# Patient Record
Sex: FEMALE | Race: BLACK OR AFRICAN AMERICAN | Employment: OTHER | URBAN - METROPOLITAN AREA
[De-identification: names, ages, dates, MRNs, and addresses within clinical notes are randomized per-mention and may not be internally consistent; named-entity substitution may affect disease eponyms.]

---

## 2017-01-03 ENCOUNTER — OFFICE VISIT (OUTPATIENT)
Dept: INTERNAL MEDICINE CLINIC | Age: 72
End: 2017-01-03

## 2017-01-03 VITALS
SYSTOLIC BLOOD PRESSURE: 136 MMHG | HEIGHT: 64 IN | TEMPERATURE: 97.7 F | WEIGHT: 213 LBS | DIASTOLIC BLOOD PRESSURE: 81 MMHG | HEART RATE: 86 BPM | OXYGEN SATURATION: 96 % | BODY MASS INDEX: 36.37 KG/M2 | RESPIRATION RATE: 18 BRPM

## 2017-01-03 DIAGNOSIS — E03.4 HYPOTHYROIDISM DUE TO ACQUIRED ATROPHY OF THYROID: ICD-10-CM

## 2017-01-03 DIAGNOSIS — D64.9 ANEMIA, UNSPECIFIED: ICD-10-CM

## 2017-01-03 DIAGNOSIS — I10 ESSENTIAL HYPERTENSION: Primary | ICD-10-CM

## 2017-01-03 DIAGNOSIS — E11.9 TYPE 2 DIABETES MELLITUS WITHOUT COMPLICATION, UNSPECIFIED LONG TERM INSULIN USE STATUS: ICD-10-CM

## 2017-01-03 DIAGNOSIS — B18.2 CHRONIC HEPATITIS C WITHOUT HEPATIC COMA (HCC): ICD-10-CM

## 2017-01-03 NOTE — PROGRESS NOTES
1. Have you been to the ER, urgent care clinic since your last visit? Hospitalized since your last visit?no  2. Have you seen or consulted any other health care providers outside of the [address] [place] [address] since your last visit? Include any pap smears or colon screening.  no

## 2017-01-03 NOTE — PROGRESS NOTES
SUBJECTIVE:   Ms. Fani Moser is a 80 y.o. female who is here for the following complaint, and also for follow up of routine medical issues. She had been seeing Dr. Mini Ly, and Dr. Sukhi Ho before that. Chief Complaint   Patient presents with    Hypertension    Follow-up     pt here today for 4 month routine f.u    Other     pt refused f/u shot       DM: 128 yesterday. Low 100s generally. No tingling other than the left 2nd toe--better. Fay Petar Her numbness L foot has improved. LBP is stable. \"I was told I have arthritis in the spine\" by Dr. Yisroel Denver, orthopedist.  She has been through treatment with Dr. Robin Erickson, now seeing someone else in his office, for Hep C, obtained during a prior blood transfusion. Seeing Nephrologist, Dr. Rosalinda Gamez. At this time, she is otherwise doing well and has brought no other complaints to my attention today. For a list of the medical issues addressed today, see the assessment and plan below. PMH:   Past Medical History   Diagnosis Date    Arthritis      spine    Cancer ([address]      kidney (right)    Chronic kidney disease      right kidney removed    Diabetes ([address]      She is controlling with weight loss    Diabetes mellitus ([address] 10/8/2014    Hepatitis C      hx hep C from blood transfusion per pt; Treated by Dr. Colbert Dakin Hyperlipidemia     Hypertension     Personal history of renal cancer 3/2/2015    Thyroid disease 1999     Thyroidectomy       Past Surgical History   Procedure Laterality Date    Pr breast surgery procedure unlisted  1996     breast reduction    Hx urological  1996     kidney removed on right    Hx other surgical  1999     thyroidectomy    Hx other surgical       colonoscopy x 2 - ?polyps    Hx colonoscopy  3/5/12     Repeat in 5 years (Dr. Robin Erickson)    Hx tonsillectomy       2or 4years old    Hx hysterectomy  19    Hx gyn  1980s     tubal pregnancy    Hx oophorectomy  1995       All: She is allergic to codeine.    Current Outpatient Prescriptions   Medication Sig    metFORMIN (GLUCOPHAGE) 500 mg tablet Take 1 Tab by mouth two (2) times daily (with meals).  amLODIPine (NORVASC) 5 mg tablet Take 5 mg by mouth daily.  valsartan (DIOVAN) 160 mg tablet Take  by mouth daily.  hydrochlorothiazide (HYDRODIURIL) 25 mg tablet Take 1 Tab by mouth daily as needed.  atorvastatin (LIPITOR) 20 mg tablet Take 1 Tab by mouth daily.  multivitamin with iron-mineral 0.8 mg cmpk Take 1 Packet by mouth daily.  lancets 30 gauge misc 1 Lancet by SubCUTAneous route daily.  levothyroxine (SYNTHROID) 50 mcg tablet TAKE ONE TABLET BY MOUTH EVERY DAY BEFORE  BREAKFAST    glucose blood VI test strips (BLOOD GLUCOSE TEST) strip One touch ultra glucose strips. Check blood sugar 2-3 times per week    acetaminophen (TYLENOL EXTRA STRENGTH) 500 mg tablet Take 500-1,000 mg by mouth every six (6) hours as needed for Pain.  cloNIDine (CATAPRES) 0.1 mg tablet Take 0.1 mg by mouth two (2) times a day.  Blood-Glucose Meter (ONE TOUCH ULTRAMINI) monitoring kit Check blood sugar daily    amLODIPine-valsartan (EXFORGE) 5-160 mg per tablet Take 1 Tab by mouth daily.  OT ULTRA/FASTTRACK CONTROL soln 1 Drop by Other route every seven (7) days. No current facility-administered medications for this visit. FH: Her family history includes Diabetes in her daughter, mother, and sister; Heart Disease in her father; Hypertension in her daughter and son; Kidney Disease in her father and mother; No Known Problems in her son. SH: Retired Lpn, doing private duty nursing. She reports that she has quit smoking. She has never used smokeless tobacco. She reports that she does not drink alcohol or use illicit drugs. ROS: See above; Complete ROS otherwise negative.      OBJECTIVE:   Vitals:   Visit Vitals    /81 (BP 1 Location: Left arm, BP Patient Position: Sitting)    Pulse 86    Temp 97.7 °F (36.5 °C) (Oral)    Resp 18    Ht 5' 4\" (1.626 m)    Wt 213 lb (96.6 kg)    SpO2 96%    BMI 36.56 kg/m2      Gen: Pleasant 80 y.o.  female in NAD. HEENT: PERRLA. EOMI. OP moist and pink. Neck: Supple. No LAD. HEART: RRR, No M/G/R.    LUNGS: CTAB No W/R. ABDOMEN: S, NT, ND, BS+. EXTREMITIES: Warm. No C/C/E.  MUSCULOSKELETAL: Normal ROM, muscle strength 5/5 all groups. NEURO: Alert and oriented x 3. Cranial nerves grossly intact. No focal sensory or motor deficits noted. SKIN: Warm. Dry. Keloid at site of prior thyroid surgery. Lab Results   Component Value Date/Time    Sodium 142 08/04/2016 11:06 AM    Potassium 4.5 08/04/2016 11:06 AM    Chloride 102 08/04/2016 11:06 AM    CO2 24 08/04/2016 11:06 AM    Anion gap 11 03/03/2013 04:25 PM    Glucose 128 08/04/2016 11:06 AM    BUN 16 08/04/2016 11:06 AM    Creatinine 1.09 08/04/2016 11:06 AM    BUN/Creatinine ratio 15 08/04/2016 11:06 AM    GFR est AA 59 08/04/2016 11:06 AM    GFR est non-AA 52 08/04/2016 11:06 AM    Calcium 9.4 08/04/2016 11:06 AM    Bilirubin, total 0.2 08/04/2016 11:06 AM    ALT 15 08/04/2016 11:06 AM    AST 19 08/04/2016 11:06 AM    Alk.  phosphatase 100 08/04/2016 11:06 AM    Protein, total 6.2 08/04/2016 11:06 AM    Albumin 3.8 08/04/2016 11:06 AM    Globulin 4.0 03/03/2013 04:25 PM    A-G Ratio 1.6 08/04/2016 11:06 AM       Lab Results   Component Value Date/Time    Cholesterol, total 147 08/04/2016 11:06 AM    HDL Cholesterol 61 08/04/2016 11:06 AM    LDL, calculated 61 08/04/2016 11:06 AM    Triglyceride 125 08/04/2016 11:06 AM    CHOL/HDL Ratio 3.7 12/29/2009 07:16 AM        Lab Results   Component Value Date/Time    Hemoglobin A1c 7.6 08/04/2016 11:06 AM       Lab Results   Component Value Date/Time    WBC 8.6 08/04/2016 11:06 AM    HGB 12.6 08/04/2016 11:06 AM    HCT 39.3 08/04/2016 11:06 AM    PLATELET 519 50/75/3276 11:06 AM    MCV 83 08/04/2016 11:06 AM       Lab Results   Component Value Date/Time    TSH 1.230 08/04/2016 11:06 AM    TSH 1.760 12/03/2015 08:53 AM         ASSESSMENT/ PLAN:   Diabetes: at last check, controlled. Continue current measures. Hypertension: Fine today. Thyroid disease: Euthyroid. Watch TSH. Chronic kidney disease:  Reviewed labs. Watch labs. Arthritis: Chronic and stable. Hyperlipidemia: Doing worse. Discussed her high risk of cardiovascular complications. Rx for lipitor 20. Hepatitis C: F/U with GI. Obesity: Diet and exercise. Weight down a bit; keep up the good work. Neuropathy L toe: ?Sciatica vs other compressive neuropathy. Doing better lately. Follow-up Disposition:  Return in about 4 months (around 5/3/2017) for HTN. I have reviewed the patient's medications and risks/side effects/benefits were discussed. Diagnosis(-es) explained to patient and questions answered. Literature provided where appropriate.

## 2017-01-03 NOTE — MR AVS SNAPSHOT
Visit Information Date & Time Provider Department Dept. Phone Encounter #  
 1/3/2017  2:00 PM Ginny Hernandez, [address] [place] [address],[address] [phone] [id number] Follow-up Instructions Return in about 4 months (around 5/3/2017) for HTN. Follow-up and Disposition History Upcoming Health Maintenance Date Due DTaP/Tdap/Td series (1 - Tdap) 3/7/1967 ZOSTER VACCINE AGE 60> 3/7/2006 COLONOSCOPY 3/12/2017 HEMOGLOBIN A1C Q6M 2/4/2017 FOOT EXAM Q1 4/7/2017 MEDICARE YEARLY EXAM 5/10/2017 MICROALBUMIN Q1 8/4/2017 LIPID PANEL Q1 8/4/2017 EYE EXAM RETINAL OR DILATED Q1 8/30/2017 BREAST CANCER SCRN MAMMOGRAM 4/11/2018 GLAUCOMA SCREENING Q2Y 8/30/2018 Allergies as of 1/3/2017  Review Complete On: 1/3/2017 By: Ginny Hernandez MD  
  
 Severity Noted Reaction Type Reactions Codeine  03/23/2011    Hives Current Immunizations  Reviewed on 5/9/2016 Name Date Influenza Vaccine Split 2/1/2012 PPD 2/1/2012 Pneumococcal Conjugate (PCV-13) 2/11/2015 Pneumococcal Vaccine (Unspecified Type) 2/1/2012 Not reviewed this visit You Were Diagnosed With   
  
 Codes Comments Essential hypertension    -  Primary ICD-10-CM: I10 
ICD-9-CM: 401.9 Type 2 diabetes mellitus without complication, unspecified long term insulin use status (HCC)     ICD-10-CM: E11.9 ICD-9-CM: 250.00 Hypothyroidism due to acquired atrophy of thyroid     ICD-10-CM: E03.4 ICD-9-CM: 244.8, 246.8 Anemia, unspecified     ICD-10-CM: D64.9 ICD-9-CM: 985. 9 Chronic hepatitis C without hepatic coma (HCC)     ICD-10-CM: B18.2 ICD-9-CM: 070.54 Vitals BP Pulse Temp Resp Height(growth percentile) Weight(growth percentile) 136/81 (BP 1 Location: Left arm, BP Patient Position: Sitting) 86 97.7 °F (36.5 °C) (Oral) 18 5' 4\" (1.626 m) 213 lb (96.6 kg) SpO2 BMI OB Status Smoking Status 96% 36.56 kg/m2 Hysterectomy Former Smoker Vitals History BMI and BSA Data Body Mass Index Body Surface Area  
 36.56 kg/m 2 2.09 m 2 Preferred Pharmacy Pharmacy Name Phone [facility] [address] [address] Your Updated Medication List  
  
   
This list is accurate as of: 1/3/17  3:00 PM.  Always use your most recent med list. amLODIPine 5 mg tablet Commonly known as:  Mercedes Pace Take 5 mg by mouth daily. amLODIPine-valsartan 5-160 mg per tablet Commonly known as:  Evins Ripa Take 1 Tab by mouth daily. atorvastatin 20 mg tablet Commonly known as:  LIPITOR Take 1 Tab by mouth daily. Blood-Glucose Meter monitoring kit Commonly known as:  Dawson Elwood Check blood sugar daily  
  
 cloNIDine HCl 0.1 mg tablet Commonly known as:  CATAPRES Take 0.1 mg by mouth two (2) times a day. glucose blood VI test strips strip Commonly known as:  blood glucose test  
One touch ultra glucose strips. Check blood sugar 2-3 times per week  
  
 hydroCHLOROthiazide 25 mg tablet Commonly known as:  HYDRODIURIL Take 1 Tab by mouth daily as needed. lancets 30 gauge Misc 1 Lancet by SubCUTAneous route daily. levothyroxine 50 mcg tablet Commonly known as:  SYNTHROID  
TAKE ONE TABLET BY MOUTH EVERY DAY BEFORE  BREAKFAST  
  
 metFORMIN 500 mg tablet Commonly known as:  GLUCOPHAGE Take 1 Tab by mouth two (2) times daily (with meals). multivitamin with iron-mineral 0.8 mg Cmpk Take 1 Packet by mouth daily. OT Ultra/FastTrack Control Soln Generic drug:  Blood Glucose Control, Normal  
1 Drop by Other route every seven (7) days. TYLENOL EXTRA STRENGTH 500 mg tablet Generic drug:  acetaminophen Take 500-1,000 mg by mouth every six (6) hours as needed for Pain.  
  
 valsartan 160 mg tablet Commonly known as:  DIOVAN Take  by mouth daily. Prescriptions Sent to Pharmacy Refills glucose blood VI test strips (BLOOD GLUCOSE TEST) strip 6 Sig: One touch ultra glucose strips. Check blood sugar 2-3 times per week Class: Normal  
 Pharmacy: [address], [place] N[place]ak  #: [phone] Follow-up Instructions Return in about 4 months (around 5/3/2017) for HTN. Introducing [facility] & HEALTH SERVICES! Dear Soraida Pérez: Thank you for requesting a [employer] account. Our records indicate that you already have an active [employer] account. You can access your account anytime at [email or web address] [email or web address] Did you know that you can access your hospital and ER discharge instructions at any time in [employer]? You can also review all of your test results from your hospital stay or ER visit. Additional Information If you have questions, please visit the Frequently Asked Questions section of the [employer] website at [email or web address] Remember, [employer] is NOT to be used for urgent needs. For medical emergencies, dial 911. Now available from your iPhone and Android! Please provide this summary of care documentation to your next provider. Your primary care clinician is listed as [place]. If you have any questions after today's visit, please call [phone].

## 2017-02-27 RX ORDER — LEVOTHYROXINE SODIUM 50 UG/1
TABLET ORAL
Qty: 90 TAB | Refills: 0 | Status: SHIPPED | OUTPATIENT
Start: 2017-02-27 | End: 2017-07-16 | Stop reason: SDUPTHER

## 2017-04-12 ENCOUNTER — HOSPITAL ENCOUNTER (OUTPATIENT)
Dept: MAMMOGRAPHY | Age: 72
Discharge: HOME | End: 2017-04-12
Attending: INTERNAL MEDICINE
Payer: MEDICARE

## 2017-04-12 DIAGNOSIS — Z12.31 VISIT FOR SCREENING MAMMOGRAM: ICD-10-CM

## 2017-04-12 PROCEDURE — 77067 SCR MAMMO BI INCL CAD: CPT

## 2017-05-04 ENCOUNTER — OFFICE VISIT (OUTPATIENT)
Dept: INTERNAL MEDICINE CLINIC | Age: 72
End: 2017-05-04

## 2017-05-04 VITALS
HEIGHT: 64 IN | DIASTOLIC BLOOD PRESSURE: 84 MMHG | TEMPERATURE: 97.6 F | WEIGHT: 210.6 LBS | OXYGEN SATURATION: 98 % | HEART RATE: 88 BPM | SYSTOLIC BLOOD PRESSURE: 134 MMHG | BODY MASS INDEX: 35.96 KG/M2 | RESPIRATION RATE: 16 BRPM

## 2017-05-04 DIAGNOSIS — I10 ESSENTIAL HYPERTENSION: ICD-10-CM

## 2017-05-04 DIAGNOSIS — E03.4 HYPOTHYROIDISM DUE TO ACQUIRED ATROPHY OF THYROID: ICD-10-CM

## 2017-05-04 DIAGNOSIS — D64.9 ANEMIA, UNSPECIFIED: ICD-10-CM

## 2017-05-04 DIAGNOSIS — Z00.00 ROUTINE GENERAL MEDICAL EXAMINATION AT A HEALTH CARE FACILITY: Primary | ICD-10-CM

## 2017-05-04 DIAGNOSIS — Z13.39 SCREENING FOR ALCOHOLISM: ICD-10-CM

## 2017-05-04 DIAGNOSIS — E11.9 TYPE 2 DIABETES MELLITUS WITHOUT COMPLICATION, UNSPECIFIED LONG TERM INSULIN USE STATUS: ICD-10-CM

## 2017-05-04 NOTE — MR AVS SNAPSHOT
Visit Information Date & Time Provider Department Dept. Phone Encounter #  
 5/4/2017  9:00 AM Bella Jimenez, [address] [id number] Follow-up Instructions Return in about 6 months (around 11/4/2017) for HTN, etc.  
 Follow-up and Disposition History Upcoming Health Maintenance Date Due DTaP/Tdap/Td series (1 - Tdap) 3/7/1967 ZOSTER VACCINE AGE 60> 3/7/2006 HEMOGLOBIN A1C Q6M 2/4/2017 COLONOSCOPY 3/12/2017 INFLUENZA AGE 9 TO ADULT 8/1/2017 MICROALBUMIN Q1 8/4/2017 LIPID PANEL Q1 8/4/2017 EYE EXAM RETINAL OR DILATED Q1 8/30/2017 FOOT EXAM Q1 5/4/2018 MEDICARE YEARLY EXAM 5/5/2018 GLAUCOMA SCREENING Q2Y 8/30/2018 BREAST CANCER SCRN MAMMOGRAM 4/12/2019 Allergies as of 5/4/2017  Review Complete On: 5/4/2017 By: Bella Jimenez MD  
  
 Severity Noted Reaction Type Reactions Codeine  03/23/2011    Hives Current Immunizations  Reviewed on 5/9/2016 Name Date Influenza Vaccine Split 2/1/2012 PPD 2/1/2012 Pneumococcal Conjugate (PCV-13) 2/11/2015 Pneumococcal Vaccine (Unspecified Type) 2/1/2012 Not reviewed this visit You Were Diagnosed With   
  
 Codes Comments Routine general medical examination at a health care facility    -  Primary ICD-10-CM: Z00.00 ICD-9-CM: V70.0 Type 2 diabetes mellitus without complication, unspecified long term insulin use status     ICD-10-CM: E11.9 ICD-9-CM: 250.00 Essential hypertension     ICD-10-CM: I10 
ICD-9-CM: 401.9 Hypothyroidism due to acquired atrophy of thyroid     ICD-10-CM: E03.4 ICD-9-CM: 244.8, 246.8 Anemia, unspecified     ICD-10-CM: D64.9 ICD-9-CM: 285.9 Screening for alcoholism     ICD-10-CM: Z13.89 ICD-9-CM: V79.1 Vitals BP Pulse Temp Resp Height(growth percentile) Weight(growth percentile)  134/84 (BP 1 Location: Left arm, BP Patient Position: Sitting) 88 97.6 °F (36.4 °C) (Oral) 16 5' 4\" (1.626 m) 210 lb 9.6 oz (95.5 kg) LMP SpO2 BMI OB Status Smoking Status (LMP Unknown) 98% 36.15 kg/m2 Hysterectomy Former Smoker Vitals History BMI and BSA Data Body Mass Index Body Surface Area  
 36.15 kg/m 2 2.08 m 2 Preferred Pharmacy Pharmacy Name Phone [facility] [address] [address] Your Updated Medication List  
  
   
This list is accurate as of: 5/4/17  9:37 AM.  Always use your most recent med list. amLODIPine 5 mg tablet Commonly known as:  Axel Alli Take 5 mg by mouth daily. amLODIPine-valsartan 5-160 mg per tablet Commonly known as:  Toney Parkinson Take 1 Tab by mouth daily. atorvastatin 20 mg tablet Commonly known as:  LIPITOR Take 1 Tab by mouth daily. Blood-Glucose Meter monitoring kit Commonly known as:  SIMTEKl Check blood sugar daily  
  
 cloNIDine HCl 0.1 mg tablet Commonly known as:  CATAPRES Take 0.1 mg by mouth two (2) times a day. diph,Pertuss(Acell),Tet Vac-PF 2 Lf-(2.5-5-3-5 mcg)-5Lf/0.5 mL susp Commonly known as:  ADACEL  
0.5 mL by IntraMUSCular route once for 1 dose. glucose blood VI test strips strip Commonly known as:  blood glucose test  
One touch ultra glucose strips. Check blood sugar 2-3 times per week  
  
 hydroCHLOROthiazide 25 mg tablet Commonly known as:  HYDRODIURIL Take 1 Tab by mouth daily as needed. lancets 30 gauge Misc 1 Lancet by SubCUTAneous route daily. levothyroxine 50 mcg tablet Commonly known as:  SYNTHROID  
TAKE ONE TABLET BY MOUTH ONCE DAILY BEFORE BREAKFAST  
  
 metFORMIN 500 mg tablet Commonly known as:  GLUCOPHAGE Take 1 Tab by mouth two (2) times daily (with meals). multivitamin with iron-mineral 0.8 mg Cmpk Take 1 Packet by mouth daily. OT Ultra/FastTrack Control Soln Generic drug:  Blood Glucose Control, Normal  
 1 Drop by Other route every seven (7) days. TYLENOL EXTRA STRENGTH 500 mg tablet Generic drug:  acetaminophen Take 500-1,000 mg by mouth every six (6) hours as needed for Pain.  
  
 valsartan 160 mg tablet Commonly known as:  DIOVAN Take  by mouth daily. varicella zoster vacine live 19,400 unit/0.65 mL Susr injection Commonly known as:  ZOSTAVAX  
1 Vial by SubCUTAneous route once for 1 dose. Prescriptions Printed Refills  
 varicella zoster vacine live (ZOSTAVAX) 19,400 unit/0.65 mL susr injection 0 Si Vial by SubCUTAneous route once for 1 dose. Class: Print Route: SubCUTAneous diph,Pertuss,Acell,,Tet Vac-PF (ADACEL) 2 Lf-(2.5-5-3-5 mcg)-5Lf/0.5 mL susp 0 Si.5 mL by IntraMUSCular route once for 1 dose. Class: Print Route: IntraMUSCular We Performed the Following CBC WITH AUTOMATED DIFF [61903 CPT(R)] HEMOGLOBIN A1C WITH EAG [66704 CPT(R)]  DIABETES FOOT EXAM [7 Custom] LIPID PANEL [51641 CPT(R)] METABOLIC PANEL, COMPREHENSIVE [91447 CPT(R)] MICROALBUMIN, UR, RAND L340227 CPT(R)] T4, FREE D0960697 CPT(R)] TSH 3RD GENERATION [80505 CPT(R)] Follow-up Instructions Return in about 6 months (around 2017) for HTN, etc.  
  
  
Patient Instructions Medicare Part B Preventive Services Limitations Recommendation Scheduled Bone Mass Measurement 
(age 72 & older, biennial) Requires diagnosis related to osteoporosis or estrogen deficiency. Biennial benefit unless patient has history of long-term glucocorticoid tx or baseline is needed because initial test was by other method Cardiovascular Screening Blood Tests (every 5 years) Total cholesterol, HDL, Triglycerides Order as a panel if possible Colorectal Cancer Screening 
-Fecal occult blood test (annual) -Flexible sigmoidoscopy (5y) 
-Screening colonoscopy (10y) -Barium Enema Counseling to Prevent Tobacco Use (up to 8 sessions per year) - Counseling greater than 3 and up to 10 minutes - Counseling greater than 10 minutes Patients must be asymptomatic of tobacco-related conditions to receive as preventive service Diabetes Screening Tests (at least every 3 years, Medicare covers annually or at 6-month intervals for prediabetic patients) Fasting blood sugar (FBS) or glucose tolerance test (GTT) Patient must be diagnosed with one of the following: 
-Hypertension, Dyslipidemia, obesity, previous impaired FBS or GTT 
Or any two of the following: overweight, FH of diabetes, age ? 72, history of gestational diabetes, birth of baby weighing more than 9 pounds Diabetes Self-Management Training (DSMT) (no USPSTF recommendation) Requires referral by treating physician for patient with diabetes or renal disease. 10 hours of initial DSMT session of no less than 30 minutes each in a continuous 12-month period. 2 hours of follow-up DSMT in subsequent years. Glaucoma Screening (no USPSTF recommendation) Diabetes mellitus, family history, , age 48 or over,  American, age 72 or over Human Immunodeficiency Virus (HIV) Screening (annually for increased risk patients) HIV-1 and HIV-2 by EIA, RENAN, rapid antibody test, or oral mucosa transudate Patient must be at increased risk for HIV infection per USPSTF guidelines or pregnant. Tests covered annually for patients at increased risk. Pregnant patients may receive up to 3 test during pregnancy. Medical Nutrition Therapy (MNT) (for diabetes or renal disease not recommended schedule) Requires referral by treating physician for patient with diabetes or renal disease. Can be provided in same year as diabetes self-management training (DSMT), and CMS recommends medical nutrition therapy take place after DSMT. Up to 3 hours for initial year and 2 hours in subsequent years. Shingles Vaccination A shingles vaccine is also recommended once in a lifetime after age 61 Seasonal Influenza Vaccination (annually) Pneumococcal Vaccination (once after 65) Hepatitis B Vaccinations (if medium/high risk) Medium/high risk factors:  End-stage renal disease, Hemophiliacs who received Factor VIII or IX concentrates, Clients of institutions for the mentally retarded, Persons who live in the same house as a HepB virus carrier, Homosexual men, Illicit injectable drug abusers. Screening Mammography (biennial age 54-69) Annually (age 36 or over) Screening Pap Tests and Pelvic Examination (up to age 79 and after 79 if unknown history or abnormal study last 10 years) Every 24 months except high risk Ultrasound Screening for Abdominal Aortic Aneurysm (AAA) (once) Patient must be referred through IPPE and not have had a screening for abdominal aortic aneurysm before under Medicare. Limited to patients who meet one of the following criteria: 
- Men who are 73-68 years old and have smoked more than 100 cigarettes in their lifetime. 
-Anyone with a FH of AAA 
-Anyone recommended for screening by USPSTF Introducing [facility] SERVICES! Dear Katie Agarwal: Thank you for requesting a [employer] account. Our records indicate that you already have an active [employer] account. You can access your account anytime at [email or web address] Did you know that you can access your hospital and ER discharge instructions at any time in [employer]? You can also review all of your test results from your hospital stay or ER visit. Additional Information If you have questions, please visit the Frequently Asked Questions section of the [employer] website at [email or web address] Remember, [employer] is NOT to be used for urgent needs. For medical emergencies, dial 911. Now available from your iPhone and Android! Please provide this summary of care documentation to your next provider. Your primary care clinician is listed as [place]. If you have any questions after today's visit, please call [phone].

## 2017-05-04 NOTE — PROGRESS NOTES
1. Have you been to the ER, urgent care clinic since your last visit? Hospitalized since your last visit?no    2. Have you seen or consulted any other health care providers outside of the [address] [place] [address] since your last visit? Include any pap smears or colon screening.  no

## 2017-05-04 NOTE — PATIENT INSTRUCTIONS
Medicare Part B Preventive Services Limitations Recommendation Scheduled   Bone Mass Measurement  (age 72 & older, biennial) Requires diagnosis related to osteoporosis or estrogen deficiency. Biennial benefit unless patient has history of long-term glucocorticoid tx or baseline is needed because initial test was by other method     Cardiovascular Screening Blood Tests (every 5 years)  Total cholesterol, HDL, Triglycerides Order as a panel if possible     Colorectal Cancer Screening  -Fecal occult blood test (annual)  -Flexible sigmoidoscopy (5y)  -Screening colonoscopy (10y)  -Barium Enema      Counseling to Prevent Tobacco Use (up to 8 sessions per year)  - Counseling greater than 3 and up to 10 minutes  - Counseling greater than 10 minutes Patients must be asymptomatic of tobacco-related conditions to receive as preventive service     Diabetes Screening Tests (at least every 3 years, Medicare covers annually or at 6-month intervals for prediabetic patients)    Fasting blood sugar (FBS) or glucose tolerance test (GTT) Patient must be diagnosed with one of the following:  -Hypertension, Dyslipidemia, obesity, previous impaired FBS or GTT  Or any two of the following: overweight, FH of diabetes, age ? 72, history of gestational diabetes, birth of baby weighing more than 9 pounds     Diabetes Self-Management Training (DSMT) (no USPSTF recommendation) Requires referral by treating physician for patient with diabetes or renal disease. 10 hours of initial DSMT session of no less than 30 minutes each in a continuous 12-month period. 2 hours of follow-up DSMT in subsequent years.      Glaucoma Screening (no USPSTF recommendation) Diabetes mellitus, family history, , age 48 or over,  American, age 72 or over     Human Immunodeficiency Virus (HIV) Screening (annually for increased risk patients)  HIV-1 and HIV-2 by EIA, RENAN, rapid antibody test, or oral mucosa transudate Patient must be at increased risk for HIV infection per USPSTF guidelines or pregnant. Tests covered annually for patients at increased risk. Pregnant patients may receive up to 3 test during pregnancy. Medical Nutrition Therapy (MNT) (for diabetes or renal disease not recommended schedule) Requires referral by treating physician for patient with diabetes or renal disease. Can be provided in same year as diabetes self-management training (DSMT), and CMS recommends medical nutrition therapy take place after DSMT. Up to 3 hours for initial year and 2 hours in subsequent years. Shingles Vaccination A shingles vaccine is also recommended once in a lifetime after age 61     Seasonal Influenza Vaccination (annually)      Pneumococcal Vaccination (once after 72)      Hepatitis B Vaccinations (if medium/high risk) Medium/high risk factors:  End-stage renal disease,  Hemophiliacs who received Factor VIII or IX concentrates, Clients of institutions for the mentally retarded, Persons who live in the same house as a HepB virus carrier, Homosexual men, Illicit injectable drug abusers. Screening Mammography (biennial age 54-69) Annually (age 36 or over)     Screening Pap Tests and Pelvic Examination (up to age 79 and after 79 if unknown history or abnormal study last 10 years) Every 25 months except high risk     Ultrasound Screening for Abdominal Aortic Aneurysm (AAA) (once) Patient must be referred through [facility] and not have had a screening for abdominal aortic aneurysm before under Medicare.   Limited to patients who meet one of the following criteria:  - Men who are 73-68 years old and have smoked more than 100 cigarettes in their lifetime.  -Anyone with a FH of AAA  -Anyone recommended for screening by USPSTF

## 2017-05-04 NOTE — PROGRESS NOTES
SUBJECTIVE:   Ms. Tae Mendez is a 71 y.o. female who is here for the following complaint, and also for follow up of routine medical issues. She had been seeing Dr. Raffi Ramey, and Dr. Kiara Moon before that. Chief Complaint   Patient presents with    Hypertension    Follow-up     pt here today for 6 month f.u and Wellness        She got the flu in January. She is a little more tired lately. DM: 184 yesterday. Low 100s generally. Her numbness L foot has improved. LBP is stable. \"I was told I have arthritis in the spine\" by Dr. Hiwot Lawson, orthopedist.  She has been through treatment with Dr. Cecy Fairbanks, now seeing someone else in his office, for Hep C, obtained during a prior blood transfusion. Seeing Nephrologist, Dr. Dionne Chahal. At this time, she is otherwise doing well and has brought no other complaints to my attention today. For a list of the medical issues addressed today, see the assessment and plan below. PMH:   Past Medical History:   Diagnosis Date    Arthritis     spine    Cancer ([facility] [address]     kidney (right)    Chronic kidney disease     right kidney removed    Diabetes ([address]     She is controlling with weight loss    Diabetes mellitus ([address] 10/8/2014    Hepatitis C     hx hep C from blood transfusion per pt; Treated by Dr. Jalil Garcia Hyperlipidemia     Hypertension     Personal history of renal cancer 3/2/2015    Thyroid disease 1999    Thyroidectomy       Past Surgical History:   Procedure Laterality Date    BREAST SURGERY PROCEDURE UNLISTED  1996    breast reduction    HX BREAST REDUCTION Bilateral 1996    HX COLONOSCOPY  3/5/12    Repeat in 5 years (Dr. Cecy Fairbanks)    HX GYN  1980s    tubal pregnancy    HX HYSTERECTOMY  1995    HX [address] HX OTHER SURGICAL  1999    thyroidectomy    HX OTHER SURGICAL      colonoscopy x 2 - ?polyps    HX TONSILLECTOMY      2or 4years old   [address]    kidney removed on right       All: She is allergic to codeine. Current Outpatient Prescriptions   Medication Sig    levothyroxine (SYNTHROID) 50 mcg tablet TAKE ONE TABLET BY MOUTH ONCE DAILY BEFORE BREAKFAST    glucose blood VI test strips (BLOOD GLUCOSE TEST) strip One touch ultra glucose strips. Check blood sugar 2-3 times per week    metFORMIN (GLUCOPHAGE) 500 mg tablet Take 1 Tab by mouth two (2) times daily (with meals).  amLODIPine (NORVASC) 5 mg tablet Take 5 mg by mouth daily.  valsartan (DIOVAN) 160 mg tablet Take  by mouth daily.  hydrochlorothiazide (HYDRODIURIL) 25 mg tablet Take 1 Tab by mouth daily as needed.  atorvastatin (LIPITOR) 20 mg tablet Take 1 Tab by mouth daily.  multivitamin with iron-mineral 0.8 mg cmpk Take 1 Packet by mouth daily.  lancets 30 gauge misc 1 Lancet by SubCUTAneous route daily.  acetaminophen (TYLENOL EXTRA STRENGTH) 500 mg tablet Take 500-1,000 mg by mouth every six (6) hours as needed for Pain.  cloNIDine (CATAPRES) 0.1 mg tablet Take 0.1 mg by mouth two (2) times a day.  Blood-Glucose Meter (ONE TOUCH ULTRAMINI) monitoring kit Check blood sugar daily    amLODIPine-valsartan (EXFORGE) 5-160 mg per tablet Take 1 Tab by mouth daily.  OT ULTRA/FASTTRACK CONTROL soln 1 Drop by Other route every seven (7) days. No current facility-administered medications for this visit. FH: Her family history includes Diabetes in her daughter, mother, and sister; Heart Disease in her father; Hypertension in her daughter and son; Kidney Disease in her father and mother; No Known Problems in her son. SH: Retired Lpn, doing private duty nursing. She reports that she has quit smoking. She has never used smokeless tobacco. She reports that she does not drink alcohol or use illicit drugs. ROS: See above; Complete ROS otherwise negative.      OBJECTIVE:   Vitals:   Visit Vitals    /84 (BP 1 Location: Left arm, BP Patient Position: Sitting)    Pulse 88    Temp 97.6 °F (36.4 °C) (Oral)    Resp 16    Ht 5' 4\" (1.626 m)    Wt 210 lb 9.6 oz (95.5 kg)    LMP  (LMP Unknown)    SpO2 98%    BMI 36.15 kg/m2      Gen: Pleasant 71 y.o.  female in NAD. HEENT: PERRLA. EOMI. OP moist and pink. Neck: Supple. No LAD. HEART: RRR, No M/G/R.    LUNGS: CTAB No W/R. ABDOMEN: S, NT, ND, BS+. EXTREMITIES: Warm. No C/C/E.  MUSCULOSKELETAL: Normal ROM, muscle strength 5/5 all groups. NEURO: Alert and oriented x 3. Cranial nerves grossly intact. No focal sensory or motor deficits noted. SKIN: Warm. Dry. Keloid at site of prior thyroid surgery. Lab Results   Component Value Date/Time    Sodium 142 08/04/2016 11:06 AM    Potassium 4.5 08/04/2016 11:06 AM    Chloride 102 08/04/2016 11:06 AM    CO2 24 08/04/2016 11:06 AM    Anion gap 11 03/03/2013 04:25 PM    Glucose 128 08/04/2016 11:06 AM    BUN 16 08/04/2016 11:06 AM    Creatinine 1.09 08/04/2016 11:06 AM    BUN/Creatinine ratio 15 08/04/2016 11:06 AM    GFR est AA 59 08/04/2016 11:06 AM    GFR est non-AA 52 08/04/2016 11:06 AM    Calcium 9.4 08/04/2016 11:06 AM    Bilirubin, total 0.2 08/04/2016 11:06 AM    ALT (SGPT) 15 08/04/2016 11:06 AM    AST (SGOT) 19 08/04/2016 11:06 AM    Alk.  phosphatase 100 08/04/2016 11:06 AM    Protein, total 6.2 08/04/2016 11:06 AM    Albumin 3.8 08/04/2016 11:06 AM    Globulin 4.0 03/03/2013 04:25 PM    A-G Ratio 1.6 08/04/2016 11:06 AM       Lab Results   Component Value Date/Time    Cholesterol, total 147 08/04/2016 11:06 AM    HDL Cholesterol 61 08/04/2016 11:06 AM    LDL, calculated 61 08/04/2016 11:06 AM    Triglyceride 125 08/04/2016 11:06 AM    CHOL/HDL Ratio 3.7 12/29/2009 07:16 AM        Lab Results   Component Value Date/Time    Hemoglobin A1c 7.6 08/04/2016 11:06 AM       Lab Results   Component Value Date/Time    WBC 8.6 08/04/2016 11:06 AM    HGB 12.6 08/04/2016 11:06 AM    HCT 39.3 08/04/2016 11:06 AM    PLATELET 069 64/50/5759 11:06 AM    MCV 83 08/04/2016 11:06 AM       Lab Results   Component Value Date/Time    TSH 1.230 08/04/2016 11:06 AM         ASSESSMENT/ PLAN:   Diabetes: at last check, not quite controlled. Continue current measures. Hypertension: Fine today. Thyroid disease: Euthyroid. Watch TSH. Chronic kidney disease:  Reviewed labs. Watch labs. Arthritis: Chronic and stable. Hyperlipidemia: Doing worse. Discussed her high risk of cardiovascular complications. Rx for lipitor 20. Hepatitis C: F/U with GI. Obesity: Diet and exercise. Weight down a bit; keep up the good work. Neuropathy L toe: ?Sciatica vs other compressive neuropathy. Doing better lately. Follow-up Disposition:  Return in about 6 months (around 11/4/2017) for HTN, etc.    I have reviewed the patient's medications and risks/side effects/benefits were discussed. Diagnosis(-es) explained to patient and questions answered. Literature provided where appropriate. This is a Subsequent Medicare Annual Wellness Visit providing Personalized Prevention Plan Services (PPPS) (Performed 12 months after initial AWV and PPPS )    I have reviewed the patient's medical history in detail and updated the computerized patient record.      History     Past Medical History:   Diagnosis Date    Arthritis     spine    Cancer ([facility] [address]     kidney (right)    Chronic kidney disease     right kidney removed    Diabetes ([address]     She is controlling with weight loss    Diabetes mellitus ([facility] [address] 10/8/2014    Hepatitis C     hx hep C from blood transfusion per pt; Treated by Dr. Jennifer Roberts Hyperlipidemia     Hypertension     Personal history of renal cancer 3/2/2015    Thyroid disease 1999    Thyroidectomy      Past Surgical History:   Procedure Laterality Date    BREAST SURGERY PROCEDURE UNLISTED  1996    breast reduction    HX BREAST REDUCTION Bilateral 1996    HX COLONOSCOPY  3/5/12    Repeat in 5 years (Dr. Shepherd Daily)    HX GYN  1980s    tubal pregnancy    HX HYSTERECTOMY  1995    HX [address] HX OTHER SURGICAL  1999    thyroidectomy    HX OTHER SURGICAL      colonoscopy x 2 - ?polyps    HX TONSILLECTOMY      2or 4years old   [facility] [address]    kidney removed on right     Current Outpatient Prescriptions   Medication Sig Dispense Refill    levothyroxine (SYNTHROID) 50 mcg tablet TAKE ONE TABLET BY MOUTH ONCE DAILY BEFORE BREAKFAST 90 Tab 0    glucose blood VI test strips (BLOOD GLUCOSE TEST) strip One touch ultra glucose strips. Check blood sugar 2-3 times per week 50 Strip 6    metFORMIN (GLUCOPHAGE) 500 mg tablet Take 1 Tab by mouth two (2) times daily (with meals). 180 Tab 3    amLODIPine (NORVASC) 5 mg tablet Take 5 mg by mouth daily.  valsartan (DIOVAN) 160 mg tablet Take  by mouth daily.  hydrochlorothiazide (HYDRODIURIL) 25 mg tablet Take 1 Tab by mouth daily as needed. 30 Tab 5    atorvastatin (LIPITOR) 20 mg tablet Take 1 Tab by mouth daily. 90 Tab 3    multivitamin with iron-mineral 0.8 mg cmpk Take 1 Packet by mouth daily.  lancets 30 gauge misc 1 Lancet by SubCUTAneous route daily.  acetaminophen (TYLENOL EXTRA STRENGTH) 500 mg tablet Take 500-1,000 mg by mouth every six (6) hours as needed for Pain.  cloNIDine (CATAPRES) 0.1 mg tablet Take 0.1 mg by mouth two (2) times a day.  Blood-Glucose Meter (ONE TOUCH ULTRAMINI) monitoring kit Check blood sugar daily 1 Kit 0    amLODIPine-valsartan (EXFORGE) 5-160 mg per tablet Take 1 Tab by mouth daily. 90 Tab 3    OT ULTRA/FASTTRACK CONTROL soln 1 Drop by Other route every seven (7) days.        Allergies   Allergen Reactions    Codeine Hives     Family History   Problem Relation Age of Onset    Diabetes Mother     Kidney Disease Mother     Heart Disease Father     Kidney Disease Father      dialysis    Diabetes Sister     Diabetes Daughter     Hypertension Daughter     Hypertension Son     No Known Problems Son      Social History   Substance Use Topics    Smoking status: Former Smoker    Smokeless tobacco: Never Used      Comment: quit smoking 30-40 yrs ago    Alcohol use No     Patient Active Problem List   Diagnosis Code    Hypertension I10    Hepatitis C B19.20    Renal cancer ([facility] [address] C64.9    Hypothyroid E03.9    Anemia, unspecified D64.9    S/p nephrectomy, right Z90.5    Glucose intolerance (impaired glucose tolerance) R73.02    Diabetes mellitus (HCC) E11.9    History of hysterectomy including cervix Z90.710    History of bilateral salpingo-oophorectomy Z90.722    Personal history of renal cancer Z85.528    Bartholin cyst N75.0       Depression Risk Factor Screening:     PHQ over the last two weeks 5/4/2017   Little interest or pleasure in doing things Several days   Feeling down, depressed or hopeless Several days   Total Score PHQ 2 2     Alcohol Risk Factor Screening: On any occasion during the past 3 months, have you had more than 3 drinks containing alcohol? No    Do you average more than 7 drinks per week? No      Functional Ability and Level of Safety:     Hearing Loss   none    Activities of Daily Living   Self-care. Requires assistance with: no ADLs    Fall Risk     Fall Risk Assessment, last 12 mths 5/4/2017   Able to walk? Yes   Fall in past 12 months? No     Abuse Screen   Patient is not abused    Review of Systems   A comprehensive review of systems was negative except for that written in the HPI. Physical Examination     Evaluation of Cognitive Function:  Mood/affect:  happy  Appearance: age appropriate      Visit Vitals    /84 (BP 1 Location: Left arm, BP Patient Position: Sitting)    Pulse 88    Temp 97.6 °F (36.4 °C) (Oral)    Resp 16    Ht 5' 4\" (1.626 m)    Wt 210 lb 9.6 oz (95.5 kg)    LMP  (LMP Unknown)    SpO2 98%    BMI 36.15 kg/m2     Gen: Pleasant 71 y.o.  female in NAD.   HEENT: PERRLA. EOMI. OP moist and pink.  Neck: Supple.  No LAD.  HEART: RRR, No M/G/R.   LUNGS: CTAB No W/R.   ABDOMEN: S, NT, ND, BS+.   EXTREMITIES: Warm.  No C/C/E. MUSCULOSKELETAL: Normal ROM, muscle strength 5/5 all groups. NEURO: Alert and oriented x 3.  Cranial nerves grossly intact.  No focal sensory or motor deficits noted. SKIN: Warm. Dry. No rashes or other lesions noted. Patient Care Team:  Junior López MD as PCP - General (Internal Medicine)  Marisa Paz MD (Nephrology)  Yumiko Anaya as Consulting Provider (Optometry)  Carrie Mcneal. Yusuf Rodriguez NP (Gastroenterology)    Advice/Referrals/Counseling   Education and counseling provided:  Are appropriate based on today's review and evaluation      Assessment/Plan   current treatment plan is effective, no change in therapy.

## 2017-05-05 LAB
ALBUMIN SERPL-MCNC: 4.1 G/DL (ref 3.5–4.8)
ALBUMIN/GLOB SERPL: 1.6 {RATIO} (ref 1.2–2.2)
ALP SERPL-CCNC: 94 IU/L (ref 39–117)
ALT SERPL-CCNC: 16 IU/L (ref 0–32)
AST SERPL-CCNC: 17 IU/L (ref 0–40)
BASOPHILS # BLD AUTO: 0 X10E3/UL (ref 0–0.2)
BASOPHILS NFR BLD AUTO: 0 %
BILIRUB SERPL-MCNC: 0.3 MG/DL (ref 0–1.2)
BUN SERPL-MCNC: 21 MG/DL (ref 8–27)
BUN/CREAT SERPL: 16 (ref 12–28)
CALCIUM SERPL-MCNC: 9.5 MG/DL (ref 8.7–10.3)
CHLORIDE SERPL-SCNC: 105 MMOL/L (ref 96–106)
CHOLEST SERPL-MCNC: 194 MG/DL (ref 100–199)
CO2 SERPL-SCNC: 19 MMOL/L (ref 18–29)
CREAT SERPL-MCNC: 1.32 MG/DL (ref 0.57–1)
EOSINOPHIL # BLD AUTO: 0.1 X10E3/UL (ref 0–0.4)
EOSINOPHIL NFR BLD AUTO: 1 %
ERYTHROCYTE [DISTWIDTH] IN BLOOD BY AUTOMATED COUNT: 16.3 % (ref 12.3–15.4)
EST. AVERAGE GLUCOSE BLD GHB EST-MCNC: 174 MG/DL
GLOBULIN SER CALC-MCNC: 2.5 G/DL (ref 1.5–4.5)
GLUCOSE SERPL-MCNC: 155 MG/DL (ref 65–99)
HBA1C MFR BLD: 7.7 % (ref 4.8–5.6)
HCT VFR BLD AUTO: 39 % (ref 34–46.6)
HDLC SERPL-MCNC: 60 MG/DL
HGB BLD-MCNC: 12.7 G/DL (ref 11.1–15.9)
IMM GRANULOCYTES # BLD: 0 X10E3/UL (ref 0–0.1)
IMM GRANULOCYTES NFR BLD: 0 %
LDLC SERPL CALC-MCNC: 107 MG/DL (ref 0–99)
LYMPHOCYTES # BLD AUTO: 3.3 X10E3/UL (ref 0.7–3.1)
LYMPHOCYTES NFR BLD AUTO: 36 %
MCH RBC QN AUTO: 27.2 PG (ref 26.6–33)
MCHC RBC AUTO-ENTMCNC: 32.6 G/DL (ref 31.5–35.7)
MCV RBC AUTO: 84 FL (ref 79–97)
MICROALBUMIN UR-MCNC: 2213.7 UG/ML
MONOCYTES # BLD AUTO: 0.6 X10E3/UL (ref 0.1–0.9)
MONOCYTES NFR BLD AUTO: 7 %
NEUTROPHILS # BLD AUTO: 5.2 X10E3/UL (ref 1.4–7)
NEUTROPHILS NFR BLD AUTO: 56 %
PLATELET # BLD AUTO: 228 X10E3/UL (ref 150–379)
POTASSIUM SERPL-SCNC: 4.2 MMOL/L (ref 3.5–5.2)
PROT SERPL-MCNC: 6.6 G/DL (ref 6–8.5)
RBC # BLD AUTO: 4.67 X10E6/UL (ref 3.77–5.28)
SODIUM SERPL-SCNC: 144 MMOL/L (ref 134–144)
T4 FREE SERPL-MCNC: 1.25 NG/DL (ref 0.82–1.77)
TRIGL SERPL-MCNC: 136 MG/DL (ref 0–149)
TSH SERPL DL<=0.005 MIU/L-ACNC: 1.12 UIU/ML (ref 0.45–4.5)
VLDLC SERPL CALC-MCNC: 27 MG/DL (ref 5–40)
WBC # BLD AUTO: 9.2 X10E3/UL (ref 3.4–10.8)

## 2017-05-08 ENCOUNTER — TELEPHONE (OUTPATIENT)
Dept: INTERNAL MEDICINE CLINIC | Age: 72
End: 2017-05-08

## 2017-06-20 ENCOUNTER — ANESTHESIA (OUTPATIENT)
Dept: ENDOSCOPY | Age: 72
End: 2017-06-20
Payer: MEDICARE

## 2017-06-20 ENCOUNTER — HOSPITAL ENCOUNTER (OUTPATIENT)
Age: 72
Setting detail: OUTPATIENT SURGERY
Discharge: HOME | End: 2017-06-20
Attending: INTERNAL MEDICINE | Admitting: INTERNAL MEDICINE
Payer: MEDICARE

## 2017-06-20 ENCOUNTER — ANESTHESIA EVENT (OUTPATIENT)
Dept: ENDOSCOPY | Age: 72
End: 2017-06-20
Payer: MEDICARE

## 2017-06-20 VITALS
OXYGEN SATURATION: 99 % | WEIGHT: 199 LBS | HEART RATE: 77 BPM | TEMPERATURE: 98.1 F | SYSTOLIC BLOOD PRESSURE: 108 MMHG | BODY MASS INDEX: 33.97 KG/M2 | DIASTOLIC BLOOD PRESSURE: 69 MMHG | RESPIRATION RATE: 21 BRPM | HEIGHT: 64 IN

## 2017-06-20 PROCEDURE — 74011000250 HC RX REV CODE- 250

## 2017-06-20 PROCEDURE — 74011250636 HC RX REV CODE- 250/636

## 2017-06-20 PROCEDURE — 88305 TISSUE EXAM BY PATHOLOGIST: CPT | Performed by: INTERNAL MEDICINE

## 2017-06-20 PROCEDURE — 77030009426 HC FCPS BIOP ENDOSC BSC -B: Performed by: INTERNAL MEDICINE

## 2017-06-20 PROCEDURE — 77030027957 HC TBNG IRR ENDOGTR BUSS -B: Performed by: INTERNAL MEDICINE

## 2017-06-20 PROCEDURE — 74011250636 HC RX REV CODE- 250/636: Performed by: INTERNAL MEDICINE

## 2017-06-20 PROCEDURE — 76040000019: Performed by: INTERNAL MEDICINE

## 2017-06-20 PROCEDURE — 76060000031 HC ANESTHESIA FIRST 0.5 HR: Performed by: INTERNAL MEDICINE

## 2017-06-20 PROCEDURE — 74011250637 HC RX REV CODE- 250/637: Performed by: INTERNAL MEDICINE

## 2017-06-20 RX ORDER — DEXTROMETHORPHAN/PSEUDOEPHED 2.5-7.5/.8
1.2 DROPS ORAL
Status: DISCONTINUED | OUTPATIENT
Start: 2017-06-20 | End: 2017-06-20 | Stop reason: HOSPADM

## 2017-06-20 RX ORDER — MIDAZOLAM HYDROCHLORIDE 1 MG/ML
.25-1 INJECTION, SOLUTION INTRAMUSCULAR; INTRAVENOUS
Status: DISCONTINUED | OUTPATIENT
Start: 2017-06-20 | End: 2017-06-20 | Stop reason: HOSPADM

## 2017-06-20 RX ORDER — SODIUM CHLORIDE 9 MG/ML
INJECTION, SOLUTION INTRAVENOUS
Status: DISCONTINUED | OUTPATIENT
Start: 2017-06-20 | End: 2017-06-20 | Stop reason: HOSPADM

## 2017-06-20 RX ORDER — EPINEPHRINE 0.1 MG/ML
1 INJECTION INTRACARDIAC; INTRAVENOUS
Status: DISCONTINUED | OUTPATIENT
Start: 2017-06-20 | End: 2017-06-20 | Stop reason: HOSPADM

## 2017-06-20 RX ORDER — FLUMAZENIL 0.1 MG/ML
0.2 INJECTION INTRAVENOUS
Status: DISCONTINUED | OUTPATIENT
Start: 2017-06-20 | End: 2017-06-20 | Stop reason: HOSPADM

## 2017-06-20 RX ORDER — SODIUM CHLORIDE 0.9 % (FLUSH) 0.9 %
5-10 SYRINGE (ML) INJECTION AS NEEDED
Status: DISCONTINUED | OUTPATIENT
Start: 2017-06-20 | End: 2017-06-20 | Stop reason: HOSPADM

## 2017-06-20 RX ORDER — FENTANYL CITRATE 50 UG/ML
100 INJECTION, SOLUTION INTRAMUSCULAR; INTRAVENOUS
Status: DISCONTINUED | OUTPATIENT
Start: 2017-06-20 | End: 2017-06-20 | Stop reason: HOSPADM

## 2017-06-20 RX ORDER — ATROPINE SULFATE 0.1 MG/ML
0.5 INJECTION INTRAVENOUS
Status: DISCONTINUED | OUTPATIENT
Start: 2017-06-20 | End: 2017-06-20 | Stop reason: HOSPADM

## 2017-06-20 RX ORDER — SODIUM CHLORIDE 0.9 % (FLUSH) 0.9 %
5-10 SYRINGE (ML) INJECTION EVERY 8 HOURS
Status: DISCONTINUED | OUTPATIENT
Start: 2017-06-20 | End: 2017-06-20 | Stop reason: HOSPADM

## 2017-06-20 RX ORDER — PROPOFOL 10 MG/ML
INJECTION, EMULSION INTRAVENOUS AS NEEDED
Status: DISCONTINUED | OUTPATIENT
Start: 2017-06-20 | End: 2017-06-20 | Stop reason: HOSPADM

## 2017-06-20 RX ORDER — SODIUM CHLORIDE 9 MG/ML
100 INJECTION, SOLUTION INTRAVENOUS CONTINUOUS
Status: DISCONTINUED | OUTPATIENT
Start: 2017-06-20 | End: 2017-06-20 | Stop reason: HOSPADM

## 2017-06-20 RX ORDER — DIPHENHYDRAMINE HYDROCHLORIDE 50 MG/ML
50 INJECTION, SOLUTION INTRAMUSCULAR; INTRAVENOUS ONCE
Status: DISCONTINUED | OUTPATIENT
Start: 2017-06-20 | End: 2017-06-20 | Stop reason: HOSPADM

## 2017-06-20 RX ORDER — NALOXONE HYDROCHLORIDE 0.4 MG/ML
0.4 INJECTION, SOLUTION INTRAMUSCULAR; INTRAVENOUS; SUBCUTANEOUS
Status: DISCONTINUED | OUTPATIENT
Start: 2017-06-20 | End: 2017-06-20 | Stop reason: HOSPADM

## 2017-06-20 RX ORDER — LIDOCAINE HYDROCHLORIDE 20 MG/ML
INJECTION, SOLUTION EPIDURAL; INFILTRATION; INTRACAUDAL; PERINEURAL AS NEEDED
Status: DISCONTINUED | OUTPATIENT
Start: 2017-06-20 | End: 2017-06-20 | Stop reason: HOSPADM

## 2017-06-20 RX ADMIN — PROPOFOL 40 MG: 10 INJECTION, EMULSION INTRAVENOUS at 15:26

## 2017-06-20 RX ADMIN — PROPOFOL 120 MG: 10 INJECTION, EMULSION INTRAVENOUS at 15:20

## 2017-06-20 RX ADMIN — SODIUM CHLORIDE: 9 INJECTION, SOLUTION INTRAVENOUS at 14:59

## 2017-06-20 RX ADMIN — LIDOCAINE HYDROCHLORIDE 50 MG: 20 INJECTION, SOLUTION EPIDURAL; INFILTRATION; INTRACAUDAL; PERINEURAL at 15:20

## 2017-06-20 RX ADMIN — PROPOFOL 40 MG: 10 INJECTION, EMULSION INTRAVENOUS at 15:28

## 2017-06-20 RX ADMIN — PROPOFOL 40 MG: 10 INJECTION, EMULSION INTRAVENOUS at 15:34

## 2017-06-20 RX ADMIN — PROPOFOL 50 MG: 10 INJECTION, EMULSION INTRAVENOUS at 15:24

## 2017-06-20 RX ADMIN — PROPOFOL 50 MG: 10 INJECTION, EMULSION INTRAVENOUS at 15:22

## 2017-06-20 RX ADMIN — PROPOFOL 40 MG: 10 INJECTION, EMULSION INTRAVENOUS at 15:31

## 2017-06-20 NOTE — PERIOP NOTES
Patient has been evaluated by anesthesia pre-procedure. Patient alert and oriented. Vital signs will not be charted by the Endoscopy nurse. All vitals, airway, and loc are monitored by anesthesia staff throughout procedure.      .Endoscope was pre-cleaned at bedside immediately following procedure by [facility].

## 2017-06-20 NOTE — H&P
[address]  [address], [address] [facility]      History and Physical       NAME:  Sarah Rendon   :   1945   MRN:   [id number]             History of Present Illness:  Patient is a 71 y.o. who is seen for colon cancer screening. Her last colonoscopy was in  at which time polyps were not removed. She has a personal history of colon polyps prior to that. PMH:  Past Medical History:   Diagnosis Date    Arthritis     spine    Cancer ([address]     kidney (right)    Chronic kidney disease     right kidney removed    Diabetes ([address]     She is controlling with weight loss    Diabetes mellitus ([address] 10/8/2014    Hepatitis C     hx hep C from blood transfusion per pt; Treated by Dr. Prince Samayoa Hyperlipidemia     Hypertension     Personal history of renal cancer 3/2/2015    Thyroid disease     Thyroidectomy       PSH:  Past Surgical History:   Procedure Laterality Date    BREAST SURGERY PROCEDURE UNLISTED      breast reduction    HX BREAST REDUCTION Bilateral     HX COLONOSCOPY  3/5/12    Repeat in 5 years (Dr. Toby Joseph)    HX GYN      tubal pregnancy    HX HYSTERECTOMY      [address]X [address] HX OTHER SURGICAL      thyroidectomy    HX OTHER SURGICAL      colonoscopy x 2 - ?polyps    HX TONSILLECTOMY      2or 4years old   [address]    kidney removed on right       Allergies: Allergies   Allergen Reactions    Codeine Hives       Home Medications:  Prior to Admission Medications   Prescriptions Last Dose Informant Patient Reported? Taking? Blood-Glucose Meter (ONE TOUCH ULTRAMINI) monitoring kit 2017 at Unknown time  No Yes   Sig: Check blood sugar daily   OT ULTRA/FASTTRACK CONTROL soln Not Taking at Unknown time  Yes No   Si Drop by Other route every seven (7) days.    acetaminophen (TYLENOL EXTRA STRENGTH) 500 mg tablet Unknown at Unknown time  Yes No   Sig: Take 500-1,000 mg by mouth every six (6) hours as needed for Pain. amLODIPine (NORVASC) 5 mg tablet 2017 at Unknown time  Yes Yes   Sig: Take 5 mg by mouth daily. amLODIPine-valsartan (EXFORGE) 5-160 mg per tablet 2017 at Unknown time  No Yes   Sig: Take 1 Tab by mouth daily. atorvastatin (LIPITOR) 20 mg tablet 2017 at Unknown time  No Yes   Sig: Take 1 Tab by mouth daily. cloNIDine (CATAPRES) 0.1 mg tablet 2017 at Unknown time  Yes Yes   Sig: Take 0.1 mg by mouth two (2) times a day. glucose blood VI test strips (BLOOD GLUCOSE TEST) strip 2017 at Unknown time  No Yes   Sig: One touch ultra glucose strips. Check blood sugar 2-3 times per week   hydrochlorothiazide (HYDRODIURIL) 25 mg tablet 2017  No No   Sig: Take 1 Tab by mouth daily as needed. lancets 30 gauge misc 2017 at Unknown time  Yes Yes   Si Lancet by SubCUTAneous route daily. levothyroxine (SYNTHROID) 50 mcg tablet 2017 at Unknown time  No Yes   Sig: TAKE ONE TABLET BY MOUTH ONCE DAILY BEFORE BREAKFAST   metFORMIN (GLUCOPHAGE) 500 mg tablet 2017 at Unknown time  No Yes   Sig: Take 1 Tab by mouth two (2) times daily (with meals). multivitamin with iron-mineral 0.8 mg cmpk 2017 at Unknown time  Yes Yes   Sig: Take 1 Packet by mouth daily. valsartan (DIOVAN) 160 mg tablet Not Taking at Unknown time  Yes No   Sig: Take  by mouth daily.       Facility-Administered Medications: None       Hospital Medications:  Current Facility-Administered Medications   Medication Dose Route Frequency    0.9% sodium chloride infusion  100 mL/hr IntraVENous CONTINUOUS    sodium chloride (NS) flush 5-10 mL  5-10 mL IntraVENous Q8H    sodium chloride (NS) flush 5-10 mL  5-10 mL IntraVENous PRN    midazolam (VERSED) injection 0.25-10 mg  0.25-10 mg IntraVENous Multiple    fentaNYL citrate (PF) injection 100 mcg  100 mcg IntraVENous Multiple    naloxone (NARCAN) injection 0.4 mg  0.4 mg IntraVENous Multiple    flumazenil (ROMAZICON) 0.1 mg/mL injection 0.2 mg  0.2 mg IntraVENous Multiple    simethicone (MYLICON) 08EK/4.5IF oral drops 80 mg  1.2 mL Oral Multiple    diphenhydrAMINE (BENADRYL) injection 50 mg  50 mg IntraVENous ONCE    atropine injection 0.5 mg  0.5 mg IntraVENous ONCE PRN    EPINEPHrine (ADRENALIN) 0.1 mg/mL syringe 1 mg  1 mg Endoscopically ONCE PRN     Facility-Administered Medications Ordered in Other Encounters   Medication Dose Route Frequency    0.9% sodium chloride infusion   IntraVENous CONTINUOUS       Social History:  Social History   Substance Use Topics    Smoking status: Former Smoker    Smokeless tobacco: Never Used      Comment: quit smoking 30-40 yrs ago    Alcohol use No       Family History:  Family History   Problem Relation Age of Onset    Diabetes Mother     Kidney Disease Mother     Heart Disease Father     Kidney Disease Father      dialysis    Diabetes Sister     Diabetes Daughter     Hypertension Daughter     Hypertension Son     No Known Problems Son              Review of Systems:      Constitutional: negative fever, negative chills, negative weight loss  Eyes:   negative visual changes  ENT:   negative sore throat, tongue or lip swelling  Respiratory:  negative cough, negative dyspnea  Cards:  negative for chest pain, palpitations, lower extremity edema  GI:   See HPI  :  negative for frequency, dysuria  Integument:  negative for rash and pruritus  Heme:  negative for easy bruising and gum/nose bleeding  Musculoskel: negative for myalgias,  back pain and muscle weakness  Neuro: negative for headaches, dizziness, vertigo  Psych:  negative for feelings of anxiety, depression       Objective:   Patient Vitals for the past 8 hrs:   Height Weight   06/20/17 1458 5' 4\" (1.626 m) 90.3 kg (199 lb)             EXAM:     NEURO-a&o   HEENT-wnl   LUNGS-clear    COR-regular rate and rhythym     ABD-soft , no tenderness, no rebound, good bs     EXT-no edema     Data Review     No results for input(s): WBC, HGB, HCT, PLT, HGBEXT, HCTEXT, PLTEXT in the last 72 hours. No results for input(s): NA, K, CL, CO2, BUN, CREA, GLU, PHOS, CA in the last 72 hours. No results for input(s): SGOT, GPT, AP, TBIL, TP, ALB, GLOB, GGT, AML, LPSE in the last 72 hours. No lab exists for component: AMYP, HLPSE  No results for input(s): INR, PTP, APTT in the last 72 hours. No lab exists for component: INREXT       Assessment:   · Personal history of colon polyps     Patient Active Problem List   Diagnosis Code    Hypertension I10    Hepatitis C B19.20    Renal cancer ([facility] [address] C64.9    Hypothyroid E03.9    Anemia, unspecified D64.9    S/p nephrectomy, right Z90.5    Glucose intolerance (impaired glucose tolerance) R73.02    Diabetes mellitus (HCC) E11.9    History of hysterectomy including cervix Z90.710    History of bilateral salpingo-oophorectomy Z90.722    Personal history of renal cancer Z85.528    Bartholin cyst N75.0     Plan:   · Endoscopic procedure with MAC     Signed By: Park Llamas MD     6/20/2017  3:14 PM

## 2017-06-20 NOTE — ROUTINE PROCESS
Ty Marcos  1945  [id number]    Situation:  Verbal report received from: Vikki RN  Procedure: Procedure(s):  COLONOSCOPY  COLON BIOPSY    Background:    Preoperative diagnosis: HISTORY OF POLYPS  Postoperative diagnosis: diverticulosis, sigmoid mucosa abnornality, biopsied    :  Dr. Jayda Rae  Assistant(s): Endoscopy Technician-1: Dara Munoz IV  Endoscopy RN-1: Jerica Kamara RN    Specimens:   ID Type Source Tests Collected by Time Destination   1 : sigmoid colon bx Preservative   Main Rae MD 6/20/2017 1531 Pathology     H. Pylori  no    Assessment:  Intra-procedure medications     Anesthesia gave intra-procedure sedation and medications, see anesthesia flow sheet yes    Intravenous fluids: NS@ KVO     Vital signs stable     Abdominal assessment: round and soft     Recommendation:  Discharge patient per MD order.     Family or Friend   Permission to share finding with family or friend yes

## 2017-06-20 NOTE — DISCHARGE INSTRUCTIONS
[address]  [id number]  1945    Discomfort:  Redness at IV site- apply warm compress to area; if redness or soreness persist- contact your physician  There may be a slight amount of blood passed from the rectum  Gaseous discomfort- walking, belching will help relieve any discomfort  You may not operate a vehicle for 12 hours  You may not engage in an occupation involving machinery or appliances for rest of today  You may not drink alcoholic beverages for at least 12 hours  Avoid making any critical decisions for at least 24 hour  DIET:  You may resume your regular diet - however -  remember your colon is empty and a heavy meal will produce gas. Avoid these foods:  vegetables, fried / greasy foods, carbonated drinks     ACTIVITY:  You may  resume your normal daily activities it is recommended that you spend the remainder of the day resting -  avoid any strenuous activity. CALL M.D. ANY SIGN OF:   Increasing pain, nausea, vomiting  Abdominal distension (swelling)  New increased bleeding (oral or rectal)  Fever (chills)  Pain in chest area  Bloody discharge from nose or mouth  Shortness of breath      Follow-up Instructions:   Call Dr. Lowell Briceno for any questions or problems at [phone]          ENDOSCOPY FINDINGS:   Your colonoscopy showed diverticulosis. An area of redness in the colon was biopsied. We will contact you about the pathology results. Your next colonoscopy will be due in 5 years. Telephone # [phone]      Signed By: Veronica rBown.  Anthony Duarte MD     6/20/2017  3:39 PM       DISCHARGE SUMMARY from Nurse    The following personal items collected during your admission are returned to you:   Dental Appliance: Dental Appliances: None  Vision:    Hearing Aid:    Jewelry:    Clothing:    Other Valuables:    Valuables sent to safe:

## 2017-06-20 NOTE — PROCEDURES
Zara 65  174 [facility], [address] [place] [address]        Colonoscopy Operative Report    Jose Krause  [id number]  1945      Procedure Type:   Colonoscopy with biopsy     Indications:    Personal history of colon polyps (screening only)         Pre-operative Diagnosis: see indication above    Post-operative Diagnosis:  See findings below    :  Adan Burgess. Xena Mccain MD      Referring Provider: Sam Ryan MD      Sedation:  MAC anesthesia      Procedure Details:  After informed consent was obtained with all risks and benefits of procedure explained and preoperative exam completed, the patient was taken to the endoscopy suite and placed in the left lateral decubitus position. Upon sequential sedation as per above, a digital rectal exam was performed demonstrating internal hemorrhoids. The Olympus pediatric videocolonoscope  was inserted in the rectum and carefully advanced to the cecum, which was identified by the ileocecal valve and appendiceal orifice. The cecum was identified by the ileocecal valve and appendiceal orifice. The quality of preparation was good. The colonoscope was slowly withdrawn with careful evaluation between folds. Retroflexion in the rectum was completed . Findings:   Rectum: medium-sized internal hemorrhoids  Sigmoid: a 3 mm focus of erythema was seen and biopsied with cold forceps. Mild diverticulosis was seen. Descending Colon: normal  Transverse Colon: normal  Ascending Colon: normal  Cecum: normal  Terminal Ileum: not intubated      Specimen Removed: 1. Sigmoid colon biopsy    Complications: None. EBL:  None. Impression:    1. Mild sigmoid diverticulosis  2. Medium-sized internal hemorrhoids  3. Mucosal abnormality of sigmoid colon - biopsied. Recommendations:  1. Follow up surgical pathology  2. Repeat colonoscopy in 5 years  3. High fiber diet education    Signed By: Adan Burgess.  Xena Mccain MD     6/20/2017  3:41 PM

## 2017-06-20 NOTE — ANESTHESIA PREPROCEDURE EVALUATION
Anesthetic History   No history of anesthetic complications            Review of Systems / Medical History  Patient summary reviewed, nursing notes reviewed and pertinent labs reviewed    Pulmonary  Within defined limits                 Neuro/Psych   Within defined limits           Cardiovascular  Within defined limits  Hypertension                   GI/Hepatic/Renal  Within defined limits              Endo/Other  Within defined limits  Diabetes: type 2  Hypothyroidism  Obesity     Other Findings              Physical Exam    Airway  Mallampati: II  TM Distance: > 6 cm  Neck ROM: normal range of motion   Mouth opening: Normal     Cardiovascular  Regular rate and rhythm,  S1 and S2 normal,  no murmur, click, rub, or gallop             Dental    Dentition: Full upper dentures     Pulmonary  Breath sounds clear to auscultation               Abdominal  GI exam deferred       Other Findings            Anesthetic Plan    ASA: 3  Anesthesia type: MAC          Induction: Intravenous  Anesthetic plan and risks discussed with: Patient

## 2017-06-20 NOTE — IP AVS SNAPSHOT
[address] 
[phone] Patient: Lakeshia Never MRN: [id number] :1945 You are allergic to the following Allergen Reactions Codeine Hives Recent Documentation Height Weight BMI OB Status Smoking Status 1.626 m 90.3 kg 34.16 kg/m2 Hysterectomy Former Smoker Emergency Contacts Name Discharge Info Relation Home Work Mobile Rosa Quinonez  Child [2] [phone] About your hospitalization You were admitted on:  2017 You last received care in the:  [address] [place] [address] ENDOSCOPY You were discharged on:  2017 Unit phone number:  [phone] Why you were hospitalized Your primary diagnosis was:  Not on File Providers Seen During Your Hospitalizations Provider Role Specialty Primary office phone Main Sarabia MD Attending Provider Gastroenterology [phone] Your Primary Care Physician (PCP) Primary Care Physician Office Phone Office Fax [place]  [phone] Follow-up Information None Current Discharge Medication List  
  
CONTINUE these medications which have NOT CHANGED Dose & Instructions Dispensing Information Comments Morning Noon Evening Bedtime  
 amLODIPine 5 mg tablet Commonly known as:  Maria Fernanda Manuel Your last dose was: Your next dose is:    
   
   
 Dose:  5 mg Take 5 mg by mouth daily. Refills:  0  
     
   
   
   
  
 amLODIPine-valsartan 5-160 mg per tablet Commonly known as:  Giorgio Seat Your last dose was: Your next dose is:    
   
   
 Dose:  1 Tab Take 1 Tab by mouth daily. Quantity:  90 Tab Refills:  3  
     
   
   
   
  
 atorvastatin 20 mg tablet Commonly known as:  LIPITOR Your last dose was: Your next dose is:    
   
   
 Dose:  20 mg Take 1 Tab by mouth daily. Quantity:  90 Tab Refills:  3 Blood-Glucose Meter monitoring kit Commonly known as:  Edouard Trujillo Your last dose was: Your next dose is:    
   
   
 Check blood sugar daily Quantity:  1 Kit Refills:  0  
     
   
   
   
  
 cloNIDine HCl 0.1 mg tablet Commonly known as:  CATAPRES Your last dose was: Your next dose is:    
   
   
 Dose:  0.1 mg Take 0.1 mg by mouth two (2) times a day. Refills:  0  
     
   
   
   
  
 glucose blood VI test strips strip Commonly known as:  blood glucose test  
   
Your last dose was: Your next dose is: One touch ultra glucose strips. Check blood sugar 2-3 times per week Quantity:  50 Strip Refills:  6 Type 2 diabetes mellitus without complication [facility]-27-QR O05.4  
    
   
   
   
  
 hydroCHLOROthiazide 25 mg tablet Commonly known as:  HYDRODIURIL Your last dose was: Your next dose is:    
   
   
 Dose:  25 mg Take 1 Tab by mouth daily as needed. Quantity:  30 Tab Refills:  5  
     
   
   
   
  
 lancets 30 gauge Misc Your last dose was: Your next dose is:    
   
   
 Dose:  1 Lancet 1 Lancet by SubCUTAneous route daily. Refills:  0  
     
   
   
   
  
 levothyroxine 50 mcg tablet Commonly known as:  SYNTHROID Your last dose was: Your next dose is: TAKE ONE TABLET BY MOUTH ONCE DAILY BEFORE BREAKFAST Quantity:  90 Tab Refills:  0  
     
   
   
   
  
 metFORMIN 500 mg tablet Commonly known as:  GLUCOPHAGE Your last dose was: Your next dose is:    
   
   
 Dose:  500 mg Take 1 Tab by mouth two (2) times daily (with meals). Quantity:  180 Tab Refills:  3  
     
   
   
   
  
 multivitamin with iron-mineral 0.8 mg Cmpk Your last dose was: Your next dose is:    
   
   
 Dose:  1 Packet Take 1 Packet by mouth daily. Refills:  0 OT Ultra/FastTrack Control Soln Generic drug:  Blood Glucose Control, Normal  
   
Your last dose was: Your next dose is:    
   
   
 Dose:  1 Drop 1 Drop by Other route every seven (7) days. Refills:  0  
     
   
   
   
  
 TYLENOL EXTRA STRENGTH 500 mg tablet Generic drug:  acetaminophen Your last dose was: Your next dose is:    
   
   
 Dose:  500-1000 mg Take 500-1,000 mg by mouth every six (6) hours as needed for Pain. Refills:  0  
     
   
   
   
  
 valsartan 160 mg tablet Commonly known as:  DIOVAN Your last dose was: Your next dose is: Take  by mouth daily. Refills:  0 Discharge Instructions [address] 
611 [facility], [address] [place] [address] COLON DISCHARGE INSTRUCTIONS Gabbie Mora [id number] 
1945 Discomfort: 
Redness at IV site- apply warm compress to area; if redness or soreness persist- contact your physician There may be a slight amount of blood passed from the rectum Gaseous discomfort- walking, belching will help relieve any discomfort You may not operate a vehicle for 12 hours You may not engage in an occupation involving machinery or appliances for rest of today You may not drink alcoholic beverages for at least 12 hours Avoid making any critical decisions for at least 24 hour DIET: 
You may resume your regular diet  however -  remember your colon is empty and a heavy meal will produce gas. Avoid these foods:  vegetables, fried / greasy foods, carbonated drinks ACTIVITY: 
You may  resume your normal daily activities it is recommended that you spend the remainder of the day resting -  avoid any strenuous activity. CALL M.D. ANY SIGN OF: Increasing pain, nausea, vomiting Abdominal distension (swelling) New increased bleeding (oral or rectal) Fever (chills) Pain in chest area Bloody discharge from nose or mouth Shortness of breath Follow-up Instructions: 
 Call Dr. Emiliano Malin for any questions or problems at [phone] ENDOSCOPY FINDINGS: 
 Your colonoscopy showed diverticulosis. An area of redness in the colon was biopsied. We will contact you about the pathology results. Your next colonoscopy will be due in 5 years. Telephone # [phone] Signed By: Claudetta Conine. Camacho Varela MD   
 6/20/2017  3:39 PM 
  
 
DISCHARGE SUMMARY from Nurse The following personal items collected during your admission are returned to you:  
Dental Appliance: Dental Appliances: None Vision:   
Hearing Aid:   
Jewelry:   
Clothing:   
Other Valuables:   
Valuables sent to safe:   
 
 
 
Discharge Orders None Introducing [facility]! Dear Sylvia Guillermo: Thank you for requesting a STWA account. Our records indicate that you already have an active STWA account. You can access your account anytime at [email or web address] Did you know that you can access your hospital and ER discharge instructions at any time in STWA? You can also review all of your test results from your hospital stay or ER visit. Additional Information If you have questions, please visit the Frequently Asked Questions section of the STWA website at [email or web address] Remember, STWA is NOT to be used for urgent needs. For medical emergencies, dial 911. Now available from your iPhone and Android! General Information Please provide this summary of care documentation to your next provider. Patient Signature:  ____________________________________________________________ Date:  ____________________________________________________________  
  
Luciana Greco Provider Signature:  ____________________________________________________________ Date:  ____________________________________________________________

## 2017-06-20 NOTE — ANESTHESIA POSTPROCEDURE EVALUATION
Post-Anesthesia Evaluation and Assessment    Patient: Jose Krause MRN: [id number]  SSN: [id number]    YOB: 1945  Age: 71 y.o. Sex: female       Cardiovascular Function/Vital Signs  Visit Vitals    /72    Pulse (!) 59    Temp 36.7 °C (98.1 °F)    Resp 9    Ht 5' 4\" (1.626 m)    Wt 90.3 kg (199 lb)    SpO2 98%    BMI 34.16 kg/m2       Patient is status post MAC anesthesia for Procedure(s):  COLONOSCOPY  COLON BIOPSY. Nausea/Vomiting: None    Postoperative hydration reviewed and adequate. Pain:  Pain Scale 1: Visual (06/20/17 1545)  Pain Intensity 1: 0 (06/20/17 1545)   Managed    Neurological Status: At baseline    Mental Status and Level of Consciousness: Arousable    Pulmonary Status:   O2 Device: Room air (06/20/17 1545)   Adequate oxygenation and airway patent    Complications related to anesthesia: None    Post-anesthesia assessment completed.  No concerns    Signed By: Holland Vega MD     June 20, 2017

## 2017-06-23 RX ORDER — ATORVASTATIN CALCIUM 20 MG/1
TABLET, FILM COATED ORAL
Qty: 90 TAB | Refills: 0 | Status: SHIPPED | OUTPATIENT
Start: 2017-06-23 | End: 2017-10-11 | Stop reason: SDUPTHER

## 2017-07-17 RX ORDER — LEVOTHYROXINE SODIUM 50 UG/1
TABLET ORAL
Qty: 90 TAB | Refills: 0 | Status: SHIPPED | OUTPATIENT
Start: 2017-07-17 | End: 2017-10-28 | Stop reason: SDUPTHER

## 2017-10-11 RX ORDER — ATORVASTATIN CALCIUM 20 MG/1
TABLET, FILM COATED ORAL
Qty: 90 TAB | Refills: 0 | Status: SHIPPED | OUTPATIENT
Start: 2017-10-11 | End: 2017-11-16 | Stop reason: SDUPTHER

## 2017-10-28 RX ORDER — LEVOTHYROXINE SODIUM 50 UG/1
TABLET ORAL
Qty: 90 TAB | Refills: 0 | Status: SHIPPED | OUTPATIENT
Start: 2017-10-28 | End: 2017-11-16 | Stop reason: SDUPTHER

## 2017-11-03 ENCOUNTER — OFFICE VISIT (OUTPATIENT)
Dept: INTERNAL MEDICINE CLINIC | Age: 72
End: 2017-11-03

## 2017-11-03 VITALS
BODY MASS INDEX: 32.88 KG/M2 | RESPIRATION RATE: 16 BRPM | TEMPERATURE: 97.9 F | HEART RATE: 81 BPM | WEIGHT: 192.6 LBS | DIASTOLIC BLOOD PRESSURE: 87 MMHG | SYSTOLIC BLOOD PRESSURE: 139 MMHG | HEIGHT: 64 IN | OXYGEN SATURATION: 99 %

## 2017-11-03 DIAGNOSIS — D64.9 ANEMIA, UNSPECIFIED TYPE: ICD-10-CM

## 2017-11-03 DIAGNOSIS — E03.4 HYPOTHYROIDISM DUE TO ACQUIRED ATROPHY OF THYROID: ICD-10-CM

## 2017-11-03 DIAGNOSIS — E11.9 TYPE 2 DIABETES MELLITUS WITHOUT COMPLICATION, UNSPECIFIED LONG TERM INSULIN USE STATUS: Primary | ICD-10-CM

## 2017-11-03 DIAGNOSIS — I10 ESSENTIAL HYPERTENSION: ICD-10-CM

## 2017-11-03 NOTE — MR AVS SNAPSHOT
Visit Information Date & Time Provider Department Dept. Phone Encounter #  
 11/3/2017  9:15 AM Shakira Serrano, [address] [place] [address],[address] [phone] [id number] Follow-up Instructions Return in about 6 months (around 5/3/2018) for HTN. Upcoming Health Maintenance Date Due DTaP/Tdap/Td series (1 - Tdap) 3/7/1967 ZOSTER VACCINE AGE 60> 1/7/2006 HEMOGLOBIN A1C Q6M 11/4/2017 FOOT EXAM Q1 5/4/2018 MICROALBUMIN Q1 5/4/2018 LIPID PANEL Q1 5/4/2018 MEDICARE YEARLY EXAM 5/5/2018 EYE EXAM RETINAL OR DILATED Q1 7/19/2018 BREAST CANCER SCRN MAMMOGRAM 4/12/2019 GLAUCOMA SCREENING Q2Y 7/19/2019 COLONOSCOPY 6/20/2022 Allergies as of 11/3/2017  Review Complete On: 11/3/2017 By: Shakira Serrano MD  
  
 Severity Noted Reaction Type Reactions Codeine  03/23/2011    Hives Current Immunizations  Reviewed on 5/9/2016 Name Date Influenza Vaccine Split 2/1/2012 PPD 2/1/2012 Pneumococcal Conjugate (PCV-13) 2/11/2015 ZZZ-RETIRED (DO NOT USE) Pneumococcal Vaccine (Unspecified Type) 2/1/2012 Not reviewed this visit You Were Diagnosed With   
  
 Codes Comments Type 2 diabetes mellitus without complication, unspecified long term insulin use status (HCC)    -  Primary ICD-10-CM: E11.9 ICD-9-CM: 250.00 Essential hypertension     ICD-10-CM: I10 
ICD-9-CM: 401.9 Hypothyroidism due to acquired atrophy of thyroid     ICD-10-CM: E03.4 ICD-9-CM: 244.8, 246.8 Anemia, unspecified type     ICD-10-CM: D64.9 ICD-9-CM: 394. 9 Vitals BP Pulse Temp Resp Height(growth percentile) Weight(growth percentile) 139/87 (BP 1 Location: Left arm, BP Patient Position: Sitting) 81 97.9 °F (36.6 °C) (Oral) 16 5' 4\" (1.626 m) 192 lb 9.6 oz (87.4 kg) LMP SpO2 BMI OB Status Smoking Status (LMP Unknown) 99% 33.06 kg/m2 Hysterectomy Former Smoker Vitals History BMI and BSA Data Body Mass Index Body Surface Area 33.06 kg/m 2 1.99 m 2 Preferred Pharmacy Pharmacy Name Phone [facility] [address] [facility], [place] [address] Your Updated Medication List  
  
   
This list is accurate as of: 11/3/17  9:21 AM.  Always use your most recent med list. amLODIPine 5 mg tablet Commonly known as:  Oj Gamboa Take 5 mg by mouth daily. amLODIPine-valsartan 5-160 mg per tablet Commonly known as:  Rubio Clark Take 1 Tab by mouth daily. atorvastatin 20 mg tablet Commonly known as:  LIPITOR  
TAKE ONE TABLET BY MOUTH ONCE DAILY Blood-Glucose Meter monitoring kit Commonly known as:  Carolyn Ritchie Check blood sugar daily  
  
 cloNIDine HCl 0.1 mg tablet Commonly known as:  CATAPRES Take 0.1 mg by mouth two (2) times a day. glucose blood VI test strips strip Commonly known as:  blood glucose test  
One touch ultra glucose strips. Check blood sugar 2-3 times per week  
  
 hydroCHLOROthiazide 25 mg tablet Commonly known as:  HYDRODIURIL Take 1 Tab by mouth daily as needed. lancets 30 gauge Misc 1 Lancet by SubCUTAneous route daily. levothyroxine 50 mcg tablet Commonly known as:  SYNTHROID  
TAKE ONE TABLET BY MOUTH ONCE DAILY BEFORE BREAKFAST  
  
 metFORMIN 500 mg tablet Commonly known as:  GLUCOPHAGE Take 1 Tab by mouth two (2) times daily (with meals). multivitamin with iron-mineral 0.8 mg Cmpk Take 1 Packet by mouth daily. OT Ultra/FastTrack Control Soln Generic drug:  Blood Glucose Control, Normal  
1 Drop by Other route every seven (7) days. TYLENOL EXTRA STRENGTH 500 mg tablet Generic drug:  acetaminophen Take 500-1,000 mg by mouth every six (6) hours as needed for Pain.  
  
 valsartan 160 mg tablet Commonly known as:  DIOVAN Take  by mouth daily. Prescriptions Sent to Pharmacy Refills  
 glucose blood VI test strips (BLOOD GLUCOSE TEST) strip 6 Sig: One touch ultra glucose strips. Check blood sugar 2-3 times per week Class: Normal  
 Pharmacy: [facility] [address], [place] Ph #: [phone] We Performed the Following CBC WITH AUTOMATED DIFF [00406 CPT(R)] HEMOGLOBIN A1C WITH EAG [38823 CPT(R)] LIPID PANEL [72452 CPT(R)] METABOLIC PANEL, COMPREHENSIVE [22143 CPT(R)] MICROALBUMIN, UR, RAND [id number] CPT(R)] T4, FREE [id number] CPT(R)] TSH 3RD GENERATION [50412 CPT(R)] Follow-up Instructions Return in about 6 months (around 5/3/2018) for HTN. Introducing [facility]! Dear Zara Sands: Thank you for requesting a KiteBit account. Our records indicate that you already have an active KiteBit account. You can access your account anytime at [email or web address] Did you know that you can access your hospital and ER discharge instructions at any time in KiteBit? You can also review all of your test results from your hospital stay or ER visit. Additional Information If you have questions, please visit the Frequently Asked Questions section of the KiteBit website at [email or web address] Remember, KiteBit is NOT to be used for urgent needs. For medical emergencies, dial 911. Now available from your iPhone and Android! Please provide this summary of care documentation to your next provider. Your primary care clinician is listed as [place]. If you have any questions after today's visit, please call [phone].

## 2017-11-03 NOTE — PROGRESS NOTES
SUBJECTIVE:   Ms. Nando Trujillo is a 71 y.o. female who is here for the following complaint, and also for follow up of routine medical issues. She had been seeing Dr. Lily Pop, and Dr. Brennan Larios before that. Chief Complaint   Patient presents with    Hypertension     6 month f.u       She has been seeing nutritionist and losing weight:   Wt Readings from Last 3 Encounters:   11/03/17 192 lb 9.6 oz (87.4 kg)   06/20/17 199 lb (90.3 kg)   05/04/17 210 lb 9.6 oz (95.5 kg)     DM: . Her numbness L foot has improved. LBP is stable. \"I was told I have arthritis in the spine\" by Dr. Minda Guzman, orthopedist.  She has been through treatment with Dr. Isatu Rand, now seeing someone else in his office, for Hep C, obtained during a prior blood transfusion. Seeing Nephrologist, Dr. Amos Sotelo. At this time, she is otherwise doing well and has brought no other complaints to my attention today. For a list of the medical issues addressed today, see the assessment and plan below. PMH:   Past Medical History:   Diagnosis Date    Arthritis     spine    Cancer ([address]     kidney (right)    Chronic kidney disease     right kidney removed    Diabetes ([address]     She is controlling with weight loss    Diabetes mellitus ([address] 10/8/2014    Hepatitis C     hx hep C from blood transfusion per pt; Treated by Dr. Tara Mera Hyperlipidemia     Hypertension     Personal history of renal cancer 3/2/2015    Thyroid disease 1999    Thyroidectomy       Past Surgical History:   Procedure Laterality Date    BREAST SURGERY PROCEDURE UNLISTED  1996    breast reduction    COLONOSCOPY N/A 6/20/2017    COLONOSCOPY performed by Kasie Nieto.  Paola Gonzalez MD at [facility] END[facility]COPY    HX BREAST REDUCTION Bilateral 1996    HX COLONOSCOPY  3/5/12    Repeat in 5 years (Dr. Isatu Rand)    HX GYN  1980s    tubal pregnancy    HX HYSTERECTOMY  1995    HX [address] HX OTHER SURGICAL  1999    thyroidectomy    HX OTHER SURGICAL      colonoscopy x 2 - ?polyps    HX TONSILLECTOMY      2or 4years old   Michael Perry [address]    kidney removed on right       All: She is allergic to codeine. Current Outpatient Prescriptions   Medication Sig    levothyroxine (SYNTHROID) 50 mcg tablet TAKE ONE TABLET BY MOUTH ONCE DAILY BEFORE BREAKFAST    atorvastatin (LIPITOR) 20 mg tablet TAKE ONE TABLET BY MOUTH ONCE DAILY    glucose blood VI test strips (BLOOD GLUCOSE TEST) strip One touch ultra glucose strips. Check blood sugar 2-3 times per week    metFORMIN (GLUCOPHAGE) 500 mg tablet Take 1 Tab by mouth two (2) times daily (with meals).  amLODIPine (NORVASC) 5 mg tablet Take 5 mg by mouth daily.  valsartan (DIOVAN) 160 mg tablet Take  by mouth daily.  hydrochlorothiazide (HYDRODIURIL) 25 mg tablet Take 1 Tab by mouth daily as needed.  multivitamin with iron-mineral 0.8 mg cmpk Take 1 Packet by mouth daily.  lancets 30 gauge misc 1 Lancet by SubCUTAneous route daily.  acetaminophen (TYLENOL EXTRA STRENGTH) 500 mg tablet Take 500-1,000 mg by mouth every six (6) hours as needed for Pain.  cloNIDine (CATAPRES) 0.1 mg tablet Take 0.1 mg by mouth two (2) times a day.  Blood-Glucose Meter (ONE TOUCH ULTRAMINI) monitoring kit Check blood sugar daily    amLODIPine-valsartan (EXFORGE) 5-160 mg per tablet Take 1 Tab by mouth daily.  OT ULTRA/FASTTRACK CONTROL soln 1 Drop by Other route every seven (7) days. No current facility-administered medications for this visit. FH: Her family history includes Diabetes in her daughter, mother, and sister; Heart Disease in her father; Hypertension in her daughter and son; Kidney Disease in her father and mother; No Known Problems in her son. SH: Retired Lpn, doing private duty nursing. She reports that she has quit smoking. She has never used smokeless tobacco. She reports that she does not drink alcohol or use illicit drugs. ROS: See above; Complete ROS otherwise negative.      OBJECTIVE: Vitals:   Visit Vitals    /87 (BP 1 Location: Left arm, BP Patient Position: Sitting)    Pulse 81    Temp 97.9 °F (36.6 °C) (Oral)    Resp 16    Ht 5' 4\" (1.626 m)    Wt 192 lb 9.6 oz (87.4 kg)    LMP  (LMP Unknown)    SpO2 99%    BMI 33.06 kg/m2      Gen: Pleasant 71 y.o.  female in NAD. HEENT: PERRLA. EOMI. OP moist and pink. Neck: Supple. No LAD. HEART: RRR, No M/G/R.    LUNGS: CTAB No W/R. ABDOMEN: S, NT, ND, BS+. EXTREMITIES: Warm. No C/C/E.  MUSCULOSKELETAL: Normal ROM, muscle strength 5/5 all groups. NEURO: Alert and oriented x 3. Cranial nerves grossly intact. No focal sensory or motor deficits noted. SKIN: Warm. Dry. Keloid at site of prior thyroid surgery. Lab Results   Component Value Date/Time    Sodium 144 05/04/2017 09:51 AM    Potassium 4.2 05/04/2017 09:51 AM    Chloride 105 05/04/2017 09:51 AM    CO2 19 05/04/2017 09:51 AM    Anion gap 11 03/03/2013 04:25 PM    Glucose 155 05/04/2017 09:51 AM    BUN 21 05/04/2017 09:51 AM    Creatinine 1.32 05/04/2017 09:51 AM    BUN/Creatinine ratio 16 05/04/2017 09:51 AM    GFR est AA 47 05/04/2017 09:51 AM    GFR est non-AA 41 05/04/2017 09:51 AM    Calcium 9.5 05/04/2017 09:51 AM    Bilirubin, total 0.3 05/04/2017 09:51 AM    ALT (SGPT) 16 05/04/2017 09:51 AM    AST (SGOT) 17 05/04/2017 09:51 AM    Alk.  phosphatase 94 05/04/2017 09:51 AM    Protein, total 6.6 05/04/2017 09:51 AM    Albumin 4.1 05/04/2017 09:51 AM    Globulin 4.0 03/03/2013 04:25 PM    A-G Ratio 1.6 05/04/2017 09:51 AM       Lab Results   Component Value Date/Time    Cholesterol, total 194 05/04/2017 09:51 AM    HDL Cholesterol 60 05/04/2017 09:51 AM    LDL, calculated 107 05/04/2017 09:51 AM    Triglyceride 136 05/04/2017 09:51 AM    CHOL/HDL Ratio 3.7 12/29/2009 07:16 AM        Lab Results   Component Value Date/Time    Hemoglobin A1c 7.7 05/04/2017 09:51 AM       Lab Results   Component Value Date/Time    WBC 9.2 05/04/2017 09:51 AM    HGB 12.7 05/04/2017 09:51 AM    HCT 39.0 05/04/2017 09:51 AM    PLATELET 400 50/24/0950 09:51 AM    MCV 84 05/04/2017 09:51 AM       Lab Results   Component Value Date/Time    TSH 1.120 05/04/2017 09:51 AM         ASSESSMENT/ PLAN:   Diabetes: at last check, not quite controlled. Continue current measures. Hypertension: Fine today. Thyroid disease: Euthyroid. Watch TSH. Chronic kidney disease:  Reviewed labs. Watch labs. Arthritis: Chronic and stable. Hyperlipidemia: Doing worse. Discussed her high risk of cardiovascular complications. Lipitor 20. Hepatitis C: F/U with GI. Obesity: Diet and exercise. Weight down a bit; keep up the good work. Neuropathy L toe: ?Sciatica vs other compressive neuropathy. Doing better lately. Follow-up Disposition:  Return in about 6 months (around 5/3/2018) for HTN. I have reviewed the patient's medications and risks/side effects/benefits were discussed. Diagnosis(-es) explained to patient and questions answered. Literature provided where appropriate.

## 2017-11-03 NOTE — PROGRESS NOTES
1. Have you been to the ER, urgent care clinic since your last visit? Hospitalized since your last visit?no    2. Have you seen or consulted any other health care providers outside of the [address] [place] [address] since your last visit? Include any pap smears or colon screening.  no

## 2017-11-04 LAB
ALBUMIN SERPL-MCNC: 4.3 G/DL (ref 3.5–4.8)
ALBUMIN/GLOB SERPL: 1.6 {RATIO} (ref 1.2–2.2)
ALP SERPL-CCNC: 78 IU/L (ref 39–117)
ALT SERPL-CCNC: 24 IU/L (ref 0–32)
AST SERPL-CCNC: 19 IU/L (ref 0–40)
BASOPHILS # BLD AUTO: 0 X10E3/UL (ref 0–0.2)
BASOPHILS NFR BLD AUTO: 0 %
BILIRUB SERPL-MCNC: <0.2 MG/DL (ref 0–1.2)
BUN SERPL-MCNC: 40 MG/DL (ref 8–27)
BUN/CREAT SERPL: 28 (ref 12–28)
CALCIUM SERPL-MCNC: 9.7 MG/DL (ref 8.7–10.3)
CHLORIDE SERPL-SCNC: 106 MMOL/L (ref 96–106)
CHOLEST SERPL-MCNC: 162 MG/DL (ref 100–199)
CO2 SERPL-SCNC: 19 MMOL/L (ref 18–29)
CREAT SERPL-MCNC: 1.43 MG/DL (ref 0.57–1)
EOSINOPHIL # BLD AUTO: 0.1 X10E3/UL (ref 0–0.4)
EOSINOPHIL NFR BLD AUTO: 1 %
ERYTHROCYTE [DISTWIDTH] IN BLOOD BY AUTOMATED COUNT: 15.9 % (ref 12.3–15.4)
EST. AVERAGE GLUCOSE BLD GHB EST-MCNC: 134 MG/DL
GFR SERPLBLD CREATININE-BSD FMLA CKD-EPI: 37 ML/MIN/1.73
GFR SERPLBLD CREATININE-BSD FMLA CKD-EPI: 43 ML/MIN/1.73
GLOBULIN SER CALC-MCNC: 2.7 G/DL (ref 1.5–4.5)
GLUCOSE SERPL-MCNC: 103 MG/DL (ref 65–99)
HBA1C MFR BLD: 6.3 % (ref 4.8–5.6)
HCT VFR BLD AUTO: 35 % (ref 34–46.6)
HDLC SERPL-MCNC: 59 MG/DL
HGB BLD-MCNC: 11.4 G/DL (ref 11.1–15.9)
IMM GRANULOCYTES # BLD: 0 X10E3/UL (ref 0–0.1)
IMM GRANULOCYTES NFR BLD: 0 %
LDLC SERPL CALC-MCNC: 82 MG/DL (ref 0–99)
LYMPHOCYTES # BLD AUTO: 3.1 X10E3/UL (ref 0.7–3.1)
LYMPHOCYTES NFR BLD AUTO: 34 %
MCH RBC QN AUTO: 26.5 PG (ref 26.6–33)
MCHC RBC AUTO-ENTMCNC: 32.6 G/DL (ref 31.5–35.7)
MCV RBC AUTO: 81 FL (ref 79–97)
MICROALBUMIN UR-MCNC: 650.9 UG/ML
MONOCYTES # BLD AUTO: 0.7 X10E3/UL (ref 0.1–0.9)
MONOCYTES NFR BLD AUTO: 7 %
NEUTROPHILS # BLD AUTO: 5.2 X10E3/UL (ref 1.4–7)
NEUTROPHILS NFR BLD AUTO: 58 %
PLATELET # BLD AUTO: 221 X10E3/UL (ref 150–379)
POTASSIUM SERPL-SCNC: 4.2 MMOL/L (ref 3.5–5.2)
PROT SERPL-MCNC: 7 G/DL (ref 6–8.5)
RBC # BLD AUTO: 4.3 X10E6/UL (ref 3.77–5.28)
SODIUM SERPL-SCNC: 143 MMOL/L (ref 134–144)
T4 FREE SERPL-MCNC: 1.08 NG/DL (ref 0.82–1.77)
TRIGL SERPL-MCNC: 103 MG/DL (ref 0–149)
TSH SERPL DL<=0.005 MIU/L-ACNC: 1.26 UIU/ML (ref 0.45–4.5)
VLDLC SERPL CALC-MCNC: 21 MG/DL (ref 5–40)
WBC # BLD AUTO: 9.1 X10E3/UL (ref 3.4–10.8)

## 2017-11-13 ENCOUNTER — TELEPHONE (OUTPATIENT)
Dept: INTERNAL MEDICINE CLINIC | Age: 72
End: 2017-11-13

## 2017-11-16 RX ORDER — ATORVASTATIN CALCIUM 20 MG/1
20 TABLET, FILM COATED ORAL DAILY
Qty: 90 TAB | Refills: 3 | Status: SHIPPED | OUTPATIENT
Start: 2017-11-16 | End: 2018-12-18 | Stop reason: SDUPTHER

## 2017-11-16 RX ORDER — LEVOTHYROXINE SODIUM 50 UG/1
TABLET ORAL
Qty: 90 TAB | Refills: 3 | Status: SHIPPED | OUTPATIENT
Start: 2017-11-16 | End: 2018-12-18 | Stop reason: SDUPTHER

## 2017-11-16 RX ORDER — METFORMIN HYDROCHLORIDE 500 MG/1
500 TABLET ORAL 2 TIMES DAILY WITH MEALS
Qty: 180 TAB | Refills: 3 | Status: SHIPPED | OUTPATIENT
Start: 2017-11-16 | End: 2018-12-18 | Stop reason: SDUPTHER

## 2018-01-09 ENCOUNTER — TELEPHONE (OUTPATIENT)
Dept: INTERNAL MEDICINE CLINIC | Age: 73
End: 2018-01-09

## 2018-01-09 DIAGNOSIS — B18.2 CHRONIC HEPATITIS C WITHOUT HEPATIC COMA (HCC): Primary | ICD-10-CM

## 2018-01-09 NOTE — TELEPHONE ENCOUNTER
Pt asking for a referral to see a \"Gastroenterology\" she was cleared of Hep-C and in need of a follow up appt. Best Contact [phone].       Message received & copied from [facility]

## 2018-02-06 ENCOUNTER — OFFICE VISIT (OUTPATIENT)
Dept: HEMATOLOGY | Age: 73
End: 2018-02-06

## 2018-02-06 VITALS
OXYGEN SATURATION: 100 % | DIASTOLIC BLOOD PRESSURE: 80 MMHG | SYSTOLIC BLOOD PRESSURE: 138 MMHG | BODY MASS INDEX: 32.96 KG/M2 | TEMPERATURE: 96.5 F | HEART RATE: 94 BPM | WEIGHT: 192 LBS

## 2018-02-06 DIAGNOSIS — B19.20 HEPATITIS C VIRUS INFECTION WITHOUT HEPATIC COMA, UNSPECIFIED CHRONICITY: Primary | ICD-10-CM

## 2018-02-06 PROBLEM — E11.21 TYPE 2 DIABETES MELLITUS WITH NEPHROPATHY (HCC): Status: ACTIVE | Noted: 2018-02-06

## 2018-02-06 NOTE — PROGRESS NOTES
1. Have you been to the ER, urgent care clinic since your last visit? Hospitalized since your last visit? No    2. Have you seen or consulted any other health care providers outside of the [address] [place] [address] since your last visit? Include any pap smears or colon screening. No   Chief Complaint   Patient presents with    Follow-up     fibroscan Only      Visit Vitals    /80 (BP 1 Location: Left arm, BP Patient Position: Sitting)    Pulse 94    Temp 96.5 °F (35.8 °C) (Tympanic)    Wt 192 lb (87.1 kg)    SpO2 100%    BMI 32.96 kg/m2   \  PHQ over the last two weeks 2/6/2018   Little interest or pleasure in doing things Not at all   Feeling down, depressed or hopeless Not at all   Total Score PHQ 2 0     Fall Risk Assessment, last 12 mths 2/6/2018   Able to walk? Yes   Fall in past 12 months?  No     Learning Assessment 2/6/2018   PRIMARY LEARNER Patient   HIGHEST LEVEL OF EDUCATION - PRIMARY LEARNER  -   BARRIERS PRIMARY LEARNER -   CO-LEARNER CAREGIVER -   PRIMARY LANGUAGE ENGLISH   LEARNER PREFERENCE PRIMARY LISTENING   ANSWERED BY patient    RELATIONSHIP SELF

## 2018-02-06 NOTE — MR AVS SNAPSHOT
[address] [facility] [phone] [address] [place] [address] 
[phone] Patient: Romain Mills MRN: [id number] :1945 Visit Information Date & Time Provider Department Dept. Phone Encounter #  
 2018 10:30 AM Taylor Hess, [address] [place] [address] [id number] Upcoming Health Maintenance Date Due DTaP/Tdap/Td series (1 - Tdap) 3/7/1967 ZOSTER VACCINE AGE 60> 2006 HEMOGLOBIN A1C Q6M 5/3/2018 FOOT EXAM Q1 2018 MEDICARE YEARLY EXAM 2018 EYE EXAM RETINAL OR DILATED Q1 2018 MICROALBUMIN Q1 11/3/2018 LIPID PANEL Q1 11/3/2018 BREAST CANCER SCRN MAMMOGRAM 2019 GLAUCOMA SCREENING Q2Y 2019 COLONOSCOPY 2022 Allergies as of 2018  Review Complete On: 2018 By: Radha Kaplan Severity Noted Reaction Type Reactions Codeine  2011    Hives Current Immunizations  Reviewed on 2016 Name Date Influenza Vaccine Split 2012 PPD 2012 Pneumococcal Conjugate (PCV-13) 2015 ZZZ-RETIRED (DO NOT USE) Pneumococcal Vaccine (Unspecified Type) 2012 Not reviewed this visit Vitals BP Pulse Temp Weight(growth percentile) LMP SpO2  
 138/80 (BP 1 Location: Left arm, BP Patient Position: Sitting) 94 96.5 °F (35.8 °C) (Tympanic) 192 lb (87.1 kg) (LMP Unknown) 100% BMI OB Status Smoking Status 32.96 kg/m2 Hysterectomy Former Smoker BMI and BSA Data Body Mass Index Body Surface Area  
 32.96 kg/m 2 1.98 m 2 Preferred Pharmacy Pharmacy Name Phone [address] [facility], [address] [facility] [address] Your Updated Medication List  
  
   
This list is accurate as of: 18 10:47 AM.  Always use your most recent med list. amLODIPine 5 mg tablet Commonly known as:  Mimi Hutchinson Take 5 mg by mouth daily. amLODIPine-valsartan 5-160 mg per tablet Commonly known as:  Lorriane Deschutes Take 1 Tab by mouth daily. atorvastatin 20 mg tablet Commonly known as:  LIPITOR Take 1 Tab by mouth daily. Blood-Glucose Meter monitoring kit Commonly known as:  David Katz Check blood sugar daily  
  
 cloNIDine HCl 0.1 mg tablet Commonly known as:  CATAPRES Take 0.1 mg by mouth two (2) times a day. glucose blood VI test strips strip Commonly known as:  blood glucose test  
One touch ultra glucose strips. Check blood sugar 2-3 times per week  
  
 hydroCHLOROthiazide 25 mg tablet Commonly known as:  HYDRODIURIL Take 1 Tab by mouth daily as needed. lancets 30 gauge Misc 1 Lancet by SubCUTAneous route daily. levothyroxine 50 mcg tablet Commonly known as:  SYNTHROID  
TAKE ONE TABLET BY MOUTH ONCE DAILY BEFORE BREAKFAST  
  
 metFORMIN 500 mg tablet Commonly known as:  GLUCOPHAGE Take 1 Tab by mouth two (2) times daily (with meals). multivitamin with iron-mineral 0.8 mg Cmpk Take 1 Packet by mouth daily. OT Ultra/FastTrack Control Soln Generic drug:  Blood Glucose Control, Normal  
1 Drop by Other route every seven (7) days. TYLENOL EXTRA STRENGTH 500 mg tablet Generic drug:  acetaminophen Take 500-1,000 mg by mouth every six (6) hours as needed for Pain.  
  
 valsartan 160 mg tablet Commonly known as:  DIOVAN Take  by mouth daily. Introducing [facility]! Dear Stef Guillen: Thank you for requesting a P2i account. Our records indicate that you already have an active P2i account. You can access your account anytime at [email or web address] Did you know that you can access your hospital and ER discharge instructions at any time in P2i? You can also review all of your test results from your hospital stay or ER visit. Additional Information If you have questions, please visit the Frequently Asked Questions section of the Oval[employer] website at [email or web address] Remember, NuPathe is NOT to be used for urgent needs. For medical emergencies, dial 911. Now available from your iPhone and Android! Please provide this summary of care documentation to your next provider. Your primary care clinician is listed as [place]. If you have any questions after today's visit, please call [phone].

## 2018-02-06 NOTE — PROGRESS NOTES
70 Melissa Shabazz MD, [address], [facility], [place]       NANCY Alexis PA-C Seferino Batters, LINUS-BC   Nicole Egan, NANCY Robison [address]    at [address], [address] [address] Moon  22.    [phone]    FAX: [address]    at [address], [address] [facility], [address]    [phone]    FAX: [phone]         Patient Care Team:  Festus Hernanedz MD as PCP - General (Internal Medicine)  Marquez Robbins MD (Nephrology)  Palma Gomez as Consulting Provider (Optometry)  Lili Mistry MD (Gastroenterology)      Problem List  Date Reviewed: 11/3/2017          Codes Class Noted    Type 2 diabetes mellitus with nephropathy [facility]) ICD-10-CM: E11.21  ICD-9-CM: 250.40, 583.81  2/6/2018        History of hysterectomy including cervix ICD-10-CM: Z90.710  ICD-9-CM: V88.01  3/2/2015        History of bilateral salpingo-oophorectomy ICD-10-CM: Z90.722  ICD-9-CM: V45.77  3/2/2015        Personal history of renal cancer ICD-10-CM: Z85.528  ICD-9-CM: V10.52  3/2/2015        Bartholin cyst ICD-10-CM: N75.0  ICD-9-CM: 616.2  3/2/2015        Diabetes mellitus ([facility] [address] ICD-10-CM: E11.9  ICD-9-CM: 250.00  10/8/2014        S/p nephrectomy, right ICD-10-CM: Z90.5  ICD-9-CM: V45.73  12/30/2013    Overview Signed 12/30/2013 12:23 PM by Fatoumata Doherty     Renal cancer             Glucose intolerance (impaired glucose tolerance) ICD-10-CM: R73.02  ICD-9-CM: 790.22  12/30/2013        Anemia ICD-10-CM: D64.9  ICD-9-CM: 285.9  2/11/2013        Hypertension ICD-10-CM: I10  ICD-9-CM: 401.9  8/31/2011        Hepatitis C ICD-10-CM: B19.20  ICD-9-CM: 070.70  8/31/2011    Overview Signed 12/30/2013 12:24 PM by Fatoumata Doherty     Treat by Gelrund - stopped due to anemia from medications but also levels shows viral load undetectable despite incomplete treatment             Renal cancer [facility]) ICD-10-CM: C64.9  ICD-9-CM: 189.0  8/31/2011        Hypothyroid ICD-10-CM: E03.9  ICD-9-CM: 244.9  8/31/2011            The providers listed above have asked me to see Pancho Gonzales in consultation regarding chronic HCV and to perform assessment of fibrosis by means of in-office fibroscan analysis. This is a follow-up to a successful course of Luis for reassessment of fibrosis staging. The patient is a 71 y.o. Black female who was diagnosed with liver disease in late 36s. She is reportedly genotype 4 and had been intolerant to a prior course of 12 weeks of interferon and ribavirin in the past due to anemia. She has gone through a course of Licha Quivers as of ~3 years ago and has had a sustained viral response and normalization of liver enzymes. She presents for assessment of fibrotic pro/re-gression. The patient has no symptoms which could be attributed to the liver disorder. The patient completes all daily activities without any functional limitations. The patient has not experienced fatigue, fevers, chills, shortness of breath, chest pain, pain in the right side over the liver, nausea, vomiting, constipation, diarrhea, problems concentrating, swelling of the abdomen, or swelling of the lower extremities. ALLERGIES:  Allergies   Allergen Reactions    Codeine Hives       MEDICATIONS:  Current Outpatient Prescriptions   Medication Sig    glucose blood VI test strips (BLOOD GLUCOSE TEST) strip One touch ultra glucose strips. Check blood sugar 2-3 times per week    levothyroxine (SYNTHROID) 50 mcg tablet TAKE ONE TABLET BY MOUTH ONCE DAILY BEFORE BREAKFAST    atorvastatin (LIPITOR) 20 mg tablet Take 1 Tab by mouth daily.  metFORMIN (GLUCOPHAGE) 500 mg tablet Take 1 Tab by mouth two (2) times daily (with meals).     amLODIPine (NORVASC) 5 mg tablet Take 5 mg by mouth daily.  valsartan (DIOVAN) 160 mg tablet Take  by mouth daily.  hydrochlorothiazide (HYDRODIURIL) 25 mg tablet Take 1 Tab by mouth daily as needed.  multivitamin with iron-mineral 0.8 mg cmpk Take 1 Packet by mouth daily.  OT ULTRA/FASTTRACK CONTROL soln 1 Drop by Other route every seven (7) days.  lancets 30 gauge misc 1 Lancet by SubCUTAneous route daily.  acetaminophen (TYLENOL EXTRA STRENGTH) 500 mg tablet Take 500-1,000 mg by mouth every six (6) hours as needed for Pain.  cloNIDine (CATAPRES) 0.1 mg tablet Take 0.1 mg by mouth two (2) times a day.  Blood-Glucose Meter (ONE TOUCH ULTRAMINI) monitoring kit Check blood sugar daily    amLODIPine-valsartan (EXFORGE) 5-160 mg per tablet Take 1 Tab by mouth daily. No current facility-administered medications for this visit. SYSTEM REVIEW NOT RELATED TO LIVER DISEASE OR REVIEWED ABOVE:  Constitution systems: Negative for fever, chills, weight gain. Intentional weight loss of ~35#. Eyes: Negative for visual changes. ENT: Negative for sore throat, painful swallowing. Respiratory: Negative for cough, hemoptysis, SOB. Cardiology: Negative for chest pain, palpitations. GI:  Negative for constipation or diarrhea. : Negative for urinary frequency, dysuria, hematuria, nocturia. Skin: Negative for rash. Hematology: Negative for easy bruising, blood clots. Musculo-skeletal: Negative for back pain, muscle pain, weakness. Neurologic: Negative for headaches, dizziness, vertigo, memory problems not related to HE. Psychology: Negative for anxiety, depression. FAMILY HISTORY:  There is no family history of liver disease. SOCIAL HISTORY:  The patient is . The patient has 3 children and 9 grandchildren. The patient stopped using tobacco products in >25 years. The patient has never consumed significant amounts of alcohol.     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 PHYSICAL EXAMINATION:  Visit Vitals    /80 (BP 1 Location: Left arm, BP Patient Position: Sitting)    Pulse 94    Temp 96.5 °F (35.8 °C) (Tympanic)    Wt 192 lb (87.1 kg)    LMP  (LMP Unknown)    SpO2 100%    BMI 32.96 kg/m2     General: No acute distress. Skin: No spider angiomata. No jaundice. No palmar erythema. Abdomen: Soft non-tender. Liver size normal to percussion/palpation. Spleen not palpable. No obvious ascites. Extremities: No edema. No muscle wasting. No gross arthritic changes. Neurologic: Alert and oriented. Cranial nerves grossly intact. No asterixis. LIVER HISTOLOGY:  5/2015. [facility]. EkPa was 12.1. Suggested fibrosis level is F3.  2/2018. FibroScan performed at [facility]. EkPa was 3.8. Suggested fibrosis level is F0-1. CAP score is 306, suggestive of steatosis. ASSESSMENT AND PLAN:  Chronic HCV, Sustained viral response. Patient's scan today suggests regression of fibrosis with clearance of the HCV infection. Her current score is consistent with minimal fibrosis, there may be a component of steatosis as suggested on fibroscan CAP score. Assessment of fibrosis was made through performance of fibroscan in the office today. The results of the analysis were shared with the patient in the office at the time of the procedure. A copy of the report was provided to the patient and will be forwarded to the referring medical practice. All questions were answered. The patient expressed a clear understanding of the above. Follow-up with referring physician.     Kaz Ribera PA-C  L[facility] [place] [address], [address] [address]  [address].  [phone]

## 2018-04-13 ENCOUNTER — HOSPITAL ENCOUNTER (OUTPATIENT)
Dept: MAMMOGRAPHY | Age: 73
Discharge: HOME | End: 2018-04-13
Attending: INTERNAL MEDICINE
Payer: MEDICARE

## 2018-04-13 DIAGNOSIS — Z12.39 BREAST SCREENING: ICD-10-CM

## 2018-04-13 PROCEDURE — 77067 SCR MAMMO BI INCL CAD: CPT

## 2018-05-04 ENCOUNTER — HOSPITAL ENCOUNTER (OUTPATIENT)
Dept: ULTRASOUND IMAGING | Age: 73
Discharge: HOME | End: 2018-05-04
Attending: INTERNAL MEDICINE
Payer: MEDICARE

## 2018-05-04 ENCOUNTER — OFFICE VISIT (OUTPATIENT)
Dept: INTERNAL MEDICINE CLINIC | Age: 73
End: 2018-05-04

## 2018-05-04 VITALS
HEIGHT: 64 IN | DIASTOLIC BLOOD PRESSURE: 73 MMHG | WEIGHT: 190.6 LBS | SYSTOLIC BLOOD PRESSURE: 114 MMHG | OXYGEN SATURATION: 99 % | TEMPERATURE: 97.5 F | BODY MASS INDEX: 32.54 KG/M2 | HEART RATE: 65 BPM | RESPIRATION RATE: 14 BRPM

## 2018-05-04 DIAGNOSIS — E03.4 HYPOTHYROIDISM DUE TO ACQUIRED ATROPHY OF THYROID: ICD-10-CM

## 2018-05-04 DIAGNOSIS — Z00.00 MEDICARE ANNUAL WELLNESS VISIT, SUBSEQUENT: Primary | ICD-10-CM

## 2018-05-04 DIAGNOSIS — M79.605 LEFT LEG PAIN: ICD-10-CM

## 2018-05-04 DIAGNOSIS — E11.21 TYPE 2 DIABETES MELLITUS WITH NEPHROPATHY (HCC): ICD-10-CM

## 2018-05-04 DIAGNOSIS — R60.0 LEG EDEMA, LEFT: ICD-10-CM

## 2018-05-04 DIAGNOSIS — I10 ESSENTIAL HYPERTENSION: ICD-10-CM

## 2018-05-04 DIAGNOSIS — D64.9 ANEMIA, UNSPECIFIED TYPE: ICD-10-CM

## 2018-05-04 DIAGNOSIS — E11.9 TYPE 2 DIABETES MELLITUS WITHOUT COMPLICATION, UNSPECIFIED WHETHER LONG TERM INSULIN USE (HCC): ICD-10-CM

## 2018-05-04 PROCEDURE — 93971 EXTREMITY STUDY: CPT

## 2018-05-04 NOTE — PROGRESS NOTES
SUBJECTIVE:   Ms. Merlinda Kiss is a 68 y.o. female who is here for the following complaint, and also for follow up of routine medical issues. She had been seeing Dr. Sachi Tavares, and Dr. Nicole Zuniga before that. Chief Complaint   Patient presents with    Hypertension     pt here today for 6 month f.u    Leg Pain     pt c.o pain in L leg at night; and rates that pain 2/10; pt concerned of arthritis       New leg pain as described, x2 days. \"Ache\", no pins and needles, tingling, etc.  Bothers her mostly at night. She has been seeing nutritionist and losing weight:   Wt Readings from Last 3 Encounters:   05/04/18 190 lb 9.6 oz (86.5 kg)   02/06/18 192 lb (87.1 kg)   11/03/17 192 lb 9.6 oz (87.4 kg)     DM: . Her numbness L foot has improved. LBP is stable. \"I was told I have arthritis in the spine\" by Dr. Elpidio Zhang, orthopedist.  She has been through treatment with Dr. Madhav Cano, now seeing someone else in his office, for Hep C, obtained during a prior blood transfusion. \"He said after the last visit I could return just as needed. \"  Seeing Nephrologist, Dr. Allegra Beck. At this time, she is otherwise doing well and has brought no other complaints to my attention today. For a list of the medical issues addressed today, see the assessment and plan below. PMH:   Past Medical History:   Diagnosis Date    Arthritis     spine    Cancer ([facility] [address]     kidney (right)    Chronic kidney disease     right kidney removed    Diabetes ([address]     She is controlling with weight loss    Diabetes mellitus ([address] 10/8/2014    Hepatitis C     hx hep C from blood transfusion per pt; Treated by Dr. Vasquez Farah Hyperlipidemia     Hypertension     Personal history of renal cancer 3/2/2015    Thyroid disease 1999    Thyroidectomy       Past Surgical History:   Procedure Laterality Date    BREAST SURGERY PROCEDURE UNLISTED  1996    breast reduction    COLONOSCOPY N/A 6/20/2017    COLONOSCOPY performed by Nubia Monge.  Gael Cerrato MD at [facility]COPY    HX BREAST REDUCTION Bilateral 1996    HX COLONOSCOPY  3/5/12    Repeat in 5 years (Dr. Shanna De Leon)    HX GYN  1980s    tubal pregnancy    HX HYSTERECTOMY  1995    HX [address] HX OTHER SURGICAL  1999    thyroidectomy    HX OTHER SURGICAL      colonoscopy x 2 - ?polyps    HX TONSILLECTOMY      2or 4years old   Kaity Blackwell [address]    kidney removed on right       All: She is allergic to codeine. Current Outpatient Prescriptions   Medication Sig    glucose blood VI test strips (BLOOD GLUCOSE TEST) strip One touch ultra glucose strips. Check blood sugar 2-3 times per week    levothyroxine (SYNTHROID) 50 mcg tablet TAKE ONE TABLET BY MOUTH ONCE DAILY BEFORE BREAKFAST    atorvastatin (LIPITOR) 20 mg tablet Take 1 Tab by mouth daily.  metFORMIN (GLUCOPHAGE) 500 mg tablet Take 1 Tab by mouth two (2) times daily (with meals).  amLODIPine (NORVASC) 5 mg tablet Take 5 mg by mouth daily.  valsartan (DIOVAN) 160 mg tablet Take  by mouth daily.  hydrochlorothiazide (HYDRODIURIL) 25 mg tablet Take 1 Tab by mouth daily as needed.  amLODIPine-valsartan (EXFORGE) 5-160 mg per tablet Take 1 Tab by mouth daily.  multivitamin with iron-mineral 0.8 mg cmpk Take 1 Packet by mouth daily.  OT ULTRA/FASTTRACK CONTROL soln 1 Drop by Other route every seven (7) days.  lancets 30 gauge misc 1 Lancet by SubCUTAneous route daily.  acetaminophen (TYLENOL EXTRA STRENGTH) 500 mg tablet Take 500-1,000 mg by mouth every six (6) hours as needed for Pain.  cloNIDine (CATAPRES) 0.1 mg tablet Take 0.1 mg by mouth two (2) times a day.  Blood-Glucose Meter (ONE TOUCH ULTRAMINI) monitoring kit Check blood sugar daily     No current facility-administered medications for this visit. FH: Her family history includes Diabetes in her daughter, mother, and sister; Heart Disease in her father; Hypertension in her daughter and son; Kidney Disease in her father and mother;  No Known Problems in her son. SH: Retired Lpn, doing private duty nursing. She reports that she has quit smoking. She has never used smokeless tobacco. She reports that she does not drink alcohol or use illicit drugs. ROS: See above; Complete ROS otherwise negative. OBJECTIVE:   Vitals:   Visit Vitals    /73 (BP 1 Location: Left arm, BP Patient Position: Sitting)    Pulse 65    Temp 97.5 °F (36.4 °C) (Oral)    Resp 14    Ht 5' 4\" (1.626 m)    Wt 190 lb 9.6 oz (86.5 kg)    LMP  (LMP Unknown)    SpO2 99%    BMI 32.72 kg/m2      Gen: Pleasant 68 y.o.  female in NAD. HEENT: PERRLA. EOMI. OP moist and pink. Neck: Supple. No LAD. HEART: RRR, No M/G/R.    LUNGS: CTAB No W/R. ABDOMEN: S, NT, ND, BS+. EXTREMITIES: Warm. No C/C/E.  MUSCULOSKELETAL: Normal ROM, muscle strength 5/5 all groups. NEURO: Alert and oriented x 3. Cranial nerves grossly intact. No focal sensory or motor deficits noted. SKIN: Warm. Dry. Keloid at site of prior thyroid surgery. Lab Results   Component Value Date/Time    Sodium 143 11/03/2017 09:38 AM    Potassium 4.2 11/03/2017 09:38 AM    Chloride 106 11/03/2017 09:38 AM    CO2 19 11/03/2017 09:38 AM    Anion gap 11 03/03/2013 04:25 PM    Glucose 103 (H) 11/03/2017 09:38 AM    BUN 40 (H) 11/03/2017 09:38 AM    Creatinine 1.43 (H) 11/03/2017 09:38 AM    BUN/Creatinine ratio 28 11/03/2017 09:38 AM    GFR est AA 43 (L) 11/03/2017 09:38 AM    GFR est non-AA 37 (L) 11/03/2017 09:38 AM    Calcium 9.7 11/03/2017 09:38 AM    Bilirubin, total <0.2 11/03/2017 09:38 AM    ALT (SGPT) 24 11/03/2017 09:38 AM    AST (SGOT) 19 11/03/2017 09:38 AM    Alk.  phosphatase 78 11/03/2017 09:38 AM    Protein, total 7.0 11/03/2017 09:38 AM    Albumin 4.3 11/03/2017 09:38 AM    Globulin 4.0 03/03/2013 04:25 PM    A-G Ratio 1.6 11/03/2017 09:38 AM       Lab Results   Component Value Date/Time    Cholesterol, total 162 11/03/2017 09:38 AM    HDL Cholesterol 59 11/03/2017 09:38 AM    LDL, calculated 82 11/03/2017 09:38 AM    Triglyceride 103 11/03/2017 09:38 AM    CHOL/HDL Ratio 3.7 12/29/2009 07:16 AM        Lab Results   Component Value Date/Time    Hemoglobin A1c 6.3 (H) 11/03/2017 09:38 AM       Lab Results   Component Value Date/Time    WBC 9.1 11/03/2017 09:38 AM    HGB 11.4 11/03/2017 09:38 AM    HCT 35.0 11/03/2017 09:38 AM    PLATELET 516 70/36/7537 09:38 AM    MCV 81 11/03/2017 09:38 AM       Lab Results   Component Value Date/Time    TSH 1.260 11/03/2017 09:38 AM         ASSESSMENT/ PLAN:   Edema and pain L calf: R/o DVT--a potentially life threatening condition. Duplex ordered. Diabetes: at last check, controlled. Continue current measures. Hypertension: Fine today. Thyroid disease: Euthyroid. Watch TSH. Chronic kidney disease:  Reviewed labs. Watch labs. Arthritis: Chronic and stable. Hyperlipidemia: Doing worse. Discussed her high risk of cardiovascular complications. Lipitor 20. Hepatitis C: F/U with GI as needed--apparently \"turned loose\". Obesity: Diet and exercise. Weight down a bit; keep up the good work. Neuropathy L toe: ?Sciatica vs other compressive neuropathy. Doing better lately. Follow-up Disposition:  Return in about 6 months (around 11/4/2018) for HTN. I have reviewed the patient's medications and risks/side effects/benefits were discussed. Diagnosis(-es) explained to patient and questions answered. Literature provided where appropriate.

## 2018-05-04 NOTE — LETTER
5/4/2018 5:28 PM 
 
Ms. Pinky Zamora [id number] [facility] 7 [phone] Dear Pinky Zamora: 
 
Please find your most recent results below. Resulted Orders DUPLEX LOWER EXT VENOUS LEFT Narrative EXAM:  DUPLEX LOWER EXT VENOUS LEFT  
INDICATION: Left leg pain and swelling, DVT suspected. COMPARISON: None. FINDINGS: Duplex Doppler sonography of the left lower extremity was performed 
from the groin to the calf. The left common femoral, femoral and popliteal veins 
are compressible with normal color-flow and wave forms and response to 
physiologic maneuvers including Valsalva and augmentation. Impression IMPRESSION: No deep venous thrombosis identified. RECOMMENDATIONS:Test results normal 
 
 
Please call me if you have any questions: [phone] Sincerely, [address]

## 2018-05-04 NOTE — PATIENT INSTRUCTIONS

## 2018-05-04 NOTE — PROGRESS NOTES
Well      This is the Subsequent Medicare Annual Wellness Exam, performed 12 months or more after the Initial AWV or the last Subsequent AWV    I have reviewed the patient's medical history in detail and updated the computerized patient record. History     Past Medical History:   Diagnosis Date    Arthritis     spine    Cancer ([facility] [address]     kidney (right)    Chronic kidney disease     right kidney removed    Diabetes ([address]     She is controlling with weight loss    Diabetes mellitus ([facility] [address] [facility] 10/8/2014    Hepatitis C     hx hep C from blood transfusion per pt; Treated by Dr. Adarsh Chowdary Hyperlipidemia     Hypertension     Personal history of renal cancer 3/2/2015    Thyroid disease 1999    Thyroidectomy      Past Surgical History:   Procedure Laterality Date    BREAST SURGERY PROCEDURE UNLISTED  1996    breast reduction    COLONOSCOPY N/A 6/20/2017    COLONOSCOPY performed by Debra Tenorio. Denis Gill MD at [facility] ENDOSCOPY    HX BREAST REDUCTION Bilateral 1996    HX COLONOSCOPY  3/5/12    Repeat in 5 years (Dr. Ryan Mensah)    HX GYN  1980s    tubal pregnancy    HX HYSTERECTOMY  1995    [address]X [address] HX OTHER SURGICAL  1999    thyroidectomy    HX OTHER SURGICAL      colonoscopy x 2 - ?polyps    HX TONSILLECTOMY      2or 4years old   [address] [facility] [address]    kidney removed on right     Current Outpatient Prescriptions   Medication Sig Dispense Refill    glucose blood VI test strips (BLOOD GLUCOSE TEST) strip One touch ultra glucose strips. Check blood sugar 2-3 times per week 100 Strip 2    levothyroxine (SYNTHROID) 50 mcg tablet TAKE ONE TABLET BY MOUTH ONCE DAILY BEFORE BREAKFAST 90 Tab 3    atorvastatin (LIPITOR) 20 mg tablet Take 1 Tab by mouth daily. 90 Tab 3    metFORMIN (GLUCOPHAGE) 500 mg tablet Take 1 Tab by mouth two (2) times daily (with meals). 180 Tab 3    amLODIPine (NORVASC) 5 mg tablet Take 5 mg by mouth daily.  valsartan (DIOVAN) 160 mg tablet Take  by mouth daily.       hydrochlorothiazide (HYDRODIURIL) 25 mg tablet Take 1 Tab by mouth daily as needed. 30 Tab 5    amLODIPine-valsartan (EXFORGE) 5-160 mg per tablet Take 1 Tab by mouth daily. 90 Tab 3    multivitamin with iron-mineral 0.8 mg cmpk Take 1 Packet by mouth daily.  OT ULTRA/FASTTRACK CONTROL soln 1 Drop by Other route every seven (7) days.  lancets 30 gauge misc 1 Lancet by SubCUTAneous route daily.  acetaminophen (TYLENOL EXTRA STRENGTH) 500 mg tablet Take 500-1,000 mg by mouth every six (6) hours as needed for Pain.  cloNIDine (CATAPRES) 0.1 mg tablet Take 0.1 mg by mouth two (2) times a day.       Blood-Glucose Meter (ONE TOUCH ULTRAMINI) monitoring kit Check blood sugar daily 1 Kit 0     Allergies   Allergen Reactions    Codeine Hives     Family History   Problem Relation Age of Onset    Diabetes Mother     Kidney Disease Mother     Heart Disease Father     Kidney Disease Father      dialysis    Diabetes Sister     Diabetes Daughter     Hypertension Daughter     Hypertension Son     No Known Problems Son      Social History   Substance Use Topics    Smoking status: Former Smoker    Smokeless tobacco: Never Used      Comment: quit smoking 30-40 yrs ago    Alcohol use No     Patient Active Problem List   Diagnosis Code    Hypertension I10    Hepatitis C B19.20    Renal cancer ([facility] [address] C64.9    Hypothyroid E03.9    Anemia D64.9    S/p nephrectomy, right Z90.5    Glucose intolerance (impaired glucose tolerance) R73.02    Diabetes mellitus (HCC) E11.9    History of hysterectomy including cervix Z90.710    History of bilateral salpingo-oophorectomy Z90.79, K79.988    Personal history of renal cancer Z85.528    Bartholin cyst N75.0    Type 2 diabetes mellitus with nephropathy (HCC) E11.21       Depression Risk Factor Screening:     PHQ over the last two weeks 2/6/2018   Little interest or pleasure in doing things Not at all   Feeling down, depressed or hopeless Not at all   Total Score PHQ 2 0     Alcohol Risk Factor Screening: You do not drink alcohol or very rarely. Functional Ability and Level of Safety:   Hearing Loss  Hearing is good. Activities of Daily Living  The home contains: no safety equipment. Patient does total self care    Fall Risk  Fall Risk Assessment, last 12 mths 2/6/2018   Able to walk? Yes   Fall in past 12 months? No       Abuse Screen  Patient is not abused    Cognitive Screening   Evaluation of Cognitive Function:  Has your family/caregiver stated any concerns about your memory: no  Normal    Patient Care Team   Patient Care Team:  Dwayne Springer MD as PCP - General (Internal Medicine)  Kenny Salamanca MD (Nephrology)  Vale Gitelman as Consulting Provider (Optometry)  Sarrah Cabot, MD (Gastroenterology)    Assessment/Plan   Education and counseling provided:  Are appropriate based on today's review and evaluation    Diagnoses and all orders for this visit:    1. Type 2 diabetes mellitus without complication, unspecified whether long term insulin use (HCC)  -     METABOLIC PANEL, COMPREHENSIVE  -     LIPID PANEL  -     CBC WITH AUTOMATED DIFF  -     HEMOGLOBIN A1C WITH EAG  -     MICROALBUMIN, UR, RAND    2. Type 2 diabetes mellitus with nephropathy (HCC)  -     METABOLIC PANEL, COMPREHENSIVE  -     CBC WITH AUTOMATED DIFF  -     HEMOGLOBIN A1C WITH EAG  -     MICROALBUMIN, UR, RAND    3. Essential hypertension  -     METABOLIC PANEL, COMPREHENSIVE  -     LIPID PANEL  -     CBC WITH AUTOMATED DIFF    4. Hypothyroidism due to acquired atrophy of thyroid  -     METABOLIC PANEL, COMPREHENSIVE  -     CBC WITH AUTOMATED DIFF  -     TSH 3RD GENERATION  -     T4, FREE    5. Anemia, unspecified type  -     METABOLIC PANEL, COMPREHENSIVE  -     CBC WITH AUTOMATED DIFF    6.  Medicare annual wellness visit, subsequent        Health Maintenance Due   Topic Date Due    DTaP/Tdap/Td series (1 - Tdap) 03/07/1967    ZOSTER VACCINE AGE 60>  01/07/2006    HEMOGLOBIN A1C Q6M  05/03/2018    FOOT EXAM Q1  05/04/2018

## 2018-05-04 NOTE — MR AVS SNAPSHOT
[address] 
[phone] Patient: Иван Almazan MRN: [id number] :1945 Visit Information Date & Time Provider Department Dept. Phone Encounter #  
 2018  9:30 AM Raffy Silva, [address] [id number] Follow-up Instructions Return in about 6 months (around 2018) for HTN. Follow-up and Disposition History Upcoming Health Maintenance Date Due DTaP/Tdap/Td series (1 - Tdap) 3/7/1967 ZOSTER VACCINE AGE 60> 2006 HEMOGLOBIN A1C Q6M 5/3/2018 FOOT EXAM Q1 2018 MEDICARE YEARLY EXAM 2018 EYE EXAM RETINAL OR DILATED Q1 2018 Influenza Age 5 to Adult 2018 MICROALBUMIN Q1 11/3/2018 LIPID PANEL Q1 11/3/2018 GLAUCOMA SCREENING Q2Y 2019 BREAST CANCER SCRN MAMMOGRAM 2020 COLONOSCOPY 2022 Allergies as of 2018  Review Complete On: 2018 By: Raffy Silva MD  
  
 Severity Noted Reaction Type Reactions Codeine  2011    Hives Current Immunizations  Reviewed on 2016 Name Date Influenza Vaccine Split 2012 PPD 2012 Pneumococcal Conjugate (PCV-13) 2015 ZZZ-RETIRED (DO NOT USE) Pneumococcal Vaccine (Unspecified Type) 2012 Not reviewed this visit You Were Diagnosed With   
  
 Codes Comments Medicare annual wellness visit, subsequent    -  Primary ICD-10-CM: Z00.00 ICD-9-CM: V70.0 Type 2 diabetes mellitus without complication, unspecified whether long term insulin use (HCC)     ICD-10-CM: E11.9 ICD-9-CM: 250.00 Type 2 diabetes mellitus with nephropathy (HCC)     ICD-10-CM: E11.21 
ICD-9-CM: 250.40, 583.81 Essential hypertension     ICD-10-CM: I10 
ICD-9-CM: 401.9 Hypothyroidism due to acquired atrophy of thyroid     ICD-10-CM: E03.4 ICD-9-CM: 244.8, 246.8 Anemia, unspecified type     ICD-10-CM: D64.9 ICD-9-CM: 679. 9 Left leg pain     ICD-10-CM: M79.605 ICD-9-CM: 729.5 Leg edema, left     ICD-10-CM: R60.0 ICD-9-CM: 476. 3 Vitals BP Pulse Temp Resp Height(growth percentile) Weight(growth percentile) 114/73 (BP 1 Location: Left arm, BP Patient Position: Sitting) 65 97.5 °F (36.4 °C) (Oral) 14 5' 4\" (1.626 m) 190 lb 9.6 oz (86.5 kg) LMP SpO2 BMI OB Status Smoking Status (LMP Unknown) 99% 32.72 kg/m2 Hysterectomy Former Smoker Vitals History BMI and BSA Data Body Mass Index Body Surface Area 32.72 kg/m 2 1.98 m 2 Preferred Pharmacy Pharmacy Name Phone [address] [facility], [address] [place] [address]  [phone] Your Updated Medication List  
  
   
This list is accurate as of 5/4/18 10:31 AM.  Always use your most recent med list. amLODIPine 5 mg tablet Commonly known as:  [place] Take 5 mg by mouth daily. amLODIPine-valsartan 5-160 mg per tablet Commonly known as:  Marybeth Maxon Take 1 Tab by mouth daily. atorvastatin 20 mg tablet Commonly known as:  LIPITOR Take 1 Tab by mouth daily. Blood-Glucose Meter monitoring kit Commonly known as:  Cecy Ahmadi Check blood sugar daily  
  
 cloNIDine HCl 0.1 mg tablet Commonly known as:  CATAPRES Take 0.1 mg by mouth two (2) times a day. glucose blood VI test strips strip Commonly known as:  blood glucose test  
One touch ultra glucose strips. Check blood sugar 2-3 times per week  
  
 hydroCHLOROthiazide 25 mg tablet Commonly known as:  HYDRODIURIL Take 1 Tab by mouth daily as needed. lancets 30 gauge Misc 1 Lancet by SubCUTAneous route daily. levothyroxine 50 mcg tablet Commonly known as:  SYNTHROID  
TAKE ONE TABLET BY MOUTH ONCE DAILY BEFORE BREAKFAST  
  
 metFORMIN 500 mg tablet Commonly known as:  GLUCOPHAGE Take 1 Tab by mouth two (2) times daily (with meals). multivitamin with iron-mineral 0.8 mg Cmpk Take 1 Packet by mouth daily. OT Ultra/FastTrack Control Soln Generic drug:  Blood Glucose Control, Normal  
1 Drop by Other route every seven (7) days. TYLENOL EXTRA STRENGTH 500 mg tablet Generic drug:  acetaminophen Take 500-1,000 mg by mouth every six (6) hours as needed for Pain.  
  
 valsartan 160 mg tablet Commonly known as:  DIOVAN Take  by mouth daily. We Performed the Following CBC WITH AUTOMATED DIFF [52586 CPT(R)] HEMOGLOBIN A1C WITH EAG [01821 CPT(R)] LIPID PANEL [71096 CPT(R)] METABOLIC PANEL, COMPREHENSIVE [93993 CPT(R)] MICROALBUMIN, UR, RAND [id number] CPT(R)] T4, FREE [id number] CPT(R)] TSH 3RD GENERATION [45471 CPT(R)] Follow-up Instructions Return in about 6 months (around 11/4/2018) for HTN. To-Do List   
 05/04/2018 Imaging:  DUPLEX LOWER EXT VENOUS LEFT Patient Instructions Medicare Wellness Visit, Female The best way to live healthy is to have a healthy lifestyle by eating a well-balanced diet, exercising regularly, limiting alcohol and stopping smoking. Regular physical exams and screening tests are another way to keep healthy. Preventive exams provided by your health care provider can find health problems before they become diseases or illnesses. Preventive services including immunizations, screening tests, monitoring and exams can help you take care of your own health. All people over age 72 should have a pneumovax  and and a prevnar shot to prevent pneumonia. These are once in a lifetime unless you and your provider decide differently. All people over 65 should have a yearly flu shot and a tetanus vaccine every 10 years. A bone mass density to screen for osteoporosis or thinning of the bones should be done every 2 years after 65. Screening for diabetes mellitus with a blood sugar test should be done every year. Glaucoma is a disease of the eye due to increased ocular pressure that can lead to blindness and it should be done every year by an eye professional. 
 
Cardiovascular screening tests that check for elevated lipids (fatty part of blood) which can lead to heart disease and strokes should be done every 5 years. Colorectal screening that evaluates for blood or polyps in your colon should be done yearly as a stool test or every five years as a flexible sigmoidoscope or every 10 years as a colonoscopy up to age 76. Breast cancer screening with a mammogram is recommended biennially  for women age 54-69. Screening for cervical cancer with a pap smear and pelvic exam is recommended for women after age 72 years every 2 years up to age 79 or when the provider and patient decide to stop. If there is a history of cervical abnormalities or other increased risk for cancer then the test is recommended yearly. Hepatitis C screening is also recommended for anyone born between [birth year] through [address]. A shingles vaccine is also recommended once in a lifetime after age 61. Your Medicare Wellness Exam is recommended annually. Here is a list of your current Health Maintenance items with a due date: 
Health Maintenance Due Topic Date Due  
 DTaP/Tdap/Td  (1 - Tdap) 03/07/1967  Shingles Vaccine  01/07/2006  Hemoglobin A1C    05/03/2018 [facility]  05/04/2018 Introducing [facility]! Dear Chelita Sharp: Thank you for requesting a [employer] account. Our records indicate that you already have an active [employer] account. You can access your account anytime at [email or web address] [employer] Did you know that you can access your hospital and ER discharge instructions at any time in [employer]? You can also review all of your test results from your hospital stay or ER visit. Additional Information If you have questions, please visit the Frequently Asked Questions section of the [employer] website at [email or web address] Remember, [employer] is NOT to be used for urgent needs. For medical emergencies, dial 911. Now available from your iPhone and Android! Please provide this summary of care documentation to your next provider. Your primary care clinician is listed as [place]. If you have any questions after today's visit, please call [phone].

## 2018-05-04 NOTE — PROGRESS NOTES
Please call the patient and let the patient know that her test result(s) is/are normal.  Thanks. Raf Aquino.

## 2018-05-04 NOTE — LETTER
5/8/2018 10:41 AM 
 
 
 
Ms. Ivania Kahn [address] [place], [place] [id number] [facility] 7 [phone] Dear Ivania Kahn: 
 
Please find your most recent results below. Resulted Orders DUPLEX LOWER EXT VENOUS LEFT Narrative EXAM:  DUPLEX LOWER EXT VENOUS LEFT  
INDICATION: Left leg pain and swelling, DVT suspected. COMPARISON: None. FINDINGS: Duplex Doppler sonography of the left lower extremity was performed 
from the groin to the calf. The left common femoral, femoral and popliteal veins 
are compressible with normal color-flow and wave forms and response to 
physiologic maneuvers including Valsalva and augmentation. Impression IMPRESSION: No deep venous thrombosis identified. RECOMMENDATIONS: 
Please be advised your results are normal.  
 
Please call me if you have any questions: [phone] Sincerely, [address]

## 2018-05-05 LAB
ALBUMIN SERPL-MCNC: 4.2 G/DL (ref 3.5–4.8)
ALBUMIN/GLOB SERPL: 1.6 {RATIO} (ref 1.2–2.2)
ALP SERPL-CCNC: 72 IU/L (ref 39–117)
ALT SERPL-CCNC: 15 IU/L (ref 0–32)
AST SERPL-CCNC: 16 IU/L (ref 0–40)
BASOPHILS # BLD AUTO: 0 X10E3/UL (ref 0–0.2)
BASOPHILS NFR BLD AUTO: 0 %
BILIRUB SERPL-MCNC: 0.2 MG/DL (ref 0–1.2)
BUN SERPL-MCNC: 33 MG/DL (ref 8–27)
BUN/CREAT SERPL: 25 (ref 12–28)
CALCIUM SERPL-MCNC: 9.8 MG/DL (ref 8.7–10.3)
CHLORIDE SERPL-SCNC: 106 MMOL/L (ref 96–106)
CHOLEST SERPL-MCNC: 152 MG/DL (ref 100–199)
CO2 SERPL-SCNC: 22 MMOL/L (ref 18–29)
CREAT SERPL-MCNC: 1.33 MG/DL (ref 0.57–1)
EOSINOPHIL # BLD AUTO: 0.1 X10E3/UL (ref 0–0.4)
EOSINOPHIL NFR BLD AUTO: 1 %
ERYTHROCYTE [DISTWIDTH] IN BLOOD BY AUTOMATED COUNT: 17 % (ref 12.3–15.4)
EST. AVERAGE GLUCOSE BLD GHB EST-MCNC: 128 MG/DL
GFR SERPLBLD CREATININE-BSD FMLA CKD-EPI: 40 ML/MIN/1.73
GFR SERPLBLD CREATININE-BSD FMLA CKD-EPI: 46 ML/MIN/1.73
GLOBULIN SER CALC-MCNC: 2.7 G/DL (ref 1.5–4.5)
GLUCOSE SERPL-MCNC: 93 MG/DL (ref 65–99)
HBA1C MFR BLD: 6.1 % (ref 4.8–5.6)
HCT VFR BLD AUTO: 37.7 % (ref 34–46.6)
HDLC SERPL-MCNC: 57 MG/DL
HGB BLD-MCNC: 12 G/DL (ref 11.1–15.9)
IMM GRANULOCYTES # BLD: 0 X10E3/UL (ref 0–0.1)
IMM GRANULOCYTES NFR BLD: 0 %
LDLC SERPL CALC-MCNC: 70 MG/DL (ref 0–99)
LYMPHOCYTES # BLD AUTO: 3.1 X10E3/UL (ref 0.7–3.1)
LYMPHOCYTES NFR BLD AUTO: 38 %
MCH RBC QN AUTO: 26.4 PG (ref 26.6–33)
MCHC RBC AUTO-ENTMCNC: 31.8 G/DL (ref 31.5–35.7)
MCV RBC AUTO: 83 FL (ref 79–97)
MICROALBUMIN UR-MCNC: 816.3 UG/ML
MONOCYTES # BLD AUTO: 0.5 X10E3/UL (ref 0.1–0.9)
MONOCYTES NFR BLD AUTO: 6 %
NEUTROPHILS # BLD AUTO: 4.5 X10E3/UL (ref 1.4–7)
NEUTROPHILS NFR BLD AUTO: 55 %
PLATELET # BLD AUTO: 217 X10E3/UL (ref 150–379)
POTASSIUM SERPL-SCNC: 4.7 MMOL/L (ref 3.5–5.2)
PROT SERPL-MCNC: 6.9 G/DL (ref 6–8.5)
RBC # BLD AUTO: 4.54 X10E6/UL (ref 3.77–5.28)
SODIUM SERPL-SCNC: 143 MMOL/L (ref 134–144)
T4 FREE SERPL-MCNC: 1.27 NG/DL (ref 0.82–1.77)
TRIGL SERPL-MCNC: 127 MG/DL (ref 0–149)
TSH SERPL DL<=0.005 MIU/L-ACNC: 1.14 UIU/ML (ref 0.45–4.5)
VLDLC SERPL CALC-MCNC: 25 MG/DL (ref 5–40)
WBC # BLD AUTO: 8.1 X10E3/UL (ref 3.4–10.8)

## 2018-05-07 ENCOUNTER — TELEPHONE (OUTPATIENT)
Dept: INTERNAL MEDICINE CLINIC | Age: 73
End: 2018-05-07

## 2018-05-08 NOTE — PROGRESS NOTES
Call attempted to patient, there was no answer. Left a voice mail message requesting a return call to review results. Letter printed and mailed.

## 2018-08-21 ENCOUNTER — TELEPHONE (OUTPATIENT)
Dept: INTERNAL MEDICINE CLINIC | Age: 73
End: 2018-08-21

## 2018-08-21 NOTE — TELEPHONE ENCOUNTER
Message was left for [facility] if numbers provided were not correct.  Web message was sent to patient to contact  Bria

## 2018-08-21 NOTE — TELEPHONE ENCOUNTER
Kamini//[facility] states she needs a call back to discuss patient's correct phone number as they have a different number on file & needs to contact patient & thinks phone number is wrong. [place] state patient has been receiving diabetic testing supplies from them since 2016 & just received order from Dr. Betzy Parker & needs to contact patient. Please call to discuss as phone number given was not what we have on file & no other information was given to [place] by [facility].  Thank you

## 2018-11-02 ENCOUNTER — OFFICE VISIT (OUTPATIENT)
Dept: INTERNAL MEDICINE CLINIC | Age: 73
End: 2018-11-02

## 2018-11-02 VITALS
SYSTOLIC BLOOD PRESSURE: 115 MMHG | BODY MASS INDEX: 32.1 KG/M2 | OXYGEN SATURATION: 100 % | RESPIRATION RATE: 18 BRPM | DIASTOLIC BLOOD PRESSURE: 74 MMHG | HEIGHT: 64 IN | TEMPERATURE: 97.8 F | HEART RATE: 77 BPM | WEIGHT: 188 LBS

## 2018-11-02 DIAGNOSIS — Z23 ENCOUNTER FOR IMMUNIZATION: ICD-10-CM

## 2018-11-02 DIAGNOSIS — D64.9 ANEMIA, UNSPECIFIED TYPE: ICD-10-CM

## 2018-11-02 DIAGNOSIS — E03.4 HYPOTHYROIDISM DUE TO ACQUIRED ATROPHY OF THYROID: ICD-10-CM

## 2018-11-02 DIAGNOSIS — E11.9 TYPE 2 DIABETES MELLITUS WITHOUT COMPLICATION, UNSPECIFIED WHETHER LONG TERM INSULIN USE (HCC): ICD-10-CM

## 2018-11-02 DIAGNOSIS — I10 ESSENTIAL HYPERTENSION: Primary | ICD-10-CM

## 2018-11-02 RX ORDER — IRBESARTAN 150 MG/1
150 TABLET ORAL DAILY
COMMUNITY
Start: 2018-09-25 | End: 2019-10-10 | Stop reason: SDUPTHER

## 2018-11-02 NOTE — LETTER
11/5/2018 10:55 AM 
 
Ms. Violet Melton [id number] [place] [id number] Dear Violet Melton: 
 
Please find your most recent results below. Resulted Orders LIPID PANEL Result Value Ref Range Cholesterol, total 146 100 - 199 mg/dL Triglyceride 104 0 - 149 mg/dL HDL Cholesterol 54 >39 mg/dL VLDL, calculated 21 5 - 40 mg/dL LDL, calculated 71 0 - 99 mg/dL Narrative Performed at:  [address]  [id number] : Rojean Koyanagi MD, Phone:  [phone] CBC WITH AUTOMATED DIFF Result Value Ref Range WBC 8.8 3.4 - 10.8 x10E3/uL  
 RBC 4.44 3.77 - 5.28 x10E6/uL HGB 11.6 11.1 - 15.9 g/dL HCT 37.7 34.0 - 46.6 % MCV 85 79 - 97 fL  
 MCH 26.1 (L) 26.6 - 33.0 pg  
 MCHC 30.8 (L) 31.5 - 35.7 g/dL  
 RDW 15.9 (H) 12.3 - 15.4 % PLATELET 961 713 - 844 x10E3/uL NEUTROPHILS 61 Not Estab. % Lymphocytes 32 Not Estab. % MONOCYTES 6 Not Estab. % EOSINOPHILS 1 Not Estab. % BASOPHILS 0 Not Estab. %  
 ABS. NEUTROPHILS 5.4 1.4 - 7.0 x10E3/uL Abs Lymphocytes 2.8 0.7 - 3.1 x10E3/uL  
 ABS. MONOCYTES 0.5 0.1 - 0.9 x10E3/uL  
 ABS. EOSINOPHILS 0.1 0.0 - 0.4 x10E3/uL  
 ABS. BASOPHILS 0.0 0.0 - 0.2 x10E3/uL IMMATURE GRANULOCYTES 0 Not Estab. %  
 ABS. IMM. GRANS. 0.0 0.0 - 0.1 x10E3/uL Narrative Performed at:  [address]  [id number] : Rojean Koyanagi MD, Phone:  [phone] METABOLIC PANEL, COMPREHENSIVE Result Value Ref Range Glucose 95 65 - 99 mg/dL BUN 29 (H) 8 - 27 mg/dL Creatinine 1.22 (H) 0.57 - 1.00 mg/dL GFR est non-AA 44 (L) >59 mL/min/1.73 GFR est AA 51 (L) >59 mL/min/1.73  
 BUN/Creatinine ratio 24 12 - 28 Sodium 143 134 - 144 mmol/L Potassium 4.4 3.5 - 5.2 mmol/L Chloride 108 (H) 96 - 106 mmol/L  
 CO2 22 20 - 29 mmol/L Calcium 9.2 8.7 - 10.3 mg/dL Protein, total 6.8 6.0 - 8.5 g/dL Albumin 4.4 3.5 - 4.8 g/dL GLOBULIN, TOTAL 2.4 1.5 - 4.5 g/dL A-G Ratio 1.8 1.2 - 2.2 Bilirubin, total 0.3 0.0 - 1.2 mg/dL Alk. phosphatase 79 39 - 117 IU/L  
 AST (SGOT) 17 0 - 40 IU/L  
 ALT (SGPT) 14 0 - 32 IU/L Narrative Performed at:  [address]  [id number] : Doroteo Duong MD, Phone:  [phone] TSH 3RD GENERATION Result Value Ref Range TSH 1.140 0.450 - 4.500 uIU/mL Narrative Performed at:  [address]  [id number] : Doroteo Duong MD, Phone:  [phone] HEMOGLOBIN A1C WITH EAG Result Value Ref Range Hemoglobin A1c 6.1 (H) 4.8 - 5.6 % Comment:  
            Prediabetes: 5.7 - 6.4 Diabetes: >6.4 Glycemic control for adults with diabetes: <7.0 Estimated average glucose 128 mg/dL Narrative Performed at:  [address]  [id number] : Doroteo Duong MD, Phone:  [phone] Please call me if you have any questions: [phone] Sincerely, 
 
 
Corina Chew MD

## 2018-11-02 NOTE — PROGRESS NOTES
SUBJECTIVE:   Ms. Syl Kenyon is a 68 y.o. female who is here for the following complaint, and also for follow up of routine medical issues. She had been seeing Dr. Jessica Mason, and Dr. Theador Closs before that. Chief Complaint   Patient presents with    Hypertension     pt here today for routine f.u        She's worried about her memory. She has been seeing nutritionist and losing weight:   Wt Readings from Last 3 Encounters:   11/02/18 188 lb (85.3 kg)   05/04/18 190 lb 9.6 oz (86.5 kg)   02/06/18 192 lb (87.1 kg)     DM: . Her numbness L foot has improved. LBP is stable. \"I was told I have arthritis in the spine\" by Dr. Chris Wayne, orthopedist.  She has been through treatment with Dr. Dion Pro, now seeing someone else in his office, for Hep C, obtained during a prior blood transfusion. \"He said after the last visit I could return just as needed. \"  Seeing Nephrologist, Dr. Clive Meigs. At this time, she is otherwise doing well and has brought no other complaints to my attention today. For a list of the medical issues addressed today, see the assessment and plan below.     PMH:   Past Medical History:   Diagnosis Date    Arthritis     spine    Cancer ([address]     kidney (right)    Chronic kidney disease     right kidney removed    Diabetes ([address]     She is controlling with weight loss    Diabetes mellitus ([address] 10/8/2014    Hepatitis C     hx hep C from blood transfusion per pt; Treated by Dr. Yoseph oRmero Hyperlipidemia     Hypertension     Personal history of renal cancer 3/2/2015    Thyroid disease 1999    Thyroidectomy       Past Surgical History:   Procedure Laterality Date    BREAST SURGERY PROCEDURE UNLISTED  1996    breast reduction    HX BREAST REDUCTION Bilateral 1996    HX COLONOSCOPY  3/5/12    Repeat in 5 years (Dr. Dion Pro)    HX GYN  1980s    tubal pregnancy    HX HYSTERECTOMY  1995    HX [address] HX OTHER SURGICAL  1999    thyroidectomy    HX OTHER SURGICAL colonoscopy x 2 - ?polyps    HX TONSILLECTOMY      2or 4years old   Sherlyn Ji [address]    kidney removed on right       All: She is allergic to codeine. Current Outpatient Medications   Medication Sig    glucose blood VI test strips (BLOOD GLUCOSE TEST) strip One touch ultra glucose strips. Check blood sugar 2-3 times per week    levothyroxine (SYNTHROID) 50 mcg tablet TAKE ONE TABLET BY MOUTH ONCE DAILY BEFORE BREAKFAST    atorvastatin (LIPITOR) 20 mg tablet Take 1 Tab by mouth daily.  metFORMIN (GLUCOPHAGE) 500 mg tablet Take 1 Tab by mouth two (2) times daily (with meals).  amLODIPine (NORVASC) 5 mg tablet Take 5 mg by mouth daily.  valsartan (DIOVAN) 160 mg tablet Take  by mouth daily.  hydrochlorothiazide (HYDRODIURIL) 25 mg tablet Take 1 Tab by mouth daily as needed.  multivitamin with iron-mineral 0.8 mg cmpk Take 1 Packet by mouth daily.  lancets 30 gauge misc 1 Lancet by SubCUTAneous route daily.  acetaminophen (TYLENOL EXTRA STRENGTH) 500 mg tablet Take 500-1,000 mg by mouth every six (6) hours as needed for Pain.  cloNIDine (CATAPRES) 0.1 mg tablet Take 0.1 mg by mouth two (2) times a day.  Blood-Glucose Meter (ONE TOUCH ULTRAMINI) monitoring kit Check blood sugar daily    amLODIPine-valsartan (EXFORGE) 5-160 mg per tablet Take 1 Tab by mouth daily.  OT ULTRA/FASTTRACK CONTROL soln 1 Drop by Other route every seven (7) days. No current facility-administered medications for this visit. FH: Her family history includes Diabetes in her daughter, mother, and sister; Heart Disease in her father; Hypertension in her daughter and son; Kidney Disease in her father and mother; No Known Problems in her son. SH: Retired Lpn, doing private duty nursing. She reports that she has quit smoking. she has never used smokeless tobacco. She reports that she does not drink alcohol or use drugs. ROS: See above; Complete ROS otherwise negative.      OBJECTIVE:   Vitals: Visit Vitals  /74 (BP 1 Location: Left arm, BP Patient Position: Sitting)   Pulse 77   Temp 97.8 °F (36.6 °C) (Oral)   Resp 18   Ht 5' 4\" (1.626 m)   Wt 188 lb (85.3 kg)   LMP  (LMP Unknown)   SpO2 100%   BMI 32.27 kg/m²      Gen: Pleasant 68 y.o.  female in NAD. HEENT: PERRLA. EOMI. OP moist and pink. Neck: Supple. No LAD. HEART: RRR, No M/G/R.    LUNGS: CTAB No W/R. ABDOMEN: S, NT, ND, BS+. EXTREMITIES: Warm. No C/C/E.  MUSCULOSKELETAL: Normal ROM, muscle strength 5/5 all groups. NEURO: Alert and oriented x 3. Cranial nerves grossly intact. No focal sensory or motor deficits noted. SKIN: Warm. Dry. Keloid at site of prior thyroid surgery. Lab Results   Component Value Date/Time    Sodium 143 05/04/2018 10:48 AM    Potassium 4.7 05/04/2018 10:48 AM    Chloride 106 05/04/2018 10:48 AM    CO2 22 05/04/2018 10:48 AM    Anion gap 11 03/03/2013 04:25 PM    Glucose 93 05/04/2018 10:48 AM    BUN 33 (H) 05/04/2018 10:48 AM    Creatinine 1.33 (H) 05/04/2018 10:48 AM    BUN/Creatinine ratio 25 05/04/2018 10:48 AM    GFR est AA 46 (L) 05/04/2018 10:48 AM    GFR est non-AA 40 (L) 05/04/2018 10:48 AM    Calcium 9.8 05/04/2018 10:48 AM    Bilirubin, total 0.2 05/04/2018 10:48 AM    ALT (SGPT) 15 05/04/2018 10:48 AM    AST (SGOT) 16 05/04/2018 10:48 AM    Alk.  phosphatase 72 05/04/2018 10:48 AM    Protein, total 6.9 05/04/2018 10:48 AM    Albumin 4.2 05/04/2018 10:48 AM    Globulin 4.0 03/03/2013 04:25 PM    A-G Ratio 1.6 05/04/2018 10:48 AM       Lab Results   Component Value Date/Time    Cholesterol, total 152 05/04/2018 10:48 AM    HDL Cholesterol 57 05/04/2018 10:48 AM    LDL, calculated 70 05/04/2018 10:48 AM    Triglyceride 127 05/04/2018 10:48 AM    CHOL/HDL Ratio 3.7 12/29/2009 07:16 AM        Lab Results   Component Value Date/Time    Hemoglobin A1c 6.1 (H) 05/04/2018 10:48 AM       Lab Results   Component Value Date/Time    WBC 8.1 05/04/2018 10:48 AM    HGB 12.0 05/04/2018 10:48 AM    HCT 37.7 05/04/2018 10:48 AM    PLATELET 866 13/26/9054 10:48 AM    MCV 83 05/04/2018 10:48 AM       Lab Results   Component Value Date/Time    TSH 1.140 05/04/2018 10:48 AM         ASSESSMENT/ PLAN:   Memory disturbance: MMSE today is 29/30 (failed to recall 1 of 3 objects). For now, reassured. Diabetes: at last check, controlled. Continue current measures. Hypertension: Fine today. Thyroid disease: Euthyroid. Watch TSH. Chronic kidney disease:  Reviewed labs. Watch labs. Arthritis: Chronic and stable. Hyperlipidemia: Doing worse. Discussed her high risk of cardiovascular complications. Lipitor 20. Hepatitis C: F/U with GI as needed--apparently \"turned loose\". Obesity: Diet and exercise. Weight down a bit; keep up the good work. Neuropathy L toe: ?Sciatica vs other compressive neuropathy. Doing better lately. Follow-up Disposition:  Return in about 6 months (around 5/2/2019) for HTN. I have reviewed the patient's medications and risks/side effects/benefits were discussed. Diagnosis(-es) explained to patient and questions answered. Literature provided where appropriate.

## 2018-11-02 NOTE — PROGRESS NOTES
1. Have you been to the ER, urgent care clinic since your last visit? Hospitalized since your last visit?no    2. Have you seen or consulted any other health care providers outside of the [address] [place] [address] since your last visit? Include any pap smears or colon screening.  no

## 2018-11-03 LAB
ALBUMIN SERPL-MCNC: 4.4 G/DL (ref 3.5–4.8)
ALBUMIN/GLOB SERPL: 1.8 {RATIO} (ref 1.2–2.2)
ALP SERPL-CCNC: 79 IU/L (ref 39–117)
ALT SERPL-CCNC: 14 IU/L (ref 0–32)
AST SERPL-CCNC: 17 IU/L (ref 0–40)
BASOPHILS # BLD AUTO: 0 X10E3/UL (ref 0–0.2)
BASOPHILS NFR BLD AUTO: 0 %
BILIRUB SERPL-MCNC: 0.3 MG/DL (ref 0–1.2)
BUN SERPL-MCNC: 29 MG/DL (ref 8–27)
BUN/CREAT SERPL: 24 (ref 12–28)
CALCIUM SERPL-MCNC: 9.2 MG/DL (ref 8.7–10.3)
CHLORIDE SERPL-SCNC: 108 MMOL/L (ref 96–106)
CHOLEST SERPL-MCNC: 146 MG/DL (ref 100–199)
CO2 SERPL-SCNC: 22 MMOL/L (ref 20–29)
CREAT SERPL-MCNC: 1.22 MG/DL (ref 0.57–1)
EOSINOPHIL # BLD AUTO: 0.1 X10E3/UL (ref 0–0.4)
EOSINOPHIL NFR BLD AUTO: 1 %
ERYTHROCYTE [DISTWIDTH] IN BLOOD BY AUTOMATED COUNT: 15.9 % (ref 12.3–15.4)
EST. AVERAGE GLUCOSE BLD GHB EST-MCNC: 128 MG/DL
GLOBULIN SER CALC-MCNC: 2.4 G/DL (ref 1.5–4.5)
GLUCOSE SERPL-MCNC: 95 MG/DL (ref 65–99)
HBA1C MFR BLD: 6.1 % (ref 4.8–5.6)
HCT VFR BLD AUTO: 37.7 % (ref 34–46.6)
HDLC SERPL-MCNC: 54 MG/DL
HGB BLD-MCNC: 11.6 G/DL (ref 11.1–15.9)
IMM GRANULOCYTES # BLD: 0 X10E3/UL (ref 0–0.1)
IMM GRANULOCYTES NFR BLD: 0 %
LDLC SERPL CALC-MCNC: 71 MG/DL (ref 0–99)
LYMPHOCYTES # BLD AUTO: 2.8 X10E3/UL (ref 0.7–3.1)
LYMPHOCYTES NFR BLD AUTO: 32 %
MCH RBC QN AUTO: 26.1 PG (ref 26.6–33)
MCHC RBC AUTO-ENTMCNC: 30.8 G/DL (ref 31.5–35.7)
MCV RBC AUTO: 85 FL (ref 79–97)
MONOCYTES # BLD AUTO: 0.5 X10E3/UL (ref 0.1–0.9)
MONOCYTES NFR BLD AUTO: 6 %
NEUTROPHILS # BLD AUTO: 5.4 X10E3/UL (ref 1.4–7)
NEUTROPHILS NFR BLD AUTO: 61 %
PLATELET # BLD AUTO: 234 X10E3/UL (ref 150–379)
POTASSIUM SERPL-SCNC: 4.4 MMOL/L (ref 3.5–5.2)
PROT SERPL-MCNC: 6.8 G/DL (ref 6–8.5)
RBC # BLD AUTO: 4.44 X10E6/UL (ref 3.77–5.28)
SODIUM SERPL-SCNC: 143 MMOL/L (ref 134–144)
TRIGL SERPL-MCNC: 104 MG/DL (ref 0–149)
TSH SERPL DL<=0.005 MIU/L-ACNC: 1.14 UIU/ML (ref 0.45–4.5)
VLDLC SERPL CALC-MCNC: 21 MG/DL (ref 5–40)
WBC # BLD AUTO: 8.8 X10E3/UL (ref 3.4–10.8)

## 2018-11-05 ENCOUNTER — TELEPHONE (OUTPATIENT)
Dept: INTERNAL MEDICINE CLINIC | Age: 73
End: 2018-11-05

## 2018-12-18 RX ORDER — METFORMIN HYDROCHLORIDE 500 MG/1
TABLET ORAL
Qty: 180 TAB | Refills: 3 | Status: SHIPPED | OUTPATIENT
Start: 2018-12-18 | End: 2019-11-26 | Stop reason: SDUPTHER

## 2018-12-18 RX ORDER — ATORVASTATIN CALCIUM 20 MG/1
TABLET, FILM COATED ORAL
Qty: 90 TAB | Refills: 3 | Status: SHIPPED | OUTPATIENT
Start: 2018-12-18 | End: 2020-04-03

## 2018-12-18 RX ORDER — LEVOTHYROXINE SODIUM 50 UG/1
TABLET ORAL
Qty: 90 TAB | Refills: 3 | Status: SHIPPED | OUTPATIENT
Start: 2018-12-18 | End: 2019-11-26 | Stop reason: SDUPTHER

## 2018-12-18 NOTE — TELEPHONE ENCOUNTER
Pt is requesting to refill on levothyroxine 05mg, lipitor 20mg Metformin 500mg , Clonidine 0.1mg and uses the [facility] mail order  pharmacy on file. Best contact [phone]       Message received & copied from [facility]

## 2018-12-20 ENCOUNTER — DOCUMENTATION ONLY (OUTPATIENT)
Dept: INTERNAL MEDICINE CLINIC | Age: 73
End: 2018-12-20

## 2019-01-03 ENCOUNTER — TELEPHONE (OUTPATIENT)
Dept: INTERNAL MEDICINE CLINIC | Age: 74
End: 2019-01-03

## 2019-01-03 NOTE — TELEPHONE ENCOUNTER
Pt is requesting a recommendation to a Gyn. Pts number is [phone].          Message received & copied from [facility]

## 2019-01-03 NOTE — TELEPHONE ENCOUNTER
Call completed to patient, two identifiers verified. Patient provided contact information for [facility].

## 2019-03-14 ENCOUNTER — OFFICE VISIT (OUTPATIENT)
Dept: INTERNAL MEDICINE CLINIC | Age: 74
End: 2019-03-14

## 2019-03-14 VITALS
HEART RATE: 75 BPM | HEIGHT: 64 IN | WEIGHT: 194 LBS | SYSTOLIC BLOOD PRESSURE: 105 MMHG | OXYGEN SATURATION: 97 % | TEMPERATURE: 97.7 F | BODY MASS INDEX: 33.12 KG/M2 | DIASTOLIC BLOOD PRESSURE: 55 MMHG | RESPIRATION RATE: 16 BRPM

## 2019-03-14 DIAGNOSIS — N81.10 PROLAPSE OF FEMALE BLADDER, ACQUIRED: Primary | ICD-10-CM

## 2019-03-14 NOTE — PROGRESS NOTES
SUBJECTIVE  Ms. Ivania Kahn presents today acutely for Preop Visit. Chief Complaint   Patient presents with    Pre-op Exam     Pt is going to have prolapse bladder surgery     She has urinary frequency, and nocturia. This has been found to have been due to prolapsed bladder. Surgery planned by Dr. Erlinda Bowden. She is stable with respect to comorbidities. No acute illnesses. No cardiovascular symptoms. No neurologic symptoms--focal weakness, etc.     Past Medical History:   Diagnosis Date    Arthritis     spine    Cancer ([address]     kidney (right)    Chronic kidney disease     right kidney removed    Diabetes ([address]     She is controlling with weight loss    Diabetes mellitus ([address] 10/8/2014    Hepatitis C     hx hep C from blood transfusion per pt; Treated by Dr. Anne Marie Eric Hyperlipidemia     Hypertension     Personal history of renal cancer 3/2/2015    Thyroid disease 1999    Thyroidectomy     Past Surgical History:   Procedure Laterality Date    BREAST SURGERY PROCEDURE UNLISTED  1996    breast reduction    COLONOSCOPY N/A 6/20/2017    COLONOSCOPY performed by David Michael. Aby Martinez MD at [facility] ENDOSCOPY    HX BREAST REDUCTION Bilateral 1996    HX COLONOSCOPY  3/5/12    Repeat in 5 years (Dr. Nika Garcia)    HX GYN  1980s    tubal pregnancy    HX HYSTERECTOMY  1995    HX [address] HX OTHER SURGICAL  1999    thyroidectomy    HX OTHER SURGICAL      colonoscopy x 2 - ?polyps    HX TONSILLECTOMY      2or 4years old   CarloskiraBatman [address]    kidney removed on right     Allergies: She is allergic to codeine.    Current Outpatient Medications on File Prior to Visit   Medication Sig Dispense Refill    metFORMIN (GLUCOPHAGE) 500 mg tablet TAKE 1 TABLET BY MOUTH TWO  TIMES DAILY WITH MEALS 180 Tab 3    levothyroxine (SYNTHROID) 50 mcg tablet TAKE ONE TABLET BY MOUTH  ONCE DAILY BEFORE BREAKFAST 90 Tab 3    atorvastatin (LIPITOR) 20 mg tablet TAKE 1 TABLET BY MOUTH  DAILY 90 Tab 3    ONETOUCH ULTRA BLUE TEST STRIP strip CHECK BLOOD SUGAR 2-3 TIMES A WEEK 100 Strip 2    irbesartan (AVAPRO) 150 mg tablet Take 150 mg by mouth daily.  amLODIPine (NORVASC) 5 mg tablet Take 5 mg by mouth daily.  hydrochlorothiazide (HYDRODIURIL) 25 mg tablet Take 1 Tab by mouth daily as needed. 30 Tab 5    multivitamin with iron-mineral 0.8 mg cmpk Take 1 Packet by mouth daily.  lancets 30 gauge misc 1 Lancet by SubCUTAneous route daily.  acetaminophen (TYLENOL EXTRA STRENGTH) 500 mg tablet Take 500-1,000 mg by mouth every six (6) hours as needed for Pain.  cloNIDine (CATAPRES) 0.1 mg tablet Take 0.1 mg by mouth two (2) times a day.  Blood-Glucose Meter (ONE TOUCH ULTRAMINI) monitoring kit Check blood sugar daily 1 Kit 0     No current facility-administered medications on file prior to visit. SH: She reports that she has quit smoking. she has never used smokeless tobacco. She reports that she does not drink alcohol or use drugs. Complete review of systems was performed and is otherwise unremarkable except as noted elsewhere. OBJECTIVE  Visit Vitals  /55 (BP 1 Location: Left arm, BP Patient Position: Sitting)   Pulse 75   Temp 97.7 °F (36.5 °C) (Oral)   Resp 16   Ht 5' 4\" (1.626 m)   Wt 194 lb (88 kg)   LMP  (LMP Unknown)   SpO2 97%   BMI 33.30 kg/m²     Gen: Pleasant 69 y.o.  female in [facility].   HEENT: PERRLA. EOMI. OP moist and pink.  Neck: Supple.  No LAD.  HEART: RRR, No M/G/R.   LUNGS: CTAB No W/R.   ABDOMEN: S, NT, ND, BS+.   EXTREMITIES: Warm. No C/C/E. MUSCULOSKELETAL: Normal ROM, muscle strength 5/5 all groups. NEURO: Alert and oriented x 3.  Cranial nerves grossly intact.  No focal sensory or motor deficits noted. SKIN: Warm. Dry. No rashes or other lesions noted. ASSESSMENT / PLAN  Prolapsed bladder with urinary symptoms (frequency,etc) related to this: Stable comorbidities. Okay to proceed based on current guidelines.      I have reviewed with the patient details of the assessment and plan and all questions were answered. Relevant patient education was performed. Follow-up Disposition: As planned.

## 2019-03-14 NOTE — PROGRESS NOTES
Identified pt with two pt identifiers(name and ). Reviewed record in preparation for visit and have obtained necessary documentation. Chief Complaint   Patient presents with    Pre-op Exam     Pt is going to have prolapse bladder surgery      Visit Vitals  /55 (BP 1 Location: Left arm, BP Patient Position: Sitting)   Pulse 75   Temp 97.7 °F (36.5 °C) (Oral)   Resp 16   Ht 5' 4\" (1.626 m)   Wt 194 lb (88 kg)   SpO2 97%   BMI 33.30 kg/m²     Health Maintenance Due   Topic    DTaP/Tdap/Td series (1 - Tdap)    Shingrix Vaccine Age 49> (1 of 2)    Influenza Age 5 to Adult        Coordination of Care Questionnaire:  :   1) Have you been to an emergency room, urgent care, or hospitalized since your last visit? If yes, where when, and reason for visit? No         2. Have seen or consulted any other health care provider since your last visit? If yes, where when, and reason for visit? Yes, Dr. Hazel Lombardi at [facility]       3) Do you have an Advanced Directive/ Living Will in place? No  If yes, do we have a copy on file   If no, would you like information No    Patient is accompanied by self I have received verbal consent from Lucas Baig to discuss any/all medical information while they are present in the room.

## 2019-03-28 ENCOUNTER — TELEPHONE (OUTPATIENT)
Dept: INTERNAL MEDICINE CLINIC | Age: 74
End: 2019-03-28

## 2019-03-28 DIAGNOSIS — E11.65 TYPE 2 DIABETES MELLITUS WITH HYPERGLYCEMIA, WITHOUT LONG-TERM CURRENT USE OF INSULIN (HCC): Primary | ICD-10-CM

## 2019-03-28 NOTE — TELEPHONE ENCOUNTER
Please send an order to [facility] for a One Touch glucose monitor. Pt has misplaced this when she got home from vacation. Please send in as soon as possible. Thanks.

## 2019-03-29 RX ORDER — INSULIN PUMP SYRINGE, 3 ML
EACH MISCELLANEOUS
Qty: 1 KIT | Refills: 0 | Status: SHIPPED | OUTPATIENT
Start: 2019-03-29 | End: 2020-02-22

## 2019-04-01 ENCOUNTER — HOSPITAL ENCOUNTER (OUTPATIENT)
Dept: PREADMISSION TESTING | Age: 74
Discharge: HOME | End: 2019-04-01
Payer: MEDICARE

## 2019-04-01 VITALS
HEIGHT: 64 IN | SYSTOLIC BLOOD PRESSURE: 145 MMHG | BODY MASS INDEX: 33.14 KG/M2 | RESPIRATION RATE: 14 BRPM | DIASTOLIC BLOOD PRESSURE: 83 MMHG | HEART RATE: 88 BPM | TEMPERATURE: 97.7 F | WEIGHT: 194.13 LBS

## 2019-04-01 LAB
ANION GAP SERPL CALC-SCNC: 5 MMOL/L (ref 5–15)
BASOPHILS # BLD: 0 K/UL (ref 0–0.1)
BASOPHILS NFR BLD: 0 % (ref 0–1)
BUN SERPL-MCNC: 30 MG/DL (ref 6–20)
BUN/CREAT SERPL: 21 (ref 12–20)
CALCIUM SERPL-MCNC: 9 MG/DL (ref 8.5–10.1)
CHLORIDE SERPL-SCNC: 114 MMOL/L (ref 97–108)
CO2 SERPL-SCNC: 26 MMOL/L (ref 21–32)
CREAT SERPL-MCNC: 1.41 MG/DL (ref 0.55–1.02)
DIFFERENTIAL METHOD BLD: ABNORMAL
EOSINOPHIL # BLD: 0 K/UL (ref 0–0.4)
EOSINOPHIL NFR BLD: 0 % (ref 0–7)
ERYTHROCYTE [DISTWIDTH] IN BLOOD BY AUTOMATED COUNT: 15.9 % (ref 11.5–14.5)
EST. AVERAGE GLUCOSE BLD GHB EST-MCNC: 143 MG/DL
GLUCOSE SERPL-MCNC: 95 MG/DL (ref 65–100)
HBA1C MFR BLD: 6.6 % (ref 4.2–6.3)
HCT VFR BLD AUTO: 38.8 % (ref 35–47)
HGB BLD-MCNC: 11.8 G/DL (ref 11.5–16)
IMM GRANULOCYTES # BLD AUTO: 0 K/UL (ref 0–0.04)
IMM GRANULOCYTES NFR BLD AUTO: 0 % (ref 0–0.5)
LYMPHOCYTES # BLD: 3.1 K/UL (ref 0.8–3.5)
LYMPHOCYTES NFR BLD: 33 % (ref 12–49)
MCH RBC QN AUTO: 26.8 PG (ref 26–34)
MCHC RBC AUTO-ENTMCNC: 30.4 G/DL (ref 30–36.5)
MCV RBC AUTO: 88 FL (ref 80–99)
MONOCYTES # BLD: 0.6 K/UL (ref 0–1)
MONOCYTES NFR BLD: 7 % (ref 5–13)
NEUTS SEG # BLD: 5.5 K/UL (ref 1.8–8)
NEUTS SEG NFR BLD: 60 % (ref 32–75)
NRBC # BLD: 0 K/UL (ref 0–0.01)
NRBC BLD-RTO: 0 PER 100 WBC
PLATELET # BLD AUTO: 182 K/UL (ref 150–400)
PMV BLD AUTO: 11.5 FL (ref 8.9–12.9)
POTASSIUM SERPL-SCNC: 4.2 MMOL/L (ref 3.5–5.1)
RBC # BLD AUTO: 4.41 M/UL (ref 3.8–5.2)
SODIUM SERPL-SCNC: 145 MMOL/L (ref 136–145)
WBC # BLD AUTO: 9.3 K/UL (ref 3.6–11)

## 2019-04-01 PROCEDURE — 36415 COLL VENOUS BLD VENIPUNCTURE: CPT

## 2019-04-01 PROCEDURE — 85025 COMPLETE CBC W/AUTO DIFF WBC: CPT

## 2019-04-01 PROCEDURE — 83036 HEMOGLOBIN GLYCOSYLATED A1C: CPT

## 2019-04-01 PROCEDURE — 80048 BASIC METABOLIC PNL TOTAL CA: CPT

## 2019-04-01 PROCEDURE — 93005 ELECTROCARDIOGRAM TRACING: CPT

## 2019-04-01 NOTE — TELEPHONE ENCOUNTER
Identified patient 2 identifiers verified. Patient aware of glucometer prescription sent to [facility].

## 2019-04-01 NOTE — PERIOP NOTES
Dr. Thomas Davila office called and I spoke with Verenice Van regarding the need for preop orders in PAT. Verenice Van spoke with Dr. Anamaria Shaw and CBC, CMP, HgbA1C and EKG ordered per phone order. Preoperative instructions reviewed with patient. Patient given SSI infection FAQS sheet. Patient was given the opportunity to ask questions on the information provided. Surgical consent obtained and signed by patient.

## 2019-04-02 LAB
ATRIAL RATE: 81 BPM
CALCULATED P AXIS, ECG09: 63 DEGREES
CALCULATED R AXIS, ECG10: 4 DEGREES
CALCULATED T AXIS, ECG11: 24 DEGREES
DIAGNOSIS, 93000: NORMAL
P-R INTERVAL, ECG05: 174 MS
Q-T INTERVAL, ECG07: 372 MS
QRS DURATION, ECG06: 76 MS
QTC CALCULATION (BEZET), ECG08: 432 MS
VENTRICULAR RATE, ECG03: 81 BPM

## 2019-04-02 NOTE — PERIOP NOTES
Faxed PAT testing reports (and fax confirmation received) to Dr. Gilbert Castaneda office. Called at [phone] on 04/02/19 (left message with Aristeo Luke, Dr Gilbert Castaneda nurse) RE: abnormal labs. See abnormals below:  Chloride: 114  BUN: 30  Creat: 1.41  BUN / Creat ratio: 21    Pt has a h/o RT Nephrectomy for cancer.

## 2019-04-05 ENCOUNTER — TELEPHONE (OUTPATIENT)
Dept: INTERNAL MEDICINE CLINIC | Age: 74
End: 2019-04-05

## 2019-04-05 NOTE — H&P
Patient  Name BEVERLY Calderon ID# [id number] Appt. Date/Time 2019 11:30AM   1945 Service Dept. [facility] [facility]  Provider Maurisio Osborne MD  Insurance   Med Primary: Cameron Argueta ( [facility])  Insurance # : [id number]  Policy/Group # : [id number]  Prescription: ORX - Member is eligible. details  Chief Complaint  urogyn pre-op  Patient's Care Team  OB/GYN: Alexis Toure MD: Dori Brand Do [facility] [address], [address], [address] [facility], Ph [phone], Fax [phone] NPI: [id number]  Primary Care Provider: Ayaka Yu MD: [address] [address] [address], [address], [address] [facility], Ph [phone], Fax [phone] NPI: [id number]  Patient's Pharmacies  [facility] (PRIMARY) ([address] [place] [address]): [address] [place] [address] [address], [address] [facility], Ph [phone], Fax [phone]  BP: 120/78 sitting L arm 2019 11:13 am  BMI: 33.5 2019 11:12 am  Ht: 5 ft 4 in (162.56 cm) 2019 11:12 am  Wt: 195 lbs With clothes (88.45 kg) 2019 11:12 am  Allergies  Reviewed Allergies  CODEINE: Hives (Moderate)   Medications  Reviewed Medications  amLODIPine 5 mg tablet  Take 1 tablet(s) every day by oral route. 19   entered Raghu Santos  cloNIDine HCl 0.1 mg tablet  Take 1 tablet(s) twice a day by oral route. 19   entered Raghu Santos  hydroCHLOROthiazide 25 mg tablet  Take 1 tablet(s) as needed by oral route. 19   entered Raghu Santos  irbesartan 150 mg tablet  Take 1 tablet(s) every day by oral route. 19   entered Raghu Santos  levothyroxine 112 mcg tablet  Take 1 tablet(s) every day by oral route. 19   entered Raghu Santos  metFORMIN 500 mg tablet  Take 1 tablet(s) twice a day by oral route.   19   entered Kinsey Santos  multivitamin  19   entered Raghu Santos  Problems  Reviewed Problems    Total Colpocleisis, [address], Cystoscopy [id number] 7:30am [address], [facility] - [id number] 2:00pm  Family History  Discussed Family History  Mother - Hypertensive disorder    - Diabetes mellitus  Father - Hypertensive disorder    - Diabetes mellitus  Social History  Discussed Social History  Urogynecology Social History  Smoking Status: Former smoker (Notes: quit )  Marital status: Single (Notes: )  Number of children: 3  Caffeine intake: Occasional  Occupation: some private duty nursing  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 If Post Menopausal, Age at Menopause: (Notes: hyst ). Date of Last Pap Smear: 2003 (Notes: normal). Abnormal Pap: N. Date of Last Mammogram: 2003 (Notes: Normal Bilat, Category 1). Date of Last Colonoscopy: (Notes: due , polyps per patient). no other info in cps  Obstetric History  Reviewed Obstetric History  TOTAL FULL PRE AB. I AB. S ECTOPICS MULTIPLE LIVING  4     1  3   x3  Past Medical History  Past Medical History not reviewed (last reviewed 2019)  Cancer: Y -  Kidney cancer  Diabetes Mellitus: Y - pre-diabetic A1C 6.1 in   Hypercholesterolemia (high cholesterol): Y - 146,   Hypertension (high blood pressure): Y  Thyroid Disease: Y - thyroidectomy  HPI  pre op for Total Colpocleisis, Perineorrhaphy, Cystoscopy Consent    saw PCP for clearance    Clarified she had TOTAL right nephrectomy  ROS  Additionally reports: Except as noted in the HPI, the review of systems is negative for General, Breast, , Resp, GI, CV, Endo, MS, Psych and Heme. Physical Exam  Patient is a 78-year-old female. Chaperone: Chaperone: present.     Constitutional: General Appearance: obese. Level of Distress: NAD. Ambulation: ambulating normally. Psychiatric: Mental Status: normal mood and affect and active and alert. Neck: Neck: FROM. Lungs: Respiratory effort: no dyspnea. Cardiovascular: Peripheral Vascular: no LLE edema or RLE edema. Abdomen: Visual Inspection: (normal) visual inspection and not distended. Palpation: no tenderness or masses and soft. Hernia: none palpable. Female Genitalia: External: no masses or erythema. Bladder/Urethra: normal meatus, no urethral discharge, and negative empty supine cough stress test. Vagina: no tenderness, erythema, or abnormal vaginal discharge and atrophy. Cervix: absent. Uterus: absent. Adnexa/Parametria: no adnexal tenderness. Pop-Q: Aa: +2, Ba: +2, C: -1, Ap: -1, Bp: -1, genital hiatus [facility]): 5.5, perineal body (pb): 1.0, and total vaginal length (TVL): 7.0. Rectum: normal perianal skin and sphincter tone and no hemorrhoids or masses. Skin: Inspection and palpation: no rash or lesions. Procedure Documentation  Bladder Instillation:  Patient had negative cough stress test but only 150cc void. Backfilled to 300cc with saline, again, reduced prolapse stress test negative. Given 1 macrobid to cover procedure. Assessment / Plan  1. 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 *cysto first (prior right nephrectomy, suspect no jet)*      Backfilled stress test negative today, We discussed de aubrey stress urinary incontinence, and placement of prophylactic midurethral sling vs a staged procedure in the future if necessary. We agreed to forgo the sling at this time. Risks of surgery including bleeding, need for blood transfusion, infection, damage to nearby organs, voiding dysfunction and risk of recurrence reviewed. Details of procedures and postoperative recovery plan including limitations on activities and lifting reviewed.  Patient agrees to proceed with Total colpocleisis, perineorrhaphy, cystoscopy. Consent signed and copy given. We have PCP clearance, will ask them if they have any labs. N81.89: Other female genital prolapse    2. Bladder muscle dysfunction - overactive -  Likely related to prolapse, will improve after surgery. Counseling and coordination of care exceeded 50% of today's Office Visit. Face to face time spent with patient exceeded 25 minutes and supports an Established Patient Detailed Visit, Level 4. ([id number])   N32.81: Overactive bladder      Return to Mony Leiva MD for Surgery at [facility] (OP) on 04/08/2019 at 07:30 AM  Teressa Allred MD for Established Patient at [facility] [facility] on 05/06/2019 at 11:30 AM  Encounter Sign-Off  Encounter signed-off by Teressa Allred MD, 04/01/2019. Date of Surgery Update:  Ifeoma Gregorio was seen and examined. History and physical has been reviewed. The patient has been examined.  There have been no significant clinical changes since the completion of the originally dated History and Physical.    Signed By: Alec Reynolds MD     April 8, 2019 7:23 AM

## 2019-04-05 NOTE — TELEPHONE ENCOUNTER
----- Message from Armida Alfonso sent at 4/5/2019 11:37 AM EDT -----  Regarding: Dr. Rosa Mackey from [place] [address] home delivery pharmacy requesting a call back to verify which blood glucose monitor the pt is required to receive.  Best contact [phone] reference # [id number]    Copy/paste Gonzalo Chin

## 2019-04-07 ENCOUNTER — APPOINTMENT (OUTPATIENT)
Dept: VASCULAR SURGERY | Age: 74
End: 2019-04-07
Attending: EMERGENCY MEDICINE
Payer: MEDICARE

## 2019-04-07 ENCOUNTER — HOSPITAL ENCOUNTER (EMERGENCY)
Age: 74
Discharge: HOME | End: 2019-04-07
Attending: EMERGENCY MEDICINE | Admitting: EMERGENCY MEDICINE
Payer: MEDICARE

## 2019-04-07 ENCOUNTER — ANESTHESIA EVENT (OUTPATIENT)
Dept: SURGERY | Age: 74
End: 2019-04-07
Payer: MEDICARE

## 2019-04-07 VITALS
HEART RATE: 98 BPM | HEIGHT: 64 IN | DIASTOLIC BLOOD PRESSURE: 94 MMHG | RESPIRATION RATE: 16 BRPM | BODY MASS INDEX: 32.44 KG/M2 | TEMPERATURE: 98.4 F | OXYGEN SATURATION: 97 % | SYSTOLIC BLOOD PRESSURE: 161 MMHG | WEIGHT: 190 LBS

## 2019-04-07 DIAGNOSIS — M71.22 BAKER'S CYST OF KNEE, LEFT: Primary | ICD-10-CM

## 2019-04-07 PROCEDURE — 77030018846 HC SOL IRR STRL H20 ICUM -A

## 2019-04-07 PROCEDURE — 93971 EXTREMITY STUDY: CPT

## 2019-04-07 PROCEDURE — 99282 EMERGENCY DEPT VISIT SF MDM: CPT

## 2019-04-07 NOTE — DISCHARGE INSTRUCTIONS
Ibuprofen or tylenol as needed for pain. Return to ER for any fever, chills, redness, warmth or swelling to leg. If pain persists follow-up with orthopedic doctor for reevaluation. Baker's Cyst: Care Instructions  Your Care Instructions    A Baker's cyst is a swelling behind the knee. It may cause pain or stiffness when you bend your knee or straighten it all the way. Baker's cysts are also called popliteal cysts. If you have arthritis or another condition that is the cause of the Baker's cyst, your doctor may treat that condition. A Baker's cyst may go away on its own. If not, or if it is causing a lot of discomfort, your doctor may drain the fluid that has built up behind the knee. In some cases, a Baker's cyst is removed in surgery. There are things you can do at home, such as staying off your leg, to reduce the swelling and pain. Follow-up care is a key part of your treatment and safety. Be sure to make and go to all appointments, and call your doctor if you are having problems. It's also a good idea to know your test results and keep a list of the medicines you take. How can you care for yourself at home? · Rest your knee as much as possible. · Ask your doctor if you can take an over-the-counter pain medicine, such as acetaminophen (Tylenol), ibuprofen (Advil, Motrin), or naproxen (Aleve). Be safe with medicines. Read and follow all instructions on the label. · Use a cane, a crutch, a walker, or another device if you need help to get around. These can help rest your knees. · If you have an elastic bandage, make sure it is snug but not so tight that your leg is numb, tingles, or swells below the bandage. Ask your doctor if you can loosen the bandage if it is too tight. · Follow your doctor's instructions about how much weight you can put on your knee. · Stay at a healthy weight. Being overweight puts extra strain on your knee. When should you call for help?   Call 911 anytime you think you may need emergency care. For example, call if:    · You have chest pain, are short of breath, or you cough up blood.    Call your doctor now or seek immediate medical care if:    · You have new or worse pain.     · Your foot is cool or pale or changes color.     · You have tingling, weakness, or numbness in your foot or toes.     · You have signs of a blood clot in your leg (called a deep vein thrombosis), such as:  ? Pain in your calf, back of the knee, thigh, or groin. ? Redness or swelling in your leg.    Watch closely for changes in your health, and be sure to contact your doctor if:    · You do not get better as expected. Where can you learn more? Go to http://nolan-cole.info/. Enter U671 in the search box to learn more about \"Baker's Cyst: Care Instructions. \"  Current as of: September 20, 2018  Content Version: 11.9  © 5931-7191 [employer]. Care instructions adapted under license by [employer] (which disclaims liability or warranty for this information). If you have questions about a medical condition or this instruction, always ask your healthcare professional. [address] any warranty or liability for your use of this information.

## 2019-04-07 NOTE — ED PROVIDER NOTES
68 yo female presents to ER for evaluation of left leg pain. Onset was yesterday. No injury or trauma. Pt awoke with pain. Pain described as aching worse with walking. Pain rated 3/4. Pain has been constant but different levels of pain. Pain located behind knee. No chest pain, shortness of breath or difficulty breathing. Pt took tylenol with some relief. Social hx  Nonsmoker  Lives alone      The history is provided by the patient. Past Medical History:   Diagnosis Date    Arthritis     LOWER SPINE    Cancer [facility])     kidney (right)    Chronic kidney disease 1996    right kidney removed - CANCER    Diabetes ([facility] [address]     She is controlling with weight loss    Diabetes mellitus ([facility] [address] 10/8/2014    NIDDM    Hepatitis C     hx hep C from blood transfusion per pt; Treated by Dr. Tara Mera Hyperlipidemia     Hypertension     Ill-defined condition 1990s    HEPATITIS C - treated with medication    Liver disease     Personal history of renal cancer 3/2/2015    Thyroid disease 1999    Thyroidectomy       Past Surgical History:   Procedure Laterality Date    COLONOSCOPY N/A 6/20/2017    COLONOSCOPY performed by Kasie Nieto.  Paola Gonzalez MD at [facility] ENDOSCOPY    HX BREAST REDUCTION Bilateral 1996    HX COLONOSCOPY  3/5/12    Repeat in 5 years (Dr. Isatu Rand)    HX GYN  1980s    tubal pregnancy    HX HEENT      WISDOM TEETH X4    HX OTHER SURGICAL  1999    thyroidectomy    HX OTHER SURGICAL      colonoscopy x 2 - ?polyps    HX RITESH AND BSO Bilateral 1995    HX TONSILLECTOMY      2or 4years old    HX [address]    kidney removed on right         Family History:   Problem Relation Age of Onset    Diabetes Mother     Kidney Disease Mother     Heart Disease Father     Kidney Disease Father         dialysis    Diabetes Sister     Diabetes Daughter     Hypertension Daughter     Hypertension Son     No Known Problems Son     No Known Problems Brother     Anesth Problems Neg Hx        Social History Socioeconomic History    Marital status:      Spouse name: Not on file    Number of children: Not on file    Years of education: Not on file    Highest education level: Not on file   Occupational History    Not on file   Social Needs    Financial resource strain: Not on file    Food insecurity:     Worry: Not on file     Inability: Not on file    Transportation needs:     Medical: Not on file     Non-medical: Not on file   Tobacco Use    Smoking status: Former Smoker     Packs/day: 0.25     Years: 8.00     Pack years: 2.00     Last attempt to quit: 1992     Years since quittin.0    Smokeless tobacco: Never Used    Tobacco comment: quit smoking 30-40 yrs ago   Substance and Sexual Activity    Alcohol use: No    Drug use: No    Sexual activity: Yes     Partners: Male   Lifestyle    Physical activity:     Days per week: Not on file     Minutes per session: Not on file    Stress: Not on file   Relationships    Social connections:     Talks on phone: Not on file     Gets together: Not on file     Attends Ep[employer] service: Not on file     Active member of club or organization: Not on file     Attends meetings of clubs or organizations: Not on file     Relationship status: Not on file    Intimate partner violence:     Fear of current or ex partner: Not on file     Emotionally abused: Not on file     Physically abused: Not on file     Forced sexual activity: Not on file   Other Topics Concern    Not on file   Social History Narrative    Not on file         ALLERGIES: Codeine    Review of Systems   Constitutional: Negative for chills and fever. Respiratory: Negative for chest tightness and shortness of breath. Cardiovascular: Negative for chest pain, palpitations and leg swelling. Gastrointestinal: Negative for abdominal pain, nausea and vomiting. Musculoskeletal: Negative for myalgias and neck pain. Skin: Negative for color change and wound.    Neurological: Negative for dizziness and headaches. All other systems reviewed and are negative. Vitals:    04/07/19 1537   Pulse: 99   SpO2: 98%            Physical Exam   Constitutional: She is oriented to person, place, and time. She appears well-developed and well-nourished. HENT:   Head: Normocephalic and atraumatic. Eyes: Pupils are equal, round, and reactive to light. Conjunctivae and EOM are normal.   Neck: Normal range of motion. Neck supple. Cardiovascular: Normal rate and regular rhythm. Pulmonary/Chest: Effort normal. No respiratory distress. Musculoskeletal: She exhibits tenderness. She exhibits no edema. Left Knee: no redness, warmth, swelling. No obvious deformity. Pt able to lift leg off of exam bed. Pt able to flex and extend fully. Pt tender to palpation, posterior aspect of knee. Joint stable. No ligament laxity with valgus or varus stress. 2+ dorsalis pedis, 2+ posterior tibialis. Ankle, foot, hip, tib/fib  Non tender to palpation. Neurological: She is alert and oriented to person, place, and time. No cranial nerve deficit. She exhibits normal muscle tone. Coordination normal.   Skin: Skin is warm and dry. No rash noted. Psychiatric: She has a normal mood and affect. Her behavior is normal. Judgment and thought content normal.   Nursing note and vitals reviewed. MDM  Number of Diagnoses or Management Options  Baker's cyst of knee, left:   Diagnosis management comments: 68 yo female with left posterior knee pain. No redness or warmth. No signs of infection  FROM, no fever  P: vascular    4:59 PM  Vascular study negative for DVT. Mullins's cyst present. Patient is well hydrated, well appearing, and in no respiratory distress. Physical exam is reassuring, and without signs of serious illness. Will discharge patient home with supportive care, and follow-up with PCP within the next few days. Patient's results have been reviewed with them.   Patient and/or family have verbally conveyed their understanding and agreement of the patient's signs, symptoms, diagnosis, treatment and prognosis and additionally agree to follow up as recommended or return to the Emergency Room should their condition change prior to follow-up. Discharge instructions have also been provided to the patient with some educational information regarding their diagnosis as well a list of reasons why they would want to return to the ER prior to their follow-up appointment should their condition change. Amount and/or Complexity of Data Reviewed  Discuss the patient with other providers: yes (ER attending-Eduard)    Patient Progress  Patient progress: stable         Procedures      Pt case including HPI, PE, and all available lab and radiology results has been discussed with attending physician. Opportunity to evaluate patient has been provided to ER attending. Discharge and prescription plan has been agreed upon.

## 2019-04-08 ENCOUNTER — HOSPITAL ENCOUNTER (OUTPATIENT)
Age: 74
Setting detail: OUTPATIENT SURGERY
Discharge: HOME | End: 2019-04-08
Attending: OBSTETRICS & GYNECOLOGY | Admitting: OBSTETRICS & GYNECOLOGY
Payer: MEDICARE

## 2019-04-08 ENCOUNTER — ANESTHESIA (OUTPATIENT)
Dept: SURGERY | Age: 74
End: 2019-04-08
Payer: MEDICARE

## 2019-04-08 VITALS
SYSTOLIC BLOOD PRESSURE: 142 MMHG | TEMPERATURE: 98.8 F | HEART RATE: 83 BPM | HEIGHT: 64 IN | BODY MASS INDEX: 33.14 KG/M2 | WEIGHT: 194.13 LBS | OXYGEN SATURATION: 93 % | RESPIRATION RATE: 15 BRPM | DIASTOLIC BLOOD PRESSURE: 78 MMHG

## 2019-04-08 PROBLEM — N81.9 VAGINAL VAULT PROLAPSE: Status: ACTIVE | Noted: 2019-04-08

## 2019-04-08 LAB
GLUCOSE BLD STRIP.AUTO-MCNC: 112 MG/DL (ref 65–100)
GLUCOSE BLD STRIP.AUTO-MCNC: 148 MG/DL (ref 65–100)
SERVICE CMNT-IMP: ABNORMAL
SERVICE CMNT-IMP: ABNORMAL

## 2019-04-08 PROCEDURE — 76210000016 HC OR PH I REC 1 TO 1.5 HR: Performed by: OBSTETRICS & GYNECOLOGY

## 2019-04-08 PROCEDURE — 77030003029 HC SUT VCRL J&J -B: Performed by: OBSTETRICS & GYNECOLOGY

## 2019-04-08 PROCEDURE — 82962 GLUCOSE BLOOD TEST: CPT

## 2019-04-08 PROCEDURE — 77030026438 HC STYL ET INTUB CARD -A: Performed by: ANESTHESIOLOGY

## 2019-04-08 PROCEDURE — 74011250637 HC RX REV CODE- 250/637: Performed by: ANESTHESIOLOGY

## 2019-04-08 PROCEDURE — 77030011640 HC PAD GRND REM COVD -A: Performed by: OBSTETRICS & GYNECOLOGY

## 2019-04-08 PROCEDURE — 74011250636 HC RX REV CODE- 250/636: Performed by: OBSTETRICS & GYNECOLOGY

## 2019-04-08 PROCEDURE — 74011250636 HC RX REV CODE- 250/636

## 2019-04-08 PROCEDURE — 76010000131 HC OR TIME 2 TO 2.5 HR: Performed by: OBSTETRICS & GYNECOLOGY

## 2019-04-08 PROCEDURE — 77030020782 HC GWN BAIR PAWS FLX 3M -B

## 2019-04-08 PROCEDURE — 77030034696 HC CATH URETH FOL 2W BARD -A: Performed by: OBSTETRICS & GYNECOLOGY

## 2019-04-08 PROCEDURE — 76060000035 HC ANESTHESIA 2 TO 2.5 HR: Performed by: OBSTETRICS & GYNECOLOGY

## 2019-04-08 PROCEDURE — 74011250637 HC RX REV CODE- 250/637: Performed by: OBSTETRICS & GYNECOLOGY

## 2019-04-08 PROCEDURE — 74011000250 HC RX REV CODE- 250: Performed by: OBSTETRICS & GYNECOLOGY

## 2019-04-08 PROCEDURE — 77030002966 HC SUT PDS J&J -A: Performed by: OBSTETRICS & GYNECOLOGY

## 2019-04-08 PROCEDURE — 76210000020 HC REC RM PH II FIRST 0.5 HR: Performed by: OBSTETRICS & GYNECOLOGY

## 2019-04-08 PROCEDURE — 77030031139 HC SUT VCRL2 J&J -A: Performed by: OBSTETRICS & GYNECOLOGY

## 2019-04-08 PROCEDURE — 74011000250 HC RX REV CODE- 250

## 2019-04-08 PROCEDURE — 77030008684 HC TU ET CUF COVD -B: Performed by: ANESTHESIOLOGY

## 2019-04-08 PROCEDURE — 77030019927 HC TBNG IRR CYSTO BAXT -A: Performed by: OBSTETRICS & GYNECOLOGY

## 2019-04-08 PROCEDURE — 77030020362 HC SOL INJ STRL H2O 1000ML BG LF: Performed by: OBSTETRICS & GYNECOLOGY

## 2019-04-08 RX ORDER — SODIUM CHLORIDE, SODIUM LACTATE, POTASSIUM CHLORIDE, CALCIUM CHLORIDE 600; 310; 30; 20 MG/100ML; MG/100ML; MG/100ML; MG/100ML
INJECTION, SOLUTION INTRAVENOUS
Status: DISCONTINUED | OUTPATIENT
Start: 2019-04-08 | End: 2019-04-08 | Stop reason: HOSPADM

## 2019-04-08 RX ORDER — SODIUM CHLORIDE 0.9 % (FLUSH) 0.9 %
5-40 SYRINGE (ML) INJECTION AS NEEDED
Status: DISCONTINUED | OUTPATIENT
Start: 2019-04-08 | End: 2019-04-08 | Stop reason: HOSPADM

## 2019-04-08 RX ORDER — DEXAMETHASONE SODIUM PHOSPHATE 4 MG/ML
INJECTION, SOLUTION INTRA-ARTICULAR; INTRALESIONAL; INTRAMUSCULAR; INTRAVENOUS; SOFT TISSUE AS NEEDED
Status: DISCONTINUED | OUTPATIENT
Start: 2019-04-08 | End: 2019-04-08 | Stop reason: HOSPADM

## 2019-04-08 RX ORDER — DIPHENHYDRAMINE HYDROCHLORIDE 50 MG/ML
12.5 INJECTION, SOLUTION INTRAMUSCULAR; INTRAVENOUS AS NEEDED
Status: DISCONTINUED | OUTPATIENT
Start: 2019-04-08 | End: 2019-04-08 | Stop reason: HOSPADM

## 2019-04-08 RX ORDER — SODIUM CHLORIDE 0.9 % (FLUSH) 0.9 %
5-40 SYRINGE (ML) INJECTION EVERY 8 HOURS
Status: DISCONTINUED | OUTPATIENT
Start: 2019-04-08 | End: 2019-04-08 | Stop reason: HOSPADM

## 2019-04-08 RX ORDER — LIDOCAINE HYDROCHLORIDE 20 MG/ML
INJECTION, SOLUTION EPIDURAL; INFILTRATION; INTRACAUDAL; PERINEURAL AS NEEDED
Status: DISCONTINUED | OUTPATIENT
Start: 2019-04-08 | End: 2019-04-08 | Stop reason: HOSPADM

## 2019-04-08 RX ORDER — BUPIVACAINE HYDROCHLORIDE AND EPINEPHRINE 2.5; 5 MG/ML; UG/ML
INJECTION, SOLUTION EPIDURAL; INFILTRATION; INTRACAUDAL; PERINEURAL AS NEEDED
Status: DISCONTINUED | OUTPATIENT
Start: 2019-04-08 | End: 2019-04-08 | Stop reason: HOSPADM

## 2019-04-08 RX ORDER — LIDOCAINE HYDROCHLORIDE 10 MG/ML
0.1 INJECTION, SOLUTION EPIDURAL; INFILTRATION; INTRACAUDAL; PERINEURAL AS NEEDED
Status: DISCONTINUED | OUTPATIENT
Start: 2019-04-08 | End: 2019-04-08 | Stop reason: HOSPADM

## 2019-04-08 RX ORDER — ONDANSETRON 2 MG/ML
4 INJECTION INTRAMUSCULAR; INTRAVENOUS AS NEEDED
Status: DISCONTINUED | OUTPATIENT
Start: 2019-04-08 | End: 2019-04-08 | Stop reason: HOSPADM

## 2019-04-08 RX ORDER — SODIUM CHLORIDE 9 MG/ML
25 INJECTION, SOLUTION INTRAVENOUS CONTINUOUS
Status: DISCONTINUED | OUTPATIENT
Start: 2019-04-08 | End: 2019-04-08 | Stop reason: HOSPADM

## 2019-04-08 RX ORDER — MIDAZOLAM HYDROCHLORIDE 1 MG/ML
0.5 INJECTION, SOLUTION INTRAMUSCULAR; INTRAVENOUS
Status: DISCONTINUED | OUTPATIENT
Start: 2019-04-08 | End: 2019-04-08 | Stop reason: HOSPADM

## 2019-04-08 RX ORDER — PHENAZOPYRIDINE HYDROCHLORIDE 100 MG/1
100 TABLET, FILM COATED ORAL ONCE
Status: COMPLETED | OUTPATIENT
Start: 2019-04-08 | End: 2019-04-08

## 2019-04-08 RX ORDER — CEFAZOLIN SODIUM/WATER 2 G/20 ML
2 SYRINGE (ML) INTRAVENOUS
Status: COMPLETED | OUTPATIENT
Start: 2019-04-08 | End: 2019-04-08

## 2019-04-08 RX ORDER — FENTANYL CITRATE 50 UG/ML
50 INJECTION, SOLUTION INTRAMUSCULAR; INTRAVENOUS AS NEEDED
Status: DISCONTINUED | OUTPATIENT
Start: 2019-04-08 | End: 2019-04-08 | Stop reason: HOSPADM

## 2019-04-08 RX ORDER — FENTANYL CITRATE 50 UG/ML
25 INJECTION, SOLUTION INTRAMUSCULAR; INTRAVENOUS
Status: DISCONTINUED | OUTPATIENT
Start: 2019-04-08 | End: 2019-04-08 | Stop reason: HOSPADM

## 2019-04-08 RX ORDER — FENTANYL CITRATE 50 UG/ML
INJECTION, SOLUTION INTRAMUSCULAR; INTRAVENOUS AS NEEDED
Status: DISCONTINUED | OUTPATIENT
Start: 2019-04-08 | End: 2019-04-08 | Stop reason: HOSPADM

## 2019-04-08 RX ORDER — MIDAZOLAM HYDROCHLORIDE 1 MG/ML
1 INJECTION, SOLUTION INTRAMUSCULAR; INTRAVENOUS AS NEEDED
Status: DISCONTINUED | OUTPATIENT
Start: 2019-04-08 | End: 2019-04-08 | Stop reason: SDUPTHER

## 2019-04-08 RX ORDER — MORPHINE SULFATE 10 MG/ML
2 INJECTION, SOLUTION INTRAMUSCULAR; INTRAVENOUS
Status: DISCONTINUED | OUTPATIENT
Start: 2019-04-08 | End: 2019-04-08 | Stop reason: HOSPADM

## 2019-04-08 RX ORDER — ROCURONIUM BROMIDE 10 MG/ML
INJECTION, SOLUTION INTRAVENOUS AS NEEDED
Status: DISCONTINUED | OUTPATIENT
Start: 2019-04-08 | End: 2019-04-08 | Stop reason: HOSPADM

## 2019-04-08 RX ORDER — ONDANSETRON 2 MG/ML
INJECTION INTRAMUSCULAR; INTRAVENOUS AS NEEDED
Status: DISCONTINUED | OUTPATIENT
Start: 2019-04-08 | End: 2019-04-08 | Stop reason: HOSPADM

## 2019-04-08 RX ORDER — BUPIVACAINE HYDROCHLORIDE AND EPINEPHRINE 5; 5 MG/ML; UG/ML
INJECTION, SOLUTION EPIDURAL; INTRACAUDAL; PERINEURAL AS NEEDED
Status: DISCONTINUED | OUTPATIENT
Start: 2019-04-08 | End: 2019-04-08 | Stop reason: HOSPADM

## 2019-04-08 RX ORDER — MIDAZOLAM HYDROCHLORIDE 1 MG/ML
INJECTION, SOLUTION INTRAMUSCULAR; INTRAVENOUS AS NEEDED
Status: DISCONTINUED | OUTPATIENT
Start: 2019-04-08 | End: 2019-04-08 | Stop reason: HOSPADM

## 2019-04-08 RX ORDER — HYDROMORPHONE HYDROCHLORIDE 1 MG/ML
0.2 INJECTION, SOLUTION INTRAMUSCULAR; INTRAVENOUS; SUBCUTANEOUS
Status: DISCONTINUED | OUTPATIENT
Start: 2019-04-08 | End: 2019-04-08 | Stop reason: HOSPADM

## 2019-04-08 RX ORDER — PHENAZOPYRIDINE HYDROCHLORIDE 100 MG/1
100 TABLET, FILM COATED ORAL ONCE
Status: DISCONTINUED | OUTPATIENT
Start: 2019-04-08 | End: 2019-04-08 | Stop reason: SDUPTHER

## 2019-04-08 RX ORDER — SODIUM CHLORIDE, SODIUM LACTATE, POTASSIUM CHLORIDE, CALCIUM CHLORIDE 600; 310; 30; 20 MG/100ML; MG/100ML; MG/100ML; MG/100ML
125 INJECTION, SOLUTION INTRAVENOUS CONTINUOUS
Status: DISCONTINUED | OUTPATIENT
Start: 2019-04-08 | End: 2019-04-08 | Stop reason: HOSPADM

## 2019-04-08 RX ORDER — HEPARIN SODIUM 5000 [USP'U]/ML
5000 INJECTION, SOLUTION INTRAVENOUS; SUBCUTANEOUS ONCE
Status: COMPLETED | OUTPATIENT
Start: 2019-04-08 | End: 2019-04-08

## 2019-04-08 RX ORDER — HYDROMORPHONE HYDROCHLORIDE 2 MG/ML
INJECTION, SOLUTION INTRAMUSCULAR; INTRAVENOUS; SUBCUTANEOUS AS NEEDED
Status: DISCONTINUED | OUTPATIENT
Start: 2019-04-08 | End: 2019-04-08 | Stop reason: HOSPADM

## 2019-04-08 RX ORDER — SODIUM CHLORIDE, SODIUM LACTATE, POTASSIUM CHLORIDE, CALCIUM CHLORIDE 600; 310; 30; 20 MG/100ML; MG/100ML; MG/100ML; MG/100ML
75 INJECTION, SOLUTION INTRAVENOUS CONTINUOUS
Status: DISCONTINUED | OUTPATIENT
Start: 2019-04-08 | End: 2019-04-08 | Stop reason: HOSPADM

## 2019-04-08 RX ORDER — ROPIVACAINE HYDROCHLORIDE 5 MG/ML
30 INJECTION, SOLUTION EPIDURAL; INFILTRATION; PERINEURAL ONCE
Status: DISCONTINUED | OUTPATIENT
Start: 2019-04-08 | End: 2019-04-08 | Stop reason: HOSPADM

## 2019-04-08 RX ORDER — MIDAZOLAM HYDROCHLORIDE 1 MG/ML
1 INJECTION, SOLUTION INTRAMUSCULAR; INTRAVENOUS AS NEEDED
Status: DISCONTINUED | OUTPATIENT
Start: 2019-04-08 | End: 2019-04-08 | Stop reason: HOSPADM

## 2019-04-08 RX ORDER — CEFAZOLIN SODIUM/WATER 2 G/20 ML
2 SYRINGE (ML) INTRAVENOUS ONCE
Status: DISCONTINUED | OUTPATIENT
Start: 2019-04-08 | End: 2019-04-08 | Stop reason: SDUPTHER

## 2019-04-08 RX ORDER — PROPOFOL 10 MG/ML
INJECTION, EMULSION INTRAVENOUS AS NEEDED
Status: DISCONTINUED | OUTPATIENT
Start: 2019-04-08 | End: 2019-04-08 | Stop reason: HOSPADM

## 2019-04-08 RX ORDER — ACETAMINOPHEN 325 MG/1
650 TABLET ORAL ONCE
Status: COMPLETED | OUTPATIENT
Start: 2019-04-08 | End: 2019-04-08

## 2019-04-08 RX ORDER — SUCCINYLCHOLINE CHLORIDE 20 MG/ML
INJECTION INTRAMUSCULAR; INTRAVENOUS AS NEEDED
Status: DISCONTINUED | OUTPATIENT
Start: 2019-04-08 | End: 2019-04-08 | Stop reason: HOSPADM

## 2019-04-08 RX ADMIN — Medication 2 G: at 07:57

## 2019-04-08 RX ADMIN — FENTANYL CITRATE 50 MCG: 50 INJECTION, SOLUTION INTRAMUSCULAR; INTRAVENOUS at 08:39

## 2019-04-08 RX ADMIN — FENTANYL CITRATE 50 MCG: 50 INJECTION, SOLUTION INTRAMUSCULAR; INTRAVENOUS at 07:59

## 2019-04-08 RX ADMIN — ROCURONIUM BROMIDE 5 MG: 10 INJECTION, SOLUTION INTRAVENOUS at 07:44

## 2019-04-08 RX ADMIN — PROPOFOL 60 MG: 10 INJECTION, EMULSION INTRAVENOUS at 08:39

## 2019-04-08 RX ADMIN — SODIUM CHLORIDE, SODIUM LACTATE, POTASSIUM CHLORIDE, CALCIUM CHLORIDE: 600; 310; 30; 20 INJECTION, SOLUTION INTRAVENOUS at 07:19

## 2019-04-08 RX ADMIN — MIDAZOLAM HYDROCHLORIDE 1 MG: 1 INJECTION, SOLUTION INTRAMUSCULAR; INTRAVENOUS at 07:29

## 2019-04-08 RX ADMIN — ONDANSETRON 4 MG: 2 INJECTION INTRAMUSCULAR; INTRAVENOUS at 09:14

## 2019-04-08 RX ADMIN — LIDOCAINE HYDROCHLORIDE 60 MG: 20 INJECTION, SOLUTION EPIDURAL; INFILTRATION; INTRACAUDAL; PERINEURAL at 07:44

## 2019-04-08 RX ADMIN — HYDROMORPHONE HYDROCHLORIDE 0.75 MG: 2 INJECTION, SOLUTION INTRAMUSCULAR; INTRAVENOUS; SUBCUTANEOUS at 09:31

## 2019-04-08 RX ADMIN — HYDROMORPHONE HYDROCHLORIDE 0.25 MG: 2 INJECTION, SOLUTION INTRAMUSCULAR; INTRAVENOUS; SUBCUTANEOUS at 07:59

## 2019-04-08 RX ADMIN — MIDAZOLAM HYDROCHLORIDE 1 MG: 1 INJECTION, SOLUTION INTRAMUSCULAR; INTRAVENOUS at 07:35

## 2019-04-08 RX ADMIN — PROPOFOL 100 MG: 10 INJECTION, EMULSION INTRAVENOUS at 07:44

## 2019-04-08 RX ADMIN — HEPARIN SODIUM 5000 UNITS: 5000 INJECTION INTRAVENOUS; SUBCUTANEOUS at 07:27

## 2019-04-08 RX ADMIN — FENTANYL CITRATE 50 MCG: 50 INJECTION, SOLUTION INTRAMUSCULAR; INTRAVENOUS at 07:44

## 2019-04-08 RX ADMIN — PHENAZOPYRIDINE HYDROCHLORIDE 100 MG: 100 TABLET ORAL at 06:41

## 2019-04-08 RX ADMIN — ACETAMINOPHEN 650 MG: 325 TABLET ORAL at 06:41

## 2019-04-08 RX ADMIN — DEXAMETHASONE SODIUM PHOSPHATE 4 MG: 4 INJECTION, SOLUTION INTRA-ARTICULAR; INTRALESIONAL; INTRAMUSCULAR; INTRAVENOUS; SOFT TISSUE at 08:11

## 2019-04-08 RX ADMIN — SUCCINYLCHOLINE CHLORIDE 140 MG: 20 INJECTION INTRAMUSCULAR; INTRAVENOUS at 07:44

## 2019-04-08 NOTE — BRIEF OP NOTE
BRIEF OPERATIVE NOTE    Date of Procedure: 4/8/2019   Preoperative Diagnosis: FEMALE GENITAL PROLAPSE  Postoperative Diagnosis: FEMALE GENITAL PROLAPSE    Procedure(s):  TOTAL COLPOCLESIS PERINEORRHAPHY AND CYSTOSCOPY  Surgeon(s) and Role:     Ayanna Lyle MD - Primary      Surgical Staff:  Circ-1: Bob Saini  Circ-Relief: Yomaira Nino RN  Scrub Tech-1: Violet Patel  Surg Asst-1: Errol Greenwood  Event Time In Time Out   Incision Start 0800    Incision Close 0926      Anesthesia: General   Estimated Blood Loss: 25cc  Specimens: * No specimens in log *   Findings: Stage 3 vault prolapse, bladder with cystitis cystica, trabeculations, and ureteral jet seen on left before and after colpocleisis (prior right nephrectomy); no sutures in bladder  Complications: None  Implants: * No implants in log *

## 2019-04-08 NOTE — OP NOTES
Date: 04/08/19    Patient:Ernestine Chyrel Goltz    Surgery Performed: Total Colpocleisis, perineorrhaphy, cystoscopy    Pre-operative diagnosis: Stage 3 vaginal vault prolapse    Post-operative diagnosis: same    Surgeon: Alana Ayoub MD    Anesthesia: GETA    Findings: stage 3 vault prolapse, bladder with left effluxing ureter (prior right nephrectomy, no jet), no foreign body in bladder, trabeculations and cystitis cystica seen in bladder    Specimens removed: none    Prosthetic devices: none    Drains: Mensah catheter    Indications: Patient with symptomatic pelvic organ prolapse underwent the above procedures understanding the alternatives, purpose, risks, benefits, complications, and thomas-operative course. Technique:  Patient was taken to the OR and placed on the OR table in supine position. She was given a general anesthetic. She was then repositioned in [place] [address] with care taken to safety positioning all 4 extremities. She was prepped and draped in the usual sterile fashion and a Mensah catheter was inserted, which drained clear urine for the remainder of the case. I then removed the catheter to begin the case with 70 degree cystoscopy to ensure normal efflux from left ureteral orifice. Right sided ureteral orifice not seen. She had a prior right nephrectomy for renal cancer. Once the right jet was confirmed, the Mensah was replaced. The cuff was grasped with an Allis clamp. Approximately 20cc of 0.25% Marcaine with epinephrine was injected under the vaginal epithelium for hemostasis and hydrodissection. Then, in large pieces, the entirety of vaginal skin was dissected off the vagina. A series of pursestring 2-0 PDS sutures sutures were placed circumferentially though the endopelvic fascia to reduce the denuded vagina. The vaginal incision was closed using a 0-vicryl in a figure of eight fashion. Cystoscopy for lower urinary tract safety was performed.   No suture or foreign body was noted in the bladder and the urothelium appeared normal.  Brisk, left sided efflux was seen. The Mensha catheter was then replaced. Next, I performed a perineorrhaphy. A jose shaped piece of perineal skin was excised and perineorrhaphy sutures were placed to greatly reduce the size of the genital hiatus. 0 vicryl was used and excellent hemostasis was noted. The perineal skin was then reapproximated with a running 2-0 vicryl suture. At the termination of the case, all counts were correct. The patient was then awoken from anesthesia and taken to the recovery room in stable condition.

## 2019-04-08 NOTE — ANESTHESIA PREPROCEDURE EVALUATION
Relevant Problems   No relevant active problems       Anesthetic History   No history of anesthetic complications            Review of Systems / Medical History  Patient summary reviewed, nursing notes reviewed and pertinent labs reviewed    Pulmonary  Within defined limits                 Neuro/Psych   Within defined limits           Cardiovascular    Hypertension: well controlled                   GI/Hepatic/Renal  Within defined limits              Endo/Other    Diabetes: well controlled, type 2  Hypothyroidism       Other Findings              Physical Exam    Airway  Mallampati: II  TM Distance: > 6 cm  Neck ROM: normal range of motion   Mouth opening: Normal     Cardiovascular  Regular rate and rhythm,  S1 and S2 normal,  no murmur, click, rub, or gallop             Dental  No notable dental hx       Pulmonary  Breath sounds clear to auscultation               Abdominal  GI exam deferred       Other Findings            Anesthetic Plan    ASA: 2  Anesthesia type: general          Induction: Intravenous  Anesthetic plan and risks discussed with: Patient

## 2019-04-08 NOTE — ANESTHESIA POSTPROCEDURE EVALUATION
Post-Anesthesia Evaluation and Assessment    Patient: Louis Mi MRN: [id number]  SSN: [id number]    YOB: 1945  Age: 69 y.o. Sex: female      I have evaluated the patient and they are stable and ready for discharge from the PACU. Cardiovascular Function/Vital Signs  Visit Vitals  /78   Pulse 82   Temp 36.7 °C (98 °F)   Resp 15   Ht 5' 4\" (1.626 m)   Wt 88.1 kg (194 lb 2 oz)   SpO2 97%   BMI 33.32 kg/m²       Patient is status post General anesthesia for Procedure(s):  TOTAL COLPOCLESIS PERINEORRHAPHY AND CYSTOSCOPY. Nausea/Vomiting: None    Postoperative hydration reviewed and adequate. Pain:  Pain Scale 1: Numeric (0 - 10) (04/08/19 0945)  Pain Intensity 1: 0 (04/08/19 0945)   Managed    Neurological Status: At baseline    Mental Status, Level of Consciousness: Alert and  oriented to person, place, and time    Pulmonary Status:   O2 Device: Nasal cannula (04/08/19 0945)   Adequate oxygenation and airway patent    Complications related to anesthesia: None    Post-anesthesia assessment completed. No concerns    Signed By: Seth Chavarria MD     April 8, 2019              Procedure(s):  TOTAL COLPOCLESIS PERINEORRHAPHY AND CYSTOSCOPY. general    <BSHSIANPOST>    Vitals Value Taken Time   /78 4/8/2019 10:00 AM   Temp 36.7 °C (98 °F) 4/8/2019  9:45 AM   Pulse 83 4/8/2019 10:02 AM   Resp 13 4/8/2019 10:02 AM   SpO2 97 % 4/8/2019 10:02 AM   Vitals shown include unvalidated device data.

## 2019-04-08 NOTE — ROUTINE PROCESS
Patient: Louis Mi MRN: [id number]  SSN: [id number]   YOB: 1945  Age: 69 y.o. Sex: female     Patient is status post Procedure(s):  TOTAL COLPOCLESIS PERINEORRHAPHY AND CYSTOSCOPY.     Surgeon(s) and Role:     * Sara Dupree MD - Primary    Local/Dose/Irrigation:  39mL 0.25% Bupivacaine with Epinephrine 1:200,000                                         Dressing/Packing:     Splint/Cast:  ]    Other:

## 2019-04-08 NOTE — DISCHARGE INSTRUCTIONS
Printed out - see hard copy in chart  Please take tylenol for pain    ______________________________________________________________________    Anesthesia Discharge Instructions    After general anesthesia or intervenous sedation, for 24 hours or while taking prescription Narcotics:  · Limit your activities  · Do not drive or operate hazardous machinery  · If you have not urinated within 8 hours after discharge, please contact your surgeon on call. · Do not make important personal or business decisions  · Do not drink alcoholic beverages    Report the following to your surgeon:  · Excessive pain, swelling, redness or odor of or around the surgical area  · Temperature over 100.5 degrees  · Nausea and vomiting lasting longer than 4 hours or if unable to take medication  · Any signs of decreased circulation or nerve impairment to extremity:  Change in color, persistent numbness, tingling, coldness or increased pain.   · Any questions

## 2019-04-15 ENCOUNTER — HOSPITAL ENCOUNTER (OUTPATIENT)
Dept: MAMMOGRAPHY | Age: 74
Discharge: HOME | End: 2019-04-15
Attending: INTERNAL MEDICINE
Payer: MEDICARE

## 2019-04-15 DIAGNOSIS — Z12.39 SCREENING BREAST EXAMINATION: ICD-10-CM

## 2019-04-15 PROCEDURE — 77067 SCR MAMMO BI INCL CAD: CPT

## 2019-05-02 ENCOUNTER — OFFICE VISIT (OUTPATIENT)
Dept: INTERNAL MEDICINE CLINIC | Age: 74
End: 2019-05-02

## 2019-05-02 VITALS
DIASTOLIC BLOOD PRESSURE: 85 MMHG | TEMPERATURE: 98 F | RESPIRATION RATE: 18 BRPM | HEART RATE: 83 BPM | SYSTOLIC BLOOD PRESSURE: 145 MMHG | BODY MASS INDEX: 33.55 KG/M2 | HEIGHT: 64 IN | OXYGEN SATURATION: 97 % | WEIGHT: 196.5 LBS

## 2019-05-02 DIAGNOSIS — D51.1 VITAMIN B12 DEFICIENCY ANEMIA DUE TO SELECTIVE VITAMIN B12 MALABSORPTION WITH PROTEINURIA: ICD-10-CM

## 2019-05-02 DIAGNOSIS — E03.9 HYPOTHYROIDISM, UNSPECIFIED TYPE: ICD-10-CM

## 2019-05-02 DIAGNOSIS — I10 ESSENTIAL HYPERTENSION: ICD-10-CM

## 2019-05-02 DIAGNOSIS — E11.21 TYPE 2 DIABETES MELLITUS WITH NEPHROPATHY (HCC): Primary | ICD-10-CM

## 2019-05-02 NOTE — PROGRESS NOTES
1. Have you been to the ER, urgent care clinic since your last visit? Hospitalized since your last visit?no    2. Have you seen or consulted any other health care providers outside of the [address] [place] [address] since your last visit? Include any pap smears or colon screening.  no

## 2019-05-02 NOTE — PROGRESS NOTES
SUBJECTIVE:   Ms. Francy Mc is a 69 y.o. female who is here for the following complaint, and also for follow up of routine medical issues. She had been seeing Dr. Kianna Frazier, and Dr. Saúl Philip before that. Chief Complaint   Patient presents with    Hypertension     pt here today for 6 month f.u        She went through a bladder procedure in March; doing well. She had a bout of swelling and stiffness of L knee, which turned out to be a Baker's cyst.  This is better. She had lost weight with help of nutritionist, but this is creeping up again:   Wt Readings from Last 3 Encounters:   05/02/19 196 lb 8 oz (89.1 kg)   04/08/19 194 lb 2 oz (88.1 kg)   04/07/19 190 lb (86.2 kg)     DM: 100-112    Her numbness L foot has improved. LBP is stable. \"I was told I have arthritis in the spine\" by Dr. Yariel Goldberg, orthopedist.  She has been through treatment with Dr. Marian Payne, now seeing someone else in his office, for Hep C, obtained during a prior blood transfusion. \"He said after the last visit I could return just as needed. \"  Seeing Nephrologist, Dr. Erin Dupree who will be retiring. At this time, she is otherwise doing well and has brought no other complaints to my attention today. For a list of the medical issues addressed today, see the assessment and plan below.     PMH:   Past Medical History:   Diagnosis Date    Arthritis     LOWER SPINE    Cancer ([address]     kidney (right)    Chronic kidney disease 1996    right kidney removed - CANCER    Diabetes ([address]     She is controlling with weight loss    Diabetes mellitus ([address] 10/8/2014    NIDDM    Hepatitis C     hx hep C from blood transfusion per pt; Treated by Dr. Jeanne Cerda Hyperlipidemia     Hypertension     Ill-defined condition 1990s    HEPATITIS C - treated with medication    Liver disease     Personal history of renal cancer 3/2/2015    Thyroid disease 1999    Thyroidectomy       Past Surgical History:   Procedure Laterality Date    COLONOSCOPY N/A 6/20/2017    COLONOSCOPY performed by Garrett Quiroga. Amanda Huerta MD at [facility] ENDOSCOPY    HX BREAST REDUCTION Bilateral 1996    HX COLONOSCOPY  3/5/12    Repeat in 5 years (Dr. Sabra Mclaughlin)    HX GYN  1980s    tubal pregnancy    HX HEENT      WISDOM TEETH X4    HX OTHER SURGICAL  1999    thyroidectomy    HX OTHER SURGICAL      colonoscopy x 2 - ?polyps    HX OTHER SURGICAL  04/08/2019    surg for prolasp bladder at [facility] Bilateral 1995    HX TONSILLECTOMY      2or 4years old   [address]    kidney removed on right       All: She is allergic to codeine. Current Outpatient Medications   Medication Sig    Blood-Glucose Meter monitoring kit Use as directed. Check Blood glucose daily and as needed. Dx E11.9    metFORMIN (GLUCOPHAGE) 500 mg tablet TAKE 1 TABLET BY MOUTH TWO  TIMES DAILY WITH MEALS    levothyroxine (SYNTHROID) 50 mcg tablet TAKE ONE TABLET BY MOUTH  ONCE DAILY BEFORE BREAKFAST    atorvastatin (LIPITOR) 20 mg tablet TAKE 1 TABLET BY MOUTH  DAILY    ONETOUCH ULTRA BLUE TEST STRIP strip CHECK BLOOD SUGAR 2-3 TIMES A WEEK    irbesartan (AVAPRO) 150 mg tablet Take 150 mg by mouth daily.  amLODIPine (NORVASC) 5 mg tablet Take 5 mg by mouth daily.  hydrochlorothiazide (HYDRODIURIL) 25 mg tablet Take 1 Tab by mouth daily as needed.  multivitamin with iron-mineral 0.8 mg cmpk Take 1 Packet by mouth daily.  lancets 30 gauge misc 1 Lancet by SubCUTAneous route daily.  acetaminophen (TYLENOL EXTRA STRENGTH) 500 mg tablet Take 500-1,000 mg by mouth every six (6) hours as needed for Pain.  cloNIDine (CATAPRES) 0.1 mg tablet Take 0.1 mg by mouth two (2) times a day.  Blood-Glucose Meter (ONE TOUCH ULTRAMINI) monitoring kit Check blood sugar daily     No current facility-administered medications for this visit. FH: Her family history includes Diabetes in her daughter, mother, and sister;  Heart Disease in her father; Hypertension in her daughter and son; Kidney Disease in her father and mother; No Known Problems in her brother and son. SH: Retired Lpn, doing private duty nursing. She reports that she quit smoking about 27 years ago. She has a 2.00 pack-year smoking history. She has never used smokeless tobacco. She reports that she does not drink alcohol or use drugs. ROS: See above; Complete ROS otherwise negative. OBJECTIVE:   Vitals:   Visit Vitals  /85 (BP 1 Location: Left arm, BP Patient Position: Sitting)   Pulse 83   Temp 98 °F (36.7 °C) (Oral)   Resp 18   Ht 5' 4\" (1.626 m)   Wt 196 lb 8 oz (89.1 kg)   LMP  (LMP Unknown)   SpO2 97%   BMI 33.73 kg/m²      Gen: Pleasant 69 y.o.  female in NAD. HEENT: PERRLA. EOMI. OP moist and pink. Neck: Supple. No LAD. HEART: RRR, No M/G/R.    LUNGS: CTAB No W/R. ABDOMEN: S, NT, ND, BS+. EXTREMITIES: Warm. No C/C/E.  MUSCULOSKELETAL: Normal ROM, muscle strength 5/5 all groups. NEURO: Alert and oriented x 3. Cranial nerves grossly intact. No focal sensory or motor deficits noted. SKIN: Warm. Dry. Keloid at site of prior thyroid surgery. Lab Results   Component Value Date/Time    Sodium 145 04/01/2019 02:14 PM    Potassium 4.2 04/01/2019 02:14 PM    Chloride 114 (H) 04/01/2019 02:14 PM    CO2 26 04/01/2019 02:14 PM    Anion gap 5 04/01/2019 02:14 PM    Glucose 95 04/01/2019 02:14 PM    BUN 30 (H) 04/01/2019 02:14 PM    Creatinine 1.41 (H) 04/01/2019 02:14 PM    BUN/Creatinine ratio 21 (H) 04/01/2019 02:14 PM    GFR est AA 44 (L) 04/01/2019 02:14 PM    GFR est non-AA 37 (L) 04/01/2019 02:14 PM    Calcium 9.0 04/01/2019 02:14 PM    Bilirubin, total 0.3 11/02/2018 10:21 AM    ALT (SGPT) 14 11/02/2018 10:21 AM    AST (SGOT) 17 11/02/2018 10:21 AM    Alk.  phosphatase 79 11/02/2018 10:21 AM    Protein, total 6.8 11/02/2018 10:21 AM    Albumin 4.4 11/02/2018 10:21 AM    Globulin 4.0 03/03/2013 04:25 PM    A-G Ratio 1.8 11/02/2018 10:21 AM       Lab Results   Component Value Date/Time    Cholesterol, total 146 11/02/2018 10:21 AM    HDL Cholesterol 54 11/02/2018 10:21 AM    LDL, calculated 71 11/02/2018 10:21 AM    Triglyceride 104 11/02/2018 10:21 AM    CHOL/HDL Ratio 3.7 12/29/2009 07:16 AM        Lab Results   Component Value Date/Time    Hemoglobin A1c 6.6 (H) 04/01/2019 02:14 PM       Lab Results   Component Value Date/Time    WBC 9.3 04/01/2019 02:14 PM    HGB 11.8 04/01/2019 02:14 PM    HCT 38.8 04/01/2019 02:14 PM    PLATELET 436 80/85/4786 02:14 PM    MCV 88.0 04/01/2019 02:14 PM       Lab Results   Component Value Date/Time    TSH 1.140 11/02/2018 10:21 AM         ASSESSMENT/ PLAN:   1. Diabetes: Controlled. Continue current measures. 2. Hypertension: High today, okay previously. Hasn't taken med yet. 3. Thyroid disease: Euthyroid. Watch TSH. 4. Chronic kidney disease:  Reviewed labs. Watch labs. 5. Arthritis: Chronic and stable. 6. Hyperlipidemia: Doing worse. Discussed her high risk of cardiovascular complications. Lipitor 20.    7. Hepatitis C: F/U with GI as needed--apparently \"turned loose\". 8. Obesity: Diet and exercise. Weight down a bit; keep up the good work. 9. Neuropathy L toe: ?Sciatica vs other compressive neuropathy. Doing better lately. Follow-up and Dispositions    · Return in about 6 months (around 11/2/2019) for DM. I have reviewed the patient's medications and risks/side effects/benefits were discussed. Diagnosis(-es) explained to patient and questions answered. Literature provided where appropriate.

## 2019-10-10 NOTE — TELEPHONE ENCOUNTER
----- Message from Gisselle Galloway sent at 10/10/2019  4:08 PM EDT -----  Regarding: Dr. Raisa Smith  Medication Refill    Caller (if not patient):      Relationship of caller (if not patient):      Best contact number(s):  [phone]      Name of medication and dosage if known:  Iberstatin 150 mg      Is patient out of this medication (yes/no):   Not yet      Pharmacy name: [address] [place] [address] listed in chart? (yes/no):  yes  Pharmacy phone number:  [phone]  reference [id number]      Details to clarify the request:      Gisselle Galloway      Copy/paste envera

## 2019-10-10 NOTE — TELEPHONE ENCOUNTER
PCP: Mary Briceno MD    Last appt: 5/2/2019  Future Appointments   Date Time Provider Rita Andrade   11/4/2019  8:45 AM Mary Briceno MD [facility]       Requested Prescriptions     Pending Prescriptions Disp Refills    irbesartan (AVAPRO) 150 mg tablet 90 Tab 1     Sig: Take 1 Tab by mouth daily.

## 2019-10-11 RX ORDER — IRBESARTAN 150 MG/1
150 TABLET ORAL DAILY
Qty: 90 TAB | Refills: 1 | Status: SHIPPED | OUTPATIENT
Start: 2019-10-11 | End: 2020-04-07 | Stop reason: SDUPTHER

## 2019-10-31 DIAGNOSIS — E03.9 HYPOTHYROIDISM, UNSPECIFIED TYPE: ICD-10-CM

## 2019-10-31 DIAGNOSIS — I10 ESSENTIAL HYPERTENSION: ICD-10-CM

## 2019-10-31 DIAGNOSIS — E11.21 TYPE 2 DIABETES MELLITUS WITH NEPHROPATHY (HCC): ICD-10-CM

## 2019-10-31 DIAGNOSIS — D51.1 VITAMIN B12 DEFICIENCY ANEMIA DUE TO SELECTIVE VITAMIN B12 MALABSORPTION WITH PROTEINURIA: ICD-10-CM

## 2019-11-01 LAB
ALBUMIN SERPL-MCNC: 4.1 G/DL (ref 3.5–4.8)
ALBUMIN/CREAT UR: 3461.1 MG/G CREAT (ref 0–30)
ALBUMIN/GLOB SERPL: 1.6 {RATIO} (ref 1.2–2.2)
ALP SERPL-CCNC: 99 IU/L (ref 39–117)
ALT SERPL-CCNC: 15 IU/L (ref 0–32)
AST SERPL-CCNC: 18 IU/L (ref 0–40)
BASOPHILS # BLD AUTO: 0.1 X10E3/UL (ref 0–0.2)
BASOPHILS NFR BLD AUTO: 1 %
BILIRUB SERPL-MCNC: <0.2 MG/DL (ref 0–1.2)
BUN SERPL-MCNC: 27 MG/DL (ref 8–27)
BUN/CREAT SERPL: 17 (ref 12–28)
CALCIUM SERPL-MCNC: 9.6 MG/DL (ref 8.7–10.3)
CHLORIDE SERPL-SCNC: 111 MMOL/L (ref 96–106)
CHOLEST SERPL-MCNC: 178 MG/DL (ref 100–199)
CO2 SERPL-SCNC: 20 MMOL/L (ref 20–29)
CREAT SERPL-MCNC: 1.6 MG/DL (ref 0.57–1)
CREAT UR-MCNC: 119.4 MG/DL
EOSINOPHIL # BLD AUTO: 0 X10E3/UL (ref 0–0.4)
EOSINOPHIL NFR BLD AUTO: 0 %
ERYTHROCYTE [DISTWIDTH] IN BLOOD BY AUTOMATED COUNT: 15.3 % (ref 12.3–15.4)
EST. AVERAGE GLUCOSE BLD GHB EST-MCNC: 134 MG/DL
GLOBULIN SER CALC-MCNC: 2.5 G/DL (ref 1.5–4.5)
GLUCOSE SERPL-MCNC: 127 MG/DL (ref 65–99)
HBA1C MFR BLD: 6.3 % (ref 4.8–5.6)
HCT VFR BLD AUTO: 37.4 % (ref 34–46.6)
HDLC SERPL-MCNC: 66 MG/DL
HGB BLD-MCNC: 12.2 G/DL (ref 11.1–15.9)
IMM GRANULOCYTES # BLD AUTO: 0 X10E3/UL (ref 0–0.1)
IMM GRANULOCYTES NFR BLD AUTO: 0 %
LDLC SERPL CALC-MCNC: 94 MG/DL (ref 0–99)
LYMPHOCYTES # BLD AUTO: 3 X10E3/UL (ref 0.7–3.1)
LYMPHOCYTES NFR BLD AUTO: 33 %
MCH RBC QN AUTO: 26.5 PG (ref 26.6–33)
MCHC RBC AUTO-ENTMCNC: 32.6 G/DL (ref 31.5–35.7)
MCV RBC AUTO: 81 FL (ref 79–97)
MICROALBUMIN UR-MCNC: 4132.5 UG/ML
MONOCYTES # BLD AUTO: 0.6 X10E3/UL (ref 0.1–0.9)
MONOCYTES NFR BLD AUTO: 7 %
NEUTROPHILS # BLD AUTO: 5.5 X10E3/UL (ref 1.4–7)
NEUTROPHILS NFR BLD AUTO: 59 %
PLATELET # BLD AUTO: 260 X10E3/UL (ref 150–450)
POTASSIUM SERPL-SCNC: 4 MMOL/L (ref 3.5–5.2)
PROT SERPL-MCNC: 6.6 G/DL (ref 6–8.5)
RBC # BLD AUTO: 4.6 X10E6/UL (ref 3.77–5.28)
SODIUM SERPL-SCNC: 144 MMOL/L (ref 134–144)
T4 FREE SERPL-MCNC: 1.26 NG/DL (ref 0.82–1.77)
TRIGL SERPL-MCNC: 91 MG/DL (ref 0–149)
TSH SERPL DL<=0.005 MIU/L-ACNC: 1.1 UIU/ML (ref 0.45–4.5)
VIT B12 SERPL-MCNC: 1714 PG/ML (ref 232–1245)
VLDLC SERPL CALC-MCNC: 18 MG/DL (ref 5–40)
WBC # BLD AUTO: 9.1 X10E3/UL (ref 3.4–10.8)

## 2019-11-04 ENCOUNTER — OFFICE VISIT (OUTPATIENT)
Dept: INTERNAL MEDICINE CLINIC | Age: 74
End: 2019-11-04

## 2019-11-04 VITALS
OXYGEN SATURATION: 98 % | BODY MASS INDEX: 33.94 KG/M2 | SYSTOLIC BLOOD PRESSURE: 141 MMHG | TEMPERATURE: 98.7 F | HEART RATE: 80 BPM | RESPIRATION RATE: 14 BRPM | WEIGHT: 198.8 LBS | HEIGHT: 64 IN | DIASTOLIC BLOOD PRESSURE: 83 MMHG

## 2019-11-04 DIAGNOSIS — E11.9 TYPE 2 DIABETES MELLITUS WITHOUT COMPLICATION, UNSPECIFIED WHETHER LONG TERM INSULIN USE (HCC): ICD-10-CM

## 2019-11-04 DIAGNOSIS — Z00.00 MEDICARE ANNUAL WELLNESS VISIT, SUBSEQUENT: Primary | ICD-10-CM

## 2019-11-04 DIAGNOSIS — E03.4 HYPOTHYROIDISM DUE TO ACQUIRED ATROPHY OF THYROID: ICD-10-CM

## 2019-11-04 DIAGNOSIS — I10 ESSENTIAL HYPERTENSION: ICD-10-CM

## 2019-11-04 DIAGNOSIS — D64.9 ANEMIA, UNSPECIFIED TYPE: ICD-10-CM

## 2019-11-04 PROBLEM — N18.30 CKD (CHRONIC KIDNEY DISEASE) STAGE 3, GFR 30-59 ML/MIN (HCC): Status: ACTIVE | Noted: 2019-11-04

## 2019-11-04 RX ORDER — IRBESARTAN 300 MG/1
150 TABLET ORAL
COMMUNITY
End: 2020-02-22 | Stop reason: SDUPTHER

## 2019-11-04 NOTE — PROGRESS NOTES
SUBJECTIVE:   Ms. Shahab Leonard is a 69 y.o. female who is here for the following complaint, and also for follow up of routine medical issues. She had been seeing Dr. Edmundo Cisneros, and Dr. Roosevelt Frazier before that. Chief Complaint   Patient presents with    Diabetes     pt here today for 6 month f.u on dm and discuss last lab results     Medication Evaluation     irbesartan 150mg is on back order; pharmacy last gave 300mg & pt has been taking 1/2 tab dialy       Stress: \"I moved, a lot is going on. \"   She had lost weight with help of nutritionist, but this is creeping up again:   Wt Readings from Last 3 Encounters:   11/04/19 198 lb 12.8 oz (90.2 kg)   05/02/19 196 lb 8 oz (89.1 kg)   04/08/19 194 lb 2 oz (88.1 kg)     DM: 100-112    Her numbness L foot has improved. LBP is stable. \"I was told I have arthritis in the spine\" by Dr. Mily Mendoza, orthopedist.  She has been through treatment with Dr. Maryanne Matthews, now seeing someone else in his office, for Hep C, obtained during a prior blood transfusion. \"He said after the last visit I could return just as needed. \"  Seeing Nephrologist, Dr. Sara Velazquez who will be retiring. At this time, she is otherwise doing well and has brought no other complaints to my attention today. For a list of the medical issues addressed today, see the assessment and plan below.     PMH:   Past Medical History:   Diagnosis Date    Arthritis     LOWER SPINE    Cancer ([address]     kidney (right)    Chronic kidney disease 1996    right kidney removed - CANCER    Diabetes ([address]     She is controlling with weight loss    Diabetes mellitus ([address] 10/8/2014    NIDDM    Hepatitis C     hx hep C from blood transfusion per pt; Treated by Dr. Rebecca Aguirre Hyperlipidemia     Hypertension     Ill-defined condition 1990s    HEPATITIS C - treated with medication    Liver disease     Personal history of renal cancer 3/2/2015    Thyroid disease 1999    Thyroidectomy       Past Surgical History:   Procedure Laterality Date    COLONOSCOPY N/A 6/20/2017    COLONOSCOPY performed by Jasson Wolfe. Ruddy Rivers MD at [facility] ENDOSCOPY    HX BREAST REDUCTION Bilateral 1996    HX COLONOSCOPY  3/5/12    Repeat in 5 years (Dr. Evon Alvarez)    HX GYN  1980s    tubal pregnancy    HX HEENT      WISDOM TEETH X4    HX OTHER SURGICAL  1999    thyroidectomy    HX OTHER SURGICAL      colonoscopy x 2 - ?polyps    HX OTHER SURGICAL  04/08/2019    surg for prolasp bladder at [address]  04/08/2019    prolasp bladder surg    HX RITESH AND BSO Bilateral 1995    HX TONSILLECTOMY      2or 4years old   [address]    kidney removed on right       All: She is allergic to codeine. Current Outpatient Medications   Medication Sig    irbesartan (AVAPRO) 300 mg tablet Take 150 mg by mouth nightly.  Blood-Glucose Meter monitoring kit Use as directed. Check Blood glucose daily and as needed. Dx E11.9    metFORMIN (GLUCOPHAGE) 500 mg tablet TAKE 1 TABLET BY MOUTH TWO  TIMES DAILY WITH MEALS    levothyroxine (SYNTHROID) 50 mcg tablet TAKE ONE TABLET BY MOUTH  ONCE DAILY BEFORE BREAKFAST    atorvastatin (LIPITOR) 20 mg tablet TAKE 1 TABLET BY MOUTH  DAILY    ONETOUCH ULTRA BLUE TEST STRIP strip CHECK BLOOD SUGAR 2-3 TIMES A WEEK    amLODIPine (NORVASC) 5 mg tablet Take 5 mg by mouth daily.  hydrochlorothiazide (HYDRODIURIL) 25 mg tablet Take 1 Tab by mouth daily as needed.  multivitamin with iron-mineral 0.8 mg cmpk Take 1 Packet by mouth daily.  lancets 30 gauge misc 1 Lancet by SubCUTAneous route daily.  acetaminophen (TYLENOL EXTRA STRENGTH) 500 mg tablet Take 500-1,000 mg by mouth every six (6) hours as needed for Pain.  cloNIDine (CATAPRES) 0.1 mg tablet Take 0.1 mg by mouth two (2) times a day.  Blood-Glucose Meter (ONE TOUCH ULTRAMINI) monitoring kit Check blood sugar daily    irbesartan (AVAPRO) 150 mg tablet Take 1 Tab by mouth daily.      No current facility-administered medications for this visit.        FH: Her family history includes Diabetes in her daughter, mother, and sister; Heart Disease in her father; Hypertension in her daughter and son; Kidney Disease in her father and mother; No Known Problems in her brother and son. SH: Retired Lpn, doing private duty nursing. She reports that she quit smoking about 27 years ago. She has a 2.00 pack-year smoking history. She has never used smokeless tobacco. She reports that she does not drink alcohol or use drugs. ROS: See above; Complete ROS otherwise negative. OBJECTIVE:   Vitals:   Visit Vitals  /83 (BP 1 Location: Left arm, BP Patient Position: Sitting)   Pulse 80   Temp 98.7 °F (37.1 °C) (Oral)   Resp 14   Ht 5' 4\" (1.626 m)   Wt 198 lb 12.8 oz (90.2 kg)   LMP  (LMP Unknown)   SpO2 98%   BMI 34.12 kg/m²      Gen: Pleasant 69 y.o.  female in NAD. HEENT: PERRLA. EOMI. OP moist and pink. Neck: Supple. No LAD. HEART: RRR, No M/G/R.    LUNGS: CTAB No W/R. ABDOMEN: S, NT, ND, BS+. EXTREMITIES: Warm. No C/C/E.  MUSCULOSKELETAL: Normal ROM, muscle strength 5/5 all groups. NEURO: Alert and oriented x 3. Cranial nerves grossly intact. No focal sensory or motor deficits noted. SKIN: Warm. Dry. Keloid at site of prior thyroid surgery. Lab Results   Component Value Date/Time    Sodium 144 10/31/2019 09:15 AM    Potassium 4.0 10/31/2019 09:15 AM    Chloride 111 (H) 10/31/2019 09:15 AM    CO2 20 10/31/2019 09:15 AM    Anion gap 5 04/01/2019 02:14 PM    Glucose 127 (H) 10/31/2019 09:15 AM    BUN 27 10/31/2019 09:15 AM    Creatinine 1.60 (H) 10/31/2019 09:15 AM    BUN/Creatinine ratio 17 10/31/2019 09:15 AM    GFR est AA 37 (L) 10/31/2019 09:15 AM    GFR est non-AA 32 (L) 10/31/2019 09:15 AM    Calcium 9.6 10/31/2019 09:15 AM    Bilirubin, total <0.2 10/31/2019 09:15 AM    ALT (SGPT) 15 10/31/2019 09:15 AM    AST (SGOT) 18 10/31/2019 09:15 AM    Alk.  phosphatase 99 10/31/2019 09:15 AM    Protein, total 6.6 10/31/2019 09:15 AM    Albumin 4.1 10/31/2019 09:15 AM    Globulin 4.0 03/03/2013 04:25 PM    A-G Ratio 1.6 10/31/2019 09:15 AM       Lab Results   Component Value Date/Time    Cholesterol, total 178 10/31/2019 09:15 AM    HDL Cholesterol 66 10/31/2019 09:15 AM    LDL, calculated 94 10/31/2019 09:15 AM    Triglyceride 91 10/31/2019 09:15 AM    CHOL/HDL Ratio 3.7 12/29/2009 07:16 AM        Lab Results   Component Value Date/Time    Hemoglobin A1c 6.3 (H) 10/31/2019 09:15 AM       Lab Results   Component Value Date/Time    WBC 9.1 10/31/2019 09:15 AM    HGB 12.2 10/31/2019 09:15 AM    HCT 37.4 10/31/2019 09:15 AM    PLATELET 847 69/02/3529 09:15 AM    MCV 81 10/31/2019 09:15 AM       Lab Results   Component Value Date/Time    TSH 1.100 10/31/2019 09:15 AM         ASSESSMENT/ PLAN:   1. Diabetes: Controlled. Continue current measures. 2. Hypertension: High today, okay previously. Hasn't taken med yet. 3. Thyroid disease: Euthyroid. Watch TSH. 4. Chronic kidney disease:  Reviewed labs. Watch labs. 5. Arthritis: Chronic and stable. 6. Hyperlipidemia: Doing worse. Discussed her high risk of cardiovascular complications. Lipitor 20.    7. Hepatitis C: F/U with GI as needed--apparently \"turned loose\". 8. Obesity: Diet and exercise. Weight down a bit; keep up the good work. 9. Neuropathy L toe: ?Sciatica vs other compressive neuropathy. Doing better lately. Follow-up and Dispositions    · Return in about 6 months (around 5/4/2020) for DM. I have reviewed the patient's medications and risks/side effects/benefits were discussed. Diagnosis(-es) explained to patient and questions answered. Literature provided where appropriate.

## 2019-11-04 NOTE — PROGRESS NOTES
Well      This is the Subsequent Medicare Annual Wellness Exam, performed 12 months or more after the Initial AWV or the last Subsequent AWV    I have reviewed the patient's medical history in detail and updated the computerized patient record. History     Past Medical History:   Diagnosis Date    Arthritis     LOWER SPINE    Cancer ([address]     kidney (right)    Chronic kidney disease 1996    right kidney removed - CANCER    Diabetes ([address]     She is controlling with weight loss    Diabetes mellitus ([address] 10/8/2014    NIDDM    Hepatitis C     hx hep C from blood transfusion per pt; Treated by Dr. Wes Moreau Hyperlipidemia     Hypertension     Ill-defined condition 1990s    HEPATITIS C - treated with medication    Liver disease     Personal history of renal cancer 3/2/2015    Thyroid disease 1999    Thyroidectomy      Past Surgical History:   Procedure Laterality Date    COLONOSCOPY N/A 6/20/2017    COLONOSCOPY performed by Aec Dye. Michael Berman MD at [facility] ENDOSCOPY    HX BREAST REDUCTION Bilateral 1996    HX COLONOSCOPY  3/5/12    Repeat in 5 years (Dr. Jing Squires)    HX GYN  1980s    tubal pregnancy    HX HEENT      WISDOM TEETH X4    HX OTHER SURGICAL  1999    thyroidectomy    HX OTHER SURGICAL      colonoscopy x 2 - ?polyps    HX OTHER SURGICAL  04/08/2019    surg for prolasp bladder at [address] [facility]""  04/08/2019    prolasp bladder surg    HX RITESH AND BSO Bilateral 1995    HX TONSILLECTOMY      2or 4years old   [address]    kidney removed on right     Current Outpatient Medications   Medication Sig Dispense Refill    irbesartan (AVAPRO) 300 mg tablet Take 150 mg by mouth nightly.  Blood-Glucose Meter monitoring kit Use as directed. Check Blood glucose daily and as needed.  Dx E11.9 1 Kit 0    metFORMIN (GLUCOPHAGE) 500 mg tablet TAKE 1 TABLET BY MOUTH TWO  TIMES DAILY WITH MEALS 180 Tab 3    levothyroxine (SYNTHROID) 50 mcg tablet TAKE ONE TABLET BY MOUTH  ONCE DAILY BEFORE BREAKFAST 90 Tab 3    atorvastatin (LIPITOR) 20 mg tablet TAKE 1 TABLET BY MOUTH  DAILY 90 Tab 3    ONETOUCH ULTRA BLUE TEST STRIP strip CHECK BLOOD SUGAR 2-3 TIMES A WEEK 100 Strip 2    amLODIPine (NORVASC) 5 mg tablet Take 5 mg by mouth daily.  hydrochlorothiazide (HYDRODIURIL) 25 mg tablet Take 1 Tab by mouth daily as needed. 30 Tab 5    multivitamin with iron-mineral 0.8 mg cmpk Take 1 Packet by mouth daily.  lancets 30 gauge misc 1 Lancet by SubCUTAneous route daily.  acetaminophen (TYLENOL EXTRA STRENGTH) 500 mg tablet Take 500-1,000 mg by mouth every six (6) hours as needed for Pain.  cloNIDine (CATAPRES) 0.1 mg tablet Take 0.1 mg by mouth two (2) times a day.  Blood-Glucose Meter (ONE TOUCH ULTRAMINI) monitoring kit Check blood sugar daily 1 Kit 0    irbesartan (AVAPRO) 150 mg tablet Take 1 Tab by mouth daily.  90 Tab 1     Allergies   Allergen Reactions    Codeine Hives     Family History   Problem Relation Age of Onset    Diabetes Mother     Kidney Disease Mother     Heart Disease Father     Kidney Disease Father         dialysis    Diabetes Sister     Diabetes Daughter     Hypertension Daughter     Hypertension Son     No Known Problems Son     No Known Problems Brother     Anesth Problems Neg Hx      Social History     Tobacco Use    Smoking status: Former Smoker     Packs/day: 0.25     Years: 8.00     Pack years: 2.00     Last attempt to quit: 1992     Years since quittin.6    Smokeless tobacco: Never Used    Tobacco comment: quit smoking 30-40 yrs ago   Substance Use Topics    Alcohol use: No     Patient Active Problem List   Diagnosis Code    Hypertension I10    Hepatitis C B19.20    [address]al cancer ([facility] C64.9    Hypothyroid E03.9    Anemia D64.9    S/p nephrectomy, right Z90.5    Glucose intolerance (impaired glucose tolerance) R73.02    Diabetes mellitus ([facility] E11.9    History of hysterectomy including cervix Z90.710    History of bilateral salpingo-oophorectomy Z90.79, Z90.722    Personal history of renal cancer Z85.528    Bartholin cyst N75.0    Type 2 diabetes mellitus with nephropathy (HCC) E11.21    Vaginal vault prolapse N81.9       Depression Risk Factor Screening:     3 most recent PHQ Screens 11/4/2019   Little interest or pleasure in doing things Several days   Feeling down, depressed, irritable, or hopeless Several days   Total Score PHQ 2 2     Alcohol Risk Factor Screening: You do not drink alcohol or very rarely. Functional Ability and Level of Safety:   Hearing Loss  Hearing is good. Activities of Daily Living  The home contains: no safety equipment. Patient does total self care    Fall Risk  Fall Risk Assessment, last 12 mths 11/4/2019   Able to walk? Yes   Fall in past 12 months? No       Abuse Screen  Patient is not abused    Cognitive Screening   Evaluation of Cognitive Function:  Has your family/caregiver stated any concerns about your memory: no  Normal  MMSE 30/30    Patient Care Team   Patient Care Team:  Nehemias Hopkins MD as PCP - General (Internal Medicine)  Nehemias Hopkins MD [facility]CP - [facility]  Donato Adams MD (Nephrology)  Aston Class as Consulting Provider (Optometry)  Saman Funes MD (Gastroenterology)    Assessment/Plan   Education and counseling provided:  Are appropriate based on today's review and evaluation    Diagnoses and all orders for this visit:    1.  Medicare annual wellness visit, subsequent        Health Maintenance Due   Topic Date Due    DTaP/Tdap/Td series (1 - Tdap) 03/07/1967    Shingrix Vaccine Age 50> (1 of 2) 03/07/1996    Pneumococcal 65+ years (2 of 2 - PPSV23) 02/01/2017    MEDICARE YEARLY EXAM  05/05/2019    Influenza Age 9 to Adult  08/01/2019

## 2019-11-04 NOTE — PATIENT INSTRUCTIONS
Office Policies    Phone calls/patient messages:            Please allow up to 24 hours for someone in the office to contact you about your call or message. Be mindful your provider may be out of the office or your message may require further review. We encourage you to use [employer] for your messages as this is a faster, more efficient way to communicate with our office                         Medication Refills:            Prescription medications require 48-72 business hours to process. We encourage you to use [employer] for your refills. For controlled medications: Please allow 72 business hours to process. Certain medications may require you to  a written prescription at our office. NO narcotic/controlled medications will be prescribed after 4pm Monday through Friday or on weekends              Form/Paperwork Completion:            Please note a $25 fee may incur for all paperwork for completed by our providers. We ask that you allow 7-10 business days. Pre-payment is due prior to picking up/faxing the completed form. You may also download your forms to [employer] to have your doctor print off. Medicare Wellness Visit, Female     The best way to live healthy is to have a lifestyle where you eat a well-balanced diet, exercise regularly, limit alcohol use, and quit all forms of tobacco/nicotine, if applicable. Regular preventive services are another way to keep healthy. Preventive services (vaccines, screening tests, monitoring & exams) can help personalize your care plan, which helps you manage your own care. Screening tests can find health problems at the earliest stages, when they are easiest to treat. [facility] follows the current, evidence-based guidelines published by the [facility] and [employer] (USPSTF) when recommending preventive services for our patients.  Because we follow these guidelines, sometimes recommendations change over time as research supports it. (For example, mammograms used to be recommended annually. Even though Medicare will still pay for an annual mammogram, the newer guidelines recommend a mammogram every two years for women of average risk). Of course, you and your doctor may decide to screen more often for some diseases, based on your risk and your co-morbidities (chronic disease you are already diagnosed with). Preventive services for you include:  - Medicare offers their members a free annual wellness visit, which is time for you and your primary care provider to discuss and plan for your preventive service needs. Take advantage of this benefit every year!  -All adults over the age of 72 should receive the recommended pneumonia vaccines. Current USPSTF guidelines recommend a series of two vaccines for the best pneumonia protection.   -All adults should have a flu vaccine yearly and a tetanus vaccine every 10 years.   -All adults age 48 and older should receive the shingles vaccines (series of two vaccines). -All adults age 38-68 who are overweight should have a diabetes screening test once every three years.   -All adults born between [birth year] and 1964 should be screened once for Hepatitis C.  -Other screening tests and preventive services for persons with diabetes include: an eye exam to screen for diabetic retinopathy, a kidney function test, a foot exam, and stricter control over your cholesterol.   -Cardiovascular screening for adults with routine risk involves an electrocardiogram (ECG) at intervals determined by your doctor.   -Colorectal cancer screenings should be done for adults age 54-65 with no increased risk factors for colorectal cancer. There are a number of acceptable methods of screening for this type of cancer. Each test has its own benefits and drawbacks. Discuss with your doctor what is most appropriate for you during your annual wellness visit.  The different tests include: colonoscopy (considered the best screening method), a fecal occult blood test, a fecal DNA test, and sigmoidoscopy.    -A bone mass density test is recommended when a woman turns 65 to screen for osteoporosis. This test is only recommended one time, as a screening. Some providers will use this same test as a disease monitoring tool if you already have osteoporosis. -Breast cancer screenings are recommended every other year for women of normal risk, age 54-69.  -Cervical cancer screenings for women over age 72 are only recommended with certain risk factors.      Here is a list of your current Health Maintenance items (your personalized list of preventive services) with a due date:  Health Maintenance Due   Topic Date Due    DTaP/Tdap/Td  (1 - Tdap) 03/07/1967    Shingles Vaccine (1 of 2) 03/07/1996    Pneumococcal Vaccine (2 of 2 - PPSV23) 02/01/2017    Annual Well Visit  05/05/2019    Flu Vaccine  08/01/2019

## 2019-11-04 NOTE — PROGRESS NOTES
1. Have you been to the ER, urgent care clinic since your last visit? Hospitalized since your last visit?no    2. Have you seen or consulted any other health care providers outside of the [address] [place] [address] since your last visit? Include any pap smears or colon screening.  no

## 2019-11-26 RX ORDER — LEVOTHYROXINE SODIUM 50 UG/1
TABLET ORAL
Qty: 90 TAB | Refills: 3 | Status: SHIPPED | OUTPATIENT
Start: 2019-11-26 | End: 2020-12-30

## 2019-11-26 RX ORDER — METFORMIN HYDROCHLORIDE 500 MG/1
TABLET ORAL
Qty: 180 TAB | Refills: 3 | Status: SHIPPED | OUTPATIENT
Start: 2019-11-26 | End: 2021-04-12

## 2020-01-21 RX ORDER — AMLODIPINE BESYLATE 5 MG/1
5 TABLET ORAL DAILY
Qty: 90 TAB | Refills: 1 | Status: SHIPPED | OUTPATIENT
Start: 2020-01-21 | End: 2020-06-03

## 2020-02-22 ENCOUNTER — APPOINTMENT (OUTPATIENT)
Dept: CT IMAGING | Age: 75
DRG: 392 | End: 2020-02-22
Attending: EMERGENCY MEDICINE
Payer: MEDICARE

## 2020-02-22 ENCOUNTER — HOSPITAL ENCOUNTER (INPATIENT)
Age: 75
LOS: 2 days | Discharge: HOME | DRG: 392 | End: 2020-02-24
Attending: EMERGENCY MEDICINE | Admitting: HOSPITALIST
Payer: MEDICARE

## 2020-02-22 DIAGNOSIS — E86.0 DEHYDRATION: ICD-10-CM

## 2020-02-22 DIAGNOSIS — R42 DISEQUILIBRIUM: Primary | ICD-10-CM

## 2020-02-22 PROBLEM — R11.10 VOMITING: Status: ACTIVE | Noted: 2020-02-22

## 2020-02-22 LAB
ALBUMIN SERPL-MCNC: 3.6 G/DL (ref 3.5–5)
ALBUMIN/GLOB SERPL: 0.8 {RATIO} (ref 1.1–2.2)
ALP SERPL-CCNC: 81 U/L (ref 45–117)
ALT SERPL-CCNC: 25 U/L (ref 12–78)
ANION GAP SERPL CALC-SCNC: 6 MMOL/L (ref 5–15)
APPEARANCE UR: CLEAR
AST SERPL-CCNC: 35 U/L (ref 15–37)
BACTERIA URNS QL MICRO: NEGATIVE /HPF
BASOPHILS # BLD: 0 K/UL (ref 0–0.1)
BASOPHILS NFR BLD: 0 % (ref 0–1)
BILIRUB SERPL-MCNC: 0.5 MG/DL (ref 0.2–1)
BILIRUB UR QL: NEGATIVE
BUN SERPL-MCNC: 23 MG/DL (ref 6–20)
BUN/CREAT SERPL: 12 (ref 12–20)
CALCIUM SERPL-MCNC: 9.7 MG/DL (ref 8.5–10.1)
CHLORIDE SERPL-SCNC: 112 MMOL/L (ref 97–108)
CO2 SERPL-SCNC: 22 MMOL/L (ref 21–32)
COLOR UR: ABNORMAL
COMMENT, HOLDF: NORMAL
CREAT SERPL-MCNC: 1.98 MG/DL (ref 0.55–1.02)
DIFFERENTIAL METHOD BLD: ABNORMAL
EOSINOPHIL # BLD: 0 K/UL (ref 0–0.4)
EOSINOPHIL NFR BLD: 0 % (ref 0–7)
EPITH CASTS URNS QL MICRO: ABNORMAL /LPF
ERYTHROCYTE [DISTWIDTH] IN BLOOD BY AUTOMATED COUNT: 15.9 % (ref 11.5–14.5)
GLOBULIN SER CALC-MCNC: 4.8 G/DL (ref 2–4)
GLUCOSE BLD STRIP.AUTO-MCNC: 108 MG/DL (ref 65–100)
GLUCOSE BLD STRIP.AUTO-MCNC: 129 MG/DL (ref 65–100)
GLUCOSE BLD STRIP.AUTO-MCNC: 86 MG/DL (ref 65–100)
GLUCOSE SERPL-MCNC: 140 MG/DL (ref 65–100)
GLUCOSE UR STRIP.AUTO-MCNC: 100 MG/DL
HCT VFR BLD AUTO: 43.1 % (ref 35–47)
HGB BLD-MCNC: 13.5 G/DL (ref 11.5–16)
HGB UR QL STRIP: ABNORMAL
IMM GRANULOCYTES # BLD AUTO: 0 K/UL (ref 0–0.04)
IMM GRANULOCYTES NFR BLD AUTO: 0 % (ref 0–0.5)
KETONES UR QL STRIP.AUTO: NEGATIVE MG/DL
LEUKOCYTE ESTERASE UR QL STRIP.AUTO: NEGATIVE
LIPASE SERPL-CCNC: 157 U/L (ref 73–393)
LYMPHOCYTES # BLD: 1.4 K/UL (ref 0.8–3.5)
LYMPHOCYTES NFR BLD: 16 % (ref 12–49)
MAGNESIUM SERPL-MCNC: 1.8 MG/DL (ref 1.6–2.4)
MCH RBC QN AUTO: 26.9 PG (ref 26–34)
MCHC RBC AUTO-ENTMCNC: 31.3 G/DL (ref 30–36.5)
MCV RBC AUTO: 85.9 FL (ref 80–99)
MONOCYTES # BLD: 0.7 K/UL (ref 0–1)
MONOCYTES NFR BLD: 7 % (ref 5–13)
MUCOUS THREADS URNS QL MICRO: ABNORMAL /LPF
NEUTS SEG # BLD: 6.7 K/UL (ref 1.8–8)
NEUTS SEG NFR BLD: 77 % (ref 32–75)
NITRITE UR QL STRIP.AUTO: NEGATIVE
NRBC # BLD: 0 K/UL (ref 0–0.01)
NRBC BLD-RTO: 0 PER 100 WBC
PH UR STRIP: 7 [PH] (ref 5–8)
PLATELET # BLD AUTO: 141 K/UL (ref 150–400)
PMV BLD AUTO: 11.9 FL (ref 8.9–12.9)
POTASSIUM SERPL-SCNC: 4.8 MMOL/L (ref 3.5–5.1)
PROT SERPL-MCNC: 8.4 G/DL (ref 6.4–8.2)
PROT UR STRIP-MCNC: >300 MG/DL
RBC # BLD AUTO: 5.02 M/UL (ref 3.8–5.2)
RBC #/AREA URNS HPF: ABNORMAL /HPF (ref 0–5)
SAMPLES BEING HELD,HOLD: NORMAL
SERVICE CMNT-IMP: ABNORMAL
SERVICE CMNT-IMP: ABNORMAL
SERVICE CMNT-IMP: NORMAL
SODIUM SERPL-SCNC: 140 MMOL/L (ref 136–145)
SP GR UR REFRACTOMETRY: 1.02 (ref 1–1.03)
TROPONIN I SERPL-MCNC: <0.05 NG/ML
UR CULT HOLD, URHOLD: NORMAL
UROBILINOGEN UR QL STRIP.AUTO: 0.2 EU/DL (ref 0.2–1)
WBC # BLD AUTO: 8.9 K/UL (ref 3.6–11)
WBC URNS QL MICRO: ABNORMAL /HPF (ref 0–4)

## 2020-02-22 PROCEDURE — 74011250636 HC RX REV CODE- 250/636: Performed by: EMERGENCY MEDICINE

## 2020-02-22 PROCEDURE — 93005 ELECTROCARDIOGRAM TRACING: CPT

## 2020-02-22 PROCEDURE — C9113 INJ PANTOPRAZOLE SODIUM, VIA: HCPCS | Performed by: HOSPITALIST

## 2020-02-22 PROCEDURE — 65660000000 HC RM CCU STEPDOWN

## 2020-02-22 PROCEDURE — 81001 URINALYSIS AUTO W/SCOPE: CPT

## 2020-02-22 PROCEDURE — 85025 COMPLETE CBC W/AUTO DIFF WBC: CPT

## 2020-02-22 PROCEDURE — 99285 EMERGENCY DEPT VISIT HI MDM: CPT

## 2020-02-22 PROCEDURE — 83735 ASSAY OF MAGNESIUM: CPT

## 2020-02-22 PROCEDURE — 74011250636 HC RX REV CODE- 250/636: Performed by: HOSPITALIST

## 2020-02-22 PROCEDURE — 96361 HYDRATE IV INFUSION ADD-ON: CPT

## 2020-02-22 PROCEDURE — 70450 CT HEAD/BRAIN W/O DYE: CPT

## 2020-02-22 PROCEDURE — 82962 GLUCOSE BLOOD TEST: CPT

## 2020-02-22 PROCEDURE — 74011250637 HC RX REV CODE- 250/637: Performed by: HOSPITALIST

## 2020-02-22 PROCEDURE — 84484 ASSAY OF TROPONIN QUANT: CPT

## 2020-02-22 PROCEDURE — 36415 COLL VENOUS BLD VENIPUNCTURE: CPT

## 2020-02-22 PROCEDURE — 80053 COMPREHEN METABOLIC PANEL: CPT

## 2020-02-22 PROCEDURE — 96374 THER/PROPH/DIAG INJ IV PUSH: CPT

## 2020-02-22 PROCEDURE — 74011000250 HC RX REV CODE- 250: Performed by: HOSPITALIST

## 2020-02-22 PROCEDURE — 83690 ASSAY OF LIPASE: CPT

## 2020-02-22 PROCEDURE — 96375 TX/PRO/DX INJ NEW DRUG ADDON: CPT

## 2020-02-22 RX ORDER — DEXTROSE MONOHYDRATE 100 MG/ML
0-250 INJECTION, SOLUTION INTRAVENOUS AS NEEDED
Status: DISCONTINUED | OUTPATIENT
Start: 2020-02-22 | End: 2020-02-24 | Stop reason: HOSPADM

## 2020-02-22 RX ORDER — THERA TABS 400 MCG
1 TAB ORAL DAILY
COMMUNITY
End: 2022-04-15

## 2020-02-22 RX ORDER — AMLODIPINE BESYLATE 5 MG/1
5 TABLET ORAL DAILY
Status: DISCONTINUED | OUTPATIENT
Start: 2020-02-22 | End: 2020-02-24 | Stop reason: HOSPADM

## 2020-02-22 RX ORDER — SODIUM CHLORIDE 9 MG/ML
50 INJECTION, SOLUTION INTRAVENOUS CONTINUOUS
Status: DISCONTINUED | OUTPATIENT
Start: 2020-02-22 | End: 2020-02-24 | Stop reason: HOSPADM

## 2020-02-22 RX ORDER — NALOXONE HYDROCHLORIDE 0.4 MG/ML
0.4 INJECTION, SOLUTION INTRAMUSCULAR; INTRAVENOUS; SUBCUTANEOUS AS NEEDED
Status: DISCONTINUED | OUTPATIENT
Start: 2020-02-22 | End: 2020-02-24 | Stop reason: HOSPADM

## 2020-02-22 RX ORDER — METOPROLOL TARTRATE 5 MG/5ML
10 INJECTION INTRAVENOUS ONCE
Status: COMPLETED | OUTPATIENT
Start: 2020-02-22 | End: 2020-02-22

## 2020-02-22 RX ORDER — ACETAMINOPHEN 650 MG/1
975 SUPPOSITORY RECTAL
Status: DISCONTINUED | OUTPATIENT
Start: 2020-02-22 | End: 2020-02-22 | Stop reason: CLARIF

## 2020-02-22 RX ORDER — MAGNESIUM SULFATE 100 %
4 CRYSTALS MISCELLANEOUS AS NEEDED
Status: DISCONTINUED | OUTPATIENT
Start: 2020-02-22 | End: 2020-02-24 | Stop reason: HOSPADM

## 2020-02-22 RX ORDER — ATORVASTATIN CALCIUM 10 MG/1
20 TABLET, FILM COATED ORAL DAILY
Status: DISCONTINUED | OUTPATIENT
Start: 2020-02-23 | End: 2020-02-24 | Stop reason: HOSPADM

## 2020-02-22 RX ORDER — SODIUM CHLORIDE 0.9 % (FLUSH) 0.9 %
5-40 SYRINGE (ML) INJECTION AS NEEDED
Status: DISCONTINUED | OUTPATIENT
Start: 2020-02-22 | End: 2020-02-24 | Stop reason: HOSPADM

## 2020-02-22 RX ORDER — CLONIDINE HYDROCHLORIDE 0.1 MG/1
0.1 TABLET ORAL 2 TIMES DAILY
Status: DISCONTINUED | OUTPATIENT
Start: 2020-02-22 | End: 2020-02-24 | Stop reason: HOSPADM

## 2020-02-22 RX ORDER — SODIUM CHLORIDE 0.9 % (FLUSH) 0.9 %
5-40 SYRINGE (ML) INJECTION EVERY 8 HOURS
Status: DISCONTINUED | OUTPATIENT
Start: 2020-02-22 | End: 2020-02-24 | Stop reason: HOSPADM

## 2020-02-22 RX ORDER — MECLIZINE HCL 12.5 MG 12.5 MG/1
25 TABLET ORAL
Status: COMPLETED | OUTPATIENT
Start: 2020-02-22 | End: 2020-02-22

## 2020-02-22 RX ORDER — ONDANSETRON 2 MG/ML
4 INJECTION INTRAMUSCULAR; INTRAVENOUS
Status: COMPLETED | OUTPATIENT
Start: 2020-02-22 | End: 2020-02-22

## 2020-02-22 RX ORDER — ONDANSETRON 2 MG/ML
4 INJECTION INTRAMUSCULAR; INTRAVENOUS
Status: DISCONTINUED | OUTPATIENT
Start: 2020-02-22 | End: 2020-02-24 | Stop reason: HOSPADM

## 2020-02-22 RX ORDER — ACETAMINOPHEN 325 MG/1
650 TABLET ORAL
Status: DISCONTINUED | OUTPATIENT
Start: 2020-02-22 | End: 2020-02-24 | Stop reason: HOSPADM

## 2020-02-22 RX ORDER — LEVOTHYROXINE SODIUM 50 UG/1
50 TABLET ORAL
Status: DISCONTINUED | OUTPATIENT
Start: 2020-02-23 | End: 2020-02-24 | Stop reason: HOSPADM

## 2020-02-22 RX ORDER — PROCHLORPERAZINE EDISYLATE 5 MG/ML
10 INJECTION INTRAMUSCULAR; INTRAVENOUS
Status: COMPLETED | OUTPATIENT
Start: 2020-02-22 | End: 2020-02-22

## 2020-02-22 RX ORDER — INSULIN LISPRO 100 [IU]/ML
INJECTION, SOLUTION INTRAVENOUS; SUBCUTANEOUS
Status: DISCONTINUED | OUTPATIENT
Start: 2020-02-22 | End: 2020-02-24 | Stop reason: HOSPADM

## 2020-02-22 RX ADMIN — MECLIZINE 25 MG: 12.5 TABLET ORAL at 12:52

## 2020-02-22 RX ADMIN — CLONIDINE HYDROCHLORIDE 0.1 MG: 0.1 TABLET ORAL at 18:38

## 2020-02-22 RX ADMIN — SODIUM CHLORIDE 40 MG: 9 INJECTION INTRAMUSCULAR; INTRAVENOUS; SUBCUTANEOUS at 18:38

## 2020-02-22 RX ADMIN — PROCHLORPERAZINE EDISYLATE 10 MG: 5 INJECTION INTRAMUSCULAR; INTRAVENOUS at 12:52

## 2020-02-22 RX ADMIN — SODIUM CHLORIDE 125 ML/HR: 900 INJECTION, SOLUTION INTRAVENOUS at 20:54

## 2020-02-22 RX ADMIN — SODIUM CHLORIDE 125 ML/HR: 900 INJECTION, SOLUTION INTRAVENOUS at 12:52

## 2020-02-22 RX ADMIN — SODIUM CHLORIDE 1000 ML: 900 INJECTION, SOLUTION INTRAVENOUS at 11:29

## 2020-02-22 RX ADMIN — ONDANSETRON 4 MG: 2 INJECTION, SOLUTION INTRAMUSCULAR; INTRAVENOUS at 11:29

## 2020-02-22 RX ADMIN — METOPROLOL TARTRATE 10 MG: 1 INJECTION, SOLUTION INTRAVENOUS at 15:35

## 2020-02-22 RX ADMIN — AMLODIPINE BESYLATE 5 MG: 5 TABLET ORAL at 18:38

## 2020-02-22 NOTE — ED NOTES
18 Dr Glen Alvarado at bedside. Attempted to lay patient back for EKG and patient was nauseated and had x1 emesis that was clear and thick. 1626 TRANSFER - OUT REPORT:    Verbal report given to Kanu(name) on [address] [address] [address]  being transferred to (unit) for routine progression of care       Report consisted of patients Situation, Background, Assessment and   Recommendations(SBAR). Information from the following report(s) SBAR was reviewed with the receiving nurse. Lines:   Peripheral IV 02/22/20 Right Antecubital (Active)   Site Assessment Dry 2/22/2020 11:01 AM   Phlebitis Assessment 0 2/22/2020 11:01 AM   Infiltration Assessment 0 2/22/2020 11:01 AM   Dressing Status Clean 2/22/2020 11:01 AM   Dressing Type Transparent 2/22/2020 11:01 AM   Hub Color/Line Status Pink 2/22/2020 11:01 AM   Action Taken Blood drawn 2/22/2020 11:01 AM        Opportunity for questions and clarification was provided.       Patient transported with:

## 2020-02-22 NOTE — ED TRIAGE NOTES
Patient reports periods of vertigo and nausea that started yesterday with a GI bug. Patient reports 7 episodes of diarrhea and nausea yesterday. Patient reports sitting on the commode this morning and slipped off to the right due to dizziness. \"I feel sea sick\". Denies vision changes, numbness/tingling. Denies dizziness currently, reports only anterior headache.

## 2020-02-22 NOTE — H&P
History & Physical    Primary Care Provider: Martha Gray MD  Source of Information: Patient     History of Presenting Illness:   Ruben Gamez is a 69 y.o. female who presents with diarrhea and vomiting     69 y.o. female with past medical history significant for HTN, DM, and hyperlipidemia who presents from home via EMS with chief complaint of diarrhea and nausea. Pt states onset of watery  Diarrhea last night with associated of nausea and abdominal pain, cramps/sharp pain, having 7 episodes of diarrhea last night, and this morning, started having vomiting, with 2 episodes of vomiting which she describes as \"more dry heaving. \" Pt describes her headache as \"dull and intermittent. \" Pt also notes some dizziness when standing up this morning but denies it currently. Pt states she fell off of the toilet this morning around 9 am upon feeling dizzy. \" possibly passed out\". Pt denies current: focal weakness,  hematemesis, hematochezia,and dysuria. Currently no dizziness at bed. No more diarrhea since this morning. Contacted a person who is having \" stomach flu\" on Wednesday      Review of Systems:  General: + chills, poor appetite, no changes of sleep  HEENT: no headache, no vision changes, no nose discharge, no hearing changes   RES: no wheezing, no cough, no sob  CVS: no cp, no palpitation. Muscular: no joint swelling, no muscle pain, no leg swelling  Skin: no rash, no itching   GI: HPI   : no dysuria, no hematuria  Hemo: no gum bleeding, no petechial   Neuro: no sensation changes, no focal weakness , having intermittent dizziness last week with high BP per pt.    Endo: no polydipsia   Psych: denied depression     Past Medical History:   Diagnosis Date    Arthritis     LOWER SPINE    Cancer ([address]     kidney (right)    Chronic kidney disease 1996    right kidney removed - CANCER    Diabetes ([address]     She is controlling with weight loss    Diabetes mellitus ([address] 10/8/2014    NIDDM    Hepatitis C     hx hep C from blood transfusion per pt; Treated by Dr. Saman Gunn Hyperlipidemia     Hypertension     Ill-defined condition 1990s    HEPATITIS C - treated with medication    Liver disease     Personal history of renal cancer 3/2/2015    Thyroid disease 1999    Thyroidectomy    Vertigo       Past Surgical History:   Procedure Laterality Date    COLONOSCOPY N/A 6/20/2017    COLONOSCOPY performed by Melany Dhillon. Kimberlee Cox MD at [address] [place] [address] ENDOSCOPY    HX BREAST REDUCTION Bilateral 1996    HX COLONOSCOPY  3/5/12    Repeat in 5 years (Dr. Josue Bonilla)    HX GYN  1980s    tubal pregnancy    HX HEENT      WISDOM TEETH X4    HX OTHER SURGICAL  1999    thyroidectomy    HX OTHER SURGICAL      colonoscopy x 2 - ?polyps    HX OTHER SURGICAL  04/08/2019    surg for prolasp bladder at [address] [facility]  04/08/2019    prolasp bladder surg    HX RITESH AND BSO Bilateral 1995    HX TONSILLECTOMY      2or 4years old   [address]    kidney removed on right     Prior to Admission medications    Medication Sig Start Date End Date Taking? Authorizing Provider   amLODIPine (NORVASC) 5 mg tablet Take 1 Tab by mouth daily. 1/21/20   Brenna Borrego MD   metFORMIN (GLUCOPHAGE) 500 mg tablet TAKE 1 TABLET BY MOUTH TWO  TIMES DAILY WITH MEALS 11/26/19   Brenna Borrego MD   levothyroxine (SYNTHROID) 50 mcg tablet TAKE ONE TABLET BY MOUTH  ONCE DAILY BEFORE BREAKFAST 11/26/19   Brenna Borrego MD   irbesartan (AVAPRO) 300 mg tablet Take 150 mg by mouth nightly. Provider, Historical   irbesartan (AVAPRO) 150 mg tablet Take 1 Tab by mouth daily. 10/11/19   Brenna Borrego MD   Blood-Glucose Meter monitoring kit Use as directed. Check Blood glucose daily and as needed.  Dx E11.9 3/29/19   Brenna Borrego MD   atorvastatin (LIPITOR) 20 mg tablet TAKE 1 TABLET BY MOUTH  DAILY 12/18/18   Brenna Borrego MD   [facility] ULTRA BLUE TEST STRIP strip CHECK BLOOD SUGAR 2-3 TIMES A WEEK 12/18/18   Gary Maguire MD   hydrochlorothiazide (HYDRODIURIL) 25 mg tablet Take 1 Tab by mouth daily as needed. 8/4/16   Gary Maguire MD   multivitamin with iron-mineral 0.8 mg cmpk Take 1 Packet by mouth daily. Provider, Historical   lancets 30 gauge misc 1 Lancet by SubCUTAneous route daily. 3/17/16   Provider, Historical   acetaminophen (TYLENOL EXTRA STRENGTH) 500 mg tablet Take 500-1,000 mg by mouth every six (6) hours as needed for Pain. Provider, Historical   cloNIDine (CATAPRES) 0.1 mg tablet Take 0.1 mg by mouth two (2) times a day. Provider, Historical   Blood-Glucose Meter (ONE TOUCH ULTRAMINI) monitoring kit Check blood sugar daily 11/26/12   Glynn May MD     Allergies   Allergen Reactions    Codeine Hives      Family History   Problem Relation Age of Onset    Diabetes Mother     Kidney Disease Mother     Heart Disease Father     Kidney Disease Father         dialysis    Diabetes Sister     Diabetes Daughter     Hypertension Daughter     Hypertension Son     No Known Problems Son     No Known Problems Brother     Anesth Problems Neg Hx         SOCIAL HISTORY:  Patient resides:  Independently x   Assisted Living    SNF    With family care       Smoking history:   None x   Former    Chronic      Alcohol history:   None x   Social    Chronic      Ambulates:   Independently x   w/cane    w/walker    w/wc    CODE STATUS:  DNR    Full x   Other      Objective:     Physical Exam:     Visit Vitals  /84   Pulse (!) 106   Temp 98.2 °F (36.8 °C)   Resp 14   LMP  (LMP Unknown)   SpO2 97%      O2 Device: Room air    General:  Alert, cooperative, no distress, appears stated age. Head:  Normocephalic, without obvious abnormality, atraumatic. Eyes:  Conjunctivae/corneas clear. PERRL, EOMs intact. Nose: Nares normal. Septum midline. Mucosa normal. No drainage or sinus tenderness. Throat: Lips, mucosa, and tongue dry.     Neck: Supple, symmetrical, trachea midline, no adenopathy, thyroid: no enlargement/tenderness/nodules, no carotid bruit and no JVD. Back:   Symmetric, no curvature. ROM normal. No CVA tenderness. Lungs:   Clear to auscultation bilaterally. Chest wall:  No tenderness or deformity. Heart:  Regular rate and tachy S1, S2 normal, no murmur, click, rub or gallop. Abdomen:   Soft, non-tender. Bowel sounds active . No masses,  No organomegaly. Extremities: Extremities normal, atraumatic, no cyanosis or edema. Pulses: 2+ and symmetric all extremities. Skin: Skin color, texture, turgor normal. No rashes or lesions   Neurologic: CNII-XII intact. 5/5, no sensation changes                Data Review:     Recent Days:  Recent Labs     02/22/20  1100   WBC 8.9   HGB 13.5   HCT 43.1   *     Recent Labs     02/22/20  1100      K 4.8   *   CO2 22   *   BUN 23*   CREA 1.98*   CA 9.7   MG 1.8   ALB 3.6   SGOT 35   ALT 25     No results for input(s): PH, PCO2, PO2, HCO3, FIO2 in the last 72 hours. 24 Hour Results:  Recent Results (from the past 24 hour(s))   CBC WITH AUTOMATED DIFF    Collection Time: 02/22/20 11:00 AM   Result Value Ref Range    WBC 8.9 3.6 - 11.0 K/uL    RBC 5.02 3.80 - 5.20 M/uL    HGB 13.5 11.5 - 16.0 g/dL    HCT 43.1 35.0 - 47.0 %    MCV 85.9 80.0 - 99.0 FL    MCH 26.9 26.0 - 34.0 PG    MCHC 31.3 30.0 - 36.5 g/dL    RDW 15.9 (H) 11.5 - 14.5 %    PLATELET 958 (L) 963 - 400 K/uL    MPV 11.9 8.9 - 12.9 FL    NRBC 0.0 0  WBC    ABSOLUTE NRBC 0.00 0.00 - 0.01 K/uL    NEUTROPHILS 77 (H) 32 - 75 %    LYMPHOCYTES 16 12 - 49 %    MONOCYTES 7 5 - 13 %    EOSINOPHILS 0 0 - 7 %    BASOPHILS 0 0 - 1 %    IMMATURE GRANULOCYTES 0 0.0 - 0.5 %    ABS. NEUTROPHILS 6.7 1.8 - 8.0 K/UL    ABS. LYMPHOCYTES 1.4 0.8 - 3.5 K/UL    ABS. MONOCYTES 0.7 0.0 - 1.0 K/UL    ABS. EOSINOPHILS 0.0 0.0 - 0.4 K/UL    ABS. BASOPHILS 0.0 0.0 - 0.1 K/UL    ABS. IMM.  GRANS. 0.0 0.00 - 0.04 K/UL    DF AUTOMATED     METABOLIC PANEL, COMPREHENSIVE Collection Time: 02/22/20 11:00 AM   Result Value Ref Range    Sodium 140 136 - 145 mmol/L    Potassium 4.8 3.5 - 5.1 mmol/L    Chloride 112 (H) 97 - 108 mmol/L    CO2 22 21 - 32 mmol/L    Anion gap 6 5 - 15 mmol/L    Glucose 140 (H) 65 - 100 mg/dL    BUN 23 (H) 6 - 20 MG/DL    Creatinine 1.98 (H) 0.55 - 1.02 MG/DL    BUN/Creatinine ratio 12 12 - 20      GFR est AA 30 (L) >60 ml/min/1.73m2    GFR est non-AA 25 (L) >60 ml/min/1.73m2    Calcium 9.7 8.5 - 10.1 MG/DL    Bilirubin, total 0.5 0.2 - 1.0 MG/DL    ALT (SGPT) 25 12 - 78 U/L    AST (SGOT) 35 15 - 37 U/L    Alk. phosphatase 81 45 - 117 U/L    Protein, total 8.4 (H) 6.4 - 8.2 g/dL    Albumin 3.6 3.5 - 5.0 g/dL    Globulin 4.8 (H) 2.0 - 4.0 g/dL    A-G Ratio 0.8 (L) 1.1 - 2.2     SAMPLES BEING HELD    Collection Time: 02/22/20 11:00 AM   Result Value Ref Range    SAMPLES BEING HELD 1RED,1BLU     COMMENT        Add-on orders for these samples will be processed based on acceptable specimen integrity and analyte stability, which may vary by analyte.    TROPONIN I    Collection Time: 02/22/20 11:00 AM   Result Value Ref Range    Troponin-I, Qt. <0.05 <0.05 ng/mL   LIPASE    Collection Time: 02/22/20 11:00 AM   Result Value Ref Range    Lipase 157 73 - 393 U/L   MAGNESIUM    Collection Time: 02/22/20 11:00 AM   Result Value Ref Range    Magnesium 1.8 1.6 - 2.4 mg/dL   EKG, 12 LEAD, INITIAL    Collection Time: 02/22/20 11:06 AM   Result Value Ref Range    Ventricular Rate 118 BPM    Atrial Rate 118 BPM    P-R Interval 184 ms    QRS Duration 74 ms    Q-T Interval 308 ms    QTC Calculation (Bezet) 431 ms    Calculated P Axis 61 degrees    Calculated R Axis -8 degrees    Calculated T Axis 13 degrees    Diagnosis       Sinus tachycardia with premature atrial complexes  When compared with ECG of 01-APR-2019 14:00,  premature ventricular complexes are no longer present  premature atrial complexes are now present     URINALYSIS W/MICROSCOPIC    Collection Time: 02/22/20 11:30 AM   Result Value Ref Range    Color YELLOW/STRAW      Appearance CLEAR CLEAR      Specific gravity 1.019 1.003 - 1.030      pH (UA) 7.0 5.0 - 8.0      Protein >300 (A) NEG mg/dL    Glucose 100 (A) NEG mg/dL    Ketone NEGATIVE  NEG mg/dL    Bilirubin NEGATIVE  NEG      Blood SMALL (A) NEG      Urobilinogen 0.2 0.2 - 1.0 EU/dL    Nitrites NEGATIVE  NEG      Leukocyte Esterase NEGATIVE  NEG      WBC 0-4 0 - 4 /hpf    RBC 0-5 0 - 5 /hpf    Epithelial cells MODERATE (A) FEW /lpf    Bacteria NEGATIVE  NEG /hpf    Mucus TRACE (A) NEG /lpf   URINE CULTURE HOLD SAMPLE    Collection Time: 02/22/20 11:30 AM   Result Value Ref Range    Urine culture hold        Urine on hold in Microbiology dept for 2 days. If unpreserved urine is submitted, it cannot be used for addtional testing after 24 hours, recollection will be required. GLUCOSE, POC    Collection Time: 02/22/20 11:50 AM   Result Value Ref Range    Glucose (POC) 129 (H) 65 - 100 mg/dL    Performed by Angy Chew          Imaging:   Ct Head Wo Cont    Result Date: 2/22/2020  IMPRESSION: Chronic sinus disease. No acute abnormality. Assessment:     Active Problems:    Vomiting (2/22/2020)           Plan:     1. Acute diarrhea and vomiting: possible acute gastroenteritis due to viral infection. Stool for culture, check norovirus. supporitve care with IVF, IV zofran, PPI. Clear liquid and advace   2. GAGAN on ckd stage 3: ivf and monitor cr   3. Syncope/dizziness: could due to dehydration, will check orthostatic vitals after hydration. Check Echo. 4. Htn: BP high, not taking bp meds today due to vomiting. Will give one dose of iv metoprolol. Can continue norvasc and clonidine with holding parameter. Will hold HCTZ and ARBs for now due to GAGAN. Will reassess. Recently her BP is uncontrolled, will need adjust meds   5. DM: hold metformin due to GAGAN and not eating now.  SSI for now        Signed By: Brooke Pink MD     February 22, 2020

## 2020-02-22 NOTE — ED PROVIDER NOTES
69 y.o. female with past medical history significant for HTN, DM, and hyperlipidemia who presents from home via EMS with chief complaint of nausea. Pt states onset of vomiting, diarrhea, and headache to be around 9 am yesterday. Pt notes having 7 episodes of diarrhea and 2 episodes of vomiting which she describes as \"dry heaving. \" Pt describes her headache as \"dull and intermittent. \" Pt also notes some dizziness but denies it currently. Pt states she fell off of the toilet this morning upon feeling dizzy. Pt denies current: dizziness, weakness, nausea, hematemesis, hematochezia,and dysuria. Pt denies any history of a stroke. There are no other acute medical concerns at this time. Social hx: Former Smoker. Occupation: Nurse (positive sick contact). Denies current alcohol or tobacco use. PCP: Brenna Borrego MD    Note written by jose antonio Calloway, as dictated by Paz Del Rio DO 11:05 AM        The history is provided by the patient. No  was used. Past Medical History:   Diagnosis Date    Arthritis     LOWER SPINE    Cancer [facility])     kidney (right)    Chronic kidney disease 1996    right kidney removed - CANCER    Diabetes ([facility]     She is controlling with weight loss    Diabetes mellitus ([facility] [address] [facility] 10/8/2014    NIDDM    Hepatitis C     hx hep C from blood transfusion per pt; Treated by Dr. Saman Gunn Hyperlipidemia     Hypertension     Ill-defined condition 1990s    HEPATITIS C - treated with medication    Liver disease     Personal history of renal cancer 3/2/2015    Thyroid disease 1999    Thyroidectomy    Vertigo        Past Surgical History:   Procedure Laterality Date    COLONOSCOPY N/A 6/20/2017    COLONOSCOPY performed by Melany Dhillon.  Kimberlee Cox MD at [facility] ENDOSCOPY    HX BREAST REDUCTION Bilateral 1996    HX COLONOSCOPY  3/5/12    Repeat in 5 years (Dr. Josue Bonilla)    HX GYN  1980s    tubal pregnancy    HX HEENT      WISDOM TEETH X4    [facility] thyroidectomy    HX OTHER SURGICAL      colonoscopy x 2 - ?polyps    HX OTHER SURGICAL  2019    surg for prolasp bladder at [address]  2019    prolasp bladder surg    HX RITESH AND BSO Bilateral     HX TONSILLECTOMY      2or 4years old    HX [address]    kidney removed on right         Family History:   Problem Relation Age of Onset    Diabetes Mother     Kidney Disease Mother     Heart Disease Father     Kidney Disease Father         dialysis    Diabetes Sister     Diabetes Daughter     Hypertension Daughter     Hypertension Son     No Known Problems Son     No Known Problems Brother     Anesth Problems Neg Hx        Social History     Socioeconomic History    Marital status:      Spouse name: Not on file    Number of children: Not on file    Years of education: Not on file    Highest education level: Not on file   Occupational History    Not on file   Social Needs    Financial resource strain: Not on file    Food insecurity:     Worry: Not on file     Inability: Not on file    Transportation needs:     Medical: Not on file     Non-medical: Not on file   Tobacco Use    Smoking status: Former Smoker     Packs/day: 0.25     Years: 8.00     Pack years: 2.00     Last attempt to quit: 1992     Years since quittin.9    Smokeless tobacco: Never Used    Tobacco comment: quit smoking 30-40 yrs ago   Substance and Sexual Activity    Alcohol use: No    Drug use: No    Sexual activity: Yes     Partners: Male   Lifestyle    Physical activity:     Days per week: Not on file     Minutes per session: Not on file    Stress: Not on file   Relationships    Social connections:     Talks on phone: Not on file     Gets together: Not on file     Attends Tenriism service: Not on file     Active member of club or organization: Not on file     Attends meetings of clubs or organizations: Not on file     Relationship status: Not on file   [address] [place] [address] Intimate partner violence:     Fear of current or ex partner: Not on file     Emotionally abused: Not on file     Physically abused: Not on file     Forced sexual activity: Not on file   Other Topics Concern    Not on file   Social History Narrative    Not on file         ALLERGIES: Codeine    Review of Systems   Gastrointestinal: Positive for diarrhea (7 episodes), nausea and vomiting (2 episodes \"dry heaving\"). Negative for abdominal pain and blood in stool. Genitourinary: Negative for dysuria and hematuria. Neurological: Positive for dizziness ( resolved) and headaches (resolved  - slow onset yesterday ). All other systems reviewed and are negative. Vitals:    02/22/20 1037   BP: (!) 186/97   Pulse: 87   Resp: 16   Temp: 98.2 °F (36.8 °C)   SpO2: 96%            Physical Exam         Constitutional: Pt is awake and alert. Pt appears well-developed and well-nourished. NAD. HENT:   Head: Normocephalic and atraumatic. Ears: Right TM normal. Small amount of fluid behind left TM. Nose: Nose normal.   Mouth/Throat: Oropharynx is clear and moist. No oropharyngeal exudate. Eyes: Conjunctivae and extraocular motions are normal. Pupils are equal, round, and reactive to light. Right eye exhibits no discharge. Left eye exhibits no discharge. No scleral icterus. Neck: No tracheal deviation present. Supple neck. Cardiovascular: Tachycardic rate, regular rhythm, normal heart sounds and intact distal pulses. Exam reveals no gallop and no friction rub. No murmur heard. Pulmonary/Chest: Effort normal and breath sounds normal.  Pt  has no wheezes. Pt  has no rales. Abdominal: Soft. Pt  exhibits no distension and no mass. No tenderness. Pt  has no rebound and no guarding. Musculoskeletal:  Pt  exhibits no edema and no tenderness. Ext: Normal ROM in all four extremities; not tender to palpation; distal pulses are normal, no edema. Neurological:  Pt is alert.   nonfocal neuro exam. No motor or sensory deficit. Symmetric face. Skin: Skin is warm and dry. Pt  is not diaphoretic. Psychiatric:  Pt  has a normal mood and affect. Behavior is normal.     Note written by jose antonio Horn, as dictated by Osito Patterson DO 11:05 AM    MDM       Procedures    ED EKG interpretation:  Rhythm: sinus tachycardia; and regular . Rate (approx.): 118; Axis: normal; ST/T wave: no acute changes noted. PACs. Note written by jose antonio Horn, as dictated by Osito Patterson DO 11:06 AM      PROGRESS NOTE:  2:48 PM  Pt re-evaluated: pt is feeling the same. Family does not think that she looks well. Every time she looks at the towel dispenser, she states that it moves. Hospitalist Melody Serve for Admission  2:50 PM    ED Room Number: ER22/22  Patient Name and age:  Torrey Ravi 69 y.o.  female  Working Diagnosis:   1. Disequilibrium    2. Dehydration      Readmission: no  Isolation Requirements:  no  Recommended Level of Care:  telemetry  Code Status:  Full Code  Department:[facility] Adult ED - 21   Other:  CT H neg  Dehydrated  Vomiting      Recent Results (from the past 12 hour(s))   CBC WITH AUTOMATED DIFF    Collection Time: 02/22/20 11:00 AM   Result Value Ref Range    WBC 8.9 3.6 - 11.0 K/uL    RBC 5.02 3.80 - 5.20 M/uL    HGB 13.5 11.5 - 16.0 g/dL    HCT 43.1 35.0 - 47.0 %    MCV 85.9 80.0 - 99.0 FL    MCH 26.9 26.0 - 34.0 PG    MCHC 31.3 30.0 - 36.5 g/dL    RDW 15.9 (H) 11.5 - 14.5 %    PLATELET 307 (L) 354 - 400 K/uL    MPV 11.9 8.9 - 12.9 FL    NRBC 0.0 0  WBC    ABSOLUTE NRBC 0.00 0.00 - 0.01 K/uL    NEUTROPHILS 77 (H) 32 - 75 %    LYMPHOCYTES 16 12 - 49 %    MONOCYTES 7 5 - 13 %    EOSINOPHILS 0 0 - 7 %    BASOPHILS 0 0 - 1 %    IMMATURE GRANULOCYTES 0 0.0 - 0.5 %    ABS. NEUTROPHILS 6.7 1.8 - 8.0 K/UL    ABS. LYMPHOCYTES 1.4 0.8 - 3.5 K/UL    ABS. MONOCYTES 0.7 0.0 - 1.0 K/UL    ABS. EOSINOPHILS 0.0 0.0 - 0.4 K/UL    ABS. BASOPHILS 0.0 0.0 - 0.1 K/UL    ABS. IMM. Radhika Phillips. 0.0 0.00 - 0.04 K/UL    DF AUTOMATED     METABOLIC PANEL, COMPREHENSIVE    Collection Time: 02/22/20 11:00 AM   Result Value Ref Range    Sodium 140 136 - 145 mmol/L    Potassium 4.8 3.5 - 5.1 mmol/L    Chloride 112 (H) 97 - 108 mmol/L    CO2 22 21 - 32 mmol/L    Anion gap 6 5 - 15 mmol/L    Glucose 140 (H) 65 - 100 mg/dL    BUN 23 (H) 6 - 20 MG/DL    Creatinine 1.98 (H) 0.55 - 1.02 MG/DL    BUN/Creatinine ratio 12 12 - 20      GFR est AA 30 (L) >60 ml/min/1.73m2    GFR est non-AA 25 (L) >60 ml/min/1.73m2    Calcium 9.7 8.5 - 10.1 MG/DL    Bilirubin, total 0.5 0.2 - 1.0 MG/DL    ALT (SGPT) 25 12 - 78 U/L    AST (SGOT) 35 15 - 37 U/L    Alk. phosphatase 81 45 - 117 U/L    Protein, total 8.4 (H) 6.4 - 8.2 g/dL    Albumin 3.6 3.5 - 5.0 g/dL    Globulin 4.8 (H) 2.0 - 4.0 g/dL    A-G Ratio 0.8 (L) 1.1 - 2.2     SAMPLES BEING HELD    Collection Time: 02/22/20 11:00 AM   Result Value Ref Range    SAMPLES BEING HELD 1RED,1BLU     COMMENT        Add-on orders for these samples will be processed based on acceptable specimen integrity and analyte stability, which may vary by analyte.    TROPONIN I    Collection Time: 02/22/20 11:00 AM   Result Value Ref Range    Troponin-I, Qt. <0.05 <0.05 ng/mL   LIPASE    Collection Time: 02/22/20 11:00 AM   Result Value Ref Range    Lipase 157 73 - 393 U/L   MAGNESIUM    Collection Time: 02/22/20 11:00 AM   Result Value Ref Range    Magnesium 1.8 1.6 - 2.4 mg/dL   EKG, 12 LEAD, INITIAL    Collection Time: 02/22/20 11:06 AM   Result Value Ref Range    Ventricular Rate 118 BPM    Atrial Rate 118 BPM    P-R Interval 184 ms    QRS Duration 74 ms    Q-T Interval 308 ms    QTC Calculation (Bezet) 431 ms    Calculated P Axis 61 degrees    Calculated R Axis -8 degrees    Calculated T Axis 13 degrees    Diagnosis       Sinus tachycardia with premature atrial complexes  When compared with ECG of 01-APR-2019 14:00,  premature ventricular complexes are no longer present  premature atrial complexes are now present     URINALYSIS W/MICROSCOPIC    Collection Time: 02/22/20 11:30 AM   Result Value Ref Range    Color YELLOW/STRAW      Appearance CLEAR CLEAR      Specific gravity 1.019 1.003 - 1.030      pH (UA) 7.0 5.0 - 8.0      Protein >300 (A) NEG mg/dL    Glucose 100 (A) NEG mg/dL    Ketone NEGATIVE  NEG mg/dL    Bilirubin NEGATIVE  NEG      Blood SMALL (A) NEG      Urobilinogen 0.2 0.2 - 1.0 EU/dL    Nitrites NEGATIVE  NEG      Leukocyte Esterase NEGATIVE  NEG      WBC 0-4 0 - 4 /hpf    RBC 0-5 0 - 5 /hpf    Epithelial cells MODERATE (A) FEW /lpf    Bacteria NEGATIVE  NEG /hpf    Mucus TRACE (A) NEG /lpf   URINE CULTURE HOLD SAMPLE    Collection Time: 02/22/20 11:30 AM   Result Value Ref Range    Urine culture hold        Urine on hold in Microbiology dept for 2 days. If unpreserved urine is submitted, it cannot be used for addtional testing after 24 hours, recollection will be required.    GLUCOSE, POC    Collection Time: 02/22/20 11:50 AM   Result Value Ref Range    Glucose (POC) 129 (H) 65 - 100 mg/dL    Performed by Zahida Alba

## 2020-02-22 NOTE — PROGRESS NOTES
Admission Medication Reconciliation:    Information obtained from:  Patient  RxQuery data available¹:  YES    Comments/Recommendations: Spoke with patient regarding use of PTA medications including prescription/OTC, vitamins/supplements, inhaled, topical, nasal, injectable, otic and ophthalmic medications. Patient was a fairly good historian. Updated PTA meds/reviewed patient's allergies. Medication changes (since last review): Added  - None    Adjusted  - Multivitamin (from packet to tablet)    Removed  - Irbesartan (duplicate entry)  - Blood glucose meter, test strips, lancets     ¹RxQuery pharmacy benefit data reflects medications filled and processed through the patient's insurance, however   this data does NOT capture whether the medication was picked up or is currently being taken by the patient. Allergies:  Codeine    Significant PMH/Disease States:   Past Medical History:   Diagnosis Date    Arthritis     LOWER SPINE    Cancer ([facility] [address]     kidney (right)    Chronic kidney disease 1996    right kidney removed - CANCER    Diabetes ([facility] [address]     She is controlling with weight loss    Diabetes mellitus ([facility] [address] 10/8/2014    NIDDM    Hepatitis C     hx hep C from blood transfusion per pt; Treated by Dr. Klaus Cristina    Hyperlipidemia     Hypertension     Ill-defined condition 1990s    HEPATITIS C - treated with medication    Liver disease     Personal history of renal cancer 3/2/2015    Thyroid disease 1999    Thyroidectomy    Vertigo      Chief Complaint for this Admission:    Chief Complaint   Patient presents with    Dizziness    Nausea     Prior to Admission Medications:   Prior to Admission Medications   Prescriptions Last Dose Informant Taking?   acetaminophen (TYLENOL EXTRA STRENGTH) 500 mg tablet   Yes   Sig: Take 500-1,000 mg by mouth every six (6) hours as needed for Pain. amLODIPine (NORVASC) 5 mg tablet 2/21/2020 at Unknown time  Yes   Sig: Take 1 Tab by mouth daily.    atorvastatin (LIPITOR) 20 mg tablet 2/21/2020 at Unknown time  Yes   Sig: TAKE 1 TABLET BY MOUTH  DAILY   cloNIDine (CATAPRES) 0.1 mg tablet 2/21/2020 at Unknown time  Yes   Sig: Take 0.1 mg by mouth two (2) times a day. hydrochlorothiazide (HYDRODIURIL) 25 mg tablet   Yes   Sig: Take 1 Tab by mouth daily as needed. irbesartan (AVAPRO) 150 mg tablet 2/21/2020 at Unknown time  Yes   Sig: Take 1 Tab by mouth daily. levothyroxine (SYNTHROID) 50 mcg tablet 2/21/2020 at Unknown time  Yes   Sig: TAKE ONE TABLET BY MOUTH  ONCE DAILY BEFORE BREAKFAST   metFORMIN (GLUCOPHAGE) 500 mg tablet 2/21/2020 at Unknown time  Yes   Sig: TAKE 1 TABLET BY MOUTH TWO  TIMES DAILY WITH MEALS   therapeutic multivitamin (THERAGRAN) tablet 2/21/2020 at Unknown time  Yes   Sig: Take 1 Tab by mouth daily. Facility-Administered Medications: None       Please contact the main inpatient pharmacy with any questions or concerns at [phone] and we will direct you to the clinical pharmacist covering this patient's care while in-house.    CORDELIA Cardona

## 2020-02-23 ENCOUNTER — APPOINTMENT (OUTPATIENT)
Dept: NON INVASIVE DIAGNOSTICS | Age: 75
DRG: 392 | End: 2020-02-23
Attending: HOSPITALIST
Payer: MEDICARE

## 2020-02-23 LAB
ALBUMIN SERPL-MCNC: 2.7 G/DL (ref 3.5–5)
ALBUMIN/GLOB SERPL: 0.8 {RATIO} (ref 1.1–2.2)
ALP SERPL-CCNC: 61 U/L (ref 45–117)
ALT SERPL-CCNC: 17 U/L (ref 12–78)
ANION GAP SERPL CALC-SCNC: 6 MMOL/L (ref 5–15)
AST SERPL-CCNC: 13 U/L (ref 15–37)
ATRIAL RATE: 118 BPM
AV VELOCITY RATIO: 0.75
BASOPHILS # BLD: 0 K/UL (ref 0–0.1)
BASOPHILS NFR BLD: 0 % (ref 0–1)
BILIRUB SERPL-MCNC: 0.3 MG/DL (ref 0.2–1)
BUN SERPL-MCNC: 18 MG/DL (ref 6–20)
BUN/CREAT SERPL: 10 (ref 12–20)
CALCIUM SERPL-MCNC: 8.2 MG/DL (ref 8.5–10.1)
CALCULATED P AXIS, ECG09: 61 DEGREES
CALCULATED R AXIS, ECG10: -8 DEGREES
CALCULATED T AXIS, ECG11: 13 DEGREES
CHLORIDE SERPL-SCNC: 116 MMOL/L (ref 97–108)
CHOLEST SERPL-MCNC: 150 MG/DL
CO2 SERPL-SCNC: 21 MMOL/L (ref 21–32)
CREAT SERPL-MCNC: 1.73 MG/DL (ref 0.55–1.02)
DIAGNOSIS, 93000: NORMAL
DIFFERENTIAL METHOD BLD: ABNORMAL
ECHO AO ROOT DIAM: 3.31 CM
ECHO AV AREA PEAK VELOCITY: 2.1 CM2
ECHO AV PEAK GRADIENT: 8.9 MMHG
ECHO AV PEAK VELOCITY: 149.24 CM/S
ECHO LA AREA 4C: 15.4 CM2
ECHO LA MAJOR AXIS: 3.09 CM
ECHO LA TO AORTIC ROOT RATIO: 0.93
ECHO LA VOL 2C: 38.54 ML (ref 22–52)
ECHO LA VOL 4C: 39.94 ML (ref 22–52)
ECHO LA VOL BP: 40.98 ML (ref 22–52)
ECHO LV E' LATERAL VELOCITY: 5.99 CM/S
ECHO LV E' SEPTAL VELOCITY: 4.33 CM/S
ECHO LV INTERNAL DIMENSION DIASTOLIC: 3.9 CM (ref 3.9–5.3)
ECHO LV INTERNAL DIMENSION SYSTOLIC: 2.68 CM
ECHO LV IVSD: 1.23 CM (ref 0.6–0.9)
ECHO LV MASS 2D: 181.2 G (ref 67–162)
ECHO LV POSTERIOR WALL DIASTOLIC: 1.14 CM (ref 0.6–0.9)
ECHO LVOT DIAM: 1.89 CM
ECHO LVOT PEAK GRADIENT: 5.1 MMHG
ECHO LVOT PEAK VELOCITY: 112.37 CM/S
ECHO MV A VELOCITY: 74.42 CM/S
ECHO MV AREA PHT: 4.3 CM2
ECHO MV E DECELERATION TIME (DT): 178.1 MS
ECHO MV E VELOCITY: 58.75 CM/S
ECHO MV E/A RATIO: 0.79
ECHO MV E/E' LATERAL: 9.81
ECHO MV E/E' RATIO (AVERAGED): 11.69
ECHO MV E/E' SEPTAL: 13.57
ECHO MV PRESSURE HALF TIME (PHT): 51.6 MS
ECHO PULMONARY ARTERY SYSTOLIC PRESSURE (PASP): 21 MMHG
ECHO PV MAX VELOCITY: 92.74 CM/S
ECHO PV PEAK GRADIENT: 3.4 MMHG
ECHO RV INTERNAL DIMENSION: 3.38 CM
ECHO RV TAPSE: 1.55 CM (ref 1.5–2)
ECHO TV REGURGITANT MAX VELOCITY: 230.65 CM/S
ECHO TV REGURGITANT PEAK GRADIENT: 21.3 MMHG
EOSINOPHIL # BLD: 0.1 K/UL (ref 0–0.4)
EOSINOPHIL NFR BLD: 1 % (ref 0–7)
ERYTHROCYTE [DISTWIDTH] IN BLOOD BY AUTOMATED COUNT: 15.8 % (ref 11.5–14.5)
EST. AVERAGE GLUCOSE BLD GHB EST-MCNC: 148 MG/DL
GLOBULIN SER CALC-MCNC: 3.4 G/DL (ref 2–4)
GLUCOSE BLD STRIP.AUTO-MCNC: 115 MG/DL (ref 65–100)
GLUCOSE BLD STRIP.AUTO-MCNC: 127 MG/DL (ref 65–100)
GLUCOSE BLD STRIP.AUTO-MCNC: 92 MG/DL (ref 65–100)
GLUCOSE BLD STRIP.AUTO-MCNC: 99 MG/DL (ref 65–100)
GLUCOSE SERPL-MCNC: 88 MG/DL (ref 65–100)
HBA1C MFR BLD: 6.8 % (ref 4–5.6)
HCT VFR BLD AUTO: 37.4 % (ref 35–47)
HDLC SERPL-MCNC: 65 MG/DL
HDLC SERPL: 2.3 {RATIO} (ref 0–5)
HGB BLD-MCNC: 11.5 G/DL (ref 11.5–16)
IMM GRANULOCYTES # BLD AUTO: 0 K/UL (ref 0–0.04)
IMM GRANULOCYTES NFR BLD AUTO: 0 % (ref 0–0.5)
LDLC SERPL CALC-MCNC: 62.6 MG/DL (ref 0–100)
LIPID PROFILE,FLP: NORMAL
LVFS 2D: 31.22 %
LYMPHOCYTES # BLD: 2.3 K/UL (ref 0.8–3.5)
LYMPHOCYTES NFR BLD: 34 % (ref 12–49)
MAGNESIUM SERPL-MCNC: 1.7 MG/DL (ref 1.6–2.4)
MCH RBC QN AUTO: 26.5 PG (ref 26–34)
MCHC RBC AUTO-ENTMCNC: 30.7 G/DL (ref 30–36.5)
MCV RBC AUTO: 86.2 FL (ref 80–99)
MONOCYTES # BLD: 0.7 K/UL (ref 0–1)
MONOCYTES NFR BLD: 10 % (ref 5–13)
MV DEC SLOPE: 3.3
NEUTS SEG # BLD: 3.7 K/UL (ref 1.8–8)
NEUTS SEG NFR BLD: 55 % (ref 32–75)
NRBC # BLD: 0 K/UL (ref 0–0.01)
NRBC BLD-RTO: 0 PER 100 WBC
P-R INTERVAL, ECG05: 184 MS
PHOSPHATE SERPL-MCNC: 3.1 MG/DL (ref 2.6–4.7)
PLATELET # BLD AUTO: 197 K/UL (ref 150–400)
PMV BLD AUTO: 10.6 FL (ref 8.9–12.9)
POTASSIUM SERPL-SCNC: 3.6 MMOL/L (ref 3.5–5.1)
PROT SERPL-MCNC: 6.1 G/DL (ref 6.4–8.2)
Q-T INTERVAL, ECG07: 308 MS
QRS DURATION, ECG06: 74 MS
QTC CALCULATION (BEZET), ECG08: 431 MS
RBC # BLD AUTO: 4.34 M/UL (ref 3.8–5.2)
SERVICE CMNT-IMP: ABNORMAL
SERVICE CMNT-IMP: ABNORMAL
SERVICE CMNT-IMP: NORMAL
SERVICE CMNT-IMP: NORMAL
SODIUM SERPL-SCNC: 143 MMOL/L (ref 136–145)
TRIGL SERPL-MCNC: 112 MG/DL (ref ?–150)
VENTRICULAR RATE, ECG03: 118 BPM
VLDLC SERPL CALC-MCNC: 22.4 MG/DL
WBC # BLD AUTO: 6.8 K/UL (ref 3.6–11)

## 2020-02-23 PROCEDURE — C9113 INJ PANTOPRAZOLE SODIUM, VIA: HCPCS | Performed by: HOSPITALIST

## 2020-02-23 PROCEDURE — 80061 LIPID PANEL: CPT

## 2020-02-23 PROCEDURE — 83036 HEMOGLOBIN GLYCOSYLATED A1C: CPT

## 2020-02-23 PROCEDURE — 80053 COMPREHEN METABOLIC PANEL: CPT

## 2020-02-23 PROCEDURE — 74011250637 HC RX REV CODE- 250/637: Performed by: HOSPITALIST

## 2020-02-23 PROCEDURE — 74011250636 HC RX REV CODE- 250/636: Performed by: HOSPITALIST

## 2020-02-23 PROCEDURE — 83735 ASSAY OF MAGNESIUM: CPT

## 2020-02-23 PROCEDURE — 93306 TTE W/DOPPLER COMPLETE: CPT

## 2020-02-23 PROCEDURE — 82962 GLUCOSE BLOOD TEST: CPT

## 2020-02-23 PROCEDURE — 36415 COLL VENOUS BLD VENIPUNCTURE: CPT

## 2020-02-23 PROCEDURE — 74011000250 HC RX REV CODE- 250: Performed by: HOSPITALIST

## 2020-02-23 PROCEDURE — 85025 COMPLETE CBC W/AUTO DIFF WBC: CPT

## 2020-02-23 PROCEDURE — 84100 ASSAY OF PHOSPHORUS: CPT

## 2020-02-23 PROCEDURE — 65660000000 HC RM CCU STEPDOWN

## 2020-02-23 RX ORDER — HEPARIN SODIUM 5000 [USP'U]/ML
5000 INJECTION, SOLUTION INTRAVENOUS; SUBCUTANEOUS EVERY 12 HOURS
Status: DISCONTINUED | OUTPATIENT
Start: 2020-02-23 | End: 2020-02-24 | Stop reason: HOSPADM

## 2020-02-23 RX ORDER — AMLODIPINE BESYLATE 5 MG/1
5 TABLET ORAL ONCE
Status: COMPLETED | OUTPATIENT
Start: 2020-02-23 | End: 2020-02-23

## 2020-02-23 RX ADMIN — LEVOTHYROXINE SODIUM 50 MCG: 0.05 TABLET ORAL at 06:51

## 2020-02-23 RX ADMIN — Medication 10 ML: at 05:55

## 2020-02-23 RX ADMIN — CLONIDINE HYDROCHLORIDE 0.1 MG: 0.1 TABLET ORAL at 09:06

## 2020-02-23 RX ADMIN — AMLODIPINE BESYLATE 5 MG: 5 TABLET ORAL at 16:48

## 2020-02-23 RX ADMIN — AMLODIPINE BESYLATE 5 MG: 5 TABLET ORAL at 09:05

## 2020-02-23 RX ADMIN — ACETAMINOPHEN 650 MG: 325 TABLET ORAL at 04:10

## 2020-02-23 RX ADMIN — ATORVASTATIN CALCIUM 20 MG: 10 TABLET, FILM COATED ORAL at 09:05

## 2020-02-23 RX ADMIN — HEPARIN SODIUM 5000 UNITS: 5000 INJECTION INTRAVENOUS; SUBCUTANEOUS at 18:48

## 2020-02-23 RX ADMIN — SODIUM CHLORIDE 125 ML/HR: 900 INJECTION, SOLUTION INTRAVENOUS at 04:08

## 2020-02-23 RX ADMIN — SODIUM CHLORIDE 40 MG: 9 INJECTION INTRAMUSCULAR; INTRAVENOUS; SUBCUTANEOUS at 09:05

## 2020-02-23 RX ADMIN — CLONIDINE HYDROCHLORIDE 0.1 MG: 0.1 TABLET ORAL at 17:42

## 2020-02-23 NOTE — PROGRESS NOTES
Bedside and Verbal shift change report given to AMOR Mcrae (oncoming nurse) by Maria T Alberts (offgoing nurse). Report included the following information SBAR and Kardex.

## 2020-02-23 NOTE — PROGRESS NOTES
[facility]  Hospitalist Group                                                                                          Hospitalist Progress Note  Godfrey Ceron MD  Answering service: [phone] from in house phone        Date of Service:  2020  NAME:  Guillermo Silva  :  1945  MRN:  [id number]      Admission Summary:   69 y.o. female with past medical history significant for HTN, DM, and hyperlipidemia who presents from home via EMS with chief complaint of diarrhea and nausea    Interval history / Subjective:    Patient seen and examined    States that she feels dizzy/lightheaded when she walks. Did not have any diarrhea or vomiting today. 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 -no more diarrhea - so stool studies could no be done  -Will advance to GI lite diet and monitor  -Decrease IVF rate. #HTN:  -on amlodipine and clonidine. Irbesartan and HCTZ held due to gastroenteritis. -will give additional 5 mg amlodipine. # Dizziness/lightheadeness -related to dehydration. Echo showed normal EF,Left ventricular diastolic dysfunction. # CKD 3 :  -mild elevation in cr at admission likely due to dehydration  -cr trending down    #Diabetes:  -on metformin at home,held now. Blood sugars wnl now    Code status:full  DVT prophylaxis: heparin    Care Plan discussed with: patient shelbytona  Anticipated Disposition: Home w/Family  Anticipated Discharge: 24 hours to 48 hours     Hospital Problems  Date Reviewed: 2019          Codes Class Noted POA    Vomiting ICD-10-CM: R11.10  ICD-9-CM: 787.03  2020 Unknown                Review of Systems:   As per HPI      Vital Signs:    Last 24hrs VS reviewed since prior progress note.  Most recent are:  Visit Vitals  /82 (BP 1 Location: Left arm, BP Patient Position: At rest)   Pulse 91   Temp 98.1 °F (36.7 °C)   Resp 16   SpO2 97%   Breastfeeding No         Intake/Output Summary (Last 24 hours) at 2/23/2020 1753  Last data filed at 2/23/2020 1229  Gross per 24 hour   Intake --   Output 1050 ml   Net -1050 ml        Physical Examination:             Constitutional:  No acute distress, cooperative, pleasant    ENT:  Oral mucosa moist, oropharynx benign,EOMI   Resp:  CTA bilaterally. No wheezing/rhonchi/rales. No accessory muscle use   CV:  Regular rhythm, normal rate    GI:  Soft, non distended, non tender, normoactive bowel sounds    Musculoskeletal:  No edema, warm    Neurologic:  Moves all extremities. AAOx3, CN II-XII reviewed     Psych:  Good insight, Not anxious nor agitated. Skin:  Good turgor, no rashes or ulcers       Data Review:    Review and/or order of clinical lab test  Review and/or order of tests in the medicine section of [facility]      Labs:     Recent Labs     02/23/20  0401 02/22/20  1100   WBC 6.8 8.9   HGB 11.5 13.5   HCT 37.4 43.1    141*     Recent Labs     02/23/20  0401 02/22/20  1100    140   K 3.6 4.8   * 112*   CO2 21 22   BUN 18 23*   CREA 1.73* 1.98*   GLU 88 140*   CA 8.2* 9.7   MG 1.7 1.8   PHOS 3.1  --      Recent Labs     02/23/20  0401 02/22/20  1100   SGOT 13* 35   ALT 17 25   AP 61 81   TBILI 0.3 0.5   TP 6.1* 8.4*   ALB 2.7* 3.6   GLOB 3.4 4.8*   LPSE  --  157     No results for input(s): INR, PTP, APTT, INREXT in the last 72 hours. No results for input(s): FE, TIBC, PSAT, FERR in the last 72 hours. No results found for: FOL, RBCF   No results for input(s): PH, PCO2, PO2 in the last 72 hours.   Recent Labs     02/22/20  1100   TROIQ <0.05     Lab Results   Component Value Date/Time    Cholesterol, total 150 02/23/2020 04:01 AM    HDL Cholesterol 65 02/23/2020 04:01 AM    LDL, calculated 62.6 02/23/2020 04:01 AM    Triglyceride 112 02/23/2020 04:01 AM    CHOL/HDL Ratio 2.3 02/23/2020 04:01 AM     Lab Results   Component Value Date/Time Glucose (POC) 115 (H) 02/23/2020 04:12 PM    Glucose (POC) 92 02/23/2020 11:10 AM    Glucose (POC) 99 02/23/2020 06:21 AM    Glucose (POC) 108 (H) 02/22/2020 09:27 PM    Glucose (POC) 86 02/22/2020 05:59 PM     Lab Results   Component Value Date/Time    Color YELLOW/STRAW 02/22/2020 11:30 AM    Appearance CLEAR 02/22/2020 11:30 AM    Specific gravity 1.019 02/22/2020 11:30 AM    pH (UA) 7.0 02/22/2020 11:30 AM    Protein >300 (A) 02/22/2020 11:30 AM    Glucose 100 (A) 02/22/2020 11:30 AM    Ketone NEGATIVE  02/22/2020 11:30 AM    Bilirubin NEGATIVE  02/22/2020 11:30 AM    Urobilinogen 0.2 02/22/2020 11:30 AM    Nitrites NEGATIVE  02/22/2020 11:30 AM    Leukocyte Esterase NEGATIVE  02/22/2020 11:30 AM    Epithelial cells MODERATE (A) 02/22/2020 11:30 AM    Bacteria NEGATIVE  02/22/2020 11:30 AM    WBC 0-4 02/22/2020 11:30 AM    RBC 0-5 02/22/2020 11:30 AM         Medications Reviewed:     Current Facility-Administered Medications   Medication Dose Route Frequency    0.9% sodium chloride infusion  50 mL/hr IntraVENous CONTINUOUS    amLODIPine (NORVASC) tablet 5 mg  5 mg Oral DAILY    atorvastatin (LIPITOR) tablet 20 mg  20 mg Oral DAILY    cloNIDine HCL (CATAPRES) tablet 0.1 mg  0.1 mg Oral BID    levothyroxine (SYNTHROID) tablet 50 mcg  50 mcg Oral 6am    glucose chewable tablet 16 g  4 Tab Oral PRN    glucagon (GLUCAGEN) injection 1 mg  1 mg IntraMUSCular PRN    sodium chloride (NS) flush 5-40 mL  5-40 mL IntraVENous Q8H    sodium chloride (NS) flush 5-40 mL  5-40 mL IntraVENous PRN    naloxone (NARCAN) injection 0.4 mg  0.4 mg IntraVENous PRN    dextrose 10% infusion 0-250 mL  0-250 mL IntraVENous PRN    insulin lispro (HUMALOG) injection   SubCUTAneous AC&HS    acetaminophen (TYLENOL) tablet 650 mg  650 mg Oral Q4H PRN    ondansetron (ZOFRAN) injection 4 mg  4 mg IntraVENous Q4H PRN    pantoprazole (PROTONIX) 40 mg in 0.9% sodium chloride 10 mL injection  40 mg IntraVENous DAILY ______________________________________________________________________  EXPECTED LENGTH OF STAY: - - -  ACTUAL LENGTH OF STAY:          1                 Chloe Lechuga MD

## 2020-02-23 NOTE — PROGRESS NOTES
Bedside shift change report given to Wing Marga RN (oncoming nurse) by Sylvester Salgado RN (offgoing nurse). Report included the following information SBAR, Kardex, Intake/Output, MAR and Recent Results.

## 2020-02-23 NOTE — PROGRESS NOTES
Primary Nurse Araceli Menchaca and Autry Gottron, RN performed a dual skin assessment on this patient No impairment noted  Ford score is 21

## 2020-02-24 VITALS
TEMPERATURE: 97.5 F | SYSTOLIC BLOOD PRESSURE: 164 MMHG | HEART RATE: 94 BPM | DIASTOLIC BLOOD PRESSURE: 97 MMHG | RESPIRATION RATE: 16 BRPM | OXYGEN SATURATION: 94 %

## 2020-02-24 LAB
ANION GAP SERPL CALC-SCNC: 7 MMOL/L (ref 5–15)
BUN SERPL-MCNC: 19 MG/DL (ref 6–20)
BUN/CREAT SERPL: 11 (ref 12–20)
CALCIUM SERPL-MCNC: 8.4 MG/DL (ref 8.5–10.1)
CHLORIDE SERPL-SCNC: 114 MMOL/L (ref 97–108)
CO2 SERPL-SCNC: 23 MMOL/L (ref 21–32)
CREAT SERPL-MCNC: 1.74 MG/DL (ref 0.55–1.02)
GLUCOSE BLD STRIP.AUTO-MCNC: 121 MG/DL (ref 65–100)
GLUCOSE BLD STRIP.AUTO-MCNC: 88 MG/DL (ref 65–100)
GLUCOSE SERPL-MCNC: 114 MG/DL (ref 65–100)
POTASSIUM SERPL-SCNC: 3.5 MMOL/L (ref 3.5–5.1)
SERVICE CMNT-IMP: ABNORMAL
SERVICE CMNT-IMP: NORMAL
SODIUM SERPL-SCNC: 144 MMOL/L (ref 136–145)

## 2020-02-24 PROCEDURE — 82962 GLUCOSE BLOOD TEST: CPT

## 2020-02-24 PROCEDURE — 74011250636 HC RX REV CODE- 250/636: Performed by: HOSPITALIST

## 2020-02-24 PROCEDURE — 36415 COLL VENOUS BLD VENIPUNCTURE: CPT

## 2020-02-24 PROCEDURE — 87798 DETECT AGENT NOS DNA AMP: CPT

## 2020-02-24 PROCEDURE — 87506 IADNA-DNA/RNA PROBE TQ 6-11: CPT

## 2020-02-24 PROCEDURE — 80048 BASIC METABOLIC PNL TOTAL CA: CPT

## 2020-02-24 PROCEDURE — 74011250637 HC RX REV CODE- 250/637: Performed by: HOSPITALIST

## 2020-02-24 RX ADMIN — Medication 10 ML: at 06:49

## 2020-02-24 RX ADMIN — SODIUM CHLORIDE 50 ML/HR: 900 INJECTION, SOLUTION INTRAVENOUS at 00:56

## 2020-02-24 RX ADMIN — HEPARIN SODIUM 5000 UNITS: 5000 INJECTION INTRAVENOUS; SUBCUTANEOUS at 06:49

## 2020-02-24 RX ADMIN — LEVOTHYROXINE SODIUM 50 MCG: 0.05 TABLET ORAL at 06:53

## 2020-02-24 RX ADMIN — AMLODIPINE BESYLATE 5 MG: 5 TABLET ORAL at 09:34

## 2020-02-24 RX ADMIN — ACETAMINOPHEN 650 MG: 325 TABLET ORAL at 06:54

## 2020-02-24 RX ADMIN — ATORVASTATIN CALCIUM 20 MG: 10 TABLET, FILM COATED ORAL at 09:34

## 2020-02-24 RX ADMIN — CLONIDINE HYDROCHLORIDE 0.1 MG: 0.1 TABLET ORAL at 09:34

## 2020-02-24 NOTE — PROGRESS NOTES
Bedside shift change report given to Carissa Berrios RN (oncoming nurse) by Kai Faye RN (offgoing nurse). Report included the following information SBAR, Kardex, Intake/Output, MAR and Recent Results.

## 2020-02-24 NOTE — PHYSICIAN ADVISORY
Letter of Status Determination: Current Status   INPATIENT is Appropriate         Pt Name:  Siri Crawford   MR#  [id number]   [facility]#   [id number]   [address]  [address] [address]  @ [facility]. [facility]   Hospitalization date  2/22/2020 10:39 AM   Current Attending Physician  Satinder Kinsey MD   Principal diagnosis  N/V/D   Clinicals    Siri Crawford is a 69 y.o. female who presents with diarrhea and vomiting      69 y.o. female with past medical history significant for HTN, DM, and hyperlipidemia who presents from home via EMS with chief complaint of diarrhea and nausea. Pt states onset of watery  Diarrhea last night with associated of nausea and abdominal pain, cramps/sharp pain, having 7 episodes of diarrhea last night, and this morning, started having vomiting, with 2 episodes of vomiting which she describes as \"more dry heaving. \" Pt describes her headache as \"dull and intermittent. \" Pt also notes some dizziness when standing up this morning but denies it currently. Pt states she fell off of the toilet this morning around 9 am upon feeling dizzy. \" possibly passed out\".    Pt denies current: focal weakness,  hematemesis, hematochezia,and dysuria.    Continued to be dizzy, needs IV fluids, mildly elevated creatinine, advancing to GI lite diet today    [place] MCG criteria   Does  NOT apply    STATUS DETERMINATION  On the basis of clinical data, available documentaion, we believe that the current status of this patient as INPATIENT is Appropriate     The final decision of the patient's hospitalization status depends on the attending physician's judgment    Additional comments     Insurance  Payor: Chelsea Del Valle / Plan: [address] / Product Type: Managed Care Medicare /    C/ Bradley Muhammad Phone: [phone]    Subscriber: Zurdo Ramos Subscriber#: [id number]    Group#: [id number] Precert#: Krissy Tamayo MD  Cell: [phone]  Physician Advisor

## 2020-02-25 ENCOUNTER — PATIENT OUTREACH (OUTPATIENT)
Dept: INTERNAL MEDICINE CLINIC | Age: 75
End: 2020-02-25

## 2020-02-25 NOTE — PROGRESS NOTES
Hospital Discharge Follow-Up      Date/Time:  2/25/2020 10:07 AM  Everardo King. Vero Alvarez RN  Care Transition Nurse  [phone]    Patient was admitted to [facility] on 2/22 and discharged on 2/24 for DX. VOMITING. The physician discharge summary was not available at the time of outreach. Patient was contacted within 2 business days of discharge. Top Challenges reviewed with the provider     Advance Care Planning:   Does patient have an Advance Directive:  Not on file        Method of communication with provider :Anuja    Was this a readmission? no     Disease Specific:   N/A    Patients top risk factors for readmission:  none    Referral to Pharm D needed: no     Current Outpatient Medications   Medication Sig    therapeutic multivitamin (THERAGRAN) tablet Take 1 Tab by mouth daily.  amLODIPine (NORVASC) 5 mg tablet Take 1 Tab by mouth daily.  metFORMIN (GLUCOPHAGE) 500 mg tablet TAKE 1 TABLET BY MOUTH TWO  TIMES DAILY WITH MEALS    levothyroxine (SYNTHROID) 50 mcg tablet TAKE ONE TABLET BY MOUTH  ONCE DAILY BEFORE BREAKFAST    irbesartan (AVAPRO) 150 mg tablet Take 1 Tab by mouth daily.  atorvastatin (LIPITOR) 20 mg tablet TAKE 1 TABLET BY MOUTH  DAILY    hydrochlorothiazide (HYDRODIURIL) 25 mg tablet Take 1 Tab by mouth daily as needed.  acetaminophen (TYLENOL EXTRA STRENGTH) 500 mg tablet Take 500-1,000 mg by mouth every six (6) hours as needed for Pain.  cloNIDine (CATAPRES) 0.1 mg tablet Take 0.1 mg by mouth two (2) times a day. No current facility-administered medications for this visit.       [facility] follow up appointment(s):   Future Appointments   Date Time Provider Rita Andrade   3/2/2020 10:45 AM Humble Oden MD [address]   5/4/2020  8:45 AM Humble Oden MD [facility]:  Services available to this area

## 2020-02-27 LAB
NOROVIRUS GI RNA STL QL NAA+PROBE: NEGATIVE
NOROVIRUS GII RNA STL QL NAA+PROBE: POSITIVE

## 2020-02-27 NOTE — DISCHARGE SUMMARY
Discharge Summary       PATIENT ID: Guillermo Silva  MRN: [id number]   YOB: 1945    DATE OF ADMISSION: 2/22/2020 10:39 AM    DATE OF DISCHARGE: 2/24/2020   PRIMARY CARE PROVIDER: Floyd Elizabeth MD     ATTENDING PHYSICIAN: Godfrey Ceron MD    DISCHARGING PROVIDER: Godfrey Ceron MD    To contact this individual call [phone] and ask the  to page. If unavailable ask to be transferred the Adult Hospitalist Department. CONSULTATIONS: None    PROCEDURES/SURGERIES: * No surgery found *    ADMITTING DIAGNOSES & HOSPITAL COURSE:     DISCHARGE DIAGNOSES / PLAN:    69 y.o. female with past medical history significant for HTN, DM, and hyperlipidemia who presents from home via EMS with chief complaint of diarrhea and nausea. 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 -no more diarrhea - so stool studies could no be done  -Received IVF. -Tolerated diet.     #HTN:  -resumed home antihypertensive regimen.     # Dizziness/lightheadeness -related to dehydration. Echo showed normal EF,Left ventricular diastolic dysfunction.   CT head showed chronic sinus disease,recommended ENT evaluation as OP. # CKD 3 :  -mild elevation in cr at admission likely due to dehydration  -cr trended  down     #Diabetes:  -Resume metformin at discharge.       PENDING TEST RESULTS:   At the time of discharge the following test results are still pending: none    FOLLOW UP APPOINTMENTS:    Follow-up Information     Follow up With Specialties Details Why Contact Meri Yeung MD Internal Medicine In 1 week  [address] [place] [address]  [phone]           ADDITIONAL CARE RECOMMENDATIONS:   -Take adequate amount of fluids.  -Check BP every day  -follow up with your PCP    DIET: Regular Diet      ACTIVITY: Activity as tolerated    WOUND CARE: na    EQUIPMENT needed: na        DISCHARGE MEDICATIONS:  Discharge Medication List as of 2/24/2020 12:01 PM      CONTINUE these medications which have NOT CHANGED    Details   therapeutic multivitamin (THERAGRAN) tablet Take 1 Tab by mouth daily. , Historical Med      amLODIPine (NORVASC) 5 mg tablet Take 1 Tab by mouth daily. , Normal, Disp-90 Tab, R-1      metFORMIN (GLUCOPHAGE) 500 mg tablet TAKE 1 TABLET BY MOUTH TWO  TIMES DAILY WITH MEALS, Normal, Disp-180 Tab, R-3      levothyroxine (SYNTHROID) 50 mcg tablet TAKE ONE TABLET BY MOUTH  ONCE DAILY BEFORE BREAKFAST, Normal, Disp-90 Tab, R-3      irbesartan (AVAPRO) 150 mg tablet Take 1 Tab by mouth daily. , Normal, Disp-90 Tab, R-1      atorvastatin (LIPITOR) 20 mg tablet TAKE 1 TABLET BY MOUTH  DAILY, Normal, Disp-90 Tab, R-3      hydrochlorothiazide (HYDRODIURIL) 25 mg tablet Take 1 Tab by mouth daily as needed., Normal, Disp-30 Tab, R-5      acetaminophen (TYLENOL EXTRA STRENGTH) 500 mg tablet Take 500-1,000 mg by mouth every six (6) hours as needed for Pain., Historical Med      cloNIDine (CATAPRES) 0.1 mg tablet Take 0.1 mg by mouth two (2) times a day., Historical Med               NOTIFY YOUR PHYSICIAN FOR ANY OF THE FOLLOWING:   Fever over 101 degrees for 24 hours. Chest pain, shortness of breath, fever, chills, nausea, vomiting, diarrhea, change in mentation, falling, weakness, bleeding. Severe pain or pain not relieved by medications. Or, any other signs or symptoms that you may have questions about.     DISPOSITION:   X Home With:   OT  PT  HH  RN       Long term SNF/Inpatient Rehab    Independent/assisted living    Hospice    Other:       PATIENT CONDITION AT DISCHARGE:     Functional status    Poor     Deconditioned    X Independent      Cognition    X Lucid     Forgetful     Dementia      Catheters/lines (plus indication)    [place]     PICC     PEG    X None      Code status   X Full code     DNR      PHYSICAL EXAMINATION AT DISCHARGE:  Constitutional:  No acute distress, cooperative, pleasant    ENT:  Oral mucosa moist, oropharynx benign,EOMI   Resp:  CTA bilaterally. No wheezing/rhonchi/rales. No accessory muscle use   CV:  Regular rhythm, normal rate    GI:  Soft, non distended, non tender, normoactive bowel sounds    Musculoskeletal:  No edema, warm    Neurologic:  Moves all extremities. AAOx3, CN II-XII reviewed                         Psych:  Good insight, Not anxious nor agitated.   Skin:  Good turgor, no rashes or ulcers              CHRONIC MEDICAL DIAGNOSES:  Problem List as of 2/24/2020 Date Reviewed: 5/2/2019          Codes Class Noted - Resolved    Vomiting ICD-10-CM: R11.10  ICD-9-CM: 787.03  2/22/2020 - Present        CKD (chronic kidney disease) stage 3, GFR 30-59 ml/min ([facility]) ICD-10-CM: N18.3  ICD-9-CM: 585.3  11/4/2019 - Present        Vaginal vault prolapse ICD-10-CM: N81.9  ICD-9-CM: 618.00  4/8/2019 - Present        Type 2 diabetes mellitus with nephropathy ([facility] ICD-10-CM: E11.21  ICD-9-CM: 250.40, 583.81  2/6/2018 - Present        History of hysterectomy including cervix ICD-10-CM: Z90.710  ICD-9-CM: V88.01  3/2/2015 - Present        History of bilateral salpingo-oophorectomy ICD-10-CM: Z90.79, Z90.722  ICD-9-CM: V45.77  3/2/2015 - Present        Personal history of renal cancer ICD-10-CM: Z85.528  ICD-9-CM: V10.52  3/2/2015 - Present        Bartholin cyst ICD-10-CM: N75.0  ICD-9-CM: 616.2  3/2/2015 - Present        Diabetes mellitus ([facility] ICD-10-CM: E11.9  ICD-9-CM: 250.00  10/8/2014 - Present        S/p nephrectomy, right ICD-10-CM: Z90.5  ICD-9-CM: V45.73  12/30/2013 - Present    Overview Signed 12/30/2013 12:23 PM by Lidia Cook     Renal cancer             Glucose intolerance (impaired glucose tolerance) ICD-10-CM: R73.02  ICD-9-CM: 790.22  12/30/2013 - Present        Anemia ICD-10-CM: D64.9  ICD-9-CM: 285.9  2/11/2013 - Present        Hypertension ICD-10-CM: I10  ICD-9-CM: 401.9  8/31/2011 - Present Hepatitis C ICD-10-CM: B19.20  ICD-9-CM: 070.70  8/31/2011 - Present    Overview Signed 12/30/2013 12:24 PM by Alivia Lewis by Anna Chawla - stopped due to anemia from medications but also levels shows viral load undetectable despite incomplete treatment             Renal cancer [facility]) ICD-10-CM: C64.9  ICD-9-CM: 189.0  8/31/2011 - Present        Hypothyroid ICD-10-CM: E03.9  ICD-9-CM: 244.9  8/31/2011 - Present              30  minutes were spent with the patient on counseling and coordination of care    Signed:   Linda Ha MD  2/27/2020  9:09 AM

## 2020-02-27 NOTE — DISCHARGE INSTRUCTIONS
Discharge Instructions       PATIENT ID: Keren Cano  MRN: [id number]   YOB: 1945    DATE OF ADMISSION: 2/22/2020 10:39 AM    DATE OF DISCHARGE: 2/24/2020    PRIMARY CARE PROVIDER: Do Barahona MD     ATTENDING PHYSICIAN: Hosea Patel MD  DISCHARGING PROVIDER: Margarita Sorto MD    To contact this individual call [phone] and ask the  to page. If unavailable ask to be transferred the Adult Hospitalist Department. DISCHARGE DIAGNOSES -Acute gastroenteritis    CONSULTATIONS: IP CONSULT TO HOSPITALIST    PROCEDURES/SURGERIES: * No surgery found *    PENDING TEST RESULTS:   At the time of discharge the following test results are still pending: none    FOLLOW UP APPOINTMENTS:   Follow-up Information     Follow up With Specialties Details Why Anupama Ibarra MD Internal Medicine In 1 week  [address] [place] [address]  [phone]             ADDITIONAL CARE RECOMMENDATIONS:   -Take adequate amount of fluids.  -Check BP every day  -follow up with your PCP    DIET: Regular Diet      ACTIVITY: Activity as tolerated    WOUND CARE: na    EQUIPMENT needed: na      DISCHARGE MEDICATIONS:   See Medication Reconciliation Form    · It is important that you take the medication exactly as they are prescribed. · Keep your medication in the bottles provided by the pharmacist and keep a list of the medication names, dosages, and times to be taken in your wallet. · Do not take other medications without consulting your doctor. NOTIFY YOUR PHYSICIAN FOR ANY OF THE FOLLOWING:   Fever over 101 degrees for 24 hours. Chest pain, shortness of breath, fever, chills, nausea, vomiting, diarrhea, change in mentation, falling, weakness, bleeding. Severe pain or pain not relieved by medications. Or, any other signs or symptoms that you may have questions about.       DISPOSITION:  X  Home With:   OT  PT  [facility]  RN       SNF/Inpatient Rehab/LTAC    Independent/assisted living    Hospice    Other:         Signed:   Emily Tesfaye MD  2/24/2020  11:53 AM

## 2020-02-28 ENCOUNTER — PATIENT OUTREACH (OUTPATIENT)
Dept: INTERNAL MEDICINE CLINIC | Age: 75
End: 2020-02-28

## 2020-03-02 ENCOUNTER — OFFICE VISIT (OUTPATIENT)
Dept: INTERNAL MEDICINE CLINIC | Age: 75
End: 2020-03-02

## 2020-03-02 VITALS
RESPIRATION RATE: 16 BRPM | HEART RATE: 85 BPM | TEMPERATURE: 98.3 F | HEIGHT: 64 IN | WEIGHT: 193.6 LBS | BODY MASS INDEX: 33.05 KG/M2 | DIASTOLIC BLOOD PRESSURE: 81 MMHG | OXYGEN SATURATION: 98 % | SYSTOLIC BLOOD PRESSURE: 120 MMHG

## 2020-03-02 DIAGNOSIS — K52.9 GASTROENTERITIS: Primary | ICD-10-CM

## 2020-03-02 NOTE — PROGRESS NOTES
SUBJECTIVE  Ms. Vladimir Davalos presents today acutely for     Chief Complaint   Patient presents with   [facility] Follow Up     pt here today for hosp f.u from [facility] for gastroenteritis      She was in hospital at [facility] 2/22-2/24, for Diarrhea:     From H and P:   Vladimir Davalos is a 69 y.o. female who presents with diarrhea and vomiting      69 y.o. female with past medical history significant for HTN, DM, and hyperlipidemia who presents from home via EMS with chief complaint of diarrhea and nausea. Pt states onset of watery  Diarrhea last night with associated of nausea and abdominal pain, cramps/sharp pain, having 7 episodes of diarrhea last night, and this morning, started having vomiting, with 2 episodes of vomiting which she describes as \"more dry heaving. \" Pt describes her headache as \"dull and intermittent. \" Pt also notes some dizziness when standing up this morning but denies it currently. Pt states she fell off of the toilet this morning around 9 am upon feeling dizzy. \" possibly passed out\".    Pt denies current: focal weakness,  hematemesis, hematochezia,and dysuria. Currently no dizziness at bed. No more diarrhea since this morning. Contacted a person who is having \" stomach flu\" on Wednesday    Hospital course:   69 y.o. female with past medical history significant for HTN, DM, and hyperlipidemia who presents from home via EMS with chief complaint of diarrhea and nausea.     #Nausea/vomiting and diarrhea: suspected gastroenteriitis-resolved  -patient states that she had a sick contact exposure(kid with diarrhea at the place she works). -no more diarrhea - so stool studies could no be done  -Received IVF. -Tolerated diet.     #HTN:  -resumed home antihypertensive regimen.     # Dizziness/lightheadeness -related to dehydration.  Echo showed normal EF,Left ventricular diastolic dysfunction.   CT head showed chronic sinus disease,recommended ENT evaluation as OP.     # CKD 3 :  -mild elevation in cr at admission likely due to dehydration  -cr trended  down     #Diabetes:  -Resume metformin at discharge.       Now, her symptoms have resolved. Normal, formed stools now; no N/V. No more lightheadedness. She does have some pressure and \"nasal burning\" but no stuffiness. She takes tylenol for the sinus headache. Past Medical History:   Diagnosis Date    Arthritis     LOWER SPINE    Cancer [facility])     kidney (right)    Chronic kidney disease 1996    right kidney removed - CANCER    Diabetes ([facility] [address]     She is controlling with weight loss    Diabetes mellitus ([facility] [address] [facility] 10/8/2014    NIDDM    Hepatitis C     hx hep C from blood transfusion per pt; Treated by Dr. Valerie Luevano Hyperlipidemia     Hypertension     Ill-defined condition 1990s    HEPATITIS C - treated with medication    Liver disease     Personal history of renal cancer 3/2/2015    Thyroid disease 1999    Thyroidectomy    Vertigo      Current Outpatient Medications on File Prior to Visit   Medication Sig Dispense Refill    flaxseed oil (OMEGA 3 PO) Take  by mouth.  therapeutic multivitamin (THERAGRAN) tablet Take 1 Tab by mouth daily.  amLODIPine (NORVASC) 5 mg tablet Take 1 Tab by mouth daily. 90 Tab 1    metFORMIN (GLUCOPHAGE) 500 mg tablet TAKE 1 TABLET BY MOUTH TWO  TIMES DAILY WITH MEALS 180 Tab 3    levothyroxine (SYNTHROID) 50 mcg tablet TAKE ONE TABLET BY MOUTH  ONCE DAILY BEFORE BREAKFAST 90 Tab 3    irbesartan (AVAPRO) 150 mg tablet Take 1 Tab by mouth daily. 90 Tab 1    atorvastatin (LIPITOR) 20 mg tablet TAKE 1 TABLET BY MOUTH  DAILY 90 Tab 3    hydrochlorothiazide (HYDRODIURIL) 25 mg tablet Take 1 Tab by mouth daily as needed. 30 Tab 5    acetaminophen (TYLENOL EXTRA STRENGTH) 500 mg tablet Take 500-1,000 mg by mouth every six (6) hours as needed for Pain.  cloNIDine (CATAPRES) 0.1 mg tablet Take 0.1 mg by mouth two (2) times a day.        No current facility-administered medications on file prior to visit. SH:  reports that she quit smoking about 27 years ago. She has a 2.00 pack-year smoking history. She has never used smokeless tobacco. She reports that she does not drink alcohol or use drugs. OBJECTIVE  Visit Vitals  /81 (BP 1 Location: Left arm, BP Patient Position: Sitting)   Pulse 85   Temp 98.3 °F (36.8 °C) (Oral)   Resp 16   Ht 5' 4\" (1.626 m)   Wt 193 lb 9.6 oz (87.8 kg)   LMP  (LMP Unknown)   SpO2 98%   BMI 33.23 kg/m²     Gen: Pleasant 69 y.o.  female in NAD.   HEENT: PERRLA. EOMI. OP moist and pink.  Neck: Supple.  No LAD.  HEART: RRR, No M/G/R.   LUNGS: CTAB No W/R.   ABDOMEN: S, NT, ND, BS+.   EXTREMITIES: Warm. No C/C/E. MUSCULOSKELETAL: Normal ROM, muscle strength 5/5 all groups. NEURO: Alert and oriented x 3.  Cranial nerves grossly intact.  No focal sensory or motor deficits noted. SKIN: Warm. Dry. No rashes or other lesions noted. ASSESSMENT / PLAN    ICD-10-CM ICD-9-CM    1. Gastroenteritis--resolved. K52.9 558.9        I have reviewed with the patient details of the assessment and plan and all questions were answered. Relevant patient education was performed.

## 2020-03-02 NOTE — LETTER
NOTIFICATION RETURN TO WORK / SCHOOL 
 
3/2/2020 11:03 AM 
 
Ms. Ifeoma Gregorio [address] [facility] [id number] To Whom It May Concern: Ms. Alwin Lesches, is the daughter of my patient Ifeoma Gregorio, who is currently under the care of [facility]. She will return to work/school on: Today; Please excuse for work missed 2/24/2020 - 2/27/2020 If there are questions or concerns please have the patient contact our office.  
 
 
 
Sincerely, 
 
 
Liu Garcia MD

## 2020-03-02 NOTE — PATIENT INSTRUCTIONS
Office Policies    Phone calls/patient messages:            Please allow up to 24 hours for someone in the office to contact you about your call or message. Be mindful your provider may be out of the office or your message may require further review. We encourage you to use [employer] for your messages as this is a faster, more efficient way to communicate with our office                         Medication Refills:            Prescription medications require 48-72 business hours to process. We encourage you to use [employer] for your refills. For controlled medications: Please allow 72 business hours to process. Certain medications may require you to  a written prescription at our office. NO narcotic/controlled medications will be prescribed after 4pm Monday through Friday or on weekends              Form/Paperwork Completion:            Please note a $25 fee may incur for all paperwork for completed by our providers. We ask that you allow 7-10 business days. Pre-payment is due prior to picking up/faxing the completed form. You may also download your forms to [employer] to have your doctor print off.    1. Have you been to the ER, urgent care clinic since your last visit? Hospitalized since your last visit? [facility]  2. Have you seen or consulted any other health care providers outside of the [address] [place] [address] since your last visit? Include any pap smears or colon screening.  no

## 2020-03-04 ENCOUNTER — PATIENT OUTREACH (OUTPATIENT)
Dept: INTERNAL MEDICINE CLINIC | Age: 75
End: 2020-03-04

## 2020-04-03 RX ORDER — ATORVASTATIN CALCIUM 20 MG/1
TABLET, FILM COATED ORAL
Qty: 90 TAB | Refills: 3 | Status: SHIPPED | OUTPATIENT
Start: 2020-04-03 | End: 2020-04-07 | Stop reason: SDUPTHER

## 2020-04-07 DIAGNOSIS — I10 ESSENTIAL HYPERTENSION: Primary | ICD-10-CM

## 2020-04-07 DIAGNOSIS — E78.2 MIXED HYPERLIPIDEMIA: ICD-10-CM

## 2020-04-07 DIAGNOSIS — I10 ESSENTIAL HYPERTENSION: ICD-10-CM

## 2020-04-07 RX ORDER — CLONIDINE HYDROCHLORIDE 0.1 MG/1
0.1 TABLET ORAL 2 TIMES DAILY
Qty: 180 TAB | Refills: 2 | Status: SHIPPED | OUTPATIENT
Start: 2020-04-07 | End: 2021-10-22 | Stop reason: SDUPTHER

## 2020-04-07 RX ORDER — CLONIDINE HYDROCHLORIDE 0.1 MG/1
0.1 TABLET ORAL 2 TIMES DAILY
Qty: 60 TAB | Refills: 0 | Status: SHIPPED | OUTPATIENT
Start: 2020-04-07 | End: 2020-04-07 | Stop reason: SDUPTHER

## 2020-04-07 RX ORDER — ATORVASTATIN CALCIUM 20 MG/1
TABLET, FILM COATED ORAL
Qty: 30 TAB | Refills: 0 | Status: SHIPPED | OUTPATIENT
Start: 2020-04-07 | End: 2020-04-20

## 2020-04-07 RX ORDER — IRBESARTAN 150 MG/1
150 TABLET ORAL DAILY
Qty: 90 TAB | Refills: 2 | Status: SHIPPED | OUTPATIENT
Start: 2020-04-07 | End: 2021-04-12

## 2020-04-07 RX ORDER — CLONIDINE HYDROCHLORIDE 0.1 MG/1
0.1 TABLET ORAL 2 TIMES DAILY
Qty: 180 TAB | Refills: 1 | Status: SHIPPED | OUTPATIENT
Start: 2020-04-07 | End: 2020-05-16 | Stop reason: SDUPTHER

## 2020-04-07 RX ORDER — ATORVASTATIN CALCIUM 20 MG/1
TABLET, FILM COATED ORAL
Qty: 90 TAB | Refills: 3 | Status: CANCELLED | OUTPATIENT
Start: 2020-04-07

## 2020-04-07 NOTE — TELEPHONE ENCOUNTER
90 day mail [employer]          Both scripts need to have a 30 day supply to [facility] [address] today as she is out of medication. Pt states she used to get these from Dr. Lashon Núñez and he retired. Call pt with any questions.

## 2020-04-07 NOTE — TELEPHONE ENCOUNTER
PCP: Do Barahona MD    Last appt: 3/2/2020  Future Appointments   Date Time Provider Rita Andrade   5/4/2020  8:45 AM Do Barahona MD [address]   5/29/2020  7:40 AM [address]       Requested Prescriptions     Pending Prescriptions Disp Refills    cloNIDine HCL (CATAPRES) 0.1 mg tablet 60 Tab 0     Sig: Take 1 Tab by mouth two (2) times a day.  atorvastatin (LIPITOR) 20 mg tablet 30 Tab 0     Sig: TAKE 1 TABLET BY MOUTH  DAILY    cloNIDine HCL (CATAPRES) 0.1 mg tablet 180 Tab 1     Sig: Take 1 Tab by mouth two (2) times a day.

## 2020-04-19 DIAGNOSIS — E78.2 MIXED HYPERLIPIDEMIA: ICD-10-CM

## 2020-04-20 RX ORDER — ATORVASTATIN CALCIUM 20 MG/1
TABLET, FILM COATED ORAL
Qty: 30 TAB | Refills: 0 | Status: SHIPPED | OUTPATIENT
Start: 2020-04-20 | End: 2020-04-27 | Stop reason: SDUPTHER

## 2020-04-27 DIAGNOSIS — E78.2 MIXED HYPERLIPIDEMIA: ICD-10-CM

## 2020-04-27 RX ORDER — ATORVASTATIN CALCIUM 20 MG/1
TABLET, FILM COATED ORAL
Qty: 90 TAB | Refills: 1 | Status: SHIPPED | OUTPATIENT
Start: 2020-04-27 | End: 2020-12-30

## 2020-05-04 ENCOUNTER — TELEPHONE (OUTPATIENT)
Dept: INTERNAL MEDICINE CLINIC | Age: 75
End: 2020-05-04

## 2020-05-04 ENCOUNTER — VIRTUAL VISIT (OUTPATIENT)
Dept: INTERNAL MEDICINE CLINIC | Age: 75
End: 2020-05-04

## 2020-05-04 DIAGNOSIS — R89.9 ABNORMAL LABORATORY TEST: Primary | ICD-10-CM

## 2020-05-04 DIAGNOSIS — D64.9 ANEMIA, UNSPECIFIED TYPE: ICD-10-CM

## 2020-05-04 DIAGNOSIS — E03.4 HYPOTHYROIDISM DUE TO ACQUIRED ATROPHY OF THYROID: ICD-10-CM

## 2020-05-04 DIAGNOSIS — R89.9 ABNORMAL LABORATORY TEST RESULT: ICD-10-CM

## 2020-05-04 DIAGNOSIS — E11.9 TYPE 2 DIABETES MELLITUS WITHOUT COMPLICATION, UNSPECIFIED WHETHER LONG TERM INSULIN USE (HCC): ICD-10-CM

## 2020-05-04 DIAGNOSIS — N18.30 CKD (CHRONIC KIDNEY DISEASE) STAGE 3, GFR 30-59 ML/MIN (HCC): ICD-10-CM

## 2020-05-04 DIAGNOSIS — I10 ESSENTIAL HYPERTENSION: ICD-10-CM

## 2020-05-04 DIAGNOSIS — D51.1 VITAMIN B12 DEFICIENCY ANEMIA DUE TO SELECTIVE VITAMIN B12 MALABSORPTION WITH PROTEINURIA: ICD-10-CM

## 2020-05-04 DIAGNOSIS — E03.9 HYPOTHYROIDISM, UNSPECIFIED TYPE: ICD-10-CM

## 2020-05-04 DIAGNOSIS — E78.2 MIXED HYPERLIPIDEMIA: ICD-10-CM

## 2020-05-04 NOTE — TELEPHONE ENCOUNTER
Lab slip printed by Dr Karon Richey on 5/4/20 has been mailed out to pt along with referral to nephrologist.

## 2020-05-04 NOTE — PROGRESS NOTES
Emani Aguirre is a 77 y.o. female evaluated via telephone on 5/4/2020. Consent:  She and/or health care decision maker is aware that she may receive a bill for this telephone service, depending on her insurance coverage, and has provided verbal consent to proceed: Yes          I affirm this is a Patient Initiated Episode with a Patient who has not had a related appointment within my department in the past 7 days or scheduled within the next 24 hours. Total Time: minutes: 5-10 minutes    Note: not billable if this call serves to triage the patient into an appointment for the relevant concern      Madie Strickland MD           Subjective:   Emani Aguirre is a 77 y.o. female who was seen for Hypertension (routine f.u )    SUBJECTIVE:   Ms. Emani Aguirre is a 77 y.o. female who is here for follow up of chronic medical issues. She had been seeing Dr. Denice Tobin, and Dr. Dolores Myers before that. Chief Complaint   Patient presents with    Hypertension     routine f.u        It is recalled that she was hospitalized in Feb for diarrhea, though due to viral gastroenteritis. DM: Glucose as home, fasting, usually runs 120s. 143 today. 180 yesterday. Her numbness L foot has improved. LBP is stable. \"I was told I have arthritis in the spine\" by Dr. Eleanor Li, orthopedist.  She has been through treatment with Dr. Mana Castillo, and colleagues, for Hep C, obtained during a prior blood transfusion. \"He said after the last visit I could return just as needed. \"  Her Nephrologist, Dr. Halle Mancillaly retired. At this time, she is otherwise doing well and has brought no other complaints to my attention today. For a list of the medical issues addressed today, see the assessment and plan below.     PMH:   Past Medical History:   Diagnosis Date    Arthritis     LOWER SPINE    Cancer [facility])     kidney (right)    Chronic kidney disease 1996    right kidney removed - CANCER    Diabetes ([facility] [address]     She is controlling with weight loss    Diabetes mellitus ([facility] [address] 10/8/2014    NIDDM    Hepatitis C     hx hep C from blood transfusion per pt; Treated by Dr. Magaly Etienne Hyperlipidemia     Hypertension     Ill-defined condition 1990s    HEPATITIS C - treated with medication    Liver disease     Personal history of renal cancer 3/2/2015    Thyroid disease 1999    Thyroidectomy    Vertigo        Past Surgical History:   Procedure Laterality Date    COLONOSCOPY N/A 6/20/2017    COLONOSCOPY performed by [facility]o [facility] at [facility] ENDOSCOPY    HX BREAST REDUCTION Bilateral 1996    HX COLONOSCOPY  3/5/12    Repeat in 5 years (Dr. Wiliam Randolph)    HX GYN  1980s    tubal pregnancy    HX HEENT      WISDOM TEETH X4    HX OTHER SURGICAL  1999    thyroidectomy    HX OTHER SURGICAL      colonoscopy x 2 - ?polyps    HX OTHER SURGICAL  04/08/2019    surg for prolasp bladder at [facility]  04/08/2019    prolasp bladder surg    HX RITESH AND BSO Bilateral 1995    HX TONSILLECTOMY      1or 3years old   [address]    kidney removed on right       All: She is allergic to codeine. Current Outpatient Medications   Medication Sig    atorvastatin (LIPITOR) 20 mg tablet Take 1 tablet by mouth once daily    cloNIDine HCL (CATAPRES) 0.1 mg tablet Take 1 Tab by mouth two (2) times a day.  cloNIDine HCL (CATAPRES) 0.1 mg tablet Take 1 Tab by mouth two (2) times a day.  irbesartan (AVAPRO) 150 mg tablet Take 1 Tab by mouth daily.  flaxseed oil (OMEGA 3 PO) Take  by mouth.  therapeutic multivitamin (THERAGRAN) tablet Take 1 Tab by mouth daily.  amLODIPine (NORVASC) 5 mg tablet Take 1 Tab by mouth daily.  metFORMIN (GLUCOPHAGE) 500 mg tablet TAKE 1 TABLET BY MOUTH TWO  TIMES DAILY WITH MEALS    levothyroxine (SYNTHROID) 50 mcg tablet TAKE ONE TABLET BY MOUTH  ONCE DAILY BEFORE BREAKFAST    hydrochlorothiazide (HYDRODIURIL) 25 mg tablet Take 1 Tab by mouth daily as needed.  (Patient taking differently: Take 25 mg by mouth daily as needed. Pt.takes as need for lower ext. swelling)    acetaminophen (TYLENOL EXTRA STRENGTH) 500 mg tablet Take 500-1,000 mg by mouth every six (6) hours as needed for Pain. No current facility-administered medications for this visit. FH: Her family history includes Diabetes in her daughter, mother, and sister; Heart Disease in her father; Hypertension in her daughter and son; Kidney Disease in her father and mother; No Known Problems in her brother and son. SH: Retired Lpn, doing private duty nursing. She reports that she quit smoking about 28 years ago. She has a 2.00 pack-year smoking history. She has never used smokeless tobacco. She reports that she does not drink alcohol or use drugs. ROS: See above; Complete ROS otherwise negative. OBJECTIVE:   Audio only; no exam.       Lab Results   Component Value Date/Time    Sodium 144 02/24/2020 08:51 AM    Potassium 3.5 02/24/2020 08:51 AM    Chloride 114 (H) 02/24/2020 08:51 AM    CO2 23 02/24/2020 08:51 AM    Anion gap 7 02/24/2020 08:51 AM    Glucose 114 (H) 02/24/2020 08:51 AM    BUN 19 02/24/2020 08:51 AM    Creatinine 1.74 (H) 02/24/2020 08:51 AM    BUN/Creatinine ratio 11 (L) 02/24/2020 08:51 AM    GFR est AA 35 (L) 02/24/2020 08:51 AM    GFR est non-AA 29 (L) 02/24/2020 08:51 AM    Calcium 8.4 (L) 02/24/2020 08:51 AM    Bilirubin, total 0.3 02/23/2020 04:01 AM    ALT (SGPT) 17 02/23/2020 04:01 AM    AST (SGOT) 13 (L) 02/23/2020 04:01 AM    Alk.  phosphatase 61 02/23/2020 04:01 AM    Protein, total 6.1 (L) 02/23/2020 04:01 AM    Albumin 2.7 (L) 02/23/2020 04:01 AM    Globulin 3.4 02/23/2020 04:01 AM    A-G Ratio 0.8 (L) 02/23/2020 04:01 AM       Lab Results   Component Value Date/Time    Cholesterol, total 150 02/23/2020 04:01 AM    HDL Cholesterol 65 02/23/2020 04:01 AM    LDL, calculated 62.6 02/23/2020 04:01 AM    Triglyceride 112 02/23/2020 04:01 AM    CHOL/HDL Ratio 2.3 02/23/2020 04:01 AM        Lab Results   Component Value Date/Time    Hemoglobin A1c 6.8 (H) 02/23/2020 04:17 AM       Lab Results   Component Value Date/Time    WBC 6.8 02/23/2020 04:01 AM    HGB 11.5 02/23/2020 04:01 AM    HCT 37.4 02/23/2020 04:01 AM    PLATELET 058 24/29/4967 04:01 AM    MCV 86.2 02/23/2020 04:01 AM       Lab Results   Component Value Date/Time    TSH 1.100 10/31/2019 09:15 AM         ASSESSMENT/ PLAN:   1. Diabetes: Controlled. Continue current measures. 2. Hypertension: High today, okay previously. Hasn't taken med yet. 3. Thyroid disease: Euthyroid. Watch TSH. 4. Chronic kidney disease:  Reviewed labs. Watch labs. Refer Dr. Jesu Díaz. 5. Arthritis: Chronic and stable. 6. Hyperlipidemia: Doing worse. Discussed her high risk of cardiovascular complications. Lipitor 20.    7. Hepatitis C: F/U with GI as needed--apparently \"turned loose\". 8. Obesity: Diet and exercise. Weight down a bit; keep up the good work. 9. Neuropathy L toe: ?Sciatica vs other compressive neuropathy. Doing better lately. I have reviewed the patient's medications and risks/side effects/benefits were discussed. Diagnosis(-es) explained to patient and questions answered. Literature provided where appropriate.

## 2020-05-16 DIAGNOSIS — I10 ESSENTIAL HYPERTENSION: ICD-10-CM

## 2020-05-18 RX ORDER — CLONIDINE HYDROCHLORIDE 0.1 MG/1
0.1 TABLET ORAL 2 TIMES DAILY
Qty: 180 TAB | Refills: 1 | Status: SHIPPED | OUTPATIENT
Start: 2020-05-18 | End: 2020-07-02 | Stop reason: SDUPTHER

## 2020-05-29 ENCOUNTER — HOSPITAL ENCOUNTER (OUTPATIENT)
Dept: MAMMOGRAPHY | Age: 75
Discharge: HOME | End: 2020-05-29
Attending: INTERNAL MEDICINE
Payer: MEDICARE

## 2020-05-29 DIAGNOSIS — Z12.31 ENCOUNTER FOR SCREENING MAMMOGRAM FOR BREAST CANCER: ICD-10-CM

## 2020-05-29 PROCEDURE — 77067 SCR MAMMO BI INCL CAD: CPT

## 2020-06-03 RX ORDER — AMLODIPINE BESYLATE 5 MG/1
TABLET ORAL
Qty: 90 TAB | Refills: 1 | Status: SHIPPED | OUTPATIENT
Start: 2020-06-03 | End: 2020-10-24

## 2020-06-04 ENCOUNTER — TELEPHONE (OUTPATIENT)
Dept: INTERNAL MEDICINE CLINIC | Age: 75
End: 2020-06-04

## 2020-06-04 NOTE — TELEPHONE ENCOUNTER
#[phone] Kianna with Dr. Portia Ch office states labs were drawn and pt was taken off metformin. Does Dr. Niecy Waller want to send a replacement to the pharmacy? Pt is coming back in for more labs and Dr. Jenny Carcamo states if Dr. Niecy Waller wants more labs drawn to please just call them.

## 2020-06-04 NOTE — TELEPHONE ENCOUNTER
Message was left for Dr. Dorita Brown office to return call about reason patient was taken off Metformin.

## 2020-06-18 ENCOUNTER — TELEPHONE (OUTPATIENT)
Dept: INTERNAL MEDICINE CLINIC | Age: 75
End: 2020-06-18

## 2020-06-18 NOTE — TELEPHONE ENCOUNTER
Patient states she needs a call back to discuss medication adjustments made by her Nephrologist. Please call to discuss.  Thank you

## 2020-06-19 NOTE — TELEPHONE ENCOUNTER
Identified patient 2 identifiers verified. Patient  Aware about Januvia change and wanted to know if labs were received from Nephrology. No lab results have been received. Patient will have Nephrology fax over labs.

## 2020-07-02 ENCOUNTER — TELEPHONE (OUTPATIENT)
Dept: INTERNAL MEDICINE CLINIC | Age: 75
End: 2020-07-02

## 2020-07-02 DIAGNOSIS — I10 ESSENTIAL HYPERTENSION: ICD-10-CM

## 2020-07-02 DIAGNOSIS — E11.21 TYPE 2 DIABETES MELLITUS WITH NEPHROPATHY (HCC): Primary | ICD-10-CM

## 2020-07-02 NOTE — TELEPHONE ENCOUNTER
Called, spoke with Pt. Two pt identifiers confirmed. Pt stated Dr. Bahman Martinez changed the dosage of a medicine and put on her on another medication. Pt wanted to know who will do the refills. Pt informed reach out to Dr. Bahman Martinez first.   Pt verbalized understanding of information discussed w/ no further questions at this time.

## 2020-07-03 RX ORDER — CLONIDINE HYDROCHLORIDE 0.1 MG/1
0.1 TABLET ORAL 3 TIMES DAILY
Qty: 270 TAB | Refills: 1 | Status: SHIPPED | OUTPATIENT
Start: 2020-07-03 | End: 2020-08-14 | Stop reason: SDUPTHER

## 2020-07-07 ENCOUNTER — TELEPHONE (OUTPATIENT)
Dept: OTHER | Age: 75
End: 2020-07-07

## 2020-07-07 NOTE — TELEPHONE ENCOUNTER
This writer called patient related to medication adherence, patient verified name and date of birth. This writer advised patient, I am calling from [employer] and work with the [employer]. Medications reviewed, patient has all medications listed with no refills needed at this time. Most recent refill was over July 4th weekend.      Next appointment with Jaswinder Cannon MD is on 11/2/2020

## 2020-09-15 LAB
ALBUMIN SERPL-MCNC: 4.3 G/DL (ref 3.7–4.7)
ALBUMIN/GLOB SERPL: 1.5 {RATIO} (ref 1.2–2.2)
ALP SERPL-CCNC: 86 IU/L (ref 39–117)
ALT SERPL-CCNC: 13 IU/L (ref 0–32)
AST SERPL-CCNC: 17 IU/L (ref 0–40)
BASOPHILS # BLD AUTO: 0 X10E3/UL (ref 0–0.2)
BASOPHILS NFR BLD AUTO: 0 %
BILIRUB SERPL-MCNC: 0.3 MG/DL (ref 0–1.2)
BUN SERPL-MCNC: 34 MG/DL (ref 8–27)
BUN/CREAT SERPL: 13 (ref 12–28)
CALCIUM SERPL-MCNC: 9.2 MG/DL (ref 8.7–10.3)
CHLORIDE SERPL-SCNC: 106 MMOL/L (ref 96–106)
CHOLEST SERPL-MCNC: 161 MG/DL (ref 100–199)
CO2 SERPL-SCNC: 19 MMOL/L (ref 20–29)
CREAT SERPL-MCNC: 2.59 MG/DL (ref 0.57–1)
EOSINOPHIL # BLD AUTO: 0.1 X10E3/UL (ref 0–0.4)
EOSINOPHIL NFR BLD AUTO: 1 %
ERYTHROCYTE [DISTWIDTH] IN BLOOD BY AUTOMATED COUNT: 13.8 % (ref 11.7–15.4)
EST. AVERAGE GLUCOSE BLD GHB EST-MCNC: 146 MG/DL
GLOBULIN SER CALC-MCNC: 2.8 G/DL (ref 1.5–4.5)
GLUCOSE SERPL-MCNC: 113 MG/DL (ref 65–99)
HBA1C MFR BLD: 6.7 % (ref 4.8–5.6)
HCT VFR BLD AUTO: 35.8 % (ref 34–46.6)
HDLC SERPL-MCNC: 54 MG/DL
HGB BLD-MCNC: 11.7 G/DL (ref 11.1–15.9)
IMM GRANULOCYTES # BLD AUTO: 0 X10E3/UL (ref 0–0.1)
IMM GRANULOCYTES NFR BLD AUTO: 0 %
LDLC SERPL CALC-MCNC: 81 MG/DL (ref 0–99)
LYMPHOCYTES # BLD AUTO: 2.6 X10E3/UL (ref 0.7–3.1)
LYMPHOCYTES NFR BLD AUTO: 25 %
MCH RBC QN AUTO: 27.1 PG (ref 26.6–33)
MCHC RBC AUTO-ENTMCNC: 32.7 G/DL (ref 31.5–35.7)
MCV RBC AUTO: 83 FL (ref 79–97)
MONOCYTES # BLD AUTO: 0.7 X10E3/UL (ref 0.1–0.9)
MONOCYTES NFR BLD AUTO: 6 %
NEUTROPHILS # BLD AUTO: 7.1 X10E3/UL (ref 1.4–7)
NEUTROPHILS NFR BLD AUTO: 68 %
PLATELET # BLD AUTO: 235 X10E3/UL (ref 150–450)
POTASSIUM SERPL-SCNC: 4.6 MMOL/L (ref 3.5–5.2)
PROT SERPL-MCNC: 7.1 G/DL (ref 6–8.5)
RBC # BLD AUTO: 4.32 X10E6/UL (ref 3.77–5.28)
SODIUM SERPL-SCNC: 144 MMOL/L (ref 134–144)
T4 FREE SERPL-MCNC: 1.38 NG/DL (ref 0.82–1.77)
TRIGL SERPL-MCNC: 153 MG/DL (ref 0–149)
TSH SERPL DL<=0.005 MIU/L-ACNC: 1.09 UIU/ML (ref 0.45–4.5)
VIT B12 SERPL-MCNC: >2000 PG/ML (ref 232–1245)
VLDLC SERPL CALC-MCNC: 26 MG/DL (ref 5–40)
WBC # BLD AUTO: 10.4 X10E3/UL (ref 3.4–10.8)

## 2020-09-23 NOTE — PROGRESS NOTES
Identified patient 2 identifiers verified. Patient requested that lab order for BMP be faxed to [phone]. Order generated per verbal read back Dr. Rosa Isela Davis.

## 2020-09-26 LAB
BUN SERPL-MCNC: 35 MG/DL (ref 8–27)
BUN/CREAT SERPL: 13 (ref 12–28)
CALCIUM SERPL-MCNC: 9.3 MG/DL (ref 8.7–10.3)
CHLORIDE SERPL-SCNC: 110 MMOL/L (ref 96–106)
CO2 SERPL-SCNC: 21 MMOL/L (ref 20–29)
CREAT SERPL-MCNC: 2.61 MG/DL (ref 0.57–1)
GLUCOSE SERPL-MCNC: 121 MG/DL (ref 65–99)
POTASSIUM SERPL-SCNC: 4.5 MMOL/L (ref 3.5–5.2)
SODIUM SERPL-SCNC: 145 MMOL/L (ref 134–144)

## 2020-10-07 ENCOUNTER — TRANSCRIBE ORDER (OUTPATIENT)
Dept: SCHEDULING | Age: 75
End: 2020-10-07

## 2020-10-07 DIAGNOSIS — C64.9: Primary | ICD-10-CM

## 2020-10-16 ENCOUNTER — HOSPITAL ENCOUNTER (OUTPATIENT)
Dept: ULTRASOUND IMAGING | Age: 75
Discharge: HOME | End: 2020-10-16
Attending: INTERNAL MEDICINE
Payer: MEDICARE

## 2020-10-16 DIAGNOSIS — C64.9: ICD-10-CM

## 2020-10-16 PROCEDURE — 76770 US EXAM ABDO BACK WALL COMP: CPT

## 2020-10-24 RX ORDER — AMLODIPINE BESYLATE 5 MG/1
TABLET ORAL
Qty: 90 TAB | Refills: 3 | Status: SHIPPED | OUTPATIENT
Start: 2020-10-24 | End: 2021-04-05 | Stop reason: SDUPTHER

## 2020-11-12 ENCOUNTER — OFFICE VISIT (OUTPATIENT)
Dept: INTERNAL MEDICINE CLINIC | Age: 75
End: 2020-11-12
Payer: MEDICARE

## 2020-11-12 VITALS
HEART RATE: 77 BPM | WEIGHT: 193 LBS | TEMPERATURE: 96.3 F | SYSTOLIC BLOOD PRESSURE: 150 MMHG | DIASTOLIC BLOOD PRESSURE: 90 MMHG | BODY MASS INDEX: 32.95 KG/M2 | HEIGHT: 64 IN | OXYGEN SATURATION: 99 % | RESPIRATION RATE: 16 BRPM

## 2020-11-12 DIAGNOSIS — Z00.00 MEDICARE ANNUAL WELLNESS VISIT, SUBSEQUENT: ICD-10-CM

## 2020-11-12 DIAGNOSIS — Z23 NEEDS FLU SHOT: ICD-10-CM

## 2020-11-12 DIAGNOSIS — Z23 ENCOUNTER FOR IMMUNIZATION: ICD-10-CM

## 2020-11-12 DIAGNOSIS — D51.1 VITAMIN B12 DEFICIENCY ANEMIA DUE TO SELECTIVE VITAMIN B12 MALABSORPTION WITH PROTEINURIA: ICD-10-CM

## 2020-11-12 DIAGNOSIS — E03.4 HYPOTHYROIDISM DUE TO ACQUIRED ATROPHY OF THYROID: ICD-10-CM

## 2020-11-12 DIAGNOSIS — D64.9 ANEMIA, UNSPECIFIED TYPE: ICD-10-CM

## 2020-11-12 DIAGNOSIS — E78.2 MIXED HYPERLIPIDEMIA: ICD-10-CM

## 2020-11-12 DIAGNOSIS — E11.21 TYPE 2 DIABETES MELLITUS WITH NEPHROPATHY (HCC): Primary | ICD-10-CM

## 2020-11-12 DIAGNOSIS — I10 ESSENTIAL HYPERTENSION: ICD-10-CM

## 2020-11-12 DIAGNOSIS — N18.30 STAGE 3 CHRONIC KIDNEY DISEASE, UNSPECIFIED WHETHER STAGE 3A OR 3B CKD (HCC): ICD-10-CM

## 2020-11-12 PROCEDURE — 3044F HG A1C LEVEL LT 7.0%: CPT | Performed by: INTERNAL MEDICINE

## 2020-11-12 PROCEDURE — G8427 DOCREV CUR MEDS BY ELIG CLIN: HCPCS | Performed by: INTERNAL MEDICINE

## 2020-11-12 PROCEDURE — G8752 SYS BP LESS 140: HCPCS | Performed by: INTERNAL MEDICINE

## 2020-11-12 PROCEDURE — 90732 PPSV23 VACC 2 YRS+ SUBQ/IM: CPT

## 2020-11-12 PROCEDURE — 2022F DILAT RTA XM EVC RTNOPTHY: CPT | Performed by: INTERNAL MEDICINE

## 2020-11-12 PROCEDURE — 90694 VACC AIIV4 NO PRSRV 0.5ML IM: CPT

## 2020-11-12 PROCEDURE — 3017F COLORECTAL CA SCREEN DOC REV: CPT | Performed by: INTERNAL MEDICINE

## 2020-11-12 PROCEDURE — G8417 CALC BMI ABV UP PARAM F/U: HCPCS | Performed by: INTERNAL MEDICINE

## 2020-11-12 PROCEDURE — G0439 PPPS, SUBSEQ VISIT: HCPCS | Performed by: INTERNAL MEDICINE

## 2020-11-12 PROCEDURE — G9899 SCRN MAM PERF RSLTS DOC: HCPCS | Performed by: INTERNAL MEDICINE

## 2020-11-12 PROCEDURE — G0008 ADMIN INFLUENZA VIRUS VAC: HCPCS

## 2020-11-12 PROCEDURE — G0009 ADMIN PNEUMOCOCCAL VACCINE: HCPCS

## 2020-11-12 PROCEDURE — G8536 NO DOC ELDER MAL SCRN: HCPCS | Performed by: INTERNAL MEDICINE

## 2020-11-12 PROCEDURE — G8754 DIAS BP LESS 90: HCPCS | Performed by: INTERNAL MEDICINE

## 2020-11-12 PROCEDURE — 1101F PT FALLS ASSESS-DOCD LE1/YR: CPT | Performed by: INTERNAL MEDICINE

## 2020-11-12 PROCEDURE — G8399 PT W/DXA RESULTS DOCUMENT: HCPCS | Performed by: INTERNAL MEDICINE

## 2020-11-12 PROCEDURE — G8510 SCR DEP NEG, NO PLAN REQD: HCPCS | Performed by: INTERNAL MEDICINE

## 2020-11-12 NOTE — PATIENT INSTRUCTIONS
Office Policies    Phone calls/patient messages:            Please allow up to 24 hours for someone in the office to contact you about your call or message. Be mindful your provider may be out of the office or your message may require further review. We encourage you to use [employer] for your messages as this is a faster, more efficient way to communicate with our office                         Medication Refills:            Prescription medications require 48-72 business hours to process. We encourage you to use [employer] for your refills. For controlled medications: Please allow 72 business hours to process. Certain medications may require you to  a written prescription at our office. NO narcotic/controlled medications will be prescribed after 4pm Monday through Friday or on weekends              Form/Paperwork Completion:            Please note a $25 fee may incur for all paperwork for completed by our providers. We ask that you allow 7-10 business days. Pre-payment is due prior to picking up/faxing the completed form. You may also download your forms to [employer] to have your doctor print off.      1. Have you been to the ER, urgent care clinic since your last visit? Hospitalized since your last visit?no    2. Have you seen or consulted any other health care providers outside of the [address] [place] [address] since your last visit? Include any pap smears or colon screening. no    Medicare Wellness Visit, Female     The best way to live healthy is to have a lifestyle where you eat a well-balanced diet, exercise regularly, limit alcohol use, and quit all forms of tobacco/nicotine, if applicable. Regular preventive services are another way to keep healthy. Preventive services (vaccines, screening tests, monitoring & exams) can help personalize your care plan, which helps you manage your own care. Screening tests can find health problems at the earliest stages, when they are easiest to treat. [address] follows the current, evidence-based guidelines published by the [facility] [employer] (RUSTSTF) when recommending preventive services for our patients. Because we follow these guidelines, sometimes recommendations change over time as research supports it. (For example, mammograms used to be recommended annually. Even though Medicare will still pay for an annual mammogram, the newer guidelines recommend a mammogram every two years for women of average risk.)  Of course, you and your doctor may decide to screen more often for some diseases, based on your risk and your health status. Preventive services for you include:  - Medicare offers their members a free annual wellness visit, which is time for you and your primary care provider to discuss and plan for your preventive service needs. Take advantage of this benefit every year!  -All adults over the age of 72 should receive the recommended pneumonia vaccines. Current USPSTF guidelines recommend a series of two vaccines for the best pneumonia protection.   -All adults should have a flu vaccine yearly and a tetanus vaccine every 10 years. All adults age 48 and older should receive a shingles vaccine once in their lifetime.    -A bone mass density test is recommended when a woman turns 65 to screen for osteoporosis. This test is only recommended one time, as a screening. Some providers will use this same test as a disease monitoring tool if you already have osteoporosis. -All adults age 38-68 who are overweight should have a diabetes screening test once every three years.   -Other screening tests and preventive services for persons with diabetes include: an eye exam to screen for diabetic retinopathy, a kidney function test, a foot exam, and stricter control over your cholesterol.   -Cardiovascular screening for adults with routine risk involves an electrocardiogram (ECG) at intervals determined by your doctor. -Colorectal cancer screenings should be done for adults age 54-65 with no increased risk factors for colorectal cancer. There are a number of acceptable methods of screening for this type of cancer. Each test has its own benefits and drawbacks. Discuss with your doctor what is most appropriate for you during your annual wellness visit. The different tests include: colonoscopy (considered the best screening method), a fecal occult blood test, a fecal DNA test, and sigmoidoscopy. -Breast cancer screenings are recommended every other year for women of normal risk, age 54-69.  -Cervical cancer screenings for women over age 72 are only recommended with certain risk factors.   -All adults born between [facility] should be screened once for Hepatitis C. Here is a list of your current Health Maintenance items (your personalized list of preventive services) with a due date:  Health Maintenance Due   Topic Date Due    DTaP/Tdap/Td  (1 - Tdap) 03/07/1967    Shingles Vaccine (1 of 2) 03/07/1996    Pneumococcal Vaccine (2 of 2 - PPSV23) 02/01/2017    Yearly Flu Vaccine (1) 09/01/2020    Annual Well Visit  11/04/2020         Medicare Wellness Visit, Female     The best way to live healthy is to have a lifestyle where you eat a well-balanced diet, exercise regularly, limit alcohol use, and quit all forms of tobacco/nicotine, if applicable. Regular preventive services are another way to keep healthy. Preventive services (vaccines, screening tests, monitoring & exams) can help personalize your care plan, which helps you manage your own care. Screening tests can find health problems at the earliest stages, when they are easiest to treat. [facility] follows the current, evidence-based guidelines published by the [place] [employer] (USPSTF) when recommending preventive services for our patients.  Because we follow these guidelines, sometimes recommendations change over time as research supports it. (For example, mammograms used to be recommended annually. Even though Medicare will still pay for an annual mammogram, the newer guidelines recommend a mammogram every two years for women of average risk). Of course, you and your doctor may decide to screen more often for some diseases, based on your risk and your co-morbidities (chronic disease you are already diagnosed with). Preventive services for you include:  - Medicare offers their members a free annual wellness visit, which is time for you and your primary care provider to discuss and plan for your preventive service needs. Take advantage of this benefit every year!  -All adults over the age of 72 should receive the recommended pneumonia vaccines. Current USPSTF guidelines recommend a series of two vaccines for the best pneumonia protection.   -All adults should have a flu vaccine yearly and a tetanus vaccine every 10 years.   -All adults age 48 and older should receive the shingles vaccines (series of two vaccines). -All adults age 38-68 who are overweight should have a diabetes screening test once every three years.   -All adults born between [birth year] and 1964 should be screened once for Hepatitis C.  -Other screening tests and preventive services for persons with diabetes include: an eye exam to screen for diabetic retinopathy, a kidney function test, a foot exam, and stricter control over your cholesterol.   -Cardiovascular screening for adults with routine risk involves an electrocardiogram (ECG) at intervals determined by your doctor.   -Colorectal cancer screenings should be done for adults age 54-65 with no increased risk factors for colorectal cancer. There are a number of acceptable methods of screening for this type of cancer. Each test has its own benefits and drawbacks. Discuss with your doctor what is most appropriate for you during your annual wellness visit.  The different tests include: colonoscopy (considered the best screening method), a fecal occult blood test, a fecal DNA test, and sigmoidoscopy.    -A bone mass density test is recommended when a woman turns 65 to screen for osteoporosis. This test is only recommended one time, as a screening. Some providers will use this same test as a disease monitoring tool if you already have osteoporosis. -Breast cancer screenings are recommended every other year for women of normal risk, age 54-69.  -Cervical cancer screenings for women over age 72 are only recommended with certain risk factors.      Here is a list of your current Health Maintenance items (your personalized list of preventive services) with a due date:  Health Maintenance Due   Topic Date Due    DTaP/Tdap/Td  (1 - Tdap) 03/07/1967    Shingles Vaccine (1 of 2) 03/07/1996    Pneumococcal Vaccine (2 of 2 - PPSV23) 02/01/2017    Yearly Flu Vaccine (1) 09/01/2020    Annual Well Visit  11/04/2020

## 2020-11-12 NOTE — PROGRESS NOTES
Well      This is the Subsequent Medicare Annual Wellness Exam, performed 12 months or more after the Initial AWV or the last Subsequent AWV    I have reviewed the patient's medical history in detail and updated the computerized patient record. History     Past Medical History:   Diagnosis Date    Arthritis     LOWER SPINE    Cancer ([address]     kidney (right)    Chronic kidney disease 1996    right kidney removed - CANCER    Diabetes ([address]     She is controlling with weight loss    Diabetes mellitus ([address] 10/8/2014    NIDDM    Hepatitis C     hx hep C from blood transfusion per pt; Treated by Dr. Branden Alanis Hyperlipidemia     Hypertension     Ill-defined condition 1990s    HEPATITIS C - treated with medication    Liver disease     Personal history of renal cancer 3/2/2015    Thyroid disease 1999    Thyroidectomy    Vertigo       Past Surgical History:   Procedure Laterality Date    COLONOSCOPY N/A 6/20/2017    COLONOSCOPY performed by Saundra Fine. Maya Contreras MD at [facility] ENDOSCOPY    HX BREAST REDUCTION Bilateral 1996    HX COLONOSCOPY  3/5/12    Repeat in 5 years (Dr. Angelita Stevens)    HX GYN  1980s    tubal pregnancy    HX HEENT      WISDOM TEETH X4    HX OTHER SURGICAL  1999    thyroidectomy    HX OTHER SURGICAL      colonoscopy x 2 - ?polyps    HX OTHER SURGICAL  04/08/2019    surg [facility] prolasp bladder at [address] [facility]  04/08/2019    prolasp bladder surg    HX RITESH AND BSO Bilateral 1995    HX TONSILLECTOMY      2or 4years old   [address]    kidney removed on right     Current Outpatient Medications   Medication Sig Dispense Refill    amLODIPine (NORVASC) 5 mg tablet TAKE 1 TABLET BY MOUTH  DAILY 90 Tab 3    cloNIDine HCL (CATAPRES) 0.1 mg tablet Take 1 Tab by mouth three (3) times daily. 270 Tab 1    SITagliptin (JANUVIA) 100 mg tablet Take 1 Tab by mouth daily. 90 Tab 1    SITagliptin (JANUVIA) 100 mg tablet Take 1 Tab by mouth daily.  30 Tab 11    atorvastatin (LIPITOR) 20 mg tablet Take 1 tablet by mouth once daily 90 Tab 1    cloNIDine HCL (CATAPRES) 0.1 mg tablet Take 1 Tab by mouth two (2) times a day. 180 Tab 2    irbesartan (AVAPRO) 150 mg tablet Take 1 Tab by mouth daily. 90 Tab 2    flaxseed oil (OMEGA 3 PO) Take  by mouth.  therapeutic multivitamin (THERAGRAN) tablet Take 1 Tab by mouth daily.  levothyroxine (SYNTHROID) 50 mcg tablet TAKE ONE TABLET BY MOUTH  ONCE DAILY BEFORE BREAKFAST 90 Tab 3    hydrochlorothiazide (HYDRODIURIL) 25 mg tablet Take 1 Tab by mouth daily as needed. (Patient taking differently: Take 25 mg by mouth daily as needed. Pt.takes as need for lower ext. swelling) 30 Tab 5    acetaminophen (TYLENOL EXTRA STRENGTH) 500 mg tablet Take 500-1,000 mg by mouth every six (6) hours as needed for Pain.       metFORMIN (GLUCOPHAGE) 500 mg tablet TAKE 1 TABLET BY MOUTH TWO  TIMES DAILY WITH MEALS 180 Tab 3     Allergies   Allergen Reactions    Codeine Hives     Family History   Problem Relation Age of Onset    Diabetes Mother     Kidney Disease Mother     Heart Disease Father     Kidney Disease Father         dialysis    Diabetes Sister     Diabetes Daughter     Hypertension Daughter     Hypertension Son     No Known Problems Son     No Known Problems Brother     Anesth Problems Neg Hx      Social History     Tobacco Use    Smoking status: Former Smoker     Packs/day: 0.25     Years: 8.00     Pack years: 2.00     Last attempt to quit: 1992     Years since quittin.6    Smokeless tobacco: Never Used    Tobacco comment: quit smoking 30-40 yrs ago   Substance Use Topics    Alcohol use: No     Patient Active Problem List   Diagnosis Code    Hypertension I10    Hepatitis C B19.20    Renal cancer ([facility] C64.9    Hypothyroid E03.9    Anemia D64.9    S/p nephrectomy, right Z90.5    Glucose intolerance (impaired glucose tolerance) R73.02    Diabetes mellitus (HCC) E11.9    History of hysterectomy including cervix Z90.710    History of bilateral salpingo-oophorectomy Z90.79, Z90.722    Personal history of renal cancer Z85.528    Bartholin cyst N75.0    Type 2 diabetes mellitus with nephropathy (HCC) E11.21    Vaginal vault prolapse N81.9    CKD (chronic kidney disease) stage 3, GFR 30-59 ml/min N18.30    Vomiting R11.10       Depression Risk Factor Screening:     3 most recent PHQ Screens 11/12/2020   Little interest or pleasure in doing things Not at all   Feeling down, depressed, irritable, or hopeless Not at all   Total Score PHQ 2 0     Alcohol Risk Factor Screening: You do not drink alcohol or very rarely. Functional Ability and Level of Safety:   Hearing Loss  Hearing is good. Activities of Daily Living  The home contains: no safety equipment. Patient does total self care    Fall Risk  Fall Risk Assessment, last 12 mths 11/12/2020   Able to walk? Yes   Fall in past 12 months? No       Abuse Screen  Patient is not abused    Cognitive Screening   Evaluation of Cognitive Function:  Has your family/caregiver stated any concerns about your memory: no  Normal  MMSE 30/30    Patient Care Team   Patient Care Team:  Fidel Ceron MD as PCP - General (Internal Medicine)  Fidel Ceron MD as PCP - [facility] Empaneled Provider  James Jerry MD (Nephrology)  Maribel Gray as Consulting Provider (Optometry)  Manisha Man MD (Gastroenterology)    Assessment/Plan   Education and counseling provided:  Are appropriate based on today's review and evaluation    Diagnoses and all orders for this visit:    1. Type 2 diabetes mellitus with nephropathy (HCC)  -      DIABETES FOOT EXAM    2. Essential hypertension    3. Mixed hyperlipidemia    4. Hypothyroidism due to acquired atrophy of thyroid    5. Anemia, unspecified type    6. Vitamin B12 deficiency anemia due to selective vitamin B12 malabsorption with proteinuria    7. Stage 3 chronic kidney disease, unspecified whether stage 3a or 3b CKD    8.  Needs flu shot  -     FLU (FLUAD QUAD INFLUENZA VACCINE,QUAD,ADJUVANTED)    9. Medicare annual wellness visit, subsequent    10.  Encounter for immunization  -     ADMIN PNEUMOCOCCAL VACCINE  -     PNEUMOCOCCAL POLYSACCHARIDE VACCINE, 23-VALENT, ADULT OR IMMUNOSUPPRESSED PT DOSE,        Health Maintenance Due   Topic Date Due    DTaP/Tdap/Td series (1 - Tdap) 03/07/1967    Shingrix Vaccine Age 50> (1 of 2) 03/07/1996    Pneumococcal 65+ years (2 of 2 - PPSV23) 02/01/2017    Flu Vaccine (1) 09/01/2020    Medicare Yearly Exam  11/04/2020

## 2020-11-12 NOTE — PROGRESS NOTES
SUBJECTIVE:   Ms. Freedom Sharma is a 77 y.o. female who is here for the following complaint, and also for follow up of routine medical issues. She had been seeing Dr. Samuel Parrish, and Dr. Malachi Medina before that. Chief Complaint   Patient presents with    Hypertension     6 month f.u       She is seeing Dr. Sonam Hastings now for kidney dysfunction. Stress: Health, son is alcoholic. She had lost weight with help of nutritionist, but this is creeping up again:   Wt Readings from Last 3 Encounters:   11/12/20 193 lb (87.5 kg)   03/02/20 193 lb 9.6 oz (87.8 kg)   11/04/19 198 lb 12.8 oz (90.2 kg)     DM: 100-112    Her numbness L foot has improved. LBP is stable. \"I was told I have arthritis in the spine\" by Dr. Shobha Holbrook, orthopedist.  She has been through treatment with Dr. Ramesh Frias, now seeing someone else in his office, for Hep C, obtained during a prior blood transfusion. \"He said after the last visit I could return just as needed. \"  At this time, she is otherwise doing well and has brought no other complaints to my attention today. For a list of the medical issues addressed today, see the assessment and plan below. PMH:   Past Medical History:   Diagnosis Date    Arthritis     LOWER SPINE    Cancer ([address]     kidney (right)    Chronic kidney disease 1996    right kidney removed - CANCER    Diabetes ([address]     She is controlling with weight loss    Diabetes mellitus ([address] 10/8/2014    NIDDM    Hepatitis C     hx hep C from blood transfusion per pt; Treated by Dr. Lexis Hsieh Hyperlipidemia     Hypertension     Ill-defined condition 1990s    HEPATITIS C - treated with medication    Liver disease     Personal history of renal cancer 3/2/2015    Thyroid disease 1999    Thyroidectomy    Vertigo        Past Surgical History:   Procedure Laterality Date    COLONOSCOPY N/A 6/20/2017    COLONOSCOPY performed by Dea Olivia.  Radha Young MD at [facility] ENDOSCOPY    HX BREAST REDUCTION Bilateral 1996    HX COLONOSCOPY 3/5/12    Repeat in 5 years (Dr. Keron Colby)    HX GYN  1980s    tubal pregnancy    HX HEENT      WISDOM TEETH X4    HX OTHER SURGICAL  1999    thyroidectomy    HX OTHER SURGICAL      colonoscopy x 2 - ?polyps    HX OTHER SURGICAL  04/08/2019    s[facility] prolasp bladder at [address]  04/08/2019    prolasp bladder surg    HX RITESH AND BSO Bilateral 1995    HX TONSILLECTOMY      2or 4years old   [address]    kidney removed on right       All: She is allergic to codeine. Current Outpatient Medications   Medication Sig    amLODIPine (NORVASC) 5 mg tablet TAKE 1 TABLET BY MOUTH  DAILY    cloNIDine HCL (CATAPRES) 0.1 mg tablet Take 1 Tab by mouth three (3) times daily.  SITagliptin (JANUVIA) 100 mg tablet Take 1 Tab by mouth daily.  SITagliptin (JANUVIA) 100 mg tablet Take 1 Tab by mouth daily.  atorvastatin (LIPITOR) 20 mg tablet Take 1 tablet by mouth once daily    cloNIDine HCL (CATAPRES) 0.1 mg tablet Take 1 Tab by mouth two (2) times a day.  irbesartan (AVAPRO) 150 mg tablet Take 1 Tab by mouth daily.  flaxseed oil (OMEGA 3 PO) Take  by mouth.  therapeutic multivitamin (THERAGRAN) tablet Take 1 Tab by mouth daily.  levothyroxine (SYNTHROID) 50 mcg tablet TAKE ONE TABLET BY MOUTH  ONCE DAILY BEFORE BREAKFAST    hydrochlorothiazide (HYDRODIURIL) 25 mg tablet Take 1 Tab by mouth daily as needed. (Patient taking differently: Take 25 mg by mouth daily as needed. Pt.takes as need for lower ext. swelling)    acetaminophen (TYLENOL EXTRA STRENGTH) 500 mg tablet Take 500-1,000 mg by mouth every six (6) hours as needed for Pain.  metFORMIN (GLUCOPHAGE) 500 mg tablet TAKE 1 TABLET BY MOUTH TWO  TIMES DAILY WITH MEALS     No current facility-administered medications for this visit. FH: Her family history includes Diabetes in her daughter, mother, and sister;  Heart Disease in her father; Hypertension in her daughter and son; Kidney Disease in her father and mother; No Known Problems in her brother and son. SH: Retired Lpn, doing private duty nursing. She reports that she quit smoking about 28 years ago. She has a 2.00 pack-year smoking history. She has never used smokeless tobacco. She reports that she does not drink alcohol or use drugs. ROS: See above; Complete ROS otherwise negative. OBJECTIVE:   Vitals:   Visit Vitals  BP (!) 150/90 (BP 1 Location: Left arm, BP Patient Position: Sitting)   Pulse 77   Temp (!) 96.3 °F (35.7 °C) (Temporal)   Resp 16   Ht 5' 4\" (1.626 m)   Wt 193 lb (87.5 kg)   LMP  (LMP Unknown)   SpO2 99%   BMI 33.13 kg/m²      Gen: Pleasant 77 y.o.  female in NAD. HEENT: PERRLA. EOMI. OP moist and pink. Neck: Supple. No LAD. HEART: RRR, No M/G/R.    LUNGS: CTAB No W/R. ABDOMEN: S, NT, ND, BS+. EXTREMITIES: Warm. No C/C/E.  MUSCULOSKELETAL: Normal ROM, muscle strength 5/5 all groups. NEURO: Alert and oriented x 3. Cranial nerves grossly intact. No focal sensory or motor deficits noted. SKIN: Warm. Dry. Keloid at site of prior thyroid surgery. Lab Results   Component Value Date/Time    Sodium 145 (H) 09/25/2020 08:14 AM    Potassium 4.5 09/25/2020 08:14 AM    Chloride 110 (H) 09/25/2020 08:14 AM    CO2 21 09/25/2020 08:14 AM    Anion gap 7 02/24/2020 08:51 AM    Glucose 121 (H) 09/25/2020 08:14 AM    BUN 35 (H) 09/25/2020 08:14 AM    Creatinine 2.61 (H) 09/25/2020 08:14 AM    BUN/Creatinine ratio 13 09/25/2020 08:14 AM    GFR est AA 20 (L) 09/25/2020 08:14 AM    GFR est non-AA 17 (L) 09/25/2020 08:14 AM    Calcium 9.3 09/25/2020 08:14 AM    Bilirubin, total 0.3 09/14/2020 12:00 AM    ALT (SGPT) 13 09/14/2020 12:00 AM    Alk.  phosphatase 86 09/14/2020 12:00 AM    Protein, total 7.1 09/14/2020 12:00 AM    Albumin 4.3 09/14/2020 12:00 AM    Globulin 3.4 02/23/2020 04:01 AM    A-G Ratio 1.5 09/14/2020 12:00 AM       Lab Results   Component Value Date/Time    Cholesterol, total 161 09/14/2020 12:00 AM    HDL Cholesterol 54 09/14/2020 12:00 AM    LDL, calculated 62.6 02/23/2020 04:01 AM    LDL Chol Calc (NIH) 81 09/14/2020 12:00 AM    Triglyceride 153 (H) 09/14/2020 12:00 AM    CHOL/HDL Ratio 2.3 02/23/2020 04:01 AM        Lab Results   Component Value Date/Time    Hemoglobin A1c 6.7 (H) 09/14/2020 12:00 AM       Lab Results   Component Value Date/Time    WBC 10.4 09/14/2020 12:00 AM    HGB 11.7 09/14/2020 12:00 AM    HCT 35.8 09/14/2020 12:00 AM    PLATELET 877 01/02/9419 12:00 AM    MCV 83 09/14/2020 12:00 AM       Lab Results   Component Value Date/Time    TSH 1.090 09/14/2020 12:00 AM         ASSESSMENT/ PLAN:   1. Diabetes: Controlled. Continue current measures. 2. Hypertension: High today, okay previously. ?WCS  3. Thyroid disease: Euthyroid. Watch TSH. 4. Chronic kidney disease:  Reviewed labs. Watch labs. Seeing Dr. Bentley Desai. 5. Arthritis: Chronic and stable. 6. Hyperlipidemia: Doing worse. Discussed her high risk of cardiovascular complications. Lipitor 20.    7. Hepatitis C: F/U with GI as needed--apparently \"turned loose\". 8. Obesity: Diet and exercise. Weight down a bit; keep up the good work. 9. Neuropathy L toe: ?Sciatica vs other compressive neuropathy. Doing better lately. Follow-up and Dispositions    · Return in about 6 months (around 5/12/2021) for DM, etc.         I have reviewed the patient's medications and risks/side effects/benefits were discussed. Diagnosis(-es) explained to patient and questions answered. Literature provided where appropriate.

## 2020-11-13 LAB
BASOPHILS # BLD AUTO: 0.1 X10E3/UL (ref 0–0.2)
BASOPHILS NFR BLD AUTO: 1 %
CHOLEST SERPL-MCNC: 149 MG/DL (ref 100–199)
EOSINOPHIL # BLD AUTO: 0 X10E3/UL (ref 0–0.4)
EOSINOPHIL NFR BLD AUTO: 0 %
ERYTHROCYTE [DISTWIDTH] IN BLOOD BY AUTOMATED COUNT: 15.2 % (ref 11.7–15.4)
EST. AVERAGE GLUCOSE BLD GHB EST-MCNC: 137 MG/DL
HBA1C MFR BLD: 6.4 % (ref 4.8–5.6)
HCT VFR BLD AUTO: 34 % (ref 34–46.6)
HDLC SERPL-MCNC: 56 MG/DL
HGB BLD-MCNC: 10.9 G/DL (ref 11.1–15.9)
IMM GRANULOCYTES # BLD AUTO: 0 X10E3/UL (ref 0–0.1)
IMM GRANULOCYTES NFR BLD AUTO: 0 %
LDLC SERPL CALC-MCNC: 71 MG/DL (ref 0–99)
LYMPHOCYTES # BLD AUTO: 2.6 X10E3/UL (ref 0.7–3.1)
LYMPHOCYTES NFR BLD AUTO: 29 %
MCH RBC QN AUTO: 26.4 PG (ref 26.6–33)
MCHC RBC AUTO-ENTMCNC: 32.1 G/DL (ref 31.5–35.7)
MCV RBC AUTO: 82 FL (ref 79–97)
MONOCYTES # BLD AUTO: 0.6 X10E3/UL (ref 0.1–0.9)
MONOCYTES NFR BLD AUTO: 7 %
NEUTROPHILS # BLD AUTO: 5.6 X10E3/UL (ref 1.4–7)
NEUTROPHILS NFR BLD AUTO: 63 %
PLATELET # BLD AUTO: 218 X10E3/UL (ref 150–450)
RBC # BLD AUTO: 4.13 X10E6/UL (ref 3.77–5.28)
TRIGL SERPL-MCNC: 124 MG/DL (ref 0–149)
TSH SERPL DL<=0.005 MIU/L-ACNC: 1.05 UIU/ML (ref 0.45–4.5)
VIT B12 SERPL-MCNC: >2000 PG/ML (ref 232–1245)
VLDLC SERPL CALC-MCNC: 22 MG/DL (ref 5–40)
WBC # BLD AUTO: 8.9 X10E3/UL (ref 3.4–10.8)

## 2020-12-18 ENCOUNTER — TELEPHONE (OUTPATIENT)
Dept: ONCOLOGY | Age: 75
End: 2020-12-18

## 2020-12-18 NOTE — TELEPHONE ENCOUNTER
New Patient    Patient Name: Wendy Dates    : 47    Physician Referral: Dr Nishi Malin    Diagnosis: Abnormal Protein    Phone #: [phone]    Records: in Media

## 2020-12-30 DIAGNOSIS — E78.2 MIXED HYPERLIPIDEMIA: ICD-10-CM

## 2020-12-30 RX ORDER — ATORVASTATIN CALCIUM 20 MG/1
TABLET, FILM COATED ORAL
Qty: 90 TAB | Refills: 3 | Status: SHIPPED | OUTPATIENT
Start: 2020-12-30 | End: 2021-07-08

## 2020-12-30 RX ORDER — LEVOTHYROXINE SODIUM 50 UG/1
TABLET ORAL
Qty: 90 TAB | Refills: 3 | Status: SHIPPED | OUTPATIENT
Start: 2020-12-30 | End: 2021-04-05 | Stop reason: SDUPTHER

## 2021-01-01 DIAGNOSIS — I10 ESSENTIAL HYPERTENSION: ICD-10-CM

## 2021-01-04 RX ORDER — CLONIDINE HYDROCHLORIDE 0.1 MG/1
TABLET ORAL
Qty: 270 TAB | Refills: 3 | Status: SHIPPED | OUTPATIENT
Start: 2021-01-04 | End: 2022-06-13

## 2021-01-05 NOTE — PROGRESS NOTES
Ty Peacock is a 77 y.o. female here for new patient appt for abnormal protein. Referred by Dr Miriam Castellanos  Routine lab work and urine specimen was done to check on her kidney function. Denies any bruising, headaches, nausea, fatigue.

## 2021-01-06 ENCOUNTER — OFFICE VISIT (OUTPATIENT)
Dept: ONCOLOGY | Age: 76
End: 2021-01-06
Payer: MEDICARE

## 2021-01-06 VITALS
DIASTOLIC BLOOD PRESSURE: 84 MMHG | HEIGHT: 64 IN | SYSTOLIC BLOOD PRESSURE: 160 MMHG | BODY MASS INDEX: 32.13 KG/M2 | TEMPERATURE: 97.8 F | WEIGHT: 188.2 LBS | HEART RATE: 83 BPM

## 2021-01-06 DIAGNOSIS — D47.2 MGUS (MONOCLONAL GAMMOPATHY OF UNKNOWN SIGNIFICANCE): Primary | ICD-10-CM

## 2021-01-06 PROCEDURE — G8399 PT W/DXA RESULTS DOCUMENT: HCPCS | Performed by: INTERNAL MEDICINE

## 2021-01-06 PROCEDURE — G8753 SYS BP > OR = 140: HCPCS | Performed by: INTERNAL MEDICINE

## 2021-01-06 PROCEDURE — G8417 CALC BMI ABV UP PARAM F/U: HCPCS | Performed by: INTERNAL MEDICINE

## 2021-01-06 PROCEDURE — 99204 OFFICE O/P NEW MOD 45 MIN: CPT | Performed by: INTERNAL MEDICINE

## 2021-01-06 PROCEDURE — G8432 DEP SCR NOT DOC, RNG: HCPCS | Performed by: INTERNAL MEDICINE

## 2021-01-06 PROCEDURE — 1101F PT FALLS ASSESS-DOCD LE1/YR: CPT | Performed by: INTERNAL MEDICINE

## 2021-01-06 PROCEDURE — G9899 SCRN MAM PERF RSLTS DOC: HCPCS | Performed by: INTERNAL MEDICINE

## 2021-01-06 PROCEDURE — 3017F COLORECTAL CA SCREEN DOC REV: CPT | Performed by: INTERNAL MEDICINE

## 2021-01-06 PROCEDURE — G8427 DOCREV CUR MEDS BY ELIG CLIN: HCPCS | Performed by: INTERNAL MEDICINE

## 2021-01-06 PROCEDURE — 1090F PRES/ABSN URINE INCON ASSESS: CPT | Performed by: INTERNAL MEDICINE

## 2021-01-06 PROCEDURE — G8754 DIAS BP LESS 90: HCPCS | Performed by: INTERNAL MEDICINE

## 2021-01-06 PROCEDURE — G8536 NO DOC ELDER MAL SCRN: HCPCS | Performed by: INTERNAL MEDICINE

## 2021-01-06 NOTE — LETTER
1/24/2021 Patient: Glenda Winter YOB: 1945 Date of Visit: 1/6/2021 Diana Cardoza MD 
[address] [address] [address] [employer] Dear Diana Cardoza MD, Thank you for referring Ms. Elton Barrera to [address] [place] [address] for evaluation. My notes for this consultation are attached. If you have questions, please do not hesitate to call me. I look forward to following your patient along with you.  
 
 
Sincerely, 
 
Joana Dandy, MD

## 2021-01-07 NOTE — PROGRESS NOTES
[address] [facility]  [address], [address] [facility] [place], [address] [place]  [phone]    Hematology Consultation Note        Patient: Glenda Winter MRN: [id number]  SSN: [id number]    YOB: 1945  Age: 77 y.o. Sex: female        Subjective:      Glenda Winter is a 77 y.o. female who I am seeing in consultation for evaluation and management of elevated serum protein level. The patient underwent routine laboratory evaluation and was noted to have high protein level. The patient denies recent infections, neuropathy, weight loss, loss of appetite or bone pain. Review of Systems:    Constitutional: negative  Eyes: negative  Ears, Nose, Mouth, Throat, and Face: negative  Respiratory: negative  Cardiovascular: negative  Gastrointestinal: negative  Genitourinary:negative  Integument/Breast: negative  Hematologic/Lymphatic: negative  Musculoskeletal:negative  Neurological: negative      Past Medical History:   Diagnosis Date    Arthritis     LOWER SPINE    Cancer ([address]     kidney (right)    Chronic kidney disease 1996    right kidney removed - CANCER    Diabetes ([address]     She is controlling with weight loss    Diabetes mellitus ([address] 10/8/2014    NIDDM    Hepatitis C     hx hep C from blood transfusion per pt; Treated by Dr. Jewel Rivers Hyperlipidemia     Hypertension     Ill-defined condition 1990s    HEPATITIS C - treated with medication    Liver disease     Personal history of renal cancer 3/2/2015    Thyroid disease 1999    Thyroidectomy    Vertigo      Past Surgical History:   Procedure Laterality Date    COLONOSCOPY N/A 6/20/2017    COLONOSCOPY performed by Magdalena Elise.  Jose Raul Mcpherson MD at [facility] ENDOSCOPY    HX BREAST REDUCTION Bilateral 1996    HX COLONOSCOPY  3/5/12    Repeat in 5 years (Dr. Luz Pizarro)    HX GYN  1980s    tubal pregnancy    HX HEENT      WISDOM TEETH X4    [facility] thyroidectomy    HX OTHER SURGICAL      colonoscopy x 2 - ?polyps    HX OTHER SURGICAL  2019    s[facility] bladder [facility] [address] [facility]  2019    prolasp bladder surg    HX RITESH AND BSO Bilateral     HX TONSILLECTOMY      2or 4years old   [address]    kidney removed on right      Family History   Problem Relation Age of Onset    Diabetes Mother     Kidney Disease Mother     Heart Disease Father     Kidney Disease Father         dialysis    Diabetes Sister     Diabetes Daughter     Hypertension Daughter     Hypertension Son     No Known Problems Son     No Known Problems Brother     Anesth Problems Neg Hx      Social History     Tobacco Use    Smoking status: Former Smoker     Packs/day: 0.25     Years: 8.00     Pack years: 2.00     Quit date: 1992     Years since quittin.7    Smokeless tobacco: Never Used    Tobacco comment: quit smoking 30-40 yrs ago   Substance Use Topics    Alcohol use: No      Prior to Admission medications    Medication Sig Start Date End Date Taking? Authorizing Provider   cloNIDine HCL (CATAPRES) 0.1 mg tablet TAKE 1 TABLET BY MOUTH 3  TIMES DAILY 21  Yes Neeta Moy MD   levothyroxine (SYNTHROID) 50 mcg tablet TAKE ONE TABLET BY MOUTH  ONCE DAILY BEFORE BREAKFAST 20  Yes Neeta Moy MD   atorvastatin (LIPITOR) 20 mg tablet TAKE 1 TABLET BY MOUTH  DAILY 20  Yes Neeta Moy MD   OneTouch Ultra Blue Test Strip strip CHECK BLOOD SUGAR 2-3 TIMES A WEEK 11/15/20  Yes Neeta Moy MD   amLODIPine (NORVASC) 5 mg tablet TAKE 1 TABLET BY MOUTH  DAILY 10/24/20  Yes Neeta Moy MD   SITagliptin (JANUVIA) 100 mg tablet Take 1 Tab by mouth daily. 20  Yes Neeta Moy MD   SITagliptin (JANUVIA) 100 mg tablet Take 1 Tab by mouth daily. 20  Yes Neeta Moy MD   cloNIDine HCL (CATAPRES) 0.1 mg tablet Take 1 Tab by mouth two (2) times a day.  20  Yes Neeta Moy MD irbesartan (AVAPRO) 150 mg tablet Take 1 Tab by mouth daily. 4/7/20  Yes Brenna Borrego MD   flaxseed oil (OMEGA 3 PO) Take  by mouth. Yes Provider, Historical   therapeutic multivitamin (THERAGRAN) tablet Take 1 Tab by mouth daily. Yes Provider, Historical   acetaminophen (TYLENOL EXTRA STRENGTH) 500 mg tablet Take 500-1,000 mg by mouth every six (6) hours as needed for Pain. Yes Provider, Historical   metFORMIN (GLUCOPHAGE) 500 mg tablet TAKE 1 TABLET BY MOUTH TWO  TIMES DAILY WITH MEALS 11/26/19   Brenna Borrego MD   hydrochlorothiazide (HYDRODIURIL) 25 mg tablet Take 1 Tab by mouth daily as needed. Patient taking differently: Take 25 mg by mouth daily as needed. Pt.takes as need for lower ext. swelling 8/4/16   Brenna Borrego MD              Allergies   Allergen Reactions    Codeine Hives           Objective:     Vitals:    01/06/21 1509   BP: (!) 160/84   Pulse: 83   Temp: 97.8 °F (36.6 °C)   Weight: 188 lb 3.2 oz (85.4 kg)   Height: 5' 4\" (1.626 m)            Physical Exam:    GENERAL: alert, cooperative, no distress, appears stated age  EYE: conjunctivae/corneas clear. PERRL, EOM's intact  LYMPHATIC: Cervical, supraclavicular, and axillary nodes normal.   THROAT & NECK: normal and no erythema or exudates noted. LUNG: clear to auscultation bilaterally  HEART: regular rate and rhythm, S1, S2 normal, no murmur, click, rub or gallop  ABDOMEN: soft, non-tender. Bowel sounds normal. No masses,  no organomegaly  EXTREMITIES:  extremities normal, atraumatic, no cyanosis or edema  SKIN: Normal.  NEUROLOGIC: AOx3. Gait normal. Reflexes and motor strength normal and symmetric. Cranial nerves 2-12 and sensation grossly intact.       Lab Results   Component Value Date/Time    WBC 8.9 11/12/2020 10:42 AM    HGB 10.9 (L) 11/12/2020 10:42 AM    HCT 34.0 11/12/2020 10:42 AM    PLATELET 484 21/27/5509 10:42 AM    MCV 82 11/12/2020 10:42 AM         Lab Results   Component Value Date/Time    Sodium 145 (H) 09/25/2020 08:14 AM    Potassium 4.5 09/25/2020 08:14 AM    Chloride 110 (H) 09/25/2020 08:14 AM    CO2 21 09/25/2020 08:14 AM    Anion gap 7 02/24/2020 08:51 AM    Glucose 121 (H) 09/25/2020 08:14 AM    BUN 35 (H) 09/25/2020 08:14 AM    Creatinine 2.61 (H) 09/25/2020 08:14 AM    BUN/Creatinine ratio 13 09/25/2020 08:14 AM    GFR est AA 20 (L) 09/25/2020 08:14 AM    GFR est non-AA 17 (L) 09/25/2020 08:14 AM    Calcium 9.3 09/25/2020 08:14 AM    Bilirubin, total 0.3 09/14/2020 12:00 AM    Alk. phosphatase 86 09/14/2020 12:00 AM    Protein, total 7.1 09/14/2020 12:00 AM    Albumin 4.3 09/14/2020 12:00 AM    Globulin 3.4 02/23/2020 04:01 AM    A-G Ratio 1.5 09/14/2020 12:00 AM    ALT (SGPT) 13 09/14/2020 12:00 AM    AST (SGOT) 17 09/14/2020 12:00 AM         Assessment:     1. MGUS     M-spike: undetectable in blood  Urine: kappa light chain  Abnormal kappa/lambda ratio: 10.64; free kappa 359    > Observation  > Additional labs    In presence of chronic kidney disease and significant elevated kappa light chain, I think we have to rule out systemic amyloidosis  I may consider a bone marrow biopsy to rule out light chain amyloidosis      Plan:       1. 24 hr urine protein  2. Consider bone marrow biopsy and amyloid staining  3. Return in 3 months      Signed by: Javier Montes MD                     January 6, 2021        CC. Wanda Casas MD  CC.  Reese Alexis MD

## 2021-03-23 ENCOUNTER — TRANSCRIBE ORDER (OUTPATIENT)
Dept: SCHEDULING | Age: 76
End: 2021-03-23

## 2021-03-23 DIAGNOSIS — R22.1 LOCALIZED SWELLING, MASS AND LUMP, NECK: Primary | ICD-10-CM

## 2021-03-26 ENCOUNTER — HOSPITAL ENCOUNTER (OUTPATIENT)
Dept: ULTRASOUND IMAGING | Age: 76
Discharge: HOME | End: 2021-03-26
Attending: INTERNAL MEDICINE
Payer: MEDICARE

## 2021-03-26 DIAGNOSIS — R22.1 LOCALIZED SWELLING, MASS AND LUMP, NECK: ICD-10-CM

## 2021-03-26 PROCEDURE — 76536 US EXAM OF HEAD AND NECK: CPT

## 2021-04-05 ENCOUNTER — OFFICE VISIT (OUTPATIENT)
Dept: ONCOLOGY | Age: 76
End: 2021-04-05
Payer: MEDICARE

## 2021-04-05 VITALS
SYSTOLIC BLOOD PRESSURE: 162 MMHG | HEIGHT: 64 IN | TEMPERATURE: 97.3 F | RESPIRATION RATE: 16 BRPM | DIASTOLIC BLOOD PRESSURE: 85 MMHG | WEIGHT: 189 LBS | OXYGEN SATURATION: 99 % | BODY MASS INDEX: 32.27 KG/M2 | HEART RATE: 86 BPM

## 2021-04-05 DIAGNOSIS — D47.2 MGUS (MONOCLONAL GAMMOPATHY OF UNKNOWN SIGNIFICANCE): Primary | ICD-10-CM

## 2021-04-05 PROCEDURE — G8753 SYS BP > OR = 140: HCPCS | Performed by: INTERNAL MEDICINE

## 2021-04-05 PROCEDURE — 3017F COLORECTAL CA SCREEN DOC REV: CPT | Performed by: INTERNAL MEDICINE

## 2021-04-05 PROCEDURE — G8417 CALC BMI ABV UP PARAM F/U: HCPCS | Performed by: INTERNAL MEDICINE

## 2021-04-05 PROCEDURE — 1090F PRES/ABSN URINE INCON ASSESS: CPT | Performed by: INTERNAL MEDICINE

## 2021-04-05 PROCEDURE — G8536 NO DOC ELDER MAL SCRN: HCPCS | Performed by: INTERNAL MEDICINE

## 2021-04-05 PROCEDURE — G8432 DEP SCR NOT DOC, RNG: HCPCS | Performed by: INTERNAL MEDICINE

## 2021-04-05 PROCEDURE — G8754 DIAS BP LESS 90: HCPCS | Performed by: INTERNAL MEDICINE

## 2021-04-05 PROCEDURE — G8399 PT W/DXA RESULTS DOCUMENT: HCPCS | Performed by: INTERNAL MEDICINE

## 2021-04-05 PROCEDURE — G8427 DOCREV CUR MEDS BY ELIG CLIN: HCPCS | Performed by: INTERNAL MEDICINE

## 2021-04-05 PROCEDURE — 99213 OFFICE O/P EST LOW 20 MIN: CPT | Performed by: INTERNAL MEDICINE

## 2021-04-05 PROCEDURE — 1101F PT FALLS ASSESS-DOCD LE1/YR: CPT | Performed by: INTERNAL MEDICINE

## 2021-04-05 RX ORDER — LEVOTHYROXINE SODIUM 50 UG/1
TABLET ORAL
Qty: 90 TAB | Refills: 0 | Status: SHIPPED | OUTPATIENT
Start: 2021-04-05 | End: 2021-07-08

## 2021-04-05 NOTE — PROGRESS NOTES
Javier Nunes is a 77 y.o. female  Chief Complaint   Patient presents with    Follow-up     2 week     Health Maintenance Due   Topic Date Due    DTaP/Tdap/Td series (1 - Tdap) Never done    Shingrix Vaccine Age 50> (1 of 2) Never done    Eye Exam Retinal or Dilated  06/25/2020     Visit Vitals  BP (!) 162/85   Pulse 86   Temp 97.3 °F (36.3 °C)   Resp 16   Ht 5' 4\" (1.626 m)   Wt 189 lb (85.7 kg)   SpO2 99%   BMI 32.44 kg/m²     1. Have you been to the ER, urgent care clinic since your last visit? Hospitalized since your last visit? No     2. Have you seen or consulted any other health care providers outside of the [address] [place] [address] since your last visit? Include any pap smears or colon screening.  No

## 2021-04-05 NOTE — PROGRESS NOTES
[address] [facility] [place] [address], [address] [facility], [address] [place] [address]  [phone]    Hematology Follow-up Note      Patient: Madalyn Machado MRN: [id number]  SSN: [id number]    YOB: 1945  Age: 77 y.o. Sex: female        Subjective:      Madalyn Machado is a 77 y.o. female who I am seeing for evaluation and management of elevated serum protein level. The patient underwent routine laboratory evaluation and was noted to have high protein level. The patient denies recent infections, neuropathy, weight loss, loss of appetite or bone pain. She is here today for follow-up and feels well with no new complaints. Review of Systems:    Constitutional: negative  Eyes: negative  Ears, Nose, Mouth, Throat, and Face: negative  Respiratory: negative  Cardiovascular: negative  Gastrointestinal: negative  Genitourinary:negative  Integument/Breast: negative  Hematologic/Lymphatic: negative  Musculoskeletal:negative  Neurological: negative      Past Medical History:   Diagnosis Date    Arthritis     LOWER SPINE    Cancer ([address]     kidney (right)    Chronic kidney disease 1996    right kidney removed - CANCER    Diabetes ([address]     She is controlling with weight loss    Diabetes mellitus ([address] 10/8/2014    NIDDM    Hepatitis C     hx hep C from blood transfusion per pt; Treated by Dr. Cristobal Briceno Hyperlipidemia     Hypertension     Ill-defined condition 1990s    HEPATITIS C - treated with medication    Liver disease     Personal history of renal cancer 3/2/2015    Thyroid disease 1999    Thyroidectomy    Vertigo      Past Surgical History:   Procedure Laterality Date    COLONOSCOPY N/A 6/20/2017    COLONOSCOPY performed by Jer De La Rosa.  Angélica Martinez MD at [facility] [place] ENDOSCOPY    HX BREAST REDUCTION Bilateral 1996    HX COLONOSCOPY  3/5/12    Repeat in 5 years (Dr. Michelle Reyna)    HX GYN  7055X    tubal pregnancy    HX HEENT WISDOM TEETH X4    HX OTHER SURGICAL      thyroidectomy    HX OTHER SURGICAL      colonoscopy x 2 - ?polyps    HX OTHER SURGICAL  2019    surg for prolasp bladder at [address]  2019    prolasp bladder surg    HX RITESH AND BSO Bilateral     HX TONSILLECTOMY      2or 4years old   [address]    kidney removed on right      Family History   Problem Relation Age of Onset    Diabetes Mother     Kidney Disease Mother     Heart Disease Father     Kidney Disease Father         dialysis    Diabetes Sister     Diabetes Daughter     Hypertension Daughter     Hypertension Son     No Known Problems Son     No Known Problems Brother     Anesth Problems Neg Hx      Social History     Tobacco Use    Smoking status: Former Smoker     Packs/day: 0.25     Years: 8.00     Pack years: 2.00     Quit date: 1992     Years since quittin.0    Smokeless tobacco: Never Used    Tobacco comment: quit smoking 30-40 yrs ago   Substance Use Topics    Alcohol use: No      Prior to Admission medications    Medication Sig Start Date End Date Taking? Authorizing Provider   levothyroxine (SYNTHROID) 50 mcg tablet TAKE ONE TABLET BY MOUTH ONCE DAILY BEFORE BREAKFAST 21  Yes Reema Villalobos MD   Januvia 100 mg tablet TAKE 1 TABLET BY MOUTH  DAILY 21  Yes Lizandro Bryant MD   cloNIDine HCL (CATAPRES) 0.1 mg tablet TAKE 1 TABLET BY MOUTH 3  TIMES DAILY 21  Yes Reema Villalobos MD   atorvastatin (LIPITOR) 20 mg tablet TAKE 1 TABLET BY MOUTH  DAILY 20  Yes Reema Villalobos MD   OneTouch Ultra Blue Test Strip strip CHECK BLOOD SUGAR 2-3 TIMES A WEEK 11/15/20  Yes Reema Villalobos MD   amLODIPine (NORVASC) 5 mg tablet TAKE 1 TABLET BY MOUTH  DAILY 10/24/20  Yes Reema Villalobos MD   SITagliptin (JANUVIA) 100 mg tablet Take 1 Tab by mouth daily. 20  Yes Reema Villalobos MD   cloNIDine HCL (CATAPRES) 0.1 mg tablet Take 1 Tab by mouth two (2) times a day. 4/7/20  Yes Jerel Ricardo MD   flaxseed oil (OMEGA 3 PO) Take  by mouth. Yes Provider, Historical   therapeutic multivitamin (THERAGRAN) tablet Take 1 Tab by mouth daily. Yes Provider, Historical   acetaminophen (TYLENOL EXTRA STRENGTH) 500 mg tablet Take 500-1,000 mg by mouth every six (6) hours as needed for Pain. Yes Provider, Historical   levothyroxine (SYNTHROID) 50 mcg tablet TAKE ONE TABLET BY MOUTH  ONCE DAILY BEFORE BREAKFAST 12/30/20   Jerel Ricardo MD   irbesartan (AVAPRO) 150 mg tablet Take 1 Tab by mouth daily. 4/7/20   Jerel Ricardo MD   metFORMIN (GLUCOPHAGE) 500 mg tablet TAKE 1 TABLET BY MOUTH TWO  TIMES DAILY WITH MEALS 11/26/19   Jerel Ricardo MD   hydrochlorothiazide (HYDRODIURIL) 25 mg tablet Take 1 Tab by mouth daily as needed. Patient taking differently: Take 25 mg by mouth daily as needed. Pt.takes as need for lower ext. swelling 8/4/16   Jerel Ricardo MD              Allergies   Allergen Reactions    Codeine Hives           Objective:     Visit Vitals  BP (!) 162/85   Pulse 86   Temp 97.3 °F (36.3 °C)   Resp 16   Ht 5' 4\" (1.626 m)   Wt 189 lb (85.7 kg)   LMP  (LMP Unknown)   SpO2 99%   BMI 32.44 kg/m²       Pain Scale: 0 - No pain/10  Pain Location:       Physical Exam:    GENERAL: alert, cooperative, no distress, appears stated age  EYE: conjunctivae/corneas clear. LYMPHATIC: Cervical, supraclavicular, and axillary nodes normal.   THROAT & NECK: normal and no erythema or exudates noted. LUNG: clear to auscultation bilaterally  HEART: regular rate and rhythm. ABDOMEN: soft, non-tender. EXTREMITIES:  extremities normal, atraumatic, no cyanosis or edema  SKIN: Normal.  NEUROLOGIC: AOx3.  Gait normal.       Physical exam and ROS has been modified from a prior visit to make it relevant and current        Lab Results   Component Value Date/Time    WBC 8.9 11/12/2020 10:42 AM    HGB 10.9 (L) 11/12/2020 10:42 AM    HCT 34.0 11/12/2020 10:42 AM    PLATELET 713 51/45/4171 10:42 AM    MCV 82 11/12/2020 10:42 AM         Lab Results   Component Value Date/Time    Sodium 145 (H) 09/25/2020 08:14 AM    Potassium 4.5 09/25/2020 08:14 AM    Chloride 110 (H) 09/25/2020 08:14 AM    CO2 21 09/25/2020 08:14 AM    Anion gap 7 02/24/2020 08:51 AM    Glucose 121 (H) 09/25/2020 08:14 AM    BUN 35 (H) 09/25/2020 08:14 AM    Creatinine 2.61 (H) 09/25/2020 08:14 AM    BUN/Creatinine ratio 13 09/25/2020 08:14 AM    GFR est AA 20 (L) 09/25/2020 08:14 AM    GFR est non-AA 17 (L) 09/25/2020 08:14 AM    Calcium 9.3 09/25/2020 08:14 AM    Bilirubin, total 0.3 09/14/2020 12:00 AM    Alk. phosphatase 86 09/14/2020 12:00 AM    Protein, total 7.1 09/14/2020 12:00 AM    Albumin 4.3 09/14/2020 12:00 AM    Globulin 3.4 02/23/2020 04:01 AM    A-G Ratio 1.5 09/14/2020 12:00 AM    ALT (SGPT) 13 09/14/2020 12:00 AM    AST (SGOT) 17 09/14/2020 12:00 AM         Assessment:     1. MGUS     M-spike: undetectable in blood  Urine: kappa light chain  Abnormal kappa/lambda ratio: 10.64; free kappa 359    In presence of chronic kidney disease and significant elevated kappa light chain, I think we have to rule out systemic amyloidosis  I recommend bone marrow biopsy to rule out light chain amyloidosis    Repeat labs  Bone marrow biopsy      Plan:     > Labs today: SPEP, immunofixation, FLC  > Bone marrow biopsy  > Follow-up as needed      I performed a history and physical examination of the patient and discussed his management with the NPP. I reviewed the NPP note and agree with the documented findings and plan of care. The patient was seen in conjunction with Ms. Joann Cartagena. Ms. Kassi Zhao is a women with abnormal serum light chain ratio with anemia and renal insufficiency. She will undergo a bone marrow biopsy to rule out amyloidosis. Exam is normal.       Signed by: Patti Bowers MD                     April 5, 2021      CC. Rea Hollis MD  CC.  Deshaun Franks MD

## 2021-04-05 NOTE — TELEPHONE ENCOUNTER
Pt requests refill of:    ODIPine (NORVASC) 5 mg tablet      Please send to:    [address]aryn [address] [facility],[address], [address] [facility]  [phone]

## 2021-04-05 NOTE — LETTER
4/5/2021 Patient: Javier Nunes YOB: 1945 Date of Visit: 4/5/2021 Lucille Lynn MD 
[address] [facility] [address] [address] [address] [employer] Dear Lucille Lynn MD, Thank you for referring Ms. Blanche Gill to [address] [place] [address] for evaluation. My notes for this consultation are attached. If you have questions, please do not hesitate to call me. I look forward to following your patient along with you.  
 
 
Sincerely, 
 
Sylvie Guzmán MD

## 2021-04-06 ENCOUNTER — OFFICE VISIT (OUTPATIENT)
Dept: SURGERY | Age: 76
End: 2021-04-06
Payer: MEDICARE

## 2021-04-06 VITALS
HEIGHT: 64 IN | OXYGEN SATURATION: 97 % | DIASTOLIC BLOOD PRESSURE: 72 MMHG | SYSTOLIC BLOOD PRESSURE: 145 MMHG | TEMPERATURE: 97.2 F | RESPIRATION RATE: 16 BRPM | WEIGHT: 191.1 LBS | HEART RATE: 86 BPM | BODY MASS INDEX: 32.62 KG/M2

## 2021-04-06 DIAGNOSIS — E04.1 THYROID CYST: Primary | ICD-10-CM

## 2021-04-06 PROCEDURE — 1101F PT FALLS ASSESS-DOCD LE1/YR: CPT | Performed by: SURGERY

## 2021-04-06 PROCEDURE — 99203 OFFICE O/P NEW LOW 30 MIN: CPT | Performed by: SURGERY

## 2021-04-06 PROCEDURE — G8753 SYS BP > OR = 140: HCPCS | Performed by: SURGERY

## 2021-04-06 PROCEDURE — 3017F COLORECTAL CA SCREEN DOC REV: CPT | Performed by: SURGERY

## 2021-04-06 PROCEDURE — G8536 NO DOC ELDER MAL SCRN: HCPCS | Performed by: SURGERY

## 2021-04-06 PROCEDURE — G8754 DIAS BP LESS 90: HCPCS | Performed by: SURGERY

## 2021-04-06 PROCEDURE — G8432 DEP SCR NOT DOC, RNG: HCPCS | Performed by: SURGERY

## 2021-04-06 PROCEDURE — G8427 DOCREV CUR MEDS BY ELIG CLIN: HCPCS | Performed by: SURGERY

## 2021-04-06 PROCEDURE — G8399 PT W/DXA RESULTS DOCUMENT: HCPCS | Performed by: SURGERY

## 2021-04-06 PROCEDURE — 1090F PRES/ABSN URINE INCON ASSESS: CPT | Performed by: SURGERY

## 2021-04-06 PROCEDURE — G8417 CALC BMI ABV UP PARAM F/U: HCPCS | Performed by: SURGERY

## 2021-04-06 NOTE — Clinical Note
4/6/2021 Patient: Ck Leung YOB: 1945 Date of Visit: 4/6/2021 Herve Osborne MD 
[address] [facility] [address] [address] [address] [employer] Goldy Vaca MD 
[address] 
[address] [address] [address] [employer] Dear MD Goldy Kim MD, Thank you for referring Ms. Javad Pedraza to  [facility] for evaluation. My notes for this consultation are attached. If you have questions, please do not hesitate to call me. I look forward to following your patient along with you.  
 
 
Sincerely, 
 
Jake Palacios MD

## 2021-04-06 NOTE — PROGRESS NOTES
Identified pt with two pt identifiers(name and ). Reviewed record in preparation for visit and have obtained necessary documentation. All patient medications has been reviewed. Chief Complaint   Patient presents with    Thyroid Problem     referred by Shelton zavala thyroid mass       Health Maintenance Due   Topic    DTaP/Tdap/Td series (1 - Tdap)    Shingrix Vaccine Age 49> (1 of 2)    Eye Exam Retinal or Dilated        Vitals:    21 1029   BP: (!) 145/72   Pulse: 86   Resp: 16   Temp: 97.2 °F (36.2 °C)   TempSrc: Temporal   SpO2: 97%   Weight: 86.7 kg (191 lb 1.6 oz)   Height: 5' 4\" (1.626 m)   PainSc:   0 - No pain       4. Have you been to the ER, urgent care clinic since your last visit? Hospitalized since your last visit? No    5. Have you seen or consulted any other health care providers outside of the [address] [place] [address] since your last visit? Include any pap smears or colon screening. No      Patient is accompanied by self I have received verbal consent from Javier Nunes to discuss any/all medical information while they are present in the room.

## 2021-04-06 NOTE — PROGRESS NOTES
History and Physical  Encounter Date: 4/6/2021    To:  Marcus Rodriguez MD  CC: Ross Gerardo MD    From: Grace Joel MD    Thank you for referring Nadia Hester. Assessment:   Large cystic lesion on the right thyroid that on my review of the images appears to be consistent with a hemorrhagic cyst.  These can arise in the setting of thyroid nodule. She is status post left thyroidectomy done in 1991 for what turned out to be a benign nodule. The incision is quite low possibly for reasons of cosmesis. Unfortunately, she has developed significant keloid along the scar and has even had that excised with recurrence. Body mass index is 32.8 kg/m². Comorbid hypertension, diabetes. Noh/o heart disease or stroke. S/p right nephrectomy for cancer in 1996. She is not on dialysis. Good functional status. No aspirin or anticoagulation. Plan:   I explained what I thought was the underlying issue and that we would aspirate and send the fluid for cytology. I explained that there is a good chance this will be nondiagnostic for any underlying thyroid nodule but that we would reevaluate in 2 weeks with ultrasound again in the office to see if there is a nodule remaining that could be biopsied with FNA. Discussed the risk of surgery including bleeding, infection,  injury to the parathyroid glands and resultant hypocalcemia, injury to the recurrent laryngeal nerves, and the risks of general anesthetic. The patient understands the risks; any and all questions were answered to the patient's satisfaction. Subjective:      Reyes Farrier is a 77 y.o. female who who is seen in consultation at the request of Marcus Rodriguez for right thyroid cyst.    The patient tells me that she began to notice intermittent swelling there 2 to 3 weeks ago and Veronica Troncoso noticed this as well. She says it has not caused her any pain. She is not having any difficulty swallowing or feelings of stridor.   She does occasionally get short of breath with activity. She has a history of left thyroidectomy for a benign tumor. She is maintained on Synthroid. She also has a history of right nephrectomy for cancer. She is doing quite well from this perspective. Lab Results   Component Value Date/Time    TSH 1.050 11/12/2020 10:42 AM       Past Medical History:   Diagnosis Date    Arthritis     LOWER SPINE    Cancer ([address]     kidney (right)    Chronic kidney disease 1996    right kidney removed - CANCER    Diabetes ([address]     She is controlling with weight loss    Diabetes mellitus ([address] 10/8/2014    NIDDM    Hepatitis C     hx hep C from blood transfusion per pt; Treated by Dr. Teddy Valladares Hyperlipidemia     Hypertension     Ill-defined condition 1990s    HEPATITIS C - treated with medication    Liver disease     Personal history of renal cancer 3/2/2015    Thyroid disease 1999    Thyroidectomy    Vertigo      Past Surgical History:   Procedure Laterality Date    COLONOSCOPY N/A 6/20/2017    COLONOSCOPY performed by Olivia Mcnamara.  Joyce Rico MD at [facility] ENDOSCOPY    HX BREAST REDUCTION Bilateral 1996    HX COLONOSCOPY  3/5/12    Repeat in 5 years (Dr. Paul Hammans)    HX GYN  1980s    tubal pregnancy    HX HEENT      WISDOM TEETH X4    HX OTHER SURGICAL  1999    thyroidectomy    HX OTHER SURGICAL      colonoscopy x 2 - ?polyps    HX OTHER SURGICAL  04/08/2019    surg [facility] prolasp bladder at [address] [facility]  04/08/2019    prolasp bladder surg    HX RITESH AND BSO Bilateral 1995    HX TONSILLECTOMY      2or 4years old   [address]    kidney removed on right      Family History   Problem Relation Age of Onset    Diabetes Mother     Kidney Disease Mother     Heart Disease Father     Kidney Disease Father         dialysis    Diabetes Sister     Diabetes Daughter     Hypertension Daughter     Hypertension Son     No Known Problems Son     No Known Problems Brother     Anesth Problems Neg Hx Social History     Tobacco Use    Smoking status: Former Smoker     Packs/day: 0.25     Years: 8.00     Pack years: 2.00     Quit date: 1992     Years since quittin.0    Smokeless tobacco: Never Used    Tobacco comment: quit smoking 30-40 yrs ago   Substance Use Topics    Alcohol use: No      Current Outpatient Medications   Medication Sig    SITagliptin (Januvia) 50 mg tablet Take 50 mg by mouth daily.  levothyroxine (SYNTHROID) 50 mcg tablet TAKE ONE TABLET BY MOUTH ONCE DAILY BEFORE BREAKFAST    cloNIDine HCL (CATAPRES) 0.1 mg tablet TAKE 1 TABLET BY MOUTH 3  TIMES DAILY    atorvastatin (LIPITOR) 20 mg tablet TAKE 1 TABLET BY MOUTH  DAILY    OneTouch Ultra Blue Test Strip strip CHECK BLOOD SUGAR 2-3 TIMES A WEEK    amLODIPine (NORVASC) 5 mg tablet TAKE 1 TABLET BY MOUTH  DAILY    cloNIDine HCL (CATAPRES) 0.1 mg tablet Take 1 Tab by mouth two (2) times a day.  flaxseed oil (OMEGA 3 PO) Take  by mouth.  therapeutic multivitamin (THERAGRAN) tablet Take 1 Tab by mouth daily.  metFORMIN (GLUCOPHAGE) 500 mg tablet TAKE 1 TABLET BY MOUTH TWO  TIMES DAILY WITH MEALS    acetaminophen (TYLENOL EXTRA STRENGTH) 500 mg tablet Take 500-1,000 mg by mouth every six (6) hours as needed for Pain.  Januvia 100 mg tablet TAKE 1 TABLET BY MOUTH  DAILY    SITagliptin (JANUVIA) 100 mg tablet Take 1 Tab by mouth daily.  irbesartan (AVAPRO) 150 mg tablet Take 1 Tab by mouth daily.  hydrochlorothiazide (HYDRODIURIL) 25 mg tablet Take 1 Tab by mouth daily as needed. (Patient taking differently: Take 25 mg by mouth daily as needed. Pt.takes as need for lower ext. swelling)     No current facility-administered medications for this visit.       Allergies   Allergen Reactions    Codeine Hives       Review of Systems: [] Denies all  Constitutional: [] Fever/chills   [] Sweats   [] Loss of appetite   [] Fatigue/weakness   [] Weight loss  Head & Neck:   [] Headaches   [] Visual loss   [] Hearing loss [] Thyroid problems  Cardiovascular:   [] Stroke   [] Calf pain when walking   [] Chest pain   [] Rapid heart beat       [] Heart attack   [] Stents   [] Congestive heart failure   [] Leg swelling   [] Murmur  Respiratory:   [] Sleep apnea   [] Cough/congestion   [x] Shortness of breath   [] Wheezing/asthma      [] COPD/emphysema  Gastrointestinal:   [] Frequent indigestion   [] Trouble swallowing   [] Nausea   [] Vomiting   [] Bloating   [] Abd pain       [] Diarrhea   [] Constipation   [] Blood in stool   [] Ulcers   [] Intestinal disease   [] Hepatitis    Genitourinary:   [] Painful urination   [] Difficulty urinating   [] Frequent urination       [] Enlarged prostate   [] Vasectomy   [] Blood in urine   [] Dialysis  Musculoskeletal:  [] Muscle aches   [] Back pain   [] Joint pain  Neurologic:    [] Seizures   [] Dizziness   [] Numbness  Hematologic:   [] Nosebleeds   [] Easy bruising   [] Anemia   [] Easy bleeding  Psychiatric:    [] Depression   [] Anxiety   [] Bipolar disorder   [] Schizophrenia    Objective:     Visit Vitals  BP (!) 145/72 (BP 1 Location: Left upper arm, BP Patient Position: Sitting, BP Cuff Size: Large adult)   Pulse 86   Temp 97.2 °F (36.2 °C) (Temporal)   Resp 16   Ht 5' 4\" (1.626 m)   Wt 86.7 kg (191 lb 1.6 oz)   LMP  (LMP Unknown)   SpO2 97%   BMI 32.80 kg/m²         Physical Exam:  General appearance  Alert, cooperative, no distress, appears stated age   Head  Normocephalic, without obvious abnormality, atraumatic   Eyes  conjunctivae/corneas clear. Anicteric. Nose Nares normal. No drainage. Neck Supple, symmetrical, trachea midline, no adenopathy  Thyroid exam reveals palpable nodule on the right. This is nontender. There is no erythema. There is a scar along the level of the clavicles that has keloid. Lungs   Clear to auscultation bilaterally   Heart  Regular rate and rhythm. No murmur, rub or gallop   Abdomen   Soft. Bowel sounds normal. NT.    Extremities extremities normal, atraumatic, no cyanosis or edema   Pulses 2+ and symmetric   Skin Skin color, texture, turgor normal. No rashes.    Lymph nodes Cervical, supraclavicular, and axillary nodes normal.   Neurologic Without overt sensory or motor deficit           Signed By: Mali Bravo MD     April 6, 2021

## 2021-04-08 RX ORDER — AMLODIPINE BESYLATE 5 MG/1
5 TABLET ORAL DAILY
Qty: 90 TAB | Refills: 3 | Status: SHIPPED | OUTPATIENT
Start: 2021-04-08 | End: 2022-06-20 | Stop reason: SDUPTHER

## 2021-04-12 ENCOUNTER — HOSPITAL ENCOUNTER (OUTPATIENT)
Dept: CT IMAGING | Age: 76
Discharge: HOME | End: 2021-04-12
Attending: INTERNAL MEDICINE
Payer: MEDICARE

## 2021-04-12 VITALS
SYSTOLIC BLOOD PRESSURE: 142 MMHG | OXYGEN SATURATION: 98 % | HEIGHT: 64 IN | WEIGHT: 188 LBS | DIASTOLIC BLOOD PRESSURE: 89 MMHG | RESPIRATION RATE: 18 BRPM | BODY MASS INDEX: 32.1 KG/M2 | HEART RATE: 78 BPM | TEMPERATURE: 98.5 F

## 2021-04-12 DIAGNOSIS — D47.2 MGUS (MONOCLONAL GAMMOPATHY OF UNKNOWN SIGNIFICANCE): ICD-10-CM

## 2021-04-12 LAB
BASOPHILS # BLD: 0 K/UL (ref 0–0.1)
BASOPHILS NFR BLD: 0 % (ref 0–1)
DIFFERENTIAL METHOD BLD: ABNORMAL
EOSINOPHIL # BLD: 0.1 K/UL (ref 0–0.4)
EOSINOPHIL NFR BLD: 1 % (ref 0–7)
ERYTHROCYTE [DISTWIDTH] IN BLOOD BY AUTOMATED COUNT: 15.7 % (ref 11.5–14.5)
GLUCOSE BLD STRIP.AUTO-MCNC: 132 MG/DL (ref 65–100)
HCT VFR BLD AUTO: 34.2 % (ref 35–47)
HGB BLD-MCNC: 10.8 G/DL (ref 11.5–16)
IMM GRANULOCYTES # BLD AUTO: 0 K/UL (ref 0–0.04)
IMM GRANULOCYTES NFR BLD AUTO: 0 % (ref 0–0.5)
LYMPHOCYTES # BLD: 2.3 K/UL (ref 0.8–3.5)
LYMPHOCYTES NFR BLD: 20 % (ref 12–49)
MCH RBC QN AUTO: 26.4 PG (ref 26–34)
MCHC RBC AUTO-ENTMCNC: 31.6 G/DL (ref 30–36.5)
MCV RBC AUTO: 83.6 FL (ref 80–99)
MONOCYTES # BLD: 0.6 K/UL (ref 0–1)
MONOCYTES NFR BLD: 6 % (ref 5–13)
NEUTS SEG # BLD: 8.4 K/UL (ref 1.8–8)
NEUTS SEG NFR BLD: 73 % (ref 32–75)
NRBC # BLD: 0 K/UL (ref 0–0.01)
NRBC BLD-RTO: 0 PER 100 WBC
PLATELET # BLD AUTO: 168 K/UL (ref 150–400)
PMV BLD AUTO: 11.7 FL (ref 8.9–12.9)
RBC # BLD AUTO: 4.09 M/UL (ref 3.8–5.2)
SERVICE CMNT-IMP: ABNORMAL
WBC # BLD AUTO: 11.4 K/UL (ref 3.6–11)

## 2021-04-12 PROCEDURE — 88185 FLOWCYTOMETRY/TC ADD-ON: CPT

## 2021-04-12 PROCEDURE — 77030028872 HC BN BIOP NDL ON CNTRL TY TELE -C

## 2021-04-12 PROCEDURE — 85025 COMPLETE CBC W/AUTO DIFF WBC: CPT

## 2021-04-12 PROCEDURE — 88342 IMHCHEM/IMCYTCHM 1ST ANTB: CPT

## 2021-04-12 PROCEDURE — 88184 FLOWCYTOMETRY/ TC 1 MARKER: CPT

## 2021-04-12 PROCEDURE — 88364 INSITU HYBRIDIZATION (FISH): CPT

## 2021-04-12 PROCEDURE — 77030003666 HC NDL SPINAL BD -A

## 2021-04-12 PROCEDURE — 88365 INSITU HYBRIDIZATION (FISH): CPT

## 2021-04-12 PROCEDURE — 88311 DECALCIFY TISSUE: CPT

## 2021-04-12 PROCEDURE — 88305 TISSUE EXAM BY PATHOLOGIST: CPT

## 2021-04-12 PROCEDURE — 77030014115

## 2021-04-12 PROCEDURE — 36415 COLL VENOUS BLD VENIPUNCTURE: CPT

## 2021-04-12 PROCEDURE — 74011250636 HC RX REV CODE- 250/636: Performed by: RADIOLOGY

## 2021-04-12 PROCEDURE — 88313 SPECIAL STAINS GROUP 2: CPT

## 2021-04-12 PROCEDURE — 82962 GLUCOSE BLOOD TEST: CPT

## 2021-04-12 PROCEDURE — 77012 CT SCAN FOR NEEDLE BIOPSY: CPT

## 2021-04-12 RX ORDER — FLUMAZENIL 0.1 MG/ML
0.5 INJECTION INTRAVENOUS ONCE
Status: DISCONTINUED | OUTPATIENT
Start: 2021-04-12 | End: 2021-04-12

## 2021-04-12 RX ORDER — SODIUM CHLORIDE 9 MG/ML
25 INJECTION, SOLUTION INTRAVENOUS CONTINUOUS
Status: DISCONTINUED | OUTPATIENT
Start: 2021-04-12 | End: 2021-04-12

## 2021-04-12 RX ORDER — FENTANYL CITRATE 50 UG/ML
200 INJECTION, SOLUTION INTRAMUSCULAR; INTRAVENOUS
Status: DISCONTINUED | OUTPATIENT
Start: 2021-04-12 | End: 2021-04-12

## 2021-04-12 RX ORDER — NALOXONE HYDROCHLORIDE 0.4 MG/ML
0.4 INJECTION, SOLUTION INTRAMUSCULAR; INTRAVENOUS; SUBCUTANEOUS AS NEEDED
Status: DISCONTINUED | OUTPATIENT
Start: 2021-04-12 | End: 2021-04-12

## 2021-04-12 RX ORDER — MIDAZOLAM HYDROCHLORIDE 1 MG/ML
5 INJECTION, SOLUTION INTRAMUSCULAR; INTRAVENOUS
Status: DISCONTINUED | OUTPATIENT
Start: 2021-04-12 | End: 2021-04-12

## 2021-04-12 RX ADMIN — FENTANYL CITRATE 50 MCG: 50 INJECTION, SOLUTION INTRAMUSCULAR; INTRAVENOUS at 09:15

## 2021-04-12 RX ADMIN — MIDAZOLAM HYDROCHLORIDE 1 MG: 1 INJECTION, SOLUTION INTRAMUSCULAR; INTRAVENOUS at 09:15

## 2021-04-12 RX ADMIN — FENTANYL CITRATE 25 MCG: 50 INJECTION, SOLUTION INTRAMUSCULAR; INTRAVENOUS at 09:25

## 2021-04-12 RX ADMIN — FENTANYL CITRATE 25 MCG: 50 INJECTION, SOLUTION INTRAMUSCULAR; INTRAVENOUS at 09:39

## 2021-04-12 RX ADMIN — MIDAZOLAM HYDROCHLORIDE 1 MG: 1 INJECTION, SOLUTION INTRAMUSCULAR; INTRAVENOUS at 09:20

## 2021-04-12 NOTE — PROGRESS NOTES
0800 Pt arrives ambulatory to angio department accompanied by daughter for CT guided bone marrow biopsy procedure. All assessments completed and consent was reviewed. Education given was regarding procedure, IV conscious sedation, post-procedure care and  management/follow-up. Opportunity for questions was provided and all questions and concerns were addressed. 1030 Pt ambulatory around stretcher without dizziness. VSS. Tolerated po without problems. Reviewed discharge instructions with pt; pt verbalized understanding of instructions. Pt discharged to home with belongings and instructions via w/c with daughter.

## 2021-04-12 NOTE — H&P
INTERVENTIONAL RADIOLOGY  Preoperative History and Physical      Patient:  Edilia Whipple  :  1945  Age:  77 y.o. MRN:  [id number]  Today's Date:  2021      CC / HPI   Edilia Whipple is a 77 y.o. female with a history of monoclonal gammopathy who presents for bone marrow biopsy. PAST MEDICAL HISTORY  Past Medical History:   Diagnosis Date    Arthritis     LOWER SPINE    Cancer [facility])     kidney (right)    Chronic kidney disease     right kidney removed - CANCER    Diabetes ([facility]     She is controlling with weight loss    Diabetes mellitus ([facility] 10/8/2014    NIDDM    Hepatitis C     hx hep C from blood transfusion per pt; Treated by Dr. Robel Larkin Hyperlipidemia     Hypertension     Ill-defined condition     HEPATITIS C - treated with medication    Liver disease     Personal history of renal cancer 3/2/2015    Thyroid disease     Thyroidectomy    Vertigo      PAST SURGICAL HISTORY  Past Surgical History:   Procedure Laterality Date    COLONOSCOPY N/A 2017    COLONOSCOPY performed by Tyler Marie.  Latia Cornejo MD at [facility] ENDOSCOPY    HX BREAST REDUCTION Bilateral     HX COLONOSCOPY  3/5/12    Repeat in 5 years (Dr. Nona Jaramillo)    HX GYN      tubal pregnancy    HX HEENT      WISDOM TEETH X4    HX OTHER SURGICAL      thyroidectomy    HX OTHER SURGICAL      colonoscopy x 2 - ?polyps    HX OTHER SURGICAL  2019    s[facility] prolasp bladder at [address] [facility]  2019    prolasp bladder surg    HX RITESH AND BSO Bilateral     HX TONSILLECTOMY      2or 4years old   [address]    kidney removed on right     SOCIAL HISTORY  Social History     Socioeconomic History    Marital status:      Spouse name: Not on file    Number of children: Not on file    Years of education: Not on file    Highest education level: Not on file   Occupational History    Not on file   Social Needs    Financial resource strain: Not on file  Food insecurity     Worry: Not on file     Inability: Not on file    Transportation needs     Medical: Not on file     Non-medical: Not on file   Tobacco Use    Smoking status: Former Smoker     Packs/day: 0.25     Years: 8.00     Pack years: 2.00     Quit date: 1992     Years since quittin.0    Smokeless tobacco: Never Used    Tobacco comment: quit smoking 30-40 yrs ago   Substance and Sexual Activity    Alcohol use: No    Drug use: No    Sexual activity: Yes     Partners: Male   Lifestyle    Physical activity     Days per week: Not on file     Minutes per session: Not on file    Stress: Not on file   Relationships    Social connections     Talks on phone: Not on file     Gets together: Not on file     Attends Quaker service: Not on file     Active member of club or organization: Not on file     Attends meetings of clubs or organizations: Not on file     Relationship status: Not on file    Intimate partner violence     Fear of current or ex partner: Not on file     Emotionally abused: Not on file     Physically abused: Not on file     Forced sexual activity: Not on file   Other Topics Concern    Not on file   Social History Narrative    Not on file     FAMILY HISTORY  Family History   Problem Relation Age of Onset    Diabetes Mother     Kidney Disease Mother     Heart Disease Father     Kidney Disease Father         dialysis    Diabetes Sister     Diabetes Daughter     Hypertension Daughter     Hypertension Son     No Known Problems Son     No Known Problems Brother     Anesth Problems Neg Hx      CURRENT MEDICATIONS  Current Outpatient Medications   Medication Sig Dispense Refill    amLODIPine (NORVASC) 5 mg tablet Take 1 Tab by mouth daily.  (Patient taking differently: Take 10 mg by mouth daily.) 90 Tab 3    levothyroxine (SYNTHROID) 50 mcg tablet TAKE ONE TABLET BY MOUTH ONCE DAILY BEFORE BREAKFAST 90 Tab 0    Januvia 100 mg tablet TAKE 1 TABLET BY MOUTH  DAILY (Patient taking differently: 50 mg.) 90 Tab 0    atorvastatin (LIPITOR) 20 mg tablet TAKE 1 TABLET BY MOUTH  DAILY 90 Tab 3    cloNIDine HCL (CATAPRES) 0.1 mg tablet Take 1 Tab by mouth two (2) times a day. 180 Tab 2    flaxseed oil (OMEGA 3 PO) Take  by mouth.  therapeutic multivitamin (THERAGRAN) tablet Take 1 Tab by mouth daily.  cloNIDine HCL (CATAPRES) 0.1 mg tablet TAKE 1 TABLET BY MOUTH 3  TIMES DAILY 270 Tab 3    OneTouch Ultra Blue Test Strip strip CHECK BLOOD SUGAR 2-3 TIMES A WEEK 100 Strip 3    acetaminophen (TYLENOL EXTRA STRENGTH) 500 mg tablet Take 500-1,000 mg by mouth every six (6) hours as needed for Pain.        Current Facility-Administered Medications   Medication Dose Route Frequency Provider Last Rate Last Admin    fentaNYL citrate (PF) injection 200 mcg  200 mcg IntraVENous Multiple Asim Perez MD        midazolam (VERSED) injection 5 mg  5 mg IntraVENous Multiple Asim Perez MD        0.9% sodium chloride infusion  25 mL/hr IntraVENous CONTINUOUS Asim Perez MD        flumazeniL (ROMAZICON) 0.1 mg/mL injection 0.5 mg  0.5 mg IntraVENous ONCE Asim Perez MD        naloxone [facility]) injection 0.4 mg  0.4 mg IntraVENous PRN Walda Fothergill, MD         ALLERGIES  Allergies   Allergen Reactions    Codeine Hives     DIAGNOSTIC STUDIES   IMAGING STUDIES  Relevant Imaging studies reviewed    LABS  Lab Results   Component Value Date/Time    WBC 8.9 11/12/2020 10:42 AM    HGB 10.9 (L) 11/12/2020 10:42 AM    HCT 34.0 11/12/2020 10:42 AM    PLATELET 271 29/16/6801 10:42 AM    MCV 82 11/12/2020 10:42 AM     Lab Results   Component Value Date/Time    Sodium 145 (H) 09/25/2020 08:14 AM    Potassium 4.5 09/25/2020 08:14 AM    Chloride 110 (H) 09/25/2020 08:14 AM    CO2 21 09/25/2020 08:14 AM    Anion gap 7 02/24/2020 08:51 AM    Glucose 121 (H) 09/25/2020 08:14 AM    BUN 35 (H) 09/25/2020 08:14 AM    Creatinine 2.61 (H) 09/25/2020 08:14 AM    BUN/Creatinine ratio 13 09/25/2020 08:14 AM    GFR est AA 20 (L) 09/25/2020 08:14 AM    GFR est non-AA 17 (L) 09/25/2020 08:14 AM    Calcium 9.3 09/25/2020 08:14 AM     No results found for: INR, PTMR, PTP, PT1, PT2, INREXT    PHYSICAL EXAM   BP (!) 145/88 (BP 1 Location: Left upper arm, BP Patient Position: At rest)   Pulse 89   Temp 98.5 °F (36.9 °C)   Resp 18   Ht 5' 4\" (1.626 m)   Wt 85.3 kg (188 lb)   LMP  (LMP Unknown)   SpO2 99%   Breastfeeding No   BMI 32.27 kg/m²   General:  NAD  Heart:  RRR  Lungs:  NWOB  Neurological:  AAOX3    PLAN   Procedure to be performed:  CT guided bone marrow biopsy  Plan for sedation:  moderate  Post procedure plan:  observation per protocol  Informed consent:  risks, benefits, and alternatives reviewed with the patient / family who agree to proceed      Noe Chester NP  [facility]

## 2021-04-15 ENCOUNTER — DOCUMENTATION ONLY (OUTPATIENT)
Dept: ONCOLOGY | Age: 76
End: 2021-04-15

## 2021-05-03 ENCOUNTER — OFFICE VISIT (OUTPATIENT)
Dept: SURGERY | Age: 76
End: 2021-05-03
Payer: MEDICARE

## 2021-05-03 VITALS
WEIGHT: 192.6 LBS | SYSTOLIC BLOOD PRESSURE: 148 MMHG | DIASTOLIC BLOOD PRESSURE: 85 MMHG | BODY MASS INDEX: 32.88 KG/M2 | HEART RATE: 87 BPM | TEMPERATURE: 96.1 F | OXYGEN SATURATION: 96 % | HEIGHT: 64 IN

## 2021-05-03 DIAGNOSIS — E04.1 THYROID CYST: Primary | ICD-10-CM

## 2021-05-03 PROCEDURE — 76942 ECHO GUIDE FOR BIOPSY: CPT | Performed by: SURGERY

## 2021-05-03 PROCEDURE — 60100 BIOPSY OF THYROID: CPT | Performed by: SURGERY

## 2021-05-03 NOTE — PROGRESS NOTES
Identified pt with two pt identifiers(name and ). Reviewed record in preparation for visit and have obtained necessary documentation. All patient medications has been reviewed. Chief Complaint   Patient presents with    Procedure     FNA of Thyroid Cyst       Health Maintenance Due   Topic    DTaP/Tdap/Td series (1 - Tdap)    Shingrix Vaccine Age 49> (1 of 2)    Eye Exam Retinal or Dilated     A1C test (Diabetic or Prediabetic)        Vitals:    21 1327   BP: (!) 149/80   Pulse: 87   Temp: (!) 96.1 °F (35.6 °C)   TempSrc: Oral   SpO2: 96%   Weight: 87.4 kg (192 lb 9.6 oz)   Height: 5' 4\" (1.626 m)   PainSc:   0 - No pain       4. Have you been to the ER, urgent care clinic since your last visit? Hospitalized since your last visit? No    5. Have you seen or consulted any other health care providers outside of the [address] [place] [address] since your last visit? Include any pap smears or colon screening. No      Patient is accompanied by daughter I have received verbal consent from Cam Rios to discuss any/all medical information while they are present in the room.

## 2021-05-03 NOTE — PROGRESS NOTES
Fine Needle Aspiration    Encounter Date: 5/3/2021    Preoperative diagnosis: right thyroid cyst  Postoperative diagnosis: same  Procedure: ultrasound-guided FNA of above    Time out at performed by me (see consent form):  * Patient was identified by name and date of birth   * Agreement on procedure being performed was verified  * Risks and Benefits explained to the patient  * Procedure site verified and marked as necessary  * Patient was positioned for comfort  * Consent was signed and verified    Description of the procedure: After obtaining informed consent a time out was performed any the patient was taken to the procedure room and ultrasound was used to local localize the target cyst.  Prep solution was then used to prep the skin and local anesthetic was infiltrated at the planned site of needle insertion. Using ultrasound guidance a 22gauge needle was used to sample the cyst fluid. The cyst was reduced in size from about 4 cm to 1 cm. No hematoma was noted afterwards. A Band-Aid was applied and the procedure was well tolerated. Disposition: We will await cytology results. Disposition:  Procedure well tolerated. Post-procedure pain scale: 0/10.     Roberta Cobb MD

## 2021-05-03 NOTE — Clinical Note
5/3/2021 Patient: Kari Mendez YOB: 1945 Date of Visit: 5/3/2021 Phylicia Lucio MD 
[address] [facility] [address] [address] [address] [employer] Salbador Muller MD 
[address] [facility] 
[address] [address] [address] [employer] Dear MD Salbador Brown MD, Thank you for referring Ms. Mercado Client to [address] for evaluation. My notes for this consultation are attached. If you have questions, please do not hesitate to call me. I look forward to following your patient along with you.  
 
 
Sincerely, 
 
Lena Lira MD

## 2021-05-11 ENCOUNTER — TRANSCRIBE ORDER (OUTPATIENT)
Dept: SCHEDULING | Age: 76
End: 2021-05-11

## 2021-05-11 DIAGNOSIS — Z12.31 VISIT FOR SCREENING MAMMOGRAM: Primary | ICD-10-CM

## 2021-05-20 ENCOUNTER — OFFICE VISIT (OUTPATIENT)
Dept: INTERNAL MEDICINE CLINIC | Age: 76
End: 2021-05-20
Payer: MEDICARE

## 2021-05-20 VITALS
HEART RATE: 92 BPM | DIASTOLIC BLOOD PRESSURE: 79 MMHG | TEMPERATURE: 97.3 F | HEIGHT: 64 IN | WEIGHT: 191 LBS | SYSTOLIC BLOOD PRESSURE: 129 MMHG | RESPIRATION RATE: 12 BRPM | OXYGEN SATURATION: 98 % | BODY MASS INDEX: 32.61 KG/M2

## 2021-05-20 DIAGNOSIS — E03.4 HYPOTHYROIDISM DUE TO ACQUIRED ATROPHY OF THYROID: ICD-10-CM

## 2021-05-20 DIAGNOSIS — N18.30 STAGE 3 CHRONIC KIDNEY DISEASE, UNSPECIFIED WHETHER STAGE 3A OR 3B CKD (HCC): ICD-10-CM

## 2021-05-20 DIAGNOSIS — C64.9 MALIGNANT NEOPLASM OF KIDNEY, UNSPECIFIED LATERALITY (HCC): ICD-10-CM

## 2021-05-20 DIAGNOSIS — E11.21 TYPE 2 DIABETES MELLITUS WITH NEPHROPATHY (HCC): Primary | ICD-10-CM

## 2021-05-20 DIAGNOSIS — D51.1 VITAMIN B12 DEFICIENCY ANEMIA DUE TO SELECTIVE VITAMIN B12 MALABSORPTION WITH PROTEINURIA: ICD-10-CM

## 2021-05-20 DIAGNOSIS — I10 ESSENTIAL HYPERTENSION: ICD-10-CM

## 2021-05-20 DIAGNOSIS — E78.2 MIXED HYPERLIPIDEMIA: ICD-10-CM

## 2021-05-20 LAB
ALBUMIN SERPL-MCNC: 3.7 G/DL (ref 3.5–5)
ALBUMIN/GLOB SERPL: 1.1 {RATIO} (ref 1.1–2.2)
ALP SERPL-CCNC: 112 U/L (ref 45–117)
ALT SERPL-CCNC: 21 U/L (ref 12–78)
ANION GAP SERPL CALC-SCNC: 7 MMOL/L (ref 5–15)
AST SERPL-CCNC: 14 U/L (ref 15–37)
BASOPHILS # BLD: 0.1 K/UL (ref 0–0.1)
BASOPHILS NFR BLD: 1 % (ref 0–1)
BILIRUB SERPL-MCNC: 0.2 MG/DL (ref 0.2–1)
BUN SERPL-MCNC: 48 MG/DL (ref 6–20)
BUN/CREAT SERPL: 13 (ref 12–20)
CALCIUM SERPL-MCNC: 8.7 MG/DL (ref 8.5–10.1)
CHLORIDE SERPL-SCNC: 113 MMOL/L (ref 97–108)
CHOLEST SERPL-MCNC: 151 MG/DL
CO2 SERPL-SCNC: 24 MMOL/L (ref 21–32)
COMMENT, HOLDF: NORMAL
CREAT SERPL-MCNC: 3.6 MG/DL (ref 0.55–1.02)
DIFFERENTIAL METHOD BLD: ABNORMAL
EOSINOPHIL # BLD: 0.1 K/UL (ref 0–0.4)
EOSINOPHIL NFR BLD: 1 % (ref 0–7)
ERYTHROCYTE [DISTWIDTH] IN BLOOD BY AUTOMATED COUNT: 15.6 % (ref 11.5–14.5)
EST. AVERAGE GLUCOSE BLD GHB EST-MCNC: 143 MG/DL
GLOBULIN SER CALC-MCNC: 3.5 G/DL (ref 2–4)
GLUCOSE SERPL-MCNC: 126 MG/DL (ref 65–100)
HBA1C MFR BLD: 6.6 % (ref 4–5.6)
HCT VFR BLD AUTO: 35.2 % (ref 35–47)
HDLC SERPL-MCNC: 63 MG/DL
HDLC SERPL: 2.4 {RATIO} (ref 0–5)
HGB BLD-MCNC: 10.8 G/DL (ref 11.5–16)
IMM GRANULOCYTES # BLD AUTO: 0 K/UL (ref 0–0.04)
IMM GRANULOCYTES NFR BLD AUTO: 0 % (ref 0–0.5)
LDLC SERPL CALC-MCNC: 66.6 MG/DL (ref 0–100)
LYMPHOCYTES # BLD: 2.6 K/UL (ref 0.8–3.5)
LYMPHOCYTES NFR BLD: 26 % (ref 12–49)
MCH RBC QN AUTO: 26.3 PG (ref 26–34)
MCHC RBC AUTO-ENTMCNC: 30.7 G/DL (ref 30–36.5)
MCV RBC AUTO: 85.9 FL (ref 80–99)
MONOCYTES # BLD: 0.6 K/UL (ref 0–1)
MONOCYTES NFR BLD: 6 % (ref 5–13)
NEUTS SEG # BLD: 6.7 K/UL (ref 1.8–8)
NEUTS SEG NFR BLD: 66 % (ref 32–75)
NRBC # BLD: 0 K/UL (ref 0–0.01)
NRBC BLD-RTO: 0 PER 100 WBC
PLATELET # BLD AUTO: 218 K/UL (ref 150–400)
PMV BLD AUTO: 11.5 FL (ref 8.9–12.9)
POTASSIUM SERPL-SCNC: 4.6 MMOL/L (ref 3.5–5.1)
PROT SERPL-MCNC: 7.2 G/DL (ref 6.4–8.2)
RBC # BLD AUTO: 4.1 M/UL (ref 3.8–5.2)
SAMPLES BEING HELD,HOLD: NORMAL
SODIUM SERPL-SCNC: 144 MMOL/L (ref 136–145)
T4 FREE SERPL-MCNC: 1.1 NG/DL (ref 0.8–1.5)
TRIGL SERPL-MCNC: 107 MG/DL (ref ?–150)
TSH SERPL DL<=0.05 MIU/L-ACNC: 1.07 UIU/ML (ref 0.36–3.74)
VIT B12 SERPL-MCNC: 1508 PG/ML (ref 193–986)
VLDLC SERPL CALC-MCNC: 21.4 MG/DL
WBC # BLD AUTO: 10 K/UL (ref 3.6–11)

## 2021-05-20 PROCEDURE — G8427 DOCREV CUR MEDS BY ELIG CLIN: HCPCS | Performed by: INTERNAL MEDICINE

## 2021-05-20 PROCEDURE — G8754 DIAS BP LESS 90: HCPCS | Performed by: INTERNAL MEDICINE

## 2021-05-20 PROCEDURE — G8510 SCR DEP NEG, NO PLAN REQD: HCPCS | Performed by: INTERNAL MEDICINE

## 2021-05-20 PROCEDURE — 3046F HEMOGLOBIN A1C LEVEL >9.0%: CPT | Performed by: INTERNAL MEDICINE

## 2021-05-20 PROCEDURE — 3017F COLORECTAL CA SCREEN DOC REV: CPT | Performed by: INTERNAL MEDICINE

## 2021-05-20 PROCEDURE — G8752 SYS BP LESS 140: HCPCS | Performed by: INTERNAL MEDICINE

## 2021-05-20 PROCEDURE — 1101F PT FALLS ASSESS-DOCD LE1/YR: CPT | Performed by: INTERNAL MEDICINE

## 2021-05-20 PROCEDURE — G8536 NO DOC ELDER MAL SCRN: HCPCS | Performed by: INTERNAL MEDICINE

## 2021-05-20 PROCEDURE — 1090F PRES/ABSN URINE INCON ASSESS: CPT | Performed by: INTERNAL MEDICINE

## 2021-05-20 PROCEDURE — 2022F DILAT RTA XM EVC RTNOPTHY: CPT | Performed by: INTERNAL MEDICINE

## 2021-05-20 PROCEDURE — G8417 CALC BMI ABV UP PARAM F/U: HCPCS | Performed by: INTERNAL MEDICINE

## 2021-05-20 PROCEDURE — G8399 PT W/DXA RESULTS DOCUMENT: HCPCS | Performed by: INTERNAL MEDICINE

## 2021-05-20 PROCEDURE — 99214 OFFICE O/P EST MOD 30 MIN: CPT | Performed by: INTERNAL MEDICINE

## 2021-05-20 NOTE — PROGRESS NOTES
SUBJECTIVE:   Ms. Edilia Whipple is a 77 y.o. female who is here for the following complaint, and also for follow up of routine medical issues. She had been seeing Dr. Felix Ho, and Dr. Bossman Fairchild before that. Chief Complaint   Patient presents with    Diabetes     6 month follow up       She is seeing Dr. Carlos Early now for kidney dysfunction. Stress: Health, son is alcoholic. She had lost weight with help of nutritionist, but this is creeping up again:   Wt Readings from Last 3 Encounters:   05/20/21 191 lb (86.6 kg)   05/03/21 192 lb 9.6 oz (87.4 kg)   04/12/21 188 lb (85.3 kg)     DM: 100-146. 119 this morning. Her numbness L foot has improved. LBP is stable. \"I was told I have arthritis in the spine\" by Dr. Vania Mora, orthopedist.  She has been through treatment with Dr. Nona Jaramillo, now seeing someone else in his office, for Hep C, obtained during a prior blood transfusion. \"He said after the last visit I could return just as needed. \"  At this time, she is otherwise doing well and has brought no other complaints to my attention today. For a list of the medical issues addressed today, see the assessment and plan below. PMH:   Past Medical History:   Diagnosis Date    Arthritis     LOWER SPINE    Cancer ([address]     kidney (right)    Chronic kidney disease 1996    right kidney removed - CANCER    Diabetes ([address]     She is controlling with weight loss    Diabetes mellitus ([address] 10/8/2014    NIDDM    Hepatitis C     hx hep C from blood transfusion per pt; Treated by Dr. Robel Larkin Hyperlipidemia     Hypertension     Ill-defined condition 1990s    HEPATITIS C - treated with medication    Liver disease     Personal history of renal cancer 3/2/2015    Thyroid disease 1999    Thyroidectomy    Vertigo        Past Surgical History:   Procedure Laterality Date    COLONOSCOPY N/A 6/20/2017    COLONOSCOPY performed by Tyler Marie.  Latia Cornejo MD at [address] [place] [address] ENDOSCOPY    HX BREAST REDUCTION Bilateral 1996    HX COLONOSCOPY  3/5/12    Repeat in 5 years (Dr. Abe Plata)    HX CYST REMOVAL      HX GYN  1980s    tubal pregnancy    HX HEENT      WISDOM TEETH X4    HX OTHER SURGICAL  1999    thyroidectomy    HX OTHER SURGICAL      colonoscopy x 2 - ?polyps    HX OTHER SURGICAL  04/08/2019    surg for prolasp bladder at [address]  04/08/2019    prolasp bladder surg    HX RITESH AND BSO Bilateral 1995    HX TONSILLECTOMY      2or 4years old   [address]    kidney removed on right    IR BX BONE MARROW DIAGNOSTIC  04/05/2021       All: She is allergic to codeine. Current Outpatient Medications   Medication Sig    glucose blood VI test strips (OneTouch Ultra Blue Test Strip) strip CHECK BLOOD SUGAR 2-3 TIMES A WEEK    amLODIPine (NORVASC) 5 mg tablet Take 1 Tab by mouth daily. (Patient taking differently: Take 10 mg by mouth daily.)    levothyroxine (SYNTHROID) 50 mcg tablet TAKE ONE TABLET BY MOUTH ONCE DAILY BEFORE BREAKFAST    Januvia 100 mg tablet TAKE 1 TABLET BY MOUTH  DAILY (Patient taking differently: 50 mg.)    cloNIDine HCL (CATAPRES) 0.1 mg tablet TAKE 1 TABLET BY MOUTH 3  TIMES DAILY    atorvastatin (LIPITOR) 20 mg tablet TAKE 1 TABLET BY MOUTH  DAILY    cloNIDine HCL (CATAPRES) 0.1 mg tablet Take 1 Tab by mouth two (2) times a day.  flaxseed oil (OMEGA 3 PO) Take  by mouth.  therapeutic multivitamin (THERAGRAN) tablet Take 1 Tab by mouth daily.  acetaminophen (TYLENOL EXTRA STRENGTH) 500 mg tablet Take 500-1,000 mg by mouth every six (6) hours as needed for Pain. No current facility-administered medications for this visit. FH: Her family history includes Diabetes in her daughter, mother, and sister; Heart Disease in her father; Hypertension in her daughter and son; Kidney Disease in her father and mother; No Known Problems in her brother and son. SH: Retired Lpn, doing private duty nursing. She reports that she quit smoking about 29 years ago.  She has a 2.00 pack-year smoking history. She has never used smokeless tobacco. She reports that she does not drink alcohol and does not use drugs. ROS: See above; Complete ROS otherwise negative. OBJECTIVE:   Vitals:   Visit Vitals  /79 (BP 1 Location: Left arm, BP Patient Position: Sitting)   Pulse 92   Temp 97.3 °F (36.3 °C) (Temporal)   Resp 12   Ht 5' 4\" (1.626 m)   Wt 191 lb (86.6 kg)   LMP  (LMP Unknown)   SpO2 98%   BMI 32.79 kg/m²      Gen: Pleasant 77 y.o.  female in NAD. HEENT: PERRLA. EOMI. OP moist and pink. Neck: Supple. No LAD. HEART: RRR, No M/G/R.    LUNGS: CTAB No W/R. ABDOMEN: S, NT, ND, BS+. EXTREMITIES: Warm. No C/C/E.  MUSCULOSKELETAL: Normal ROM, muscle strength 5/5 all groups. NEURO: Alert and oriented x 3. Cranial nerves grossly intact. No focal sensory or motor deficits noted. SKIN: Warm. Dry. Keloid at site of prior thyroid surgery. Lab Results   Component Value Date/Time    Sodium 145 (H) 09/25/2020 08:14 AM    Potassium 4.5 09/25/2020 08:14 AM    Chloride 110 (H) 09/25/2020 08:14 AM    CO2 21 09/25/2020 08:14 AM    Anion gap 7 02/24/2020 08:51 AM    Glucose 121 (H) 09/25/2020 08:14 AM    BUN 35 (H) 09/25/2020 08:14 AM    Creatinine 2.61 (H) 09/25/2020 08:14 AM    BUN/Creatinine ratio 13 09/25/2020 08:14 AM    GFR est AA 20 (L) 09/25/2020 08:14 AM    GFR est non-AA 17 (L) 09/25/2020 08:14 AM    Calcium 9.3 09/25/2020 08:14 AM    Bilirubin, total 0.3 09/14/2020 12:00 AM    ALT (SGPT) 13 09/14/2020 12:00 AM    Alk.  phosphatase 86 09/14/2020 12:00 AM    Protein, total 7.1 09/14/2020 12:00 AM    Albumin 4.3 09/14/2020 12:00 AM    Globulin 3.4 02/23/2020 04:01 AM    A-G Ratio 1.5 09/14/2020 12:00 AM       Lab Results   Component Value Date/Time    Cholesterol, total 149 11/12/2020 10:42 AM    HDL Cholesterol 56 11/12/2020 10:42 AM    LDL, calculated 71 11/12/2020 10:42 AM    LDL, calculated 62.6 02/23/2020 04:01 AM    Triglyceride 124 11/12/2020 10:42 AM    CHOL/HDL Ratio 2.3 02/23/2020 04:01 AM        Lab Results   Component Value Date/Time    Hemoglobin A1c 6.4 (H) 11/12/2020 10:42 AM       Lab Results   Component Value Date/Time    WBC 11.4 (H) 04/12/2021 08:35 AM    HGB 10.8 (L) 04/12/2021 08:35 AM    HCT 34.2 (L) 04/12/2021 08:35 AM    PLATELET 789 23/78/7703 08:35 AM    MCV 83.6 04/12/2021 08:35 AM       Lab Results   Component Value Date/Time    TSH 1.050 11/12/2020 10:42 AM         ASSESSMENT/ PLAN:   1. Diabetes: Controlled. Continue current measures. 2. Hypertension: High today, okay previously. ?WCS  3. Thyroid disease: Euthyroid. Watch TSH. 4. Chronic kidney disease:  Reviewed labs. Watch labs. Seeing Dr. Emil Ganser. 5. Arthritis: Chronic and stable. 6. Hyperlipidemia: Doing worse. Discussed her high risk of cardiovascular complications. Lipitor 20.    7. Hepatitis C: F/U with GI as needed--apparently \"turned loose\". 8. Obesity: Diet and exercise. Weight down a bit; keep up the good work. 9. Neuropathy L toe: ?Sciatica vs other compressive neuropathy. Doing better lately. Follow-up and Dispositions    · Return in about 6 months (around 11/20/2021) for DM. I have reviewed the patient's medications and risks/side effects/benefits were discussed. Diagnosis(-es) explained to patient and questions answered. Literature provided where appropriate.

## 2021-06-03 ENCOUNTER — TRANSCRIBE ORDER (OUTPATIENT)
Dept: SCHEDULING | Age: 76
End: 2021-06-03

## 2021-06-03 DIAGNOSIS — C64.9 MALIGNANT NEOPLASM OF KIDNEY EXCLUDING RENAL PELVIS (HCC): Primary | ICD-10-CM

## 2021-06-03 DIAGNOSIS — C64.9 RENAL CELL CARCINOMA (HCC): Primary | ICD-10-CM

## 2021-06-04 ENCOUNTER — HOSPITAL ENCOUNTER (OUTPATIENT)
Dept: MAMMOGRAPHY | Age: 76
Discharge: HOME | End: 2021-06-04
Attending: INTERNAL MEDICINE
Payer: MEDICARE

## 2021-06-04 DIAGNOSIS — Z12.31 VISIT FOR SCREENING MAMMOGRAM: ICD-10-CM

## 2021-06-04 PROCEDURE — 77067 SCR MAMMO BI INCL CAD: CPT

## 2021-06-09 ENCOUNTER — TELEPHONE (OUTPATIENT)
Dept: SURGERY | Age: 76
End: 2021-06-09

## 2021-06-09 NOTE — TELEPHONE ENCOUNTER
Called patient to discuss her FNA results from the thyroid cyst.  There was cyst fluid only. No cells diagnostic of any other process. We discussed the fact that we should reultrasound now that the cyst is resolved and see if there is any solid component to do a repeat FNA. She agreed with this plan and I will have my office give her a call to schedule her for ultrasound guided FNA.

## 2021-06-12 DIAGNOSIS — E11.21 TYPE 2 DIABETES MELLITUS WITH NEPHROPATHY (HCC): ICD-10-CM

## 2021-06-13 RX ORDER — SITAGLIPTIN 100 MG/1
TABLET, FILM COATED ORAL
Qty: 30 TABLET | Refills: 0 | Status: SHIPPED | OUTPATIENT
Start: 2021-06-13 | End: 2021-09-27 | Stop reason: SDUPTHER

## 2021-06-22 ENCOUNTER — OFFICE VISIT (OUTPATIENT)
Dept: SURGERY | Age: 76
End: 2021-06-22

## 2021-06-22 ENCOUNTER — OFFICE VISIT (OUTPATIENT)
Dept: SURGERY | Age: 76
End: 2021-06-22
Payer: MEDICARE

## 2021-06-22 VITALS
HEART RATE: 81 BPM | TEMPERATURE: 96.3 F | BODY MASS INDEX: 32.78 KG/M2 | OXYGEN SATURATION: 96 % | RESPIRATION RATE: 20 BRPM | WEIGHT: 192 LBS | HEIGHT: 64 IN | DIASTOLIC BLOOD PRESSURE: 80 MMHG | SYSTOLIC BLOOD PRESSURE: 145 MMHG

## 2021-06-22 DIAGNOSIS — E04.1 THYROID CYST: Primary | ICD-10-CM

## 2021-06-22 PROCEDURE — 60100 BIOPSY OF THYROID: CPT | Performed by: SURGERY

## 2021-06-22 PROCEDURE — 76942 ECHO GUIDE FOR BIOPSY: CPT | Performed by: SURGERY

## 2021-06-22 NOTE — PROGRESS NOTES
Fine Needle Aspiration    Encounter Date: 6/22/2021    Preoperative diagnosis: right thyroid complex cyst  Postoperative diagnosis: same  Procedure: ultrasound-guided FNA of above    Time out at performed by me (see consent form):  * Patient was identified by name and date of birth   * Agreement on procedure being performed was verified  * Risks and Benefits explained to the patient  * Procedure site verified and marked as necessary  * Patient was positioned for comfort  * Consent was signed and verified    Description of the procedure: After obtaining informed consent a time out was performed any the patient was taken to the procedure room and ultrasound was used to local localize the target lesion. Prep solution was then used to prep the skin and local anesthetic was infiltrated at the planned site of needle insertion. Using ultrasound guidance a 22gauge needle was used to aspirate all of the fluid. This was placed in the cytology tube and a small amount in the genetic material tube. After all of the fluid was aspirated the solid component was aspirated separately and this was added to the genetic material tube. A small amount was added to the cytology tube. No hematoma was noted afterwards. A Band-Aid was applied and the procedure was well tolerated. Disposition: We will await results. Disposition:  Procedure well tolerated. Post-procedure pain scale: 0/10.     Elvie Bobby MD

## 2021-06-22 NOTE — PROGRESS NOTES
Identified pt with two pt identifiers(name and ). Reviewed record in preparation for visit and have obtained necessary documentation. All patient medications has been reviewed. Chief Complaint   Patient presents with    Follow-up     US guided FNA of thyroid       Health Maintenance Due   Topic    DTaP/Tdap/Td series (1 - Tdap)    Shingrix Vaccine Age 49> (1 of 2)    Eye Exam Retinal or Dilated        Vitals:    21 1044   BP: (!) 145/80   Pulse: 81   Resp: 20   Temp: (!) 96.3 °F (35.7 °C)   SpO2: 96%   Weight: 87.1 kg (192 lb)   Height: 5' 4\" (1.626 m)   PainSc:   0 - No pain       4. Have you been to the ER, urgent care clinic since your last visit? Hospitalized since your last visit? No    5. Have you seen or consulted any other health care providers outside of the [address] [place] [address] since your last visit? Include any pap smears or colon screening. No      Patient is accompanied by self I have received verbal consent from Reyes Farrier to discuss any/all medical information while they are present in the room.

## 2021-07-02 ENCOUNTER — TELEPHONE (OUTPATIENT)
Dept: SURGERY | Age: 76
End: 2021-07-02

## 2021-07-02 DIAGNOSIS — E04.1 THYROID CYST: Primary | ICD-10-CM

## 2021-07-02 NOTE — TELEPHONE ENCOUNTER
Called Ms. Juliana Carlosinas today to let her know that the repeat FNA came back with the same nondiagnostic result. We explained that this is not an uncommon occurrence with cysts. We discussed the options of monitoring with ultrasound versus doing a right thyroid lobectomy. At this point she would like to do the former. I will arrange for an ultrasound in 6 months and she will come in and see us after that.   She also knows that she can give us a call at any time if she would like to change course or if she notices any difference in the area of the cyst.

## 2021-07-08 DIAGNOSIS — E78.2 MIXED HYPERLIPIDEMIA: ICD-10-CM

## 2021-07-08 RX ORDER — LEVOTHYROXINE SODIUM 50 UG/1
TABLET ORAL
Qty: 90 TABLET | Refills: 0 | Status: SHIPPED | OUTPATIENT
Start: 2021-07-08 | End: 2021-09-27 | Stop reason: SDUPTHER

## 2021-07-08 RX ORDER — ATORVASTATIN CALCIUM 20 MG/1
TABLET, FILM COATED ORAL
Qty: 90 TABLET | Refills: 0 | Status: SHIPPED | OUTPATIENT
Start: 2021-07-08 | End: 2021-09-27 | Stop reason: SDUPTHER

## 2021-09-26 DIAGNOSIS — E78.2 MIXED HYPERLIPIDEMIA: ICD-10-CM

## 2021-09-26 DIAGNOSIS — E11.21 TYPE 2 DIABETES MELLITUS WITH NEPHROPATHY (HCC): ICD-10-CM

## 2021-09-26 RX ORDER — LEVOTHYROXINE SODIUM 50 UG/1
TABLET ORAL
Qty: 90 TABLET | Refills: 0 | OUTPATIENT
Start: 2021-09-26

## 2021-09-27 RX ORDER — ATORVASTATIN CALCIUM 20 MG/1
TABLET, FILM COATED ORAL
Qty: 90 TABLET | Refills: 3 | Status: SHIPPED | OUTPATIENT
Start: 2021-09-27 | End: 2022-06-30 | Stop reason: SDUPTHER

## 2021-09-27 RX ORDER — LEVOTHYROXINE SODIUM 50 UG/1
50 TABLET ORAL
Qty: 90 TABLET | Refills: 3 | Status: SHIPPED | OUTPATIENT
Start: 2021-09-27 | End: 2022-04-18 | Stop reason: SDUPTHER

## 2021-10-22 DIAGNOSIS — I10 ESSENTIAL HYPERTENSION: ICD-10-CM

## 2021-10-22 RX ORDER — CLONIDINE HYDROCHLORIDE 0.1 MG/1
0.1 TABLET ORAL 2 TIMES DAILY
Qty: 180 TABLET | Refills: 2 | Status: SHIPPED | OUTPATIENT
Start: 2021-10-22 | End: 2022-04-15

## 2021-10-22 NOTE — TELEPHONE ENCOUNTER
Caller requests Refill of:  cloNIDine HCL (CATAPRES) 0.1 mg tablet          Please send to:  [address]rios was advised that Meds will be refilled as soon as possible, however there can be a 48-72 business hour turn around on refill requests.

## 2021-10-22 NOTE — TELEPHONE ENCOUNTER
PCP: Davin Velasquez MD    Last appt: 5/20/2021  Future Appointments   Date Time Provider Rita Devii   11/19/2021  8:45 AM Davin Velasquez MD [facility] BS AMB   12/6/2021  8:30 AM [facility] PSYCHIATRIC [facility] OP 2 [facility] TAPIA'S        Requested Prescriptions     Pending Prescriptions Disp Refills    cloNIDine HCL (CATAPRES) 0.1 mg tablet 180 Tablet 2     Sig: Take 1 Tablet by mouth two (2) times a day.

## 2021-11-19 ENCOUNTER — OFFICE VISIT (OUTPATIENT)
Dept: INTERNAL MEDICINE CLINIC | Age: 76
End: 2021-11-19
Payer: MEDICARE

## 2021-11-19 VITALS
OXYGEN SATURATION: 100 % | RESPIRATION RATE: 16 BRPM | WEIGHT: 193 LBS | SYSTOLIC BLOOD PRESSURE: 113 MMHG | HEIGHT: 64 IN | TEMPERATURE: 97.5 F | DIASTOLIC BLOOD PRESSURE: 71 MMHG | HEART RATE: 85 BPM | BODY MASS INDEX: 32.95 KG/M2

## 2021-11-19 DIAGNOSIS — Z00.00 MEDICARE ANNUAL WELLNESS VISIT, SUBSEQUENT: Primary | ICD-10-CM

## 2021-11-19 DIAGNOSIS — N18.4 CHRONIC KIDNEY DISEASE, STAGE IV (SEVERE) (HCC): ICD-10-CM

## 2021-11-19 DIAGNOSIS — Z23 ENCOUNTER FOR IMMUNIZATION: ICD-10-CM

## 2021-11-19 DIAGNOSIS — E11.21 TYPE 2 DIABETES MELLITUS WITH NEPHROPATHY (HCC): ICD-10-CM

## 2021-11-19 LAB — HBA1C MFR BLD HPLC: 6.4 %

## 2021-11-19 PROCEDURE — G8752 SYS BP LESS 140: HCPCS | Performed by: INTERNAL MEDICINE

## 2021-11-19 PROCEDURE — G0008 ADMIN INFLUENZA VIRUS VAC: HCPCS | Performed by: INTERNAL MEDICINE

## 2021-11-19 PROCEDURE — G8536 NO DOC ELDER MAL SCRN: HCPCS | Performed by: INTERNAL MEDICINE

## 2021-11-19 PROCEDURE — G8754 DIAS BP LESS 90: HCPCS | Performed by: INTERNAL MEDICINE

## 2021-11-19 PROCEDURE — G8399 PT W/DXA RESULTS DOCUMENT: HCPCS | Performed by: INTERNAL MEDICINE

## 2021-11-19 PROCEDURE — 3044F HG A1C LEVEL LT 7.0%: CPT | Performed by: INTERNAL MEDICINE

## 2021-11-19 PROCEDURE — 3017F COLORECTAL CA SCREEN DOC REV: CPT | Performed by: INTERNAL MEDICINE

## 2021-11-19 PROCEDURE — 2022F DILAT RTA XM EVC RTNOPTHY: CPT | Performed by: INTERNAL MEDICINE

## 2021-11-19 PROCEDURE — G0439 PPPS, SUBSEQ VISIT: HCPCS | Performed by: INTERNAL MEDICINE

## 2021-11-19 PROCEDURE — 90694 VACC AIIV4 NO PRSRV 0.5ML IM: CPT | Performed by: INTERNAL MEDICINE

## 2021-11-19 PROCEDURE — G8432 DEP SCR NOT DOC, RNG: HCPCS | Performed by: INTERNAL MEDICINE

## 2021-11-19 PROCEDURE — G8427 DOCREV CUR MEDS BY ELIG CLIN: HCPCS | Performed by: INTERNAL MEDICINE

## 2021-11-19 PROCEDURE — 83036 HEMOGLOBIN GLYCOSYLATED A1C: CPT | Performed by: INTERNAL MEDICINE

## 2021-11-19 PROCEDURE — 1101F PT FALLS ASSESS-DOCD LE1/YR: CPT | Performed by: INTERNAL MEDICINE

## 2021-11-19 PROCEDURE — G8417 CALC BMI ABV UP PARAM F/U: HCPCS | Performed by: INTERNAL MEDICINE

## 2021-11-19 RX ORDER — FLASH GLUCOSE SENSOR
1 KIT MISCELLANEOUS CONTINUOUS
Qty: 2 KIT | Refills: 11 | Status: SHIPPED | OUTPATIENT
Start: 2021-11-19

## 2021-11-19 RX ORDER — ALLOPURINOL 100 MG/1
100 TABLET ORAL DAILY
COMMUNITY
Start: 2021-11-04 | End: 2022-05-19

## 2021-11-19 RX ORDER — FUROSEMIDE 20 MG/1
20 TABLET ORAL 2 TIMES DAILY
COMMUNITY
Start: 2021-10-19

## 2021-11-19 RX ORDER — FLASH GLUCOSE SCANNING READER
1 EACH MISCELLANEOUS CONTINUOUS
Qty: 1 EACH | Refills: 0 | Status: SHIPPED | OUTPATIENT
Start: 2021-11-19

## 2021-11-19 NOTE — PROGRESS NOTES
Marichuy Juarez is a 77 y.o. female  Chief Complaint   Patient presents with    Follow-up     6 month     Health Maintenance Due   Topic Date Due    DTaP/Tdap/Td series (1 - Tdap) Never done    Shingrix Vaccine Age 50> (1 of 2) Never done    Eye Exam Retinal or Dilated  06/25/2020    Flu Vaccine (1) 09/01/2021    COVID-19 Vaccine (3 - Booster for Pfizer series) 10/02/2021    Foot Exam Q1  11/12/2021    Medicare Yearly Exam  11/13/2021    A1C test (Diabetic or Prediabetic)  11/20/2021     Visit Vitals  /71   Pulse 85   Temp 97.5 °F (36.4 °C) (Temporal)   Resp 16   Ht 5' 4\" (1.626 m)   Wt 193 lb (87.5 kg)   SpO2 100%   BMI 33.13 kg/m²

## 2021-11-19 NOTE — PROGRESS NOTES
SUBJECTIVE:   Ms. Ty Peacock is a 77 y.o. female who is here for the following complaint, and also for follow up of routine medical issues. She had been seeing Dr. Donny Narvaez, and Dr. Jovi Hayes before that. Chief Complaint   Patient presents with    Follow-up     6 month       She is seeing Dr. Miriam Castellanos now for kidney dysfunction. She is nearing dialysis, and has discussed options including peritoneal dialysis and kidney transplant. Stress: Health, son is alcoholic. She had lost weight with help of nutritionist, but this is creeping up again:   Wt Readings from Last 3 Encounters:   11/19/21 193 lb (87.5 kg)   06/22/21 192 lb (87.1 kg)   05/20/21 191 lb (86.6 kg)     DM: 100-146. 119 this morning. Her numbness L foot has improved. LBP is stable. \"I was told I have arthritis in the spine\" by Dr. Binh Cage, orthopedist.  She has been through treatment with Dr. Codi Solorzano, now seeing someone else in his office, for Hep C, obtained during a prior blood transfusion. \"He said after the last visit I could return just as needed. \"  At this time, she is otherwise doing well and has brought no other complaints to my attention today. For a list of the medical issues addressed today, see the assessment and plan below.     PMH:   Past Medical History:   Diagnosis Date    Arthritis     LOWER SPINE    Cancer [address] [address]     kidney (right)    Chronic kidney disease 1996    right kidney removed - CANCER    Diabetes ([address]     She is controlling with weight loss    Diabetes mellitus ([address] 10/8/2014    NIDDM    Hepatitis C     hx hep C from blood transfusion per pt; Treated by Dr. Farah Staff Hyperlipidemia     Hypertension     Ill-defined condition 1990s    HEPATITIS C - treated with medication    Liver disease     Personal history of renal cancer 3/2/2015    Thyroid disease 1999    Thyroidectomy    Vertigo        Past Surgical History:   Procedure Laterality Date    COLONOSCOPY N/A 6/20/2017    COLONOSCOPY performed by Osiris Melendez. Liz Urbina MD at [facility] ENDOSCOPY    HX BREAST REDUCTION Bilateral 1996    HX COLONOSCOPY  3/5/12    Repeat in 5 years (Dr. Stephanie Krishnan)    HX CYST REMOVAL      HX GYN  1980s    tubal pregnancy    HX HEENT      WISDOM TEETH X4    HX OTHER SURGICAL  1999    thyroidectomy    HX OTHER SURGICAL      colonoscopy x 2 - ?polyps    HX OTHER SURGICAL  04/08/2019    surg for prolasp bladder at [address]  04/08/2019    prolasp bladder surg    HX RITESH AND BSO Bilateral 1995    HX TONSILLECTOMY      2or 4years old   [address]    kidney removed on right    IR BX BONE MARROW DIAGNOSTIC  04/05/2021       All: She is allergic to codeine. Current Outpatient Medications   Medication Sig    allopurinoL (ZYLOPRIM) 100 mg tablet     furosemide (LASIX) 20 mg tablet     cloNIDine HCL (CATAPRES) 0.1 mg tablet Take 1 Tablet by mouth two (2) times a day.  levothyroxine (Euthyrox) 50 mcg tablet Take 1 Tablet by mouth Daily (before breakfast).  atorvastatin (LIPITOR) 20 mg tablet Take 1 tablet by mouth once daily    SITagliptin (Januvia) 100 mg tablet Take 1 Tablet by mouth daily.  glucose blood VI test strips (OneTouch Ultra Blue Test Strip) strip CHECK BLOOD SUGAR 2-3 TIMES A WEEK    amLODIPine (NORVASC) 5 mg tablet Take 1 Tab by mouth daily. (Patient taking differently: Take 10 mg by mouth daily.)    cloNIDine HCL (CATAPRES) 0.1 mg tablet TAKE 1 TABLET BY MOUTH 3  TIMES DAILY    flaxseed oil (OMEGA 3 PO) Take  by mouth.  therapeutic multivitamin (THERAGRAN) tablet Take 1 Tab by mouth daily.  acetaminophen (TYLENOL EXTRA STRENGTH) 500 mg tablet Take 500-1,000 mg by mouth every six (6) hours as needed for Pain. No current facility-administered medications for this visit. FH: Her family history includes Diabetes in her daughter, mother, and sister;  Heart Disease in her father; Hypertension in her daughter and son; Kidney Disease in her father and mother; No Known Problems in her brother and son. SH: Retired Lpn, doing private duty nursing. She reports that she quit smoking about 29 years ago. She has a 2.00 pack-year smoking history. She has never used smokeless tobacco. She reports that she does not drink alcohol and does not use drugs. ROS: See above; Complete ROS otherwise negative. OBJECTIVE:   Vitals:   Visit Vitals  /71   Pulse 85   Temp 97.5 °F (36.4 °C) (Temporal)   Resp 16   Ht 5' 4\" (1.626 m)   Wt 193 lb (87.5 kg)   LMP  (LMP Unknown)   SpO2 100%   BMI 33.13 kg/m²      Gen: Pleasant 77 y.o.  female in NAD. HEENT: PERRLA. EOMI. OP moist and pink. Neck: Supple. No LAD. HEART: RRR, No M/G/R.    LUNGS: CTAB No W/R. ABDOMEN: S, NT, ND, BS+. EXTREMITIES: Warm. No C/C/E.  MUSCULOSKELETAL: Normal ROM, muscle strength 5/5 all groups. NEURO: Alert and oriented x 3. Cranial nerves grossly intact. No focal sensory or motor deficits noted. SKIN: Warm. Dry. Keloid at site of prior thyroid surgery. Lab Results   Component Value Date/Time    Sodium 144 05/20/2021 09:29 AM    Potassium 4.6 05/20/2021 09:29 AM    Chloride 113 (H) 05/20/2021 09:29 AM    CO2 24 05/20/2021 09:29 AM    Anion gap 7 05/20/2021 09:29 AM    Glucose 126 (H) 05/20/2021 09:29 AM    BUN 48 (H) 05/20/2021 09:29 AM    Creatinine 3.60 (H) 05/20/2021 09:29 AM    BUN/Creatinine ratio 13 05/20/2021 09:29 AM    GFR est AA 15 (L) 05/20/2021 09:29 AM    GFR est non-AA 12 (L) 05/20/2021 09:29 AM    Calcium 8.7 05/20/2021 09:29 AM    Bilirubin, total 0.2 05/20/2021 09:29 AM    ALT (SGPT) 21 05/20/2021 09:29 AM    Alk.  phosphatase 112 05/20/2021 09:29 AM    Protein, total 7.2 05/20/2021 09:29 AM    Albumin 3.7 05/20/2021 09:29 AM    Globulin 3.5 05/20/2021 09:29 AM    A-G Ratio 1.1 05/20/2021 09:29 AM       Lab Results   Component Value Date/Time    Cholesterol, total 151 05/20/2021 09:29 AM    HDL Cholesterol 63 05/20/2021 09:29 AM    LDL, calculated 66.6 05/20/2021 09:29 AM    Triglyceride 107 05/20/2021 09:29 AM    CHOL/HDL Ratio 2.4 05/20/2021 09:29 AM        Lab Results   Component Value Date/Time    Hemoglobin A1c 6.6 (H) 05/20/2021 09:29 AM       Lab Results   Component Value Date/Time    WBC 10.0 05/20/2021 09:29 AM    HGB 10.8 (L) 05/20/2021 09:29 AM    HCT 35.2 05/20/2021 09:29 AM    PLATELET 600 91/48/6416 09:29 AM    MCV 85.9 05/20/2021 09:29 AM       Lab Results   Component Value Date/Time    TSH 1.07 05/20/2021 09:29 AM         ASSESSMENT/ PLAN:   1. Diabetes: Controlled. Continue current measures. 2. Hypertension: fine today, okay previously. ?WCS  3. Thyroid disease: Euthyroid. Watch TSH. 4. Chronic kidney disease:  Approaching dialysis. Watch labs. Seeing Dr. Rima Fong. 5. Arthritis: Chronic and stable. 6. Hyperlipidemia: Doing worse. Discussed her high risk of cardiovascular complications. Lipitor 20.    7. Hepatitis C: F/U with GI as needed--apparently \"turned loose\". 8. Obesity: Diet and exercise. Weight down a bit; keep up the good work. 9. Neuropathy L toe: ?Sciatica vs other compressive neuropathy. Doing better lately. RTC 6 months. I have reviewed the patient's medications and risks/side effects/benefits were discussed. Diagnosis(-es) explained to patient and questions answered. Literature provided where appropriate.

## 2021-11-19 NOTE — PATIENT INSTRUCTIONS

## 2021-11-19 NOTE — PROGRESS NOTES
Well      This is the Subsequent Medicare Annual Wellness Exam, performed 12 months or more after the Initial AWV or the last Subsequent AWV    I have reviewed the patient's medical history in detail and updated the computerized patient record. History     Past Medical History:   Diagnosis Date    Arthritis     LOWER SPINE    Cancer ([address]     kidney (right)    Chronic kidney disease 1996    right kidney removed - CANCER    Diabetes ([address]     She is controlling with weight loss    Diabetes mellitus ([address] 10/8/2014    NIDDM    Hepatitis C     hx hep C from blood transfusion per pt; Treated by Dr. Papi Shannon Hyperlipidemia     Hypertension     Ill-defined condition 1990s    HEPATITIS C - treated with medication    Liver disease     Personal history of renal cancer 3/2/2015    Thyroid disease 1999    Thyroidectomy    Vertigo       Past Surgical History:   Procedure Laterality Date    COLONOSCOPY N/A 6/20/2017    COLONOSCOPY performed by Antonio Corea. Chris Montgomery MD [facility] ENDOSCOPY    HX BREAST REDUCTION Bilateral 1996    HX COLONOSCOPY  3/5/12    Repeat in 5 years (Dr. Katiuska Odom)    HX CYST REMOVAL      HX GYN  1980s    tubal pregnancy    HX HEENT      WISDOM TEETH X4    HX OTHER SURGICAL  1999    thyroidectomy    HX OTHER SURGICAL      colonoscopy x 2 - ?polyps    HX OTHER SURGICAL  04/08/2019    surg for prolasp bladder at [address] [facility]  04/08/2019    prolasp bladder surg    HX RITESH AND BSO Bilateral 1995    HX TONSILLECTOMY      2or 4years old   [address]    kidney removed on right    IR BX BONE MARROW DIAGNOSTIC  04/05/2021     Current Outpatient Medications   Medication Sig Dispense Refill    allopurinoL (ZYLOPRIM) 100 mg tablet       furosemide (LASIX) 20 mg tablet       cloNIDine HCL (CATAPRES) 0.1 mg tablet Take 1 Tablet by mouth two (2) times a day. 180 Tablet 2    levothyroxine (Euthyrox) 50 mcg tablet Take 1 Tablet by mouth Daily (before breakfast).  [address] Tablet 3    atorvastatin (LIPITOR) 20 mg tablet Take 1 tablet by mouth once daily 90 Tablet 3    SITagliptin (Januvia) 100 mg tablet Take 1 Tablet by mouth daily. 90 Tablet 3    glucose blood VI test strips (OneTouch Ultra Blue Test Strip) strip CHECK BLOOD SUGAR 2-3 TIMES A WEEK 100 Strip 3    amLODIPine (NORVASC) 5 mg tablet Take 1 Tab by mouth daily. (Patient taking differently: Take 10 mg by mouth daily.) 90 Tab 3    cloNIDine HCL (CATAPRES) 0.1 mg tablet TAKE 1 TABLET BY MOUTH 3  TIMES DAILY 270 Tab 3    flaxseed oil (OMEGA 3 PO) Take  by mouth.  therapeutic multivitamin (THERAGRAN) tablet Take 1 Tab by mouth daily.  acetaminophen (TYLENOL EXTRA STRENGTH) 500 mg tablet Take 500-1,000 mg by mouth every six (6) hours as needed for Pain.        Allergies   Allergen Reactions    Codeine Hives     Family History   Problem Relation Age of Onset    Diabetes Mother     Kidney Disease Mother     Heart Disease Father     Kidney Disease Father         dialysis    Diabetes Sister     Diabetes Daughter     Hypertension Daughter     Hypertension Son     No Known Problems Son     No Known Problems Brother     Anesth Problems Neg Hx      Social History     Tobacco Use    Smoking status: Former Smoker     Packs/day: 0.25     Years: 8.00     Pack years: 2.00     Quit date: 1992     Years since quittin.6    Smokeless tobacco: Never Used    Tobacco comment: quit smoking 30-40 yrs ago   Substance Use Topics    Alcohol use: No     Patient Active Problem List   Diagnosis Code    Hypertension I10    Hepatitis C B19.20    Renal cancer ([facility] [address] C64.9    Hypothyroid E03.9    Anemia D64.9    S/p nephrectomy, right Z90.5    Glucose intolerance (impaired glucose tolerance) R73.02    Diabetes mellitus (HCC) E11.9    History of hysterectomy including cervix Z90.710    History of bilateral salpingo-oophorectomy Z90.79, J42.917    Personal history of renal cancer Z85.528    Bartholin cyst N75.0  Type 2 diabetes mellitus with nephropathy ([facility]) E11.21    Vaginal vault prolapse N81.9    CKD (chronic kidney disease) stage 3, GFR 30-59 ml/min ([facility]) N18.30    Vomiting R11.10       Depression Risk Factor Screening:     3 most recent PHQ Screens 6/22/2021   Little interest or pleasure in doing things Not at all   Feeling down, depressed, irritable, or hopeless Not at all   Total Score PHQ 2 0     \"Sometimes\" feels anxious. Mainly this is due to the idea of going on dialysis. Alcohol Risk Factor Screening: You do not drink alcohol or very rarely. Functional Ability and Level of Safety:   Hearing Loss  Hearing is good. Activities of Daily Living  The home contains: no safety equipment. Patient does total self care    Fall Risk  Fall Risk Assessment, last 12 mths 5/20/2021   Able to walk? Yes   Fall in past 12 months? 1   Do you feel unsteady? 0   Are you worried about falling 0   Number of falls in past 12 months 1   Fall with injury? 0       Abuse Screen  Patient is not abused    Cognitive Screening   Evaluation of Cognitive Function:  Has your family/caregiver stated any concerns about your memory: no  Normal      Patient Care Team   Patient Care Team:  Collis Meckel, MD as PCP - General (Internal Medicine)  Collis Meckel, MD as PCP - [address] [place] [address] [facility]  Nicole Crespo MD (Nephrology)  Padmini Khan as Consulting Provider (Optometry)  Robby Mendiola MD (Gastroenterology)  Jessy Mancilla MD as Surgeon (General Surgery)    Assessment/Plan   Education and counseling provided:  Are appropriate based on today's review and evaluation    Diagnoses and all orders for this visit:    1.  Encounter for immunization  -     FLU (FLUAD QUAD INFLUENZA VACCINE,QUAD,ADJUVANTED)        Health Maintenance Due   Topic Date Due    DTaP/Tdap/Td series (1 - Tdap) Never done    Shingrix Vaccine Age 50> (1 of 2) Never done    Eye Exam Retinal or Dilated  06/25/2020    COVID-19 Vaccine (3 - Booster for Erasmo irizarry) 10/02/2021    Foot Exam Q1  11/12/2021    Medicare Yearly Exam  11/13/2021    A1C test (Diabetic or Prediabetic)  11/20/2021

## 2021-12-06 ENCOUNTER — HOSPITAL ENCOUNTER (OUTPATIENT)
Dept: ULTRASOUND IMAGING | Age: 76
Discharge: HOME | End: 2021-12-06
Attending: SURGERY
Payer: MEDICARE

## 2021-12-06 DIAGNOSIS — E04.1 THYROID CYST: ICD-10-CM

## 2021-12-06 PROCEDURE — 76536 US EXAM OF HEAD AND NECK: CPT

## 2021-12-15 ENCOUNTER — TELEPHONE (OUTPATIENT)
Dept: SURGERY | Age: 76
End: 2021-12-15

## 2021-12-15 NOTE — TELEPHONE ENCOUNTER
Called to discuss US results. This was a 6 month repeat US to look for stability of thyroid cysts. They are stable. SHe had 2 non-diagnositc FNA's prior to this latest US. Left VM. Gave her our number. Will forward results to Dr. Cordelia Canales.

## 2022-03-18 PROBLEM — R11.10 VOMITING: Status: ACTIVE | Noted: 2020-02-22

## 2022-03-19 PROBLEM — N18.30 CKD (CHRONIC KIDNEY DISEASE) STAGE 3, GFR 30-59 ML/MIN (HCC): Status: ACTIVE | Noted: 2019-11-04

## 2022-03-19 PROBLEM — N81.9 VAGINAL VAULT PROLAPSE: Status: ACTIVE | Noted: 2019-04-08

## 2022-03-19 PROBLEM — E11.21 TYPE 2 DIABETES MELLITUS WITH NEPHROPATHY (HCC): Status: ACTIVE | Noted: 2018-02-06

## 2022-03-22 ENCOUNTER — TELEPHONE (OUTPATIENT)
Dept: PHARMACY | Age: 77
End: 2022-03-22

## 2022-03-22 NOTE — TELEPHONE ENCOUNTER
[facility] CLINICAL PHARMACY: ADHERENCE REVIEW  Identified care gap per [employer]: fills at [facility]: Diabetes and Statin adherence    Last Visit: 11/22/2021       Patient not found in Outcomes MTM    BP Readings from Last 3 Encounters:   11/19/21 113/71   06/22/21 (!) 145/80   05/20/21 129/79     CrCl cannot be calculated (Patient's most recent lab result is older than the maximum 180 days allowed. ). DIABETES ADHERENCE    Insurance Records claims through 03/22/2022 (2021 [facility] [place] =73%; GIOVANA [facility] [place] = 87%; Potential Fail Date: 06/26/2022): Januvia last filled on 03/20/2022 for 30 day supply. Next refill due: 04/19/2022    Per [facility]:   Januvia 25 mg  last picked up on 2/25/2022 for 30 day supply. Billed through [employer] Rx Value Date/Time    Hemoglobin A1c 6.6 (H) 05/20/2021 09:29 AM    Hemoglobin A1c 6.4 (H) 11/12/2020 10:42 AM    Hemoglobin A1c 6.7 (H) 09/14/2020 12:00 AM    Hemoglobin A1c (POC) 6.4 11/19/2021 09:10 AM     NOTE A1c <9%    [id number] Records claims through 03/22/2022 (2021 [facility] [place] = 97%   Atorvastin 20 mg last filled on 9/27/2021 for 90 day supply. Next refill due:  Past Due- 12/26/2021    Per [address]:   Atorvastatin 20mg last picked up on 9/27/2021 for 90 day supply. 3 refills remaining. Billed through [employer]    Asked pharmacy to refill- $0 copay- per       1. Per 11/19/2021 OV= Dr. Ricky Webb has to continue Lipitor 20mg - per chart \"Hyperlipidemia: Doing worse. Discussed her high risk of cardiovascular complications.   Lipitor 20. \"    Lab Results   Component Value Date/Time    Cholesterol, total 151 05/20/2021 09:29 AM    HDL Cholesterol 63 05/20/2021 09:29 AM    LDL, calculated 66.6 05/20/2021 09:29 AM    VLDL, calculated 21.4 05/20/2021 09:29 AM    Triglyceride 107 05/20/2021 09:29 AM    CHOL/HDL Ratio 2.4 05/20/2021 09:29 AM     ALT (SGPT)   Date Value Ref Range Status   05/20/2021 21 12 - 78 U/L Final     AST (SGOT)   Date Value Ref Range Status   2021 14 (L) 15 - 37 U/L Final     The 10-year ASCVD risk score (Dinorah Leo et al., 2013) is: 24.9%    Values used to calculate the score:      Age: 69 years      Sex: Female      Is Non- : Yes      Diabetic: Yes      Tobacco smoker: No      Systolic Blood Pressure: 329 mmHg      Is BP treated: Yes      HDL Cholesterol: 63 MG/DL      Total Cholesterol: 151 MG/DL     PLAN  The following are interventions that have been identified:  - Patient overdue refilling Atorvastatin 20 mg and active on home medication list   -[facility] filled Atorvastatin for patient 90 days $0    Attempting to reach patient to review.  Left message asking for return call.   3/22/22  3/23/22- called [facility] they have both medications ready      Future Appointments   Date Time Provider Rita Andrade   2022  8:45 AM Irineo Tirado MD [facility] [address], PharmD, [address] [facility]   Department, toll free: [phone] Option 2     ================================================================================  For Pharmacy Admin Tracking Only     CPA in place: No   Recommendation Provided To: Patient/Caregiver: 1 via Telephone and Pharmacy: 1   Intervention Detail: Adherence Monitorin and Refill(s) Provided   Gap Closed?: Yes   Intervention Accepted By: Patient/Caregiver: 0 and Pharmacy: 1   Time Spent (min): 15

## 2022-04-11 ENCOUNTER — TELEPHONE (OUTPATIENT)
Dept: INTERNAL MEDICINE CLINIC | Age: 77
End: 2022-04-11

## 2022-04-11 NOTE — TELEPHONE ENCOUNTER
Patient phone [phone]        Original request for change was received by a phone room psr and the company was advised that patient must authorize the change with the office before the office will send medication. The pharmacy was [facility]. [facility] did not leave a callback number and disconnected call from phone room psr. Patient States had a message to call to confirm if she was to add a new pharmacy. Patient states she was unsure of what pharmacy but it was \"[facility]\", states doesn't know \"how it works\"    Please call patient to clarify what pharmacies should be used.

## 2022-04-12 NOTE — TELEPHONE ENCOUNTER
Attempted to return call to patient  No answer  VM is full, unable to leave a message  Will wait for patient to call back.   Cony Maier LPN

## 2022-04-13 RX ORDER — LEVOTHYROXINE SODIUM 50 UG/1
TABLET ORAL
Qty: 90 TABLET | Refills: 0 | OUTPATIENT
Start: 2022-04-13

## 2022-04-15 ENCOUNTER — OFFICE VISIT (OUTPATIENT)
Dept: CARDIOLOGY CLINIC | Age: 77
End: 2022-04-15
Payer: MEDICARE

## 2022-04-15 VITALS
BODY MASS INDEX: 32.5 KG/M2 | HEART RATE: 93 BPM | HEIGHT: 64 IN | DIASTOLIC BLOOD PRESSURE: 100 MMHG | WEIGHT: 190.4 LBS | OXYGEN SATURATION: 98 % | SYSTOLIC BLOOD PRESSURE: 140 MMHG | RESPIRATION RATE: 14 BRPM

## 2022-04-15 DIAGNOSIS — I10 BENIGN ESSENTIAL HTN: ICD-10-CM

## 2022-04-15 DIAGNOSIS — I20.0 UNSTABLE ANGINA (HCC): Primary | ICD-10-CM

## 2022-04-15 DIAGNOSIS — E78.5 DYSLIPIDEMIA: ICD-10-CM

## 2022-04-15 DIAGNOSIS — Z87.891 HISTORY OF TOBACCO USE: ICD-10-CM

## 2022-04-15 DIAGNOSIS — E11.8 DM TYPE 2, CONTROLLED, WITH COMPLICATION (HCC): ICD-10-CM

## 2022-04-15 PROCEDURE — G8399 PT W/DXA RESULTS DOCUMENT: HCPCS | Performed by: INTERNAL MEDICINE

## 2022-04-15 PROCEDURE — G8755 DIAS BP > OR = 90: HCPCS | Performed by: INTERNAL MEDICINE

## 2022-04-15 PROCEDURE — G8753 SYS BP > OR = 140: HCPCS | Performed by: INTERNAL MEDICINE

## 2022-04-15 PROCEDURE — G8510 SCR DEP NEG, NO PLAN REQD: HCPCS | Performed by: INTERNAL MEDICINE

## 2022-04-15 PROCEDURE — 1101F PT FALLS ASSESS-DOCD LE1/YR: CPT | Performed by: INTERNAL MEDICINE

## 2022-04-15 PROCEDURE — G8536 NO DOC ELDER MAL SCRN: HCPCS | Performed by: INTERNAL MEDICINE

## 2022-04-15 PROCEDURE — 99204 OFFICE O/P NEW MOD 45 MIN: CPT | Performed by: INTERNAL MEDICINE

## 2022-04-15 PROCEDURE — G8427 DOCREV CUR MEDS BY ELIG CLIN: HCPCS | Performed by: INTERNAL MEDICINE

## 2022-04-15 PROCEDURE — 93000 ELECTROCARDIOGRAM COMPLETE: CPT | Performed by: INTERNAL MEDICINE

## 2022-04-15 PROCEDURE — 1090F PRES/ABSN URINE INCON ASSESS: CPT | Performed by: INTERNAL MEDICINE

## 2022-04-15 PROCEDURE — G8417 CALC BMI ABV UP PARAM F/U: HCPCS | Performed by: INTERNAL MEDICINE

## 2022-04-15 RX ORDER — MULTIVIT WITH MINERALS/HERBS
1 TABLET ORAL DAILY
COMMUNITY

## 2022-04-15 RX ORDER — ALLOPURINOL 300 MG/1
300 TABLET ORAL DAILY
COMMUNITY

## 2022-04-15 NOTE — PROGRESS NOTES
[address]: Armando Interiano  [phone]    History of Present Illness:   Ms. Paramjit Mcgregor is a 70 yo F with history of tobacco use, essential hypertension, type 2 diabetes, mixed hyperlipidemia, chronic kidney disease, most recent creatinine 3.6 in 2021 followed and referred by Dr. Joyce Morales. She also had thyroidectomy in 1999 and had her right kidney removed in 1996 for cancer. She is here due to progressive exertional shortness of breath over the last few months. She denies any concurrent chest pain. No significant palpitations, lightheadedness or dizziness. She denies any prior cardiac history. She did say she had a stress test in the past, but it was many years ago. EKG was normal sinus rhythm, nonspecific ST-T wave abnormality. 5/16/22 Betzy: worsening CKD, plans for PD, Cr 5.29    Assessment and Plan:   1. Unstable angina. Exertional shortness of breath, possible anginal equivalent with multiple risk factors and will proceed with a treadmill nuclear stress test and echocardiogram for further evaluation. 2. Chronic kidney disease, stage 3, status post nephrectomy in 1996 for cancer. Followed closely by Dr. Joyce Morales and Dr. Tatiana Sethi prior to that. 3. Type 2 diabetes. 4. Essential hypertension. Blood pressure is controlled. 5. Mixed hyperlipidemia. Tolerating statin. 6. History of thyroidectomy. 7. Prolapsed bladder, status post surgery in 2018 with Dr. Gilberto Schulte. She  has a past medical history of Arthritis, Cancer ([facility] [address] Chronic kidney disease (1996), Diabetes ([address] Diabetes mellitus ([address] (10/8/2014), Hepatitis C, Hyperlipidemia, Hypertension, Ill-defined condition (1990s), Liver disease, Personal history of renal cancer (3/2/2015), Thyroid disease (1999), and Vertigo. She has no past medical history of Unspecified sleep apnea. All other systems negative except as above.      PE  Vitals:    04/15/22 0758   BP: (!) 140/100   Pulse: 93   Resp: 14   SpO2: 98%   Weight: 190 lb 6.4 oz (86.4 kg)   Height: 5' 4\" (1.626 m)    Body mass index is 32.68 kg/m².    General appearance - alert, well appearing, and in no distress  Mental status - affect appropriate to mood  Eyes - sclera anicteric, moist mucous membranes  Neck - supple, no JVD  Chest - clear to auscultation, no wheezes, rales or rhonchi  Heart - normal rate, regular rhythm, normal S1, S2, no murmurs, rubs, clicks or gallops  Abdomen - soft, nontender, nondistended, no masses or organomegaly  Neurological - no focal deficit  Extremities - peripheral pulses normal, no pedal edema      Recent Labs:  Lab Results   Component Value Date/Time    Cholesterol, total 151 05/20/2021 09:29 AM    HDL Cholesterol 63 05/20/2021 09:29 AM    LDL, calculated 66.6 05/20/2021 09:29 AM    Triglyceride 107 05/20/2021 09:29 AM    CHOL/HDL Ratio 2.4 05/20/2021 09:29 AM     Lab Results   Component Value Date/Time    Creatinine 3.60 (H) 05/20/2021 09:29 AM     Lab Results   Component Value Date/Time    BUN 48 (H) 05/20/2021 09:29 AM     Lab Results   Component Value Date/Time    Potassium 4.6 05/20/2021 09:29 AM     Lab Results   Component Value Date/Time    Hemoglobin A1c 6.6 (H) 05/20/2021 09:29 AM     Lab Results   Component Value Date/Time    HGB 10.8 (L) 05/20/2021 09:29 AM     Lab Results   Component Value Date/Time    PLATELET 930 34/31/3045 09:29 AM       Reviewed:  Past Medical History:   Diagnosis Date    Arthritis     LOWER SPINE    Cancer ([address]     kidney (right)    Chronic kidney disease 1996    right kidney removed - CANCER    Diabetes ([address]     She is controlling with weight loss    Diabetes mellitus ([address] 10/8/2014    NIDDM    Hepatitis C     hx hep C from blood transfusion per pt; Treated by Dr. Jennifer Morrison    Hyperlipidemia     Hypertension     Ill-defined condition 1990s    HEPATITIS C - treated with medication    Liver disease     Personal history of renal cancer 3/2/2015    Thyroid disease 1999    Thyroidectomy    Vertigo Social History     Tobacco Use   Smoking Status Former Smoker    Packs/day: 0.25    Years: 8.00    Pack years: 2.00    Quit date: 1992    Years since quittin.0   Smokeless Tobacco Never Used   Tobacco Comment    quit smoking 30-40 yrs ago     Social History     Substance and Sexual Activity   Alcohol Use No     Allergies   Allergen Reactions    Codeine Hives       Current Outpatient Medications   Medication Sig    allopurinoL (ZYLOPRIM) 300 mg tablet Take 300 mg by mouth daily.  SITagliptin (Januvia) 25 mg tablet Take 25 mg by mouth daily.  b complex vitamins tablet Take 1 Tablet by mouth daily.  allopurinoL (ZYLOPRIM) 100 mg tablet Take 100 mg by mouth daily.  furosemide (LASIX) 20 mg tablet Take 20 mg by mouth two (2) times a day.  flash glucose sensor (FreeStyle Peyton 2 Sensor) kit 1 Each by Does Not Apply route continuous.  flash glucose scanning reader (FreeStyle Peyton 2 [place]) misc 1 Device by Does Not Apply route continuous.  levothyroxine (Euthyrox) 50 mcg tablet Take 1 Tablet by mouth Daily (before breakfast).  atorvastatin (LIPITOR) 20 mg tablet Take 1 tablet by mouth once daily    SITagliptin (Januvia) 100 mg tablet Take 1 Tablet by mouth daily.  glucose blood VI test strips (OneTouch Ultra Blue Test Strip) strip CHECK BLOOD SUGAR 2-3 TIMES A WEEK    amLODIPine (NORVASC) 5 mg tablet Take 1 Tab by mouth daily.  cloNIDine HCL (CATAPRES) 0.1 mg tablet TAKE 1 TABLET BY MOUTH 3  TIMES DAILY    flaxseed oil (OMEGA 3 PO) Take  by mouth.  acetaminophen (TYLENOL EXTRA STRENGTH) 500 mg tablet Take 500-1,000 mg by mouth every six (6) hours as needed for Pain.  cloNIDine HCL (CATAPRES) 0.1 mg tablet Take 1 Tablet by mouth two (2) times a day. (Patient not taking: Reported on 4/15/2022)    therapeutic multivitamin [facility]) tablet Take 1 Tab by mouth daily.  (Patient not taking: Reported on 4/15/2022)     No current facility-administered medications for this visit.        Leigh Childs MD  [facility] [place]  [address] [facility] [address]  [address]

## 2022-04-15 NOTE — LETTER
Patient:  Javier Mera   YOB: 1945  Date of Visit: 4/15/2022      Dear Joshua Begum MD  [address] [place] [address] [place] [address]  Via In [address] Jose R Harris MD  [address] [facility]  [address] [facility]  [address]  [address] [address]  Via In [facility] [address]: Thank you for referring Ms. Gucci Smith to me for evaluation/treatment. Below are the relevant portions of my assessment and plan of care. Ms. Natalie Phillips is a 68 yo F with history of tobacco use, essential hypertension, type 2 diabetes, mixed hyperlipidemia, chronic kidney disease, most recent creatinine 3.6 in 2021 followed and referred by Dr. Keyon Cheney. She also had thyroidectomy in 1999 and had her right kidney removed in 1996 for cancer. She is here due to progressive exertional shortness of breath over the last few months. She denies any concurrent chest pain. No significant palpitations, lightheadedness or dizziness. She denies any prior cardiac history. She did say she had a stress test in the past, but it was many years ago. EKG was normal sinus rhythm, nonspecific ST-T wave abnormality. Assessment and Plan:   1. Unstable angina. Exertional shortness of breath, possible anginal equivalent with multiple risk factors and will proceed with a treadmill nuclear stress test and echocardiogram for further evaluation. 2. Chronic kidney disease, stage 3, status post nephrectomy in 1996 for cancer. Followed closely by Dr. Keyon Cheney and Dr. Maxx Frederick prior to that. 3. Type 2 diabetes. 4. Essential hypertension. Blood pressure is controlled. 5. Mixed hyperlipidemia. Tolerating statin. 6. History of thyroidectomy. 7. Prolapsed bladder, status post surgery in 2018 with Dr. Bahman Rubin. If you have questions, please do not hesitate to call me.       Sincerely,      Rayray Gonzalez MD

## 2022-04-15 NOTE — PATIENT INSTRUCTIONS
You have been scheduled for an exercise nuclear stress test and an echocardiogram. We will call with results. Please arrive 15 minutes prior to your appointment. Wear comfortable clothing and walking or athletic shoes. Do not eat or drink anything, except water, for at least 4 hours prior to your appointment. Avoid tobacco products for at least 12 hours prior to your test.    Do not eat or drink anything containing caffeine, including but not limited to the following: chocolate, regular and decaffeinated coffee, soft drinks, or tea for at least 24 hours prior to your test.    Do not hold your scheduled medications prior to your test.    Your test will be performed on a 1 day protocol. This is determined by your height, weight, and other risk factors. For a 1 day test, please allow for 4 hours in the office that day.

## 2022-04-18 RX ORDER — LEVOTHYROXINE SODIUM 50 UG/1
50 TABLET ORAL
Qty: 90 TABLET | Refills: 3 | Status: SHIPPED | OUTPATIENT
Start: 2022-04-18

## 2022-04-18 NOTE — TELEPHONE ENCOUNTER
Med had been refused on initial request for reason:  not prescribing provider    Caller requests Refill of:  levothyroxine sodium 50 mcg      Please send to:  [address]      Visit Appointment History:   Future:   5/19/22  Previous: 11/19/21          Caller was advised that Meds will be refilled as soon as possible, however there can be a 48-72 business hour turn around on refill requests. If there are any changes to script please call patient.

## 2022-04-18 NOTE — TELEPHONE ENCOUNTER
Future Appointments:  Future Appointments   Date Time Provider Rita Lupe   5/18/2022  9:00 AM NUCLEAR, ELOY CAR BS AMB   5/18/2022 11:00 AM VASCULAR, ELOY CAR BS AMB   5/19/2022  8:45 AM Jessenia Regalado MD [facility] BS AMB        Last Appointment With Me:  11/19/2021     Requested Prescriptions     Pending Prescriptions Disp Refills    levothyroxine (Euthyrox) 50 mcg tablet 90 Tablet 3     Sig: Take 1 Tablet by mouth Daily (before breakfast).

## 2022-05-17 ENCOUNTER — TELEPHONE (OUTPATIENT)
Dept: CARDIOLOGY CLINIC | Age: 77
End: 2022-05-17

## 2022-05-17 NOTE — TELEPHONE ENCOUNTER
Left message for nuclear stress test appointment reminder including date, time, location, prep, and duration of test.    [phone] given to call and reschedule if necessary.

## 2022-05-18 ENCOUNTER — ANCILLARY PROCEDURE (OUTPATIENT)
Dept: CARDIOLOGY CLINIC | Age: 77
End: 2022-05-18

## 2022-05-18 ENCOUNTER — ANCILLARY PROCEDURE (OUTPATIENT)
Dept: CARDIOLOGY CLINIC | Age: 77
End: 2022-05-18
Payer: MEDICARE

## 2022-05-18 VITALS
WEIGHT: 190 LBS | DIASTOLIC BLOOD PRESSURE: 92 MMHG | HEIGHT: 64 IN | SYSTOLIC BLOOD PRESSURE: 150 MMHG | BODY MASS INDEX: 32.44 KG/M2

## 2022-05-18 DIAGNOSIS — E11.21 TYPE 2 DIABETES MELLITUS WITH NEPHROPATHY (HCC): ICD-10-CM

## 2022-05-18 DIAGNOSIS — Z87.891 HISTORY OF TOBACCO USE: ICD-10-CM

## 2022-05-18 DIAGNOSIS — E11.8 DM TYPE 2, CONTROLLED, WITH COMPLICATION (HCC): ICD-10-CM

## 2022-05-18 DIAGNOSIS — E11.9 TYPE 2 DIABETES MELLITUS WITHOUT COMPLICATION, UNSPECIFIED WHETHER LONG TERM INSULIN USE (HCC): ICD-10-CM

## 2022-05-18 DIAGNOSIS — E78.5 DYSLIPIDEMIA: ICD-10-CM

## 2022-05-18 DIAGNOSIS — I20.0 UNSTABLE ANGINA (HCC): ICD-10-CM

## 2022-05-18 DIAGNOSIS — I10 BENIGN ESSENTIAL HTN: ICD-10-CM

## 2022-05-18 LAB
ECHO AO ASC DIAM: 3.4 CM
ECHO AO ASCENDING AORTA INDEX: 1.78 CM/M2
ECHO AO ROOT DIAM: 3.5 CM
ECHO AO ROOT INDEX: 1.83 CM/M2
ECHO AV AREA PEAK VELOCITY: 2.5 CM2
ECHO AV AREA VTI: 3 CM2
ECHO AV AREA/BSA PEAK VELOCITY: 1.3 CM2/M2
ECHO AV AREA/BSA VTI: 1.6 CM2/M2
ECHO AV MEAN GRADIENT: 4 MMHG
ECHO AV MEAN VELOCITY: 0.9 M/S
ECHO AV PEAK GRADIENT: 9 MMHG
ECHO AV PEAK VELOCITY: 1.5 M/S
ECHO AV VELOCITY RATIO: 0.67
ECHO AV VTI: 29 CM
ECHO EST RA PRESSURE: 3 MMHG
ECHO LA DIAMETER INDEX: 2.04 CM/M2
ECHO LA DIAMETER: 3.9 CM
ECHO LA TO AORTIC ROOT RATIO: 1.11
ECHO LA VOL 2C: 48 ML (ref 22–52)
ECHO LA VOL 4C: 53 ML (ref 22–52)
ECHO LA VOL BP: 50 ML (ref 22–52)
ECHO LA VOL/BSA BIPLANE: 26 ML/M2 (ref 16–34)
ECHO LA VOLUME AREA LENGTH: 53 ML
ECHO LA VOLUME INDEX A2C: 25 ML/M2 (ref 16–34)
ECHO LA VOLUME INDEX A4C: 28 ML/M2 (ref 16–34)
ECHO LA VOLUME INDEX AREA LENGTH: 28 ML/M2 (ref 16–34)
ECHO LV E' LATERAL VELOCITY: 6 CM/S
ECHO LV E' SEPTAL VELOCITY: 5 CM/S
ECHO LV EDV A2C: 56 ML
ECHO LV EDV A4C: 72 ML
ECHO LV EDV BP: 64 ML (ref 56–104)
ECHO LV EDV INDEX A4C: 38 ML/M2
ECHO LV EDV INDEX BP: 34 ML/M2
ECHO LV EDV NDEX A2C: 29 ML/M2
ECHO LV EJECTION FRACTION A2C: 70 %
ECHO LV EJECTION FRACTION A4C: 73 %
ECHO LV EJECTION FRACTION BIPLANE: 69 % (ref 55–100)
ECHO LV ESV A2C: 17 ML
ECHO LV ESV A4C: 20 ML
ECHO LV ESV BP: 20 ML (ref 19–49)
ECHO LV ESV INDEX A2C: 9 ML/M2
ECHO LV ESV INDEX A4C: 10 ML/M2
ECHO LV ESV INDEX BP: 10 ML/M2
ECHO LV FRACTIONAL SHORTENING: 38 % (ref 28–44)
ECHO LV INTERNAL DIMENSION DIASTOLE INDEX: 2.09 CM/M2
ECHO LV INTERNAL DIMENSION DIASTOLIC: 4 CM (ref 3.9–5.3)
ECHO LV INTERNAL DIMENSION SYSTOLIC INDEX: 1.31 CM/M2
ECHO LV INTERNAL DIMENSION SYSTOLIC: 2.5 CM
ECHO LV IVSD: 1.3 CM (ref 0.6–0.9)
ECHO LV MASS 2D: 175.8 G (ref 67–162)
ECHO LV MASS INDEX 2D: 92.1 G/M2 (ref 43–95)
ECHO LV POSTERIOR WALL DIASTOLIC: 1.2 CM (ref 0.6–0.9)
ECHO LV RELATIVE WALL THICKNESS RATIO: 0.6
ECHO LVOT AREA: 3.8 CM2
ECHO LVOT AV VTI INDEX: 0.83
ECHO LVOT DIAM: 2.2 CM
ECHO LVOT MEAN GRADIENT: 2 MMHG
ECHO LVOT PEAK GRADIENT: 4 MMHG
ECHO LVOT PEAK VELOCITY: 1 M/S
ECHO LVOT STROKE VOLUME INDEX: 48.1 ML/M2
ECHO LVOT SV: 91.9 ML
ECHO LVOT VTI: 24.2 CM
ECHO MV A VELOCITY: 0.69 M/S
ECHO MV AREA PHT: 3.2 CM2
ECHO MV E DECELERATION TIME (DT): 233.6 MS
ECHO MV E VELOCITY: 0.52 M/S
ECHO MV E/A RATIO: 0.75
ECHO MV E/E' LATERAL: 8.67
ECHO MV E/E' RATIO (AVERAGED): 9.53
ECHO MV E/E' SEPTAL: 10.4
ECHO MV PRESSURE HALF TIME (PHT): 67.7 MS
ECHO PULMONARY ARTERY SYSTOLIC PRESSURE (PASP): 24 MMHG
ECHO RIGHT VENTRICULAR SYSTOLIC PRESSURE (RVSP): 24 MMHG
ECHO RV INTERNAL DIMENSION: 3.3 CM
ECHO RV TAPSE: 1.9 CM (ref 1.7–?)
ECHO TV REGURGITANT MAX VELOCITY: 2.28 M/S
ECHO TV REGURGITANT PEAK GRADIENT: 21 MMHG
NUC STRESS EJECTION FRACTION: 73 %
STRESS ANGINA INDEX: 0
STRESS BASELINE DIAS BP: 100 MMHG
STRESS BASELINE HR: 85 BPM
STRESS BASELINE ST DEPRESSION: 0 MM
STRESS BASELINE SYS BP: 178 MMHG
STRESS ESTIMATED WORKLOAD: 4.6 METS
STRESS EXERCISE DUR MIN: 3 MIN
STRESS EXERCISE DUR SEC: 1 SEC
STRESS O2 SAT PEAK: 100 %
STRESS O2 SAT REST: 97 %
STRESS PEAK DIAS BP: 100 MMHG
STRESS PEAK SYS BP: 238 MMHG
STRESS PERCENT HR ACHIEVED: 103 %
STRESS POST PEAK HR: 148 BPM
STRESS RATE PRESSURE PRODUCT: NORMAL BPM*MMHG
STRESS SR DUKE TREADMILL SCORE: 3
STRESS ST DEPRESSION: 0 MM
STRESS TARGET HR: 144 BPM
TID: 0.93

## 2022-05-18 PROCEDURE — 78452 HT MUSCLE IMAGE SPECT MULT: CPT | Performed by: INTERNAL MEDICINE

## 2022-05-18 PROCEDURE — 93015 CV STRESS TEST SUPVJ I&R: CPT | Performed by: INTERNAL MEDICINE

## 2022-05-18 PROCEDURE — A9500 TC99M SESTAMIBI: HCPCS | Performed by: INTERNAL MEDICINE

## 2022-05-18 PROCEDURE — 93306 TTE W/DOPPLER COMPLETE: CPT | Performed by: INTERNAL MEDICINE

## 2022-05-18 RX ORDER — TETRAKIS(2-METHOXYISOBUTYLISOCYANIDE)COPPER(I) TETRAFLUOROBORATE 1 MG/ML
30 INJECTION, POWDER, LYOPHILIZED, FOR SOLUTION INTRAVENOUS ONCE
Status: COMPLETED | OUTPATIENT
Start: 2022-05-18 | End: 2022-05-18

## 2022-05-18 RX ORDER — TETRAKIS(2-METHOXYISOBUTYLISOCYANIDE)COPPER(I) TETRAFLUOROBORATE 1 MG/ML
10 INJECTION, POWDER, LYOPHILIZED, FOR SOLUTION INTRAVENOUS ONCE
Status: COMPLETED | OUTPATIENT
Start: 2022-05-18 | End: 2022-05-18

## 2022-05-18 RX ADMIN — TETRAKIS(2-METHOXYISOBUTYLISOCYANIDE)COPPER(I) TETRAFLUOROBORATE 8.7 MILLICURIE: 1 INJECTION, POWDER, LYOPHILIZED, FOR SOLUTION INTRAVENOUS at 09:00

## 2022-05-18 RX ADMIN — TETRAKIS(2-METHOXYISOBUTYLISOCYANIDE)COPPER(I) TETRAFLUOROBORATE 25.8 MILLICURIE: 1 INJECTION, POWDER, LYOPHILIZED, FOR SOLUTION INTRAVENOUS at 10:30

## 2022-05-19 ENCOUNTER — TELEPHONE (OUTPATIENT)
Dept: CARDIOLOGY CLINIC | Age: 77
End: 2022-05-19

## 2022-05-19 ENCOUNTER — OFFICE VISIT (OUTPATIENT)
Dept: INTERNAL MEDICINE CLINIC | Age: 77
End: 2022-05-19
Payer: MEDICARE

## 2022-05-19 VITALS
BODY MASS INDEX: 32.44 KG/M2 | OXYGEN SATURATION: 98 % | HEIGHT: 64 IN | TEMPERATURE: 98.1 F | HEART RATE: 86 BPM | RESPIRATION RATE: 16 BRPM | DIASTOLIC BLOOD PRESSURE: 77 MMHG | SYSTOLIC BLOOD PRESSURE: 145 MMHG | WEIGHT: 190 LBS

## 2022-05-19 DIAGNOSIS — N18.4 CKD (CHRONIC KIDNEY DISEASE) STAGE 4, GFR 15-29 ML/MIN (HCC): ICD-10-CM

## 2022-05-19 DIAGNOSIS — E78.2 MIXED HYPERLIPIDEMIA: ICD-10-CM

## 2022-05-19 DIAGNOSIS — E11.21 TYPE 2 DIABETES MELLITUS WITH NEPHROPATHY (HCC): Primary | ICD-10-CM

## 2022-05-19 DIAGNOSIS — C64.9 MALIGNANT NEOPLASM OF KIDNEY, UNSPECIFIED LATERALITY (HCC): ICD-10-CM

## 2022-05-19 DIAGNOSIS — N18.5 CHRONIC KIDNEY DISEASE (CKD), STAGE V (HCC): ICD-10-CM

## 2022-05-19 DIAGNOSIS — N18.4 CHRONIC KIDNEY DISEASE, STAGE IV (SEVERE) (HCC): ICD-10-CM

## 2022-05-19 DIAGNOSIS — E03.4 HYPOTHYROIDISM DUE TO ACQUIRED ATROPHY OF THYROID: ICD-10-CM

## 2022-05-19 DIAGNOSIS — D51.1 VITAMIN B12 DEFICIENCY ANEMIA DUE TO SELECTIVE VITAMIN B12 MALABSORPTION WITH PROTEINURIA: ICD-10-CM

## 2022-05-19 DIAGNOSIS — N18.30 STAGE 3 CHRONIC KIDNEY DISEASE, UNSPECIFIED WHETHER STAGE 3A OR 3B CKD (HCC): ICD-10-CM

## 2022-05-19 PROCEDURE — G8399 PT W/DXA RESULTS DOCUMENT: HCPCS | Performed by: INTERNAL MEDICINE

## 2022-05-19 PROCEDURE — G9231 DOC ESRD DIA TRANS PREG: HCPCS | Performed by: INTERNAL MEDICINE

## 2022-05-19 PROCEDURE — G8536 NO DOC ELDER MAL SCRN: HCPCS | Performed by: INTERNAL MEDICINE

## 2022-05-19 PROCEDURE — 99214 OFFICE O/P EST MOD 30 MIN: CPT | Performed by: INTERNAL MEDICINE

## 2022-05-19 PROCEDURE — G8510 SCR DEP NEG, NO PLAN REQD: HCPCS | Performed by: INTERNAL MEDICINE

## 2022-05-19 PROCEDURE — 1090F PRES/ABSN URINE INCON ASSESS: CPT | Performed by: INTERNAL MEDICINE

## 2022-05-19 PROCEDURE — G8417 CALC BMI ABV UP PARAM F/U: HCPCS | Performed by: INTERNAL MEDICINE

## 2022-05-19 PROCEDURE — G8427 DOCREV CUR MEDS BY ELIG CLIN: HCPCS | Performed by: INTERNAL MEDICINE

## 2022-05-19 PROCEDURE — 1101F PT FALLS ASSESS-DOCD LE1/YR: CPT | Performed by: INTERNAL MEDICINE

## 2022-05-19 NOTE — PROGRESS NOTES
SUBJECTIVE:   Ms. Dory Tariq is a 69 y.o. female who is here for the following complaint, and also for follow up of routine medical issues. She had been seeing Dr. Edy Springer, and Dr. Negron Read before that. Chief Complaint   Patient presents with    Follow-up    Results     creatinine was 5.2, nephrologist notified her this week will need Peritoneal dialysis     Hypertension     seeing renal for this , yesterday was 178/100       She had echo and stress test yesterday. She has upcoming colonoscopy. She just had her eye visit, and \"nothing had changed. Dr. Mary Scanlon told her yesterday that she had Cr of 5, and they are considering starting peritoneal dialysis. She is seeing Dr. Mary Scanlon now for kidney dysfunction. She is nearing dialysis, and has discussed options including peritoneal dialysis and kidney transplant. Stress: Health, son is alcoholic. She had lost weight with help of nutritionist, but this is creeping up again:   Wt Readings from Last 3 Encounters:   05/19/22 190 lb (86.2 kg)   05/18/22 190 lb (86.2 kg)   04/15/22 190 lb 6.4 oz (86.4 kg)     DM: 100-146. 119 this morning. Her numbness L foot has improved. LBP is stable. \"I was told I have arthritis in the spine\" by Dr. Alla Cortez, orthopedist.  She has been through treatment with Dr. Estrella Monique, now seeing someone else in his office, for Hep C, obtained during a prior blood transfusion. \"He said after the last visit I could return just as needed. \"  At this time, she is otherwise doing well and has brought no other complaints to my attention today. For a list of the medical issues addressed today, see the assessment and plan below.     PMH:   Past Medical History:   Diagnosis Date    Arthritis     LOWER SPINE    Cancer [facility])     kidney (right)    Chronic kidney disease 1996    right kidney removed - CANCER    Diabetes ([facility] [address]     She is controlling with weight loss    Diabetes mellitus ([facility] [address] 10/8/2014    NIDDM    Hepatitis C hx hep C from blood transfusion per pt; Treated by Dr. Micah Arana Hyperlipidemia     Hypertension     Ill-defined condition 1990s    HEPATITIS C - treated with medication    Liver disease     Personal history of renal cancer 3/2/2015    Thyroid disease 1999    Thyroidectomy    Vertigo        Past Surgical History:   Procedure Laterality Date    COLONOSCOPY N/A 6/20/2017    COLONOSCOPY performed by Yamilet Buck. Elliott Gonzales MD at [facility] ENDOSCOPY    HX BREAST REDUCTION Bilateral 1996    HX COLONOSCOPY  3/5/12    Repeat in 5 years (Dr. Luz Maria Rowell)    HX CYST REMOVAL      HX GYN  1980s    tubal pregnancy    HX HEENT      WISDOM TEETH X4    HX OTHER SURGICAL  1999    thyroidectomy    HX OTHER SURGICAL      colonoscopy x 2 - ?polyps    HX OTHER SURGICAL  04/08/2019    surg for prolasp bladder at [address]  04/08/2019    prolasp bladder surg    HX RITESH AND BSO Bilateral 1995    HX TONSILLECTOMY      2or 4years old   [address]    kidney removed on right    IR BX BONE MARROW DIAGNOSTIC  04/05/2021       All: She is allergic to codeine. Current Outpatient Medications   Medication Sig    levothyroxine (Euthyrox) 50 mcg tablet Take 1 Tablet by mouth Daily (before breakfast).  allopurinoL (ZYLOPRIM) 300 mg tablet Take 300 mg by mouth daily.  SITagliptin (Januvia) 25 mg tablet Take 25 mg by mouth daily.  b complex vitamins tablet Take 1 Tablet by mouth daily.  furosemide (LASIX) 20 mg tablet Take 20 mg by mouth two (2) times a day.  flash glucose sensor (FreeStyle Peyton 2 Sensor) kit 1 Each by Does Not Apply route continuous.  flash glucose scanning reader (FreeStyle Peyton 2 [place]) misc 1 Device by Does Not Apply route continuous.     atorvastatin (LIPITOR) 20 mg tablet Take 1 tablet by mouth once daily    glucose blood VI test strips (OneTouch Ultra Blue Test Strip) strip CHECK BLOOD SUGAR 2-3 TIMES A WEEK    amLODIPine (NORVASC) 5 mg tablet Take 1 Tab by mouth daily.    cloNIDine HCL (CATAPRES) 0.1 mg tablet TAKE 1 TABLET BY MOUTH 3  TIMES DAILY    flaxseed oil (OMEGA 3 PO) Take  by mouth.  acetaminophen (TYLENOL EXTRA STRENGTH) 500 mg tablet Take 500-1,000 mg by mouth every six (6) hours as needed for Pain.  allopurinoL (ZYLOPRIM) 100 mg tablet Take 100 mg by mouth daily. (Patient not taking: Reported on 5/19/2022)    SITagliptin (Januvia) 100 mg tablet Take 1 Tablet by mouth daily. (Patient not taking: Reported on 5/19/2022)     No current facility-administered medications for this visit. FH: Her family history includes Diabetes in her daughter, mother, and sister; Heart Disease in her father; Hypertension in her daughter and son; Kidney Disease in her father and mother; No Known Problems in her brother and son. SH: Retired Lpn, doing private duty nursing. She reports that she quit smoking about 30 years ago. She has a 2.00 pack-year smoking history. She has never used smokeless tobacco. She reports that she does not drink alcohol and does not use drugs. ROS: See above; Complete ROS otherwise negative. OBJECTIVE:   Vitals:   Visit Vitals  BP (!) 145/77 (BP 1 Location: Left upper arm, BP Patient Position: Sitting, BP Cuff Size: Adult)   Pulse 86   Temp 98.1 °F (36.7 °C) (Temporal)   Resp 16   Ht 5' 4\" (1.626 m)   Wt 190 lb (86.2 kg)   LMP  (LMP Unknown)   SpO2 98%   BMI 32.61 kg/m²      Gen: Pleasant 69 y.o.  female in NAD. HEENT: PERRLA. EOMI. OP moist and pink. Neck: Supple. No LAD. HEART: RRR, No M/G/R.    LUNGS: CTAB No W/R. ABDOMEN: S, NT, ND, BS+. EXTREMITIES: Warm. No C/C/E.  MUSCULOSKELETAL: Normal ROM, muscle strength 5/5 all groups. NEURO: Alert and oriented x 3. Cranial nerves grossly intact. No focal sensory or motor deficits noted. SKIN: Warm. Dry. Keloid at site of prior thyroid surgery.        Lab Results   Component Value Date/Time    Sodium 144 05/20/2021 09:29 AM    Potassium 4.6 05/20/2021 09:29 AM    Chloride 113 (H) 05/20/2021 09:29 AM    CO2 24 05/20/2021 09:29 AM    Anion gap 7 05/20/2021 09:29 AM    Glucose 126 (H) 05/20/2021 09:29 AM    BUN 48 (H) 05/20/2021 09:29 AM    Creatinine 3.60 (H) 05/20/2021 09:29 AM    BUN/Creatinine ratio 13 05/20/2021 09:29 AM    GFR est AA 15 (L) 05/20/2021 09:29 AM    GFR est non-AA 12 (L) 05/20/2021 09:29 AM    Calcium 8.7 05/20/2021 09:29 AM    Bilirubin, total 0.2 05/20/2021 09:29 AM    ALT (SGPT) 21 05/20/2021 09:29 AM    Alk. phosphatase 112 05/20/2021 09:29 AM    Protein, total 7.2 05/20/2021 09:29 AM    Albumin 3.7 05/20/2021 09:29 AM    Globulin 3.5 05/20/2021 09:29 AM    A-G Ratio 1.1 05/20/2021 09:29 AM       Lab Results   Component Value Date/Time    Cholesterol, total 151 05/20/2021 09:29 AM    HDL Cholesterol 63 05/20/2021 09:29 AM    LDL, calculated 66.6 05/20/2021 09:29 AM    Triglyceride 107 05/20/2021 09:29 AM    CHOL/HDL Ratio 2.4 05/20/2021 09:29 AM        Lab Results   Component Value Date/Time    Hemoglobin A1c 6.6 (H) 05/20/2021 09:29 AM       Lab Results   Component Value Date/Time    WBC 10.0 05/20/2021 09:29 AM    HGB 10.8 (L) 05/20/2021 09:29 AM    HCT 35.2 05/20/2021 09:29 AM    PLATELET 354 85/52/5045 09:29 AM    MCV 85.9 05/20/2021 09:29 AM       Lab Results   Component Value Date/Time    TSH 1.07 05/20/2021 09:29 AM         ASSESSMENT/ PLAN:   1. Diabetes: Controlled. Continue current measures. 2. Hypertension: fine today, okay previously. ?WCS  3. Thyroid disease: Euthyroid. Watch TSH. 4. Chronic kidney disease:  Approaching dialysis. Watch labs. Seeing Dr. Remy Browning. 5. Arthritis: Chronic and stable. 6. Hyperlipidemia: Doing worse. Discussed her high risk of cardiovascular complications. Lipitor 20.    7. Hepatitis C: F/U with GI as needed--apparently \"turned loose\". 8. Obesity: Diet and exercise. Weight down a bit; keep up the good work. 9. Neuropathy L toe: ?Sciatica vs other compressive neuropathy. Doing better lately. RTC 6 months.      I have reviewed the patient's medications and risks/side effects/benefits were discussed. Diagnosis(-es) explained to patient and questions answered. Literature provided where appropriate.

## 2022-05-19 NOTE — TELEPHONE ENCOUNTER
----- Message from Sherice Perez MD sent at 5/18/2022  4:30 PM EDT -----  Please let pt know stress test and echo were normal. thx

## 2022-05-19 NOTE — PROGRESS NOTES
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

## 2022-05-19 NOTE — PROGRESS NOTES
.1. Have you been to the ER, urgent care clinic since your last visit? Hospitalized since your last visit? No    2. Have you seen or consulted any other health care providers outside of the [address] [place] [address] since your last visit? Include any pap smears or colon screening.  No         Dr Yung Rawls  Cardiac stress test and echo       Em lpn WDL

## 2022-05-20 ENCOUNTER — TELEPHONE (OUTPATIENT)
Dept: INTERNAL MEDICINE CLINIC | Age: 77
End: 2022-05-20

## 2022-05-20 LAB
CHOLEST SERPL-MCNC: 158 MG/DL
EST. AVERAGE GLUCOSE BLD GHB EST-MCNC: 146 MG/DL
HBA1C MFR BLD: 6.7 % (ref 4–5.6)
HDLC SERPL-MCNC: 56 MG/DL
HDLC SERPL: 2.8 {RATIO} (ref 0–5)
LDLC SERPL CALC-MCNC: 71.2 MG/DL (ref 0–100)
T4 FREE SERPL-MCNC: 1.4 NG/DL (ref 0.8–1.5)
TRIGL SERPL-MCNC: 154 MG/DL (ref ?–150)
TSH SERPL DL<=0.05 MIU/L-ACNC: 0.93 UIU/ML (ref 0.36–3.74)
VIT B12 SERPL-MCNC: 1457 PG/ML (ref 193–986)
VLDLC SERPL CALC-MCNC: 30.8 MG/DL

## 2022-05-25 ENCOUNTER — TRANSCRIBE ORDER (OUTPATIENT)
Dept: SCHEDULING | Age: 77
End: 2022-05-25

## 2022-05-25 ENCOUNTER — TELEPHONE (OUTPATIENT)
Dept: PHARMACY | Age: 77
End: 2022-05-25

## 2022-05-25 DIAGNOSIS — Z12.31 SCREENING MAMMOGRAM FOR HIGH-RISK PATIENT: Primary | ICD-10-CM

## 2022-05-25 NOTE — TELEPHONE ENCOUNTER
[facility] CLINICAL PHARMACY: ADHERENCE REVIEW  Identified care gap per [employer]: fills at [facility]: Diabetes and Statin adherence    Last Visit: 05/19/2022    No LIS    Patient not found in Outcomes MTM    ASSESSMENT  ACE/ARB ADHERENCE      BP Readings from Last 3 Encounters:   05/19/22 (!) 145/77   05/18/22 (!) 150/92   04/15/22 (!) 140/100     CrCl cannot be calculated (Patient's most recent lab result is older than the maximum 180 days allowed. ). DIABETES ADHERENCE    Insurance Records claims through 03/22/2022 (2021 [place] = N/A%; GIOVANA [facility] [place] = 85%; Potential Fail Date: 07/26/2022): Januvia 25mg last filled on 05/06/2022 for 30 day supply. Next refill due: 06/05/2022    Per [facility]:   Januvia 25mg last picked up on 05/06/2022 for 30 day supply. 10 refills remaining. Billed through [facility] Value Date/Time    Hemoglobin A1c 6.7 (H) 05/19/2022 09:44 AM    Hemoglobin A1c 6.6 (H) 05/20/2021 09:29 AM    Hemoglobin A1c 6.4 (H) 11/12/2020 10:42 AM    Hemoglobin A1c (POC) 6.4 11/19/2021 09:10 AM     NOTE A1c <9%    [id number] Records claims through 03/22/2022 (2021 [place] = N/A%; YTD [facility] [place] = Filled only once; Potential fail date 08/16/2022): Atorvastatin 20mg last filled on 03/22/2022 for 90 day supply. Next refill due: 06/22/2022    Per [facility]:   Atorvastatin 20mg was transferred to another pharmacy.        Lab Results   Component Value Date/Time    Cholesterol, total 158 05/19/2022 09:44 AM    HDL Cholesterol 56 05/19/2022 09:44 AM    LDL, calculated 71.2 05/19/2022 09:44 AM    VLDL, calculated 30.8 05/19/2022 09:44 AM    Triglyceride 154 (H) 05/19/2022 09:44 AM    CHOL/HDL Ratio 2.8 05/19/2022 09:44 AM     ALT (SGPT)   Date Value Ref Range Status   05/20/2021 21 12 - 78 U/L Final     AST (SGOT)   Date Value Ref Range Status   05/20/2021 14 (L) 15 - 37 U/L Final     The 10-year ASCVD risk score (Scott Lopez et al., 2013) is: 33.3%    Values used to calculate the score:      Age: 69 years      Sex: Female      Is Non- : Yes      Diabetic: Yes      Tobacco smoker: No      Systolic Blood Pressure: 869 mmHg      Is BP treated: Yes      HDL Cholesterol: 56 MG/DL      Total Cholesterol: 158 MG/DL     PLAN  The following are interventions that have been identified:  None    No patient out reach planned at this time. patient has supply on hand based on pharmacy and insurance information. Atorvastatin was transferred to another pharmacy, but unknown at this time where as she is not due for a refill until around 6/15/2022.           Future Appointments   Date Time Provider Rita Andrade   11/22/2022  9:15 AM Yanira Jhaveri MD [facility] [address] [facility]  Direct: [phone]  Department, toll free:[phone], Option 2      For Pharmacy Admin Tracking Only     CPA in place: No   Gap Closed?: Yes   Time Spent (min): 30

## 2022-05-26 RX ORDER — SODIUM BICARBONATE 650 MG/1
650 TABLET ORAL 3 TIMES DAILY
COMMUNITY

## 2022-05-26 RX ORDER — SODIUM CHLORIDE, SODIUM LACTATE, POTASSIUM CHLORIDE, CALCIUM CHLORIDE 600; 310; 30; 20 MG/100ML; MG/100ML; MG/100ML; MG/100ML
25 INJECTION, SOLUTION INTRAVENOUS CONTINUOUS
Status: CANCELLED | OUTPATIENT
Start: 2022-06-02

## 2022-05-26 NOTE — PERIOP NOTES
[facility]  Preoperative Instructions        Surgery Date 6/2/2022      Time of Arrival To Be Called  Contact# [phone]    1. On the day of your surgery, please report to the Surgical Services Registration Desk and sign in at your designated time. The Surgery Center is located to the right of the Emergency Room. 2. You must have someone with you to drive you home. You should not drive a car for 24 hours following surgery. Please make arrangements for a friend or family member to stay with you for the first 24 hours after your surgery. 3. Do not have anything to eat or drink (including water, gum, mints, coffee, juice) after midnight  6/1/2022 . ? This may not apply to medications prescribed by your physician. ?(Please note below the special instructions with medications to take the morning of your procedure.)    4. We recommend you do not drink any alcoholic beverages for 24 hours before and after your surgery. 5. Contact your surgeons office for instructions on the following medications: non-steroidal anti-inflammatory drugs (i.e. Advil, Aleve), vitamins, and supplements. (Some surgeons will want you to stop these medications prior to surgery and others may allow you to take them)  **If you are currently taking Plavix, Coumadin, Aspirin and/or other blood-thinning agents, contact your surgeon for instructions. ** Your surgeon will partner with the physician prescribing these medications to determine if it is safe to stop or if you need to continue taking. Please do not stop taking these medications without instructions from your surgeon    6. Wear comfortable clothes. Wear glasses instead of contacts. Do not bring any money or jewelry. Please bring picture ID, insurance card, and any prearranged co-payment or hospital payment. Do not wear make-up, particularly mascara the morning of your surgery.   Do not wear nail polish, particularly if you are having foot /hand surgery. Wear your hair loose or down, no ponytails, buns, isael pins or clips. All body piercings must be removed. Please shower with antibacterial soap for three consecutive days before and on the morning of surgery, but do not apply any lotions, powders or deodorants after the shower on the day of surgery. Please use a fresh towels after each shower. Please sleep in clean clothes and change bed linens the night before surgery. Please do not shave for 48 hours prior to surgery. Shaving of the face is acceptable. 7. You should understand that if you do not follow these instructions your surgery may be cancelled. If your physical condition changes (I.e. fever, cold or flu) please contact your surgeon as soon as possible. 8. It is important that you be on time. If a situation occurs where you may be late, please call [phone] (OR Holding Area). 9. If you have any questions and or problems, please call [phone] (Pre-admission Testing). 10. Your surgery time may be subject to change. You will receive a phone call the evening prior if your time changes. 11.  If having outpatient surgery, you must have someone to drive you here, stay with you during the duration of your stay, and to drive you home at time of discharge. 12.  Due to current COVID restrictions, only ONE adult may accompany you the day of the procedure. We have limited seating available. If our waiting room is at capacity, your ride may be asked to remain in their vehicle. No children are allowed in the waiting room    Special Instructions: follow all instructions given to you by your doctor. TAKE ALL MEDICATIONS DAY OF SURGERY EXCEPT: vitamins or supplements, Januvia       I understand a pre-operative phone call will be made to verify my surgery time. In the event that I am not available, I give permission for a message to be left on my answering service and/or with another person?   yes         ___________________ __________   _________    (Signature of Patient)             (Witness)                (Date and Time)

## 2022-06-01 ENCOUNTER — ANESTHESIA EVENT (OUTPATIENT)
Dept: SURGERY | Age: 77
End: 2022-06-01
Payer: MEDICARE

## 2022-06-02 ENCOUNTER — ANESTHESIA (OUTPATIENT)
Dept: SURGERY | Age: 77
End: 2022-06-02
Payer: MEDICARE

## 2022-06-02 ENCOUNTER — HOSPITAL ENCOUNTER (OUTPATIENT)
Age: 77
Setting detail: OUTPATIENT SURGERY
Discharge: HOME | End: 2022-06-02
Attending: SURGERY | Admitting: SURGERY
Payer: MEDICARE

## 2022-06-02 VITALS
BODY MASS INDEX: 31.99 KG/M2 | HEART RATE: 99 BPM | DIASTOLIC BLOOD PRESSURE: 82 MMHG | SYSTOLIC BLOOD PRESSURE: 158 MMHG | HEIGHT: 64 IN | WEIGHT: 187.39 LBS | OXYGEN SATURATION: 97 % | TEMPERATURE: 98.1 F | RESPIRATION RATE: 17 BRPM

## 2022-06-02 DIAGNOSIS — N18.5 CHRONIC KIDNEY DISEASE (CKD), STAGE V (HCC): Primary | ICD-10-CM

## 2022-06-02 LAB
ANION GAP BLD CALC-SCNC: 12 MMOL/L (ref 10–20)
CA-I BLD-MCNC: 1.24 MMOL/L (ref 1.12–1.32)
CHLORIDE BLD-SCNC: 111 MMOL/L (ref 98–107)
CO2 BLD-SCNC: 21.8 MMOL/L (ref 21–32)
CREAT BLD-MCNC: 8.4 MG/DL (ref 0.6–1.3)
GLUCOSE BLD STRIP.AUTO-MCNC: 156 MG/DL (ref 65–117)
GLUCOSE BLD-MCNC: 129 MG/DL (ref 65–100)
POTASSIUM BLD-SCNC: 4.2 MMOL/L (ref 3.5–5.1)
SERVICE CMNT-IMP: ABNORMAL
SERVICE CMNT-IMP: ABNORMAL
SODIUM BLD-SCNC: 144 MMOL/L (ref 136–145)

## 2022-06-02 PROCEDURE — 77030008684 HC TU ET CUF COVD -B: Performed by: ANESTHESIOLOGY

## 2022-06-02 PROCEDURE — 77030008606 HC TRCR ENDOSC KII AMR -B: Performed by: SURGERY

## 2022-06-02 PROCEDURE — 77030012770 HC TRCR OPT FX AMR -B: Performed by: SURGERY

## 2022-06-02 PROCEDURE — 77030010507 HC ADH SKN DERMBND J&J -B: Performed by: SURGERY

## 2022-06-02 PROCEDURE — C1750 CATH, HEMODIALYSIS,LONG-TERM: HCPCS | Performed by: SURGERY

## 2022-06-02 PROCEDURE — 74011250636 HC RX REV CODE- 250/636: Performed by: NURSE ANESTHETIST, CERTIFIED REGISTERED

## 2022-06-02 PROCEDURE — 82962 GLUCOSE BLOOD TEST: CPT

## 2022-06-02 PROCEDURE — 76210000006 HC OR PH I REC 0.5 TO 1 HR: Performed by: SURGERY

## 2022-06-02 PROCEDURE — 77030002933 HC SUT MCRYL J&J -A: Performed by: SURGERY

## 2022-06-02 PROCEDURE — 74011000250 HC RX REV CODE- 250: Performed by: NURSE ANESTHETIST, CERTIFIED REGISTERED

## 2022-06-02 PROCEDURE — 74011250637 HC RX REV CODE- 250/637: Performed by: SURGERY

## 2022-06-02 PROCEDURE — 74011250636 HC RX REV CODE- 250/636: Performed by: STUDENT IN AN ORGANIZED HEALTH CARE EDUCATION/TRAINING PROGRAM

## 2022-06-02 PROCEDURE — 76210000020 HC REC RM PH II FIRST 0.5 HR: Performed by: SURGERY

## 2022-06-02 PROCEDURE — 2709999900 HC NON-CHARGEABLE SUPPLY: Performed by: SURGERY

## 2022-06-02 PROCEDURE — 77030020829: Performed by: SURGERY

## 2022-06-02 PROCEDURE — 80047 BASIC METABLC PNL IONIZED CA: CPT

## 2022-06-02 PROCEDURE — 76010000149 HC OR TIME 1 TO 1.5 HR: Performed by: SURGERY

## 2022-06-02 PROCEDURE — 74011000250 HC RX REV CODE- 250: Performed by: SURGERY

## 2022-06-02 PROCEDURE — 74011250636 HC RX REV CODE- 250/636: Performed by: SURGERY

## 2022-06-02 PROCEDURE — 77030002986 HC SUT PROL J&J -A: Performed by: SURGERY

## 2022-06-02 PROCEDURE — 76060000033 HC ANESTHESIA 1 TO 1.5 HR: Performed by: SURGERY

## 2022-06-02 PROCEDURE — 77030013079 HC BLNKT BAIR HGGR 3M -A: Performed by: ANESTHESIOLOGY

## 2022-06-02 PROCEDURE — 77030026438 HC STYL ET INTUB CARD -A: Performed by: ANESTHESIOLOGY

## 2022-06-02 PROCEDURE — 77030028907 HC WRP KNEE WO BGS SOLM -B

## 2022-06-02 PROCEDURE — 77030031139 HC SUT VCRL2 J&J -A: Performed by: SURGERY

## 2022-06-02 PROCEDURE — 77030004495 HC ADPT PERI DLYS BAXT -C: Performed by: SURGERY

## 2022-06-02 RX ORDER — MIDAZOLAM HYDROCHLORIDE 1 MG/ML
1 INJECTION, SOLUTION INTRAMUSCULAR; INTRAVENOUS AS NEEDED
Status: DISCONTINUED | OUTPATIENT
Start: 2022-06-02 | End: 2022-06-02 | Stop reason: HOSPADM

## 2022-06-02 RX ORDER — ONDANSETRON 2 MG/ML
4 INJECTION INTRAMUSCULAR; INTRAVENOUS AS NEEDED
Status: DISCONTINUED | OUTPATIENT
Start: 2022-06-02 | End: 2022-06-02 | Stop reason: HOSPADM

## 2022-06-02 RX ORDER — FENTANYL CITRATE 50 UG/ML
50 INJECTION, SOLUTION INTRAMUSCULAR; INTRAVENOUS AS NEEDED
Status: DISCONTINUED | OUTPATIENT
Start: 2022-06-02 | End: 2022-06-02 | Stop reason: HOSPADM

## 2022-06-02 RX ORDER — PROPOFOL 10 MG/ML
INJECTION, EMULSION INTRAVENOUS
Status: DISCONTINUED | OUTPATIENT
Start: 2022-06-02 | End: 2022-06-02 | Stop reason: HOSPADM

## 2022-06-02 RX ORDER — ONDANSETRON 2 MG/ML
INJECTION INTRAMUSCULAR; INTRAVENOUS AS NEEDED
Status: DISCONTINUED | OUTPATIENT
Start: 2022-06-02 | End: 2022-06-02 | Stop reason: HOSPADM

## 2022-06-02 RX ORDER — SODIUM CHLORIDE, SODIUM LACTATE, POTASSIUM CHLORIDE, CALCIUM CHLORIDE 600; 310; 30; 20 MG/100ML; MG/100ML; MG/100ML; MG/100ML
25 INJECTION, SOLUTION INTRAVENOUS CONTINUOUS
Status: DISCONTINUED | OUTPATIENT
Start: 2022-06-02 | End: 2022-06-02 | Stop reason: HOSPADM

## 2022-06-02 RX ORDER — HYDROCODONE BITARTRATE AND ACETAMINOPHEN 5; 325 MG/1; MG/1
1 TABLET ORAL
Status: DISCONTINUED | OUTPATIENT
Start: 2022-06-02 | End: 2022-06-02 | Stop reason: HOSPADM

## 2022-06-02 RX ORDER — FENTANYL CITRATE 50 UG/ML
25 INJECTION, SOLUTION INTRAMUSCULAR; INTRAVENOUS
Status: DISCONTINUED | OUTPATIENT
Start: 2022-06-02 | End: 2022-06-02 | Stop reason: HOSPADM

## 2022-06-02 RX ORDER — ROCURONIUM BROMIDE 10 MG/ML
INJECTION, SOLUTION INTRAVENOUS AS NEEDED
Status: DISCONTINUED | OUTPATIENT
Start: 2022-06-02 | End: 2022-06-02 | Stop reason: HOSPADM

## 2022-06-02 RX ORDER — LIDOCAINE HYDROCHLORIDE 10 MG/ML
0.1 INJECTION, SOLUTION EPIDURAL; INFILTRATION; INTRACAUDAL; PERINEURAL AS NEEDED
Status: DISCONTINUED | OUTPATIENT
Start: 2022-06-02 | End: 2022-06-02 | Stop reason: HOSPADM

## 2022-06-02 RX ORDER — HYDROCODONE BITARTRATE AND ACETAMINOPHEN 5; 325 MG/1; MG/1
1 TABLET ORAL
Qty: 20 TABLET | Refills: 0 | Status: SHIPPED | OUTPATIENT
Start: 2022-06-02 | End: 2022-06-07

## 2022-06-02 RX ORDER — BUPIVACAINE HYDROCHLORIDE 2.5 MG/ML
INJECTION, SOLUTION EPIDURAL; INFILTRATION; INTRACAUDAL AS NEEDED
Status: DISCONTINUED | OUTPATIENT
Start: 2022-06-02 | End: 2022-06-02 | Stop reason: HOSPADM

## 2022-06-02 RX ORDER — NEOSTIGMINE METHYLSULFATE 1 MG/ML
INJECTION, SOLUTION INTRAVENOUS AS NEEDED
Status: DISCONTINUED | OUTPATIENT
Start: 2022-06-02 | End: 2022-06-02 | Stop reason: HOSPADM

## 2022-06-02 RX ORDER — LIDOCAINE HYDROCHLORIDE 20 MG/ML
INJECTION, SOLUTION EPIDURAL; INFILTRATION; INTRACAUDAL; PERINEURAL AS NEEDED
Status: DISCONTINUED | OUTPATIENT
Start: 2022-06-02 | End: 2022-06-02 | Stop reason: HOSPADM

## 2022-06-02 RX ORDER — FENTANYL CITRATE 50 UG/ML
INJECTION, SOLUTION INTRAMUSCULAR; INTRAVENOUS AS NEEDED
Status: DISCONTINUED | OUTPATIENT
Start: 2022-06-02 | End: 2022-06-02 | Stop reason: HOSPADM

## 2022-06-02 RX ORDER — SODIUM CHLORIDE 9 MG/ML
25 INJECTION, SOLUTION INTRAVENOUS ONCE
Status: COMPLETED | OUTPATIENT
Start: 2022-06-02 | End: 2022-06-02

## 2022-06-02 RX ORDER — DEXMEDETOMIDINE HYDROCHLORIDE 100 UG/ML
INJECTION, SOLUTION INTRAVENOUS AS NEEDED
Status: DISCONTINUED | OUTPATIENT
Start: 2022-06-02 | End: 2022-06-02 | Stop reason: HOSPADM

## 2022-06-02 RX ORDER — HYDROMORPHONE HYDROCHLORIDE 1 MG/ML
.2-.5 INJECTION, SOLUTION INTRAMUSCULAR; INTRAVENOUS; SUBCUTANEOUS
Status: DISCONTINUED | OUTPATIENT
Start: 2022-06-02 | End: 2022-06-02 | Stop reason: HOSPADM

## 2022-06-02 RX ORDER — DEXAMETHASONE SODIUM PHOSPHATE 4 MG/ML
INJECTION, SOLUTION INTRA-ARTICULAR; INTRALESIONAL; INTRAMUSCULAR; INTRAVENOUS; SOFT TISSUE AS NEEDED
Status: DISCONTINUED | OUTPATIENT
Start: 2022-06-02 | End: 2022-06-02 | Stop reason: HOSPADM

## 2022-06-02 RX ORDER — VANCOMYCIN/0.9 % SOD CHLORIDE 1.5G/250ML
1500 PLASTIC BAG, INJECTION (ML) INTRAVENOUS ONCE
Status: COMPLETED | OUTPATIENT
Start: 2022-06-02 | End: 2022-06-02

## 2022-06-02 RX ORDER — SUCCINYLCHOLINE CHLORIDE 20 MG/ML
INJECTION INTRAMUSCULAR; INTRAVENOUS AS NEEDED
Status: DISCONTINUED | OUTPATIENT
Start: 2022-06-02 | End: 2022-06-02 | Stop reason: HOSPADM

## 2022-06-02 RX ORDER — GLYCOPYRROLATE 0.2 MG/ML
INJECTION INTRAMUSCULAR; INTRAVENOUS AS NEEDED
Status: DISCONTINUED | OUTPATIENT
Start: 2022-06-02 | End: 2022-06-02 | Stop reason: HOSPADM

## 2022-06-02 RX ORDER — PROPOFOL 10 MG/ML
INJECTION, EMULSION INTRAVENOUS AS NEEDED
Status: DISCONTINUED | OUTPATIENT
Start: 2022-06-02 | End: 2022-06-02 | Stop reason: HOSPADM

## 2022-06-02 RX ADMIN — WATER 2 G: 1 INJECTION INTRAMUSCULAR; INTRAVENOUS; SUBCUTANEOUS at 15:41

## 2022-06-02 RX ADMIN — PROPOFOL 100 MCG/KG/MIN: 10 INJECTION, EMULSION INTRAVENOUS at 15:38

## 2022-06-02 RX ADMIN — Medication 3 AMPULE: at 11:51

## 2022-06-02 RX ADMIN — FENTANYL CITRATE 50 MCG: 50 INJECTION, SOLUTION INTRAMUSCULAR; INTRAVENOUS at 15:38

## 2022-06-02 RX ADMIN — ROCURONIUM BROMIDE 5 MG: 10 INJECTION INTRAVENOUS at 15:50

## 2022-06-02 RX ADMIN — LIDOCAINE HYDROCHLORIDE 40 MG: 20 INJECTION, SOLUTION EPIDURAL; INFILTRATION; INTRACAUDAL; PERINEURAL at 15:38

## 2022-06-02 RX ADMIN — PROPOFOL 150 MG: 10 INJECTION, EMULSION INTRAVENOUS at 15:38

## 2022-06-02 RX ADMIN — VANCOMYCIN HYDROCHLORIDE 1500 MG: 10 INJECTION, POWDER, LYOPHILIZED, FOR SOLUTION INTRAVENOUS at 11:51

## 2022-06-02 RX ADMIN — DEXMEDETOMIDINE HYDROCHLORIDE 8 MCG: 100 INJECTION, SOLUTION, CONCENTRATE INTRAVENOUS at 15:54

## 2022-06-02 RX ADMIN — SODIUM CHLORIDE 25 ML/HR: 9 INJECTION, SOLUTION INTRAVENOUS at 11:51

## 2022-06-02 RX ADMIN — ROCURONIUM BROMIDE 10 MG: 10 INJECTION INTRAVENOUS at 15:43

## 2022-06-02 RX ADMIN — DEXAMETHASONE SODIUM PHOSPHATE 4 MG: 4 INJECTION, SOLUTION INTRAMUSCULAR; INTRAVENOUS at 15:41

## 2022-06-02 RX ADMIN — GLYCOPYRROLATE 0.2 MG: 0.2 INJECTION, SOLUTION INTRAMUSCULAR; INTRAVENOUS at 15:31

## 2022-06-02 RX ADMIN — SUCCINYLCHOLINE CHLORIDE 160 MG: 20 INJECTION, SOLUTION INTRAMUSCULAR; INTRAVENOUS at 15:38

## 2022-06-02 RX ADMIN — PROPOFOL 50 MG: 10 INJECTION, EMULSION INTRAVENOUS at 15:40

## 2022-06-02 RX ADMIN — GLYCOPYRROLATE 0.3 MG: 0.2 INJECTION, SOLUTION INTRAMUSCULAR; INTRAVENOUS at 16:25

## 2022-06-02 RX ADMIN — Medication 3 MG: at 16:25

## 2022-06-02 RX ADMIN — FENTANYL CITRATE 50 MCG: 50 INJECTION, SOLUTION INTRAMUSCULAR; INTRAVENOUS at 15:29

## 2022-06-02 RX ADMIN — HYDROCODONE BITARTRATE AND ACETAMINOPHEN 1 TABLET: 5; 325 TABLET ORAL at 17:24

## 2022-06-02 RX ADMIN — ONDANSETRON HYDROCHLORIDE 4 MG: 2 INJECTION, SOLUTION INTRAMUSCULAR; INTRAVENOUS at 15:41

## 2022-06-02 NOTE — PERIOP NOTES
Handoff Report from Operating Room to PACU    Report received from Jessa Thacker  and Mark Anthony Benavides CRNA regarding Claudette Erb. Surgeon(s):  Christie Aguayo MD  And Procedure(s) (LRB):  INSERTION LAPAROSCOPIC PERITONEAL DIALYSIS CATHETER (N/A)  confirmed   with allergies and dressings discussed. Anesthesia type, drugs, patient history, complications, estimated blood loss, vital signs, intake and output, and last pain medication and reversal medications were reviewed.

## 2022-06-02 NOTE — PERIOP NOTES
Peritoneal Dialysis Catheter Blanco Neck Curl Cath, 2 Cuffs Left, 62.5 cm inserted in OR by Dr. Harlan Rubin.

## 2022-06-02 NOTE — ANESTHESIA PREPROCEDURE EVALUATION
Relevant Problems   CARDIOVASCULAR   (+) Hypertension      GASTROINTESTINAL   (+) Hepatitis C      RENAL FAILURE   (+) CKD (chronic kidney disease) stage 3, GFR 30-59 ml/min (HCC)   (+) CKD (chronic kidney disease) stage 4, GFR 15-29 ml/min (HCC)   (+) Chronic kidney disease (CKD), stage V (HCC) [010673]   (+) Chronic kidney disease, stage IV (severe) (HCC)   (+) Renal cancer (HCC)      ENDOCRINE   (+) Diabetes mellitus (HCC)   (+) Hypothyroid   (+) Type 2 diabetes mellitus with nephropathy (HCC)      HEMATOLOGY   (+) Anemia      PERSONAL HX & FAMILY HX OF CANCER   (+) Renal cancer ([facility] [address]       Anesthetic History     PONV          Review of Systems / Medical History  Patient summary reviewed, nursing notes reviewed and pertinent labs reviewed    Pulmonary  Within defined limits                 Neuro/Psych         Psychiatric history     Cardiovascular    Hypertension: well controlled          Hyperlipidemia    Exercise tolerance: >4 METS  Comments: EF of 60 - 65%.    GI/Hepatic/Renal         Renal disease (sp Nephrectomy 2nd to renal cell CA): CRI  Liver disease (Hep C sp transfusion)     Endo/Other    Diabetes: well controlled, type 2  Hypothyroidism  Arthritis and anemia     Other Findings   Comments: Bell's palsy         Physical Exam    Airway  Mallampati: II  TM Distance: 4 - 6 cm  Neck ROM: normal range of motion   Mouth opening: Normal     Cardiovascular  Regular rate and rhythm,  S1 and S2 normal,  no murmur, click, rub, or gallop             Dental    Dentition: Edentulous, Full upper dentures and Full lower dentures     Pulmonary  Breath sounds clear to auscultation               Abdominal  GI exam deferred       Other Findings            Anesthetic Plan    ASA: 3  Anesthesia type: general    Monitoring Plan: BIS      Induction: Intravenous  Anesthetic plan and risks discussed with: Patient

## 2022-06-02 NOTE — H&P
History and Physical    Subjective:     Rj Zeng is a 69 y.o. female who has ESRD and needs a PD catheter. Past Medical History:   Diagnosis Date    Arthritis     LOWER SPINE    Bell's palsy     23yrs old     Cancer ([facility] [address]     kidney (right)    Chronic kidney disease 1996    right kidney removed - CANCER    Diabetes ([address]     She is controlling with weight loss    Diabetes mellitus ([address] 10/8/2014    NIDDM    Hepatitis C 2011    hx hep C from blood transfusion per pt; Treated by Dr. Marisela Mendosa Hyperlipidemia     Hypertension     Ill-defined condition 1990s    HEPATITIS C - treated with medication    Personal history of renal cancer 3/2/2015    Psychiatric disorder     depression     Thyroid disease 1999    Thyroidectomy    Vertigo       Past Surgical History:   Procedure Laterality Date    COLONOSCOPY N/A 6/20/2017    COLONOSCOPY performed by Juan Ramon Waggoner.  Aram Meyer MD at [facility] ENDOSCOPY    HX BREAST REDUCTION Bilateral 1996    HX COLONOSCOPY  3/5/12    Repeat in 5 years (Dr. Parmjit Hunter)    HX CYST REMOVAL      HX GYN  1980s    tubal pregnancy    HX OTHER SURGICAL  1999    thyroidectomy    HX OTHER SURGICAL      colonoscopy x 2 - ?polyps    HX OTHER SURGICAL  04/08/2019    surg [facility] prolasp bladder at [address] [facility]  04/08/2019    prolasp bladder surg    HX RITESH AND BSO Bilateral 1995    HX TONSILLECTOMY      2or 4years old   [address]    kidney removed on right    IR BX BONE MARROW DIAGNOSTIC  04/05/2021     Family History   Problem Relation Age of Onset    Diabetes Mother     Kidney Disease Mother     Heart Disease Father     Kidney Disease Father         dialysis    Diabetes Sister     Diabetes Daughter     Hypertension Daughter     Hypertension Son     No Known Problems Son     No Known Problems Brother     Anesth Problems Neg Hx       Social History     Tobacco Use    Smoking status: Former Smoker     Packs/day: 0.25     Years: 8.00     Pack years: 2.00     Quit date: 1992     Years since quittin.1    Smokeless tobacco: Never Used    Tobacco comment: quit smoking 30-40 yrs ago   Substance Use Topics    Alcohol use: No       Prior to Admission medications    Medication Sig Start Date End Date Taking? Authorizing Provider   sodium bicarbonate 650 mg tablet Take 650 mg by mouth three (3) times daily. Yes Provider, Historical   levothyroxine (Euthyrox) 50 mcg tablet Take 1 Tablet by mouth Daily (before breakfast). 22  Yes Caro Sumner MD   allopurinoL (ZYLOPRIM) 300 mg tablet Take 300 mg by mouth daily. Yes Provider, Historical   SITagliptin (Januvia) 25 mg tablet Take 25 mg by mouth daily. Yes Provider, Historical   b complex vitamins tablet Take 1 Tablet by mouth daily. Yes Provider, Historical   furosemide (LASIX) 20 mg tablet Take 20 mg by mouth two (2) times a day. 10/19/21  Yes Provider, Historical   flash glucose sensor (FreeStyle Peyton 2 Sensor) kit 1 Each by Does Not Apply route continuous. 21  Yes Caro Sumner MD   flash glucose scanning reader [facility] [place]) misc 1 Device by Does Not Apply route continuous. 21  Yes Caro Sumner MD   atorvastatin (LIPITOR) 20 mg tablet Take 1 tablet by mouth once daily 21  Yes Caro Sumner MD   glucose blood VI test strips (OneTouch Ultra Blue Test Strip) strip CHECK BLOOD SUGAR 2-3 TIMES A WEEK 21  Yes Caro Sumner MD   amLODIPine (NORVASC) 5 mg tablet Take 1 Tab by mouth daily. 21  Yes Caro Sumner MD   cloNIDine HCL (CATAPRES) 0.1 mg tablet TAKE 1 TABLET BY MOUTH 3  TIMES DAILY 21  Yes Caro Sumner MD   flaxseed oil (OMEGA 3 PO) Take  by mouth. Yes Provider, Historical   acetaminophen (TYLENOL EXTRA STRENGTH) 500 mg tablet Take 500-1,000 mg by mouth every six (6) hours as needed for Pain.     Provider, Historical     Allergies   Allergen Reactions    Codeine Hives        Review of Systems:  Denies CP/SOB    Objective:     Physical Exam:   Visit Vitals  BP (!) 160/79 (BP 1 Location: Right upper arm, BP Patient Position: At rest)   Pulse 99   Temp 98.1 °F (36.7 °C)   Resp 15   Ht 5' 4\" (1.626 m)   Wt 85 kg (187 lb 6.3 oz)   LMP  (LMP Unknown)   SpO2 100%   BMI 32.17 kg/m²     General:  Alert, cooperative, no distress, appears stated age. Head:  Normocephalic, without obvious abnormality, atraumatic. Neck: Supple, symmetrical, trachea midline, no adenopathy, thyroid: no enlargement/tenderness/nodules, no carotid bruit and no JVD. Lungs:   Clear to auscultation bilaterally. Heart:  Regular rate and rhythm, S1, S2 normal, no murmur, click, rub or gallop. Abdomen:   Soft, non-tender. Bowel sounds normal. No masses,  No organomegaly. Extremities: Extremities normal, atraumatic, no cyanosis or edema. Pulses: 2+ and symmetric all extremities. Neurologic: Normal strength, sensation and throughout.        Assessment:     ESRD    Plan:     Lap PD catheter    Signed By: Lori Mosqueda MD     June 2, 2022

## 2022-06-02 NOTE — DISCHARGE INSTRUCTIONS
Patient Discharge Instructions    Ayde Crenshaw / [id number] : 1945    Admitted 2022 Discharged: 2022     Take Home Medications     · It is important that you take the medication exactly as they are prescribed. · Keep your medication in the bottles provided by the pharmacist and keep a list of the medication names, dosages, and times to be taken in your wallet. · Do not take other medications without consulting your doctor. What to do at [address] [place] [address] [place]: Follow up with PD nurse first of next week to change bandage. Recommended diet: Renal    Recommended activity: As Tolerated. No Strenuous activity or heavy lifting    If you experience any of the following symptoms redness or increasing drainage from incisions or severe abdominal pain, please follow up with Dr Conni Bernheim or ER immediately. Follow up with PD nurse educator in 3-5 days    Follow-up with Dr Conni Bernheim if you have any concerns about the incision. [phone]        Information obtained by :  I understand that if any problems occur once I am at home I am to contact my physician. I understand and acknowledge receipt of the instructions indicated above. Physician's or R.N.'s Signature                                                                  Date/Time                                                                                                                                              Patient or Representative Signature                                                          Date/Time      Patient Education      Hydrocodone/Acetaminophen (Vicodin, Norco, Lortab) - (By mouth)   Why this medicine is used:   Treats pain.   Contact a nurse or doctor right away if you have:  · Blistering, peeling, red skin rash  · Fast or slow heartbeat, shallow breathing, blue lips, fingernails, or skin  · Anxiety, restlessness, muscle spasms, twitching, seeing or hearing things that are not there  · Dark urine or pale stools, yellow skin or eyes  · Extreme weakness, sweating, seizures, cold or clammy skin  · Lightheadedness, dizziness, fainting, fever, sweating     Common side effects:  · Constipation, nausea, vomiting, loss of appetite, stomach pain  · Tiredness or sleepiness  © 2017 [facility] Information is for End User's use only and may not be sold, redistributed or otherwise used for commercial purposes. DISCHARGE SUMMARY from Nurse    PATIENT INSTRUCTIONS:    After general anesthesia or intravenous sedation, for 24 hours or while taking prescription narcotics:  · Limit your activities  · Do not drive and operate hazardous machinery  · Do not make important personal or business decisions  · Do not drink alcoholic beverages  · If you have not urinated within 8 hours after discharge, please contact your surgeon on call. Report the following to your surgeon:  · Excessive pain, swelling, redness or odor of or around the surgical area  · Temperature over 100.5  · Nausea and vomiting lasting longer than 4 hours or if unable to take medications  · Any signs of decreased circulation or nerve impairment to extremity: change in color, persistent numbness, tingling, coldness or increase pain  · Any questions    These are general instructions for a healthy lifestyle (if applicable): No smoking/ No tobacco products/ Avoid exposure to secondhand smoke  Surgeon General's Warning:  Quitting smoking now greatly reduces serious risk to your health.     Obesity, smoking, and sedentary lifestyle greatly increases your risk for illness    A healthy diet, regular physical exercise & weight monitoring are important for maintaining a healthy lifestyle    You may be retaining fluid if you have a history of heart failure or if you experience any of the following symptoms:  Weight gain of 3 pounds or more overnight or 5 pounds in a week, increased swelling in our hands or feet or shortness of breath while lying flat in bed. Please call your doctor as soon as you notice any of these symptoms; do not wait until your next office visit. A common side effect of anesthesia following surgery is nausea and/or vomiting. In order to decrease symptoms, it is wise to avoid foods that are high in fat, greasy foods, milk products, and spicy foods for the first 24 hours. Acceptable foods for the first 24 hours following surgery include but are not limited to:    · soup  · broth  ·  toast   · crackers   · applesauce  ·  bananas   · mashed potatoes,  · soft or scrambled eggs  · oatmeal  ·  jello    It is important to eat when taking your pain medication. This will help to prevent nausea. If possible, please try to time your meals with your medications. It is very important to stay hydrated following surgery. Sip fluids frequently while awake. Avoid acidic drinks such as citrus juices and soda for 24 hours. Carbonated beverages may cause bloating and gas. Acceptable fluids include:    · water (flavor packets may add variety)  · coffee or tea (in moderation)  · Gatorade  · Moy-Aid  · apple juice  · cranberry juice    You are encouraged to cough and deep breathe every hour when awake. This will help to prevent respiratory complications following anesthesia. You may want to hug a pillow when coughing and sneezing to add additional support to the surgical area and to decrease discomfort if you had abdominal or chest surgery. If you are discharged home with support stockings, you may remove them after 24 hours. Support stockings are used to help prevent blood clots in the legs following surgery. TO PREVENT AN INFECTION      1. [address] YOUR HANDS     To prevent infection, good handwashing is the most important thing you or your caregiver can do.        Wash your hands with soap and water or use the hand  we gave you before you touch any wounds. 2. SHOWER     Use the antibacterial soap we gave you when you take a shower.  Shower with this soap until your wounds are healed.  To reach all areas of your body, you may need someone to help you.  Dont forget to clean your belly button with every shower. 3.  USE CLEAN SHEETS     Use freshly cleaned sheets on your bed after surgery.  To keep the surgery site clean, do not allow pets to sleep with you while your wound is still healing. 4. STOP SMOKING     Stop smoking, or at least cut back on smoking     Smoking slows your healing. 5.  CONTROL YOUR BLOOD SUGAR     High blood sugars slow wound healing.  If you are diabetic, control your blood sugar levels before and after your surgery. Please take time to review all of your Home Care Instructions and Medication Information sheets provided in your discharge packet. If you have any questions, please contact your surgeon's office. Thank you. The discharge information has been reviewed with the patient and instruction recipient. The patient and instruction recipient verbalized understanding. Discharge medications reviewed with the patient and instruction recipient and appropriate educational materials and side effects teaching were provided.

## 2022-06-02 NOTE — PERIOP NOTES
[address] from Nurse    PATIENT INSTRUCTIONS:    After general anesthesia or intravenous sedation, for 24 hours or while taking prescription Narcotics:  · Limit your activities  · Do not drive and operate hazardous machinery  · Do not make important personal or business decisions  · Do  not drink alcoholic beverages  · If you have not urinated within 8 hours after discharge, please contact your surgeon on call. Report the following to your surgeon:  · Excessive pain, swelling, redness or odor of or around the surgical area  · Temperature over 100.5  · Nausea and vomiting lasting longer than 4 hours or if unable to take medications  · Any signs of decreased circulation or nerve impairment to extremity: change in color, persistent  numbness, tingling, coldness or increase pain  · Any questions    These are general instructions for a healthy lifestyle:    No smoking/ No tobacco products/ Avoid exposure to second hand smoke  Surgeon General's Warning:  Quitting smoking now greatly reduces serious risk to your health. Obesity, smoking, and sedentary lifestyle greatly increases your risk for illness    A healthy diet, regular physical exercise & weight monitoring are important for maintaining a healthy lifestyle    You may be retaining fluid if you have a history of heart failure or if you experience any of the following symptoms:  Weight gain of 3 pounds or more overnight or 5 pounds in a week, increased swelling in our hands or feet or shortness of breath while lying flat in bed. Please call your doctor as soon as you notice any of these symptoms; do not wait until your next office visit. The discharge information has been reviewed with the patient and daughter. The patient and daughter verbalized understanding.   Discharge medications reviewed with the patient and daughter and appropriate educational materials and side effects teaching were provided.   ___________________________________________________________________________________________________________________________________

## 2022-06-02 NOTE — BRIEF OP NOTE
Brief Postoperative Note    Patient: Hakeem Lantigua  YOB: 1945  MRN: [id number]    Date of Procedure: 6/2/2022     Pre-Op Diagnosis: ESRD    Post-Op Diagnosis: Same as preoperative diagnosis.       Procedure(s):  INSERTION LAPAROSCOPIC PERITONEAL DIALYSIS CATHETER    Surgeon(s):  Jose Carlos Flores MD    Surgical Assistant: None    Anesthesia: General     Estimated Blood Loss (mL): less than 50     Complications: None    Specimens: * No specimens in log *     Implants: * No implants in log *    Drains: * No LDAs found *    Findings: Lap PD cath    Electronically Signed by Ni Cool MD on 6/2/2022 at 4:53 PM

## 2022-06-02 NOTE — ANESTHESIA POSTPROCEDURE EVALUATION
Procedure(s):  INSERTION LAPAROSCOPIC PERITONEAL DIALYSIS CATHETER. general    Anesthesia Post Evaluation      Multimodal analgesia: multimodal analgesia used between 6 hours prior to anesthesia start to PACU discharge  Patient location during evaluation: PACU  Patient participation: complete - patient participated  Level of consciousness: sleepy but conscious and responsive to verbal stimuli  Pain score: 2  Pain management: adequate  Airway patency: patent  Anesthetic complications: no  Cardiovascular status: acceptable  Respiratory status: acceptable  Hydration status: acceptable  Comments: +Post-Anesthesia Evaluation and Assessment    Patient: Ray Lala MRN: [id number]  SSN: [id number]   YOB: 1945  Age: 69 y.o. Sex: female      Cardiovascular Function/Vital Signs    /78   Pulse 87   Temp 36.8 °C (98.2 °F)   Resp 13   Ht 5' 4\" (1.626 m)   Wt 85 kg (187 lb 6.3 oz)   SpO2 99%   BMI 32.17 kg/m²     Patient is status post Procedure(s):  INSERTION LAPAROSCOPIC PERITONEAL DIALYSIS CATHETER. Nausea/Vomiting: Controlled. Postoperative hydration reviewed and adequate. Pain:  Pain Scale 1: Numeric (0 - 10) (06/02/22 1724)  Pain Intensity 1: 5 (06/02/22 1724)   Managed. Neurological Status:   Neuro (WDL): Exceptions to WDL (06/02/22 1648)   At baseline. Mental Status and Level of Consciousness: Arousable. Pulmonary Status:   O2 Device: None (Room air) (06/02/22 1700)   Adequate oxygenation and airway patent. Complications related to anesthesia: None    Post-anesthesia assessment completed. No concerns.     Signed By: Brittany Pierre MD    6/2/2022  Post anesthesia nausea and vomiting:  controlled  Final Post Anesthesia Temperature Assessment:  Normothermia (36.0-37.5 degrees C)      INITIAL Post-op Vital signs:   Vitals Value Taken Time   /63 06/02/22 1715   Temp 36.8 °C (98.2 °F) 06/02/22 1700   Pulse 91 06/02/22 1724   Resp 18 06/02/22 1724   SpO2 96 % 06/02/22 1724   Vitals shown include unvalidated device data.

## 2022-06-03 NOTE — OP NOTES
[address]  OPERATIVE REPORT    Name:  Cj Rodriguez  MR#:  [id number]  :  1945  ACCOUNT #:  [de-identified]  DATE OF SERVICE:  2022    PREOPERATIVE DIAGNOSIS:  End-stage renal disease. POSTOPERATIVE DIAGNOSIS:  End-stage renal disease. PROCEDURE PERFORMED:  Laparoscopic peritoneal dialysis catheter insertion. SURGEON:  Wendy Leigh MD    ASSISTANT:  None. ANESTHESIA:  General.    COMPLICATIONS:  None. SPECIMENS REMOVED:  None. IMPLANTS:  Peritoneal dialysis catheter. ESTIMATED BLOOD LOSS:  Less than 50 mL. DRAINS:  None. INDICATIONS:  The patient is a 50-year-old female with end-stage renal disease who requires access for peritoneal dialysis. PROCEDURE:  After informed consent was obtained, the patient was given preoperative intravenous antibiotics within 1 hour of the incision. She was taken to the operating room and after induction of adequate general anesthesia, the abdomen was prepped and draped as a sterile field. A small transverse incision was made just to the left of the midline about 7 cm above the umbilicus and dissection was carried down through a thick layer of adipose tissue to the anterior rectus sheath which was incised. The rectus fibers were spread atraumatically and a 2-0 Prolene pursestring suture was placed in the posterior rectus sheath. The peritoneum was entered under direct vision using an 11-blade to create a small defect in the posterior rectus sheath in the center of the pursestring suture. A 5-mm trocar was inserted and pneumoperitoneum was established. A general survey of the abdomen was unremarkable. There were no adhesions. Under direct vision a 5-mm trocar was placed in the right upper quadrant and the camera was moved to that location.   A preperitoneal plane was developed posterior to the belly of the left rectus muscle in a caudal direction and a bariatric trocar was advanced down the preperitoneal tunnel and used to enter the peritoneum just above the level of the pelvic inlet. A standard curled tip double cuffed peritoneal dialysis catheter was then introduced through this trocar. The trocar was then removed and the patient was placed in steep Trendelenburg position. The patient has had a hysterectomy. There were no adhesions in the pelvis and the tip of the catheter was placed in the true pelvis and then the patient was placed in reverse Trendelenburg position and the bowel was allowed to fall back into the pelvis. Pneumoperitoneum was gradually released to allow the belly wall to relax and the bowel to secure the catheter in place in the pelvis. The grasper and the trocars were removed and the small defect in the posterior rectus sheath was closed with the previously placed pursestring suture and this was also used to secure the inner cuff of the catheter to the posterior rectus sheath. The rectus fibers were allowed to relax around the inner cuff of the catheter. The catheter flushed and aspirated and drained well under gravity. The anterior rectus sheath was closed with 0 Prolene suture taking care not to impinge on the catheter. Local anesthesia using 0.25% Marcaine was injected at the skin and abdominal wall both incision sites. The catheter was tunneled subcutaneously to a separate exit site inferior and lateral to the initial incision. The titanium connectors were applied. The catheter again flushed, aspirated and drained well. It was flushed with heparinized saline and clamped. The right upper quadrant trocar site was closed with single 4-0 Monocryl subcuticular stitch. The initial incision was closed with 3-0 Vicryl suture and 4-0 Monocryl suture. Dermabond was applied to both incisions as a dressing and a drain sponge was applied to the catheter exit site. The patient was extubated and transferred to the PACU in stable condition.   All counts were correct.       Musa Mora MD      [id number]  D:  06/02/2022 17:06  T:  06/03/2022 1:39  JOB #:  [id number]

## 2022-06-08 ENCOUNTER — HOSPITAL ENCOUNTER (OUTPATIENT)
Dept: MAMMOGRAPHY | Age: 77
Discharge: HOME | End: 2022-06-08
Attending: INTERNAL MEDICINE
Payer: MEDICARE

## 2022-06-08 DIAGNOSIS — Z12.31 SCREENING MAMMOGRAM FOR HIGH-RISK PATIENT: ICD-10-CM

## 2022-06-08 PROCEDURE — 77067 SCR MAMMO BI INCL CAD: CPT

## 2022-06-12 DIAGNOSIS — I10 ESSENTIAL HYPERTENSION: ICD-10-CM

## 2022-06-13 RX ORDER — CLONIDINE HYDROCHLORIDE 0.1 MG/1
TABLET ORAL
Qty: 180 TABLET | Refills: 0 | Status: SHIPPED | OUTPATIENT
Start: 2022-06-13 | End: 2022-06-20 | Stop reason: SDUPTHER

## 2022-06-20 DIAGNOSIS — I10 ESSENTIAL HYPERTENSION: ICD-10-CM

## 2022-06-20 RX ORDER — AMLODIPINE BESYLATE 5 MG/1
5 TABLET ORAL DAILY
Qty: 90 TABLET | Refills: 3 | Status: SHIPPED | OUTPATIENT
Start: 2022-06-20 | End: 2022-08-24

## 2022-06-20 RX ORDER — CLONIDINE HYDROCHLORIDE 0.1 MG/1
0.1 TABLET ORAL 2 TIMES DAILY
Qty: 180 TABLET | Refills: 3 | Status: SHIPPED | OUTPATIENT
Start: 2022-06-20 | End: 2022-08-24

## 2022-06-20 NOTE — TELEPHONE ENCOUNTER
Adjustment:  Amlodipine: should be 10 mg, 1 time daily  Clonodine: should be 3 times daily ( was upped by specialist)      Caller requests Refill of:  1. Amlodipine: should be 10 mg, 1 time daily  2. Clonodine: should be 3 times daily      Please send to:  [address] [place] [address] [address]      Visit Appointment History:   Future:   11/22/22  Previous: 5/19/22          Caller was advised that Meds will be refilled as soon as possible, however there can be a 48-72 business hour turn around on refill requests.

## 2022-06-24 RX ORDER — BLOOD SUGAR DIAGNOSTIC
STRIP MISCELLANEOUS
Qty: 100 STRIP | Refills: 0 | Status: SHIPPED | OUTPATIENT
Start: 2022-06-24

## 2022-06-24 NOTE — TELEPHONE ENCOUNTER
Patient states she is calling to check status of refill for Test Strips being received. Patient advised received yesterday, 6/23/22 & was reminded of 48-72 Bus Hr Turn Around on refills. Please call if any further information needed to give.  Thank you

## 2022-06-24 NOTE — TELEPHONE ENCOUNTER
Future Appointments:  Future Appointments   Date Time Provider Rita Andrade   11/22/2022  9:15 AM Romain Draper MD [facility] AM[facility]        Last Appointment With Me:  5/19/2022     Requested Prescriptions     Pending Prescriptions Disp Refills    glucose blood VI test strips (OneTouch Ultra Test) strip [Pharmacy Med Name: OneTouch Ultra Blue In Vitro Strip]  0     Sig: CHECK BLOOD SUGAR 2-3 TIMES A WEEK

## 2022-06-30 DIAGNOSIS — E78.2 MIXED HYPERLIPIDEMIA: ICD-10-CM

## 2022-06-30 NOTE — TELEPHONE ENCOUNTER
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 She is currently out of refills. I have pended this rx for you. Thank you,  Kacey Youssef, PharmD, [address] [facility]   Department, toll free: [phone] Option 2  ================================================================================    [facility]: ADHERENCE REVIEW  Identified care gap per [employer]: fills at [facility]: Statin adherence    Last Visit: 05/19/2022      [address] Records claims through 06/30/2022 (2021 [facility] Siobhan = NA; YTD [facility] Isobhan = 88%; Potential Fail Date: 08/25/2022): Januvia last filled on 06/28/2022 for 30 day supply. Next refill due: 07/28/2022      Per [address]:   Gabriel Kwon last picked up on 06/28/2022 for 30 day supply. 8 refills remaining. Billed through [facility] Value Date/Time    Hemoglobin A1c 6.7 (H) 05/19/2022 09:44 AM    Hemoglobin A1c 6.6 (H) 05/20/2021 09:29 AM    Hemoglobin A1c 6.4 (H) 11/12/2020 10:42 AM    Hemoglobin A1c (POC) 6.4 11/19/2021 09:10 AM     NOTE A1c <9%    [address] Records claims through 06/30/2022 ([facility]; YTD [facility] Siobhan = Filled only once; Potential Fail Date: 08/16/2022): Atorvastatin last filled on 03/22/2022 for 90 day supply. Next refill due: 06/20/2022_PAST DUE      Per [facility]:   Atorvastatin last picked up on 03/22/2022 for 90 day supply. 0 refills remaining.  Billed through [facility] Value Date/Time    Cholesterol, total 158 05/19/2022 09:44 AM    HDL Cholesterol 56 05/19/2022 09:44 AM    LDL, calculated 71.2 05/19/2022 09:44 AM    VLDL, calculated 30.8 05/19/2022 09:44 AM    Triglyceride 154 (H) 05/19/2022 09:44 AM    CHOL/HDL Ratio 2.8 05/19/2022 09:44 AM     ALT (SGPT)   Date Value Ref Range Status   05/20/2021 21 12 - 78 U/L Final     AST (SGOT)   Date Value Ref Range Status 2021 14 (L) 15 - 37 U/L Final     The 10-year ASCVD risk score (Chance Hogan et al., 2013) is: 36.7%    Values used to calculate the score:      Age: 69 years      Sex: Female      Is Non- : Yes      Diabetic: Yes      Tobacco smoker: No      Systolic Blood Pressure: 502 mmHg      Is BP treated: Yes      HDL Cholesterol: 56 MG/DL      Total Cholesterol: 158 MG/DL     PLAN  The following are interventions that have been identified:  - Patient overdue refilling Atorvastatin and active on home medication list  - Patient needs refills for Atorvastatin  - Patient eligible for 90 day supply of Atorvastatin    No patient out reach planned at this time.         Future Appointments   Date Time Provider Rita Andrade   2022  9:15 AM Eleonora Carrasco MD [facility] [address], PharmD, [address] [place] [address], toll free: [phone] Option 2  ================================================================================  For Pharmacy Admin Tracking Only     CPA in place: No   Recommendation Provided To: Provider: 1 via Note to Provider    Intervention Detail: Adherence Monitorin and New Rx: 1, reason: Improve Adherence   Gap Closed?: No   Intervention Accepted By: Provider: 1   Time Spent (min): 15

## 2022-07-01 RX ORDER — ATORVASTATIN CALCIUM 20 MG/1
TABLET, FILM COATED ORAL
Qty: 90 TABLET | Refills: 3 | Status: SHIPPED | OUTPATIENT
Start: 2022-07-01

## 2022-07-20 ENCOUNTER — TELEPHONE (OUTPATIENT)
Dept: INTERNAL MEDICINE CLINIC | Age: 77
End: 2022-07-20

## 2022-07-20 NOTE — TELEPHONE ENCOUNTER
Returned call to patient  Verified identity     She reports bg has been over 200 for the last week fasting     She said was 246 today and is taking [place] [facility] [place] 1 every day per order     She did just start metoprolol 25mg 1 every day and peritoneal dialysis  was started 1 wk ago   She does this every night     Denies any c/o except somewhat tired and weak which explained to her may just be her adjusting to dialysis     She is getting dextrose in mix every  night     Pended to md to advise   No new allergies and pharmacy the same      Em lpn

## 2022-07-20 NOTE — TELEPHONE ENCOUNTER
[phone]      Please call regarding Blood Sugar    246 this morning (no food, no medication), running in 200s    No associated symptoms reported as pt states hard to know, leonie today bc had procedure that makes her weak normally     Should meds be altered (currently januvia 25 mg 1 time per monica)

## 2022-07-21 NOTE — TELEPHONE ENCOUNTER
Patient states she is still waiting to hear back on Plan of Care for her Blood Sugar issues yesterday, 7/20/22 & is any medication changes are needed also. Please call.  Thank you

## 2022-07-21 NOTE — TELEPHONE ENCOUNTER
Patient was called   Verified identity     Notified patient that per dr on call :    \"Patient's hemoglobin A1c was at goal when last tested in May. She is on the highest dose of Januvia that she is allowed to take. Patient needs to follow a diabetic diet consistently. We can refer to diabetes education if desired. 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   She verbalized understanding on her diet and is seeing a dietitian now at dialysis already and not eating much  She feels the high bg is from the dextrose in her PD dialysis solution    She didn't have any other questions at this time     Em lpn

## 2022-08-19 RX ORDER — GLIMEPIRIDE 1 MG/1
1 TABLET ORAL
Qty: 90 TABLET | Refills: 3 | Status: SHIPPED | OUTPATIENT
Start: 2022-08-19

## 2022-08-24 ENCOUNTER — VIRTUAL VISIT (OUTPATIENT)
Dept: INTERNAL MEDICINE CLINIC | Age: 77
End: 2022-08-24
Payer: MEDICARE

## 2022-08-24 DIAGNOSIS — R06.00 DYSPNEA, UNSPECIFIED TYPE: Primary | ICD-10-CM

## 2022-08-24 PROCEDURE — 1090F PRES/ABSN URINE INCON ASSESS: CPT | Performed by: INTERNAL MEDICINE

## 2022-08-24 PROCEDURE — G8536 NO DOC ELDER MAL SCRN: HCPCS | Performed by: INTERNAL MEDICINE

## 2022-08-24 PROCEDURE — 99213 OFFICE O/P EST LOW 20 MIN: CPT | Performed by: INTERNAL MEDICINE

## 2022-08-24 PROCEDURE — G8399 PT W/DXA RESULTS DOCUMENT: HCPCS | Performed by: INTERNAL MEDICINE

## 2022-08-24 PROCEDURE — G9231 DOC ESRD DIA TRANS PREG: HCPCS | Performed by: INTERNAL MEDICINE

## 2022-08-24 PROCEDURE — G8427 DOCREV CUR MEDS BY ELIG CLIN: HCPCS | Performed by: INTERNAL MEDICINE

## 2022-08-24 PROCEDURE — 1101F PT FALLS ASSESS-DOCD LE1/YR: CPT | Performed by: INTERNAL MEDICINE

## 2022-08-24 PROCEDURE — G8417 CALC BMI ABV UP PARAM F/U: HCPCS | Performed by: INTERNAL MEDICINE

## 2022-08-24 PROCEDURE — G8510 SCR DEP NEG, NO PLAN REQD: HCPCS | Performed by: INTERNAL MEDICINE

## 2022-08-24 RX ORDER — POTASSIUM CHLORIDE 20 MEQ/1
20 TABLET, EXTENDED RELEASE ORAL DAILY
COMMUNITY
Start: 2022-08-09

## 2022-08-24 RX ORDER — METOPROLOL SUCCINATE 25 MG/1
TABLET, EXTENDED RELEASE ORAL
COMMUNITY
End: 2022-09-14

## 2022-08-24 NOTE — PROGRESS NOTES
1. \"Have you been to the ER, urgent care clinic since your last visit? Hospitalized since your last visit? \" No    2. \"Have you seen or consulted any other health care providers outside of the [address] [place] [address] since your last visit? See care team       3. For patients aged 39-70: Has the patient had a colonoscopy / FIT/ Cologuard? Yes - no Care Gap present      If the patient is female:    4. For patients aged 41-77: Has the patient had a mammogram within the past 2 years? Yes - no Care Gap present      5. For patients aged 21-65: Has the patient had a pap smear?  Yes - no Care Gap present

## 2022-08-24 NOTE — PROGRESS NOTES
Al Cyr is a 69 y.o. female who was seen by synchronous (real-time) audio-video technology on 2022 for Shortness of Breath (X 2 weeks)        Progress Note         PROGRESS NOTE  Name: Al Cyr   : 1945       ASSESSMENT/ PLAN:     Diagnoses and all orders for this visit:    Dyspnea, unspecified type  -     CBC WITH AUTOMATED DIFF; Future  -     METABOLIC PANEL, COMPREHENSIVE; Future  -     XR CHEST PA LAT; Future    She was advised to have a low threshold for going to ER if sx worsen. RTC as planned. I have reviewed the patient's medications and risks/side effects/benefits were discussed. Diagnosis(-es) explained to patient and questions answered. Literature provided where appropriate. SUBJECTIVE  Ms. Al Cyr presents today acutely for     Chief Complaint   Patient presents with    Shortness of Breath     X 2 weeks       She has had dyspnea for 2 weeks. She is shortwinded when ambulating. She doesn't notice much wheezing, unless she \"goes out. \"     No fever. She coughs a little bit at night sometimes. No sputum. Blood sugars have been high lately, up to 368. 180s this morning. She just started glimepiride this morning. Appetite is low. She has ESRD on dialysis. OBJECTIVE  Visit Vitals  LMP  (LMP Unknown)     Gen: Pleasant 69 y.o.  female in NAD. HEENT: PERRLA. EOMI. OP moist and pink. Neck: Supple. No LAD. HEART: RRR, No M/G/R.   LUNGS: CTAB No W/R. ABDOMEN: S, NT, ND, BS+. EXTREMITIES: Warm. No C/C/E. MUSCULOSKELETAL: Normal ROM, muscle strength 5/5 all groups. NEURO: Alert and oriented x 3. Cranial nerves grossly intact. No focal sensory or motor deficits noted. SKIN: Warm. Dry. No rashes or other lesions noted.           Objective:     Patient-Reported Vitals 2022   Patient-Reported Pulse 94   Patient-Reported Systolic  777   Patient-Reported Diastolic 96        [INSTRUCTIONS:  \"[x]\" Indicates a positive item  \"[]\" Indicates a negative item  -- DELETE ALL ITEMS NOT EXAMINED]    Constitutional: [x] Appears well-developed and well-nourished [x] No apparent distress      [] Abnormal -     Mental status: [x] Alert and awake  [x] Oriented to person/place/time [x] Able to follow commands    [] Abnormal -     Eyes:   EOM    [x]  Normal    [] Abnormal -   Sclera  [x]  Normal    [] Abnormal -          Discharge [x]  None visible   [] Abnormal -     HENT: [x] Normocephalic, atraumatic  [] Abnormal -   [x] Mouth/Throat: Mucous membranes are moist    External Ears [x] Normal  [] Abnormal -    Neck: [x] No visualized mass [] Abnormal -     Pulmonary/Chest: [x] Respiratory effort normal   [x] No visualized signs of difficulty breathing or respiratory distress        [] Abnormal -      Musculoskeletal:   [x] Normal gait with no signs of ataxia         [x] Normal range of motion of neck        [] Abnormal -     Neurological:        [x] No Facial Asymmetry (Cranial nerve 7 motor function) (limited exam due to video visit)          [x] No gaze palsy        [] Abnormal -          Skin:        [x] No significant exanthematous lesions or discoloration noted on facial skin         [] Abnormal -            Psychiatric:       [x] Normal Affect [] Abnormal -       Other pertinent observable physical exam findings:-        We discussed the expected course, resolution and complications of the diagnosis(es) in detail. Medication risks, benefits, costs, interactions, and alternatives were discussed as indicated. I advised her to contact the office if her condition worsens, changes or fails to improve as anticipated. She expressed understanding with the diagnosis(es) and plan. Ruben Gamez, who was evaluated through a patient-initiated, synchronous (real-time) audio-video encounter, and/or her healthcare decision maker, is aware that it is a billable service, with coverage as determined by her insurance carrier.  She provided verbal consent to proceed: YES, and patient identification was verified. It was conducted pursuant to the emergency declaration under the [address] [place] [address] and the [employer] General Act. A caregiver was present when appropriate. Ability to conduct physical exam was limited. I was not in office. The patient was at home or otherwise outside the office.       Dulce Hamilton MD

## 2022-08-25 ENCOUNTER — LAB ONLY (OUTPATIENT)
Dept: INTERNAL MEDICINE CLINIC | Age: 77
End: 2022-08-25

## 2022-08-25 ENCOUNTER — HOSPITAL ENCOUNTER (OUTPATIENT)
Dept: GENERAL RADIOLOGY | Age: 77
Discharge: HOME | End: 2022-08-25
Payer: MEDICARE

## 2022-08-25 DIAGNOSIS — R06.00 DYSPNEA, UNSPECIFIED TYPE: ICD-10-CM

## 2022-08-25 LAB
ALBUMIN SERPL-MCNC: 3.2 G/DL (ref 3.5–5)
ALBUMIN/GLOB SERPL: 0.9 {RATIO} (ref 1.1–2.2)
ALP SERPL-CCNC: 86 U/L (ref 45–117)
ALT SERPL-CCNC: 19 U/L (ref 12–78)
ANION GAP SERPL CALC-SCNC: 10 MMOL/L (ref 5–15)
AST SERPL-CCNC: 16 U/L (ref 15–37)
BASOPHILS # BLD: 0.1 K/UL (ref 0–0.1)
BASOPHILS NFR BLD: 0 % (ref 0–1)
BILIRUB SERPL-MCNC: 0.4 MG/DL (ref 0.2–1)
BUN SERPL-MCNC: 29 MG/DL (ref 6–20)
BUN/CREAT SERPL: 4 (ref 12–20)
CALCIUM SERPL-MCNC: 10 MG/DL (ref 8.5–10.1)
CHLORIDE SERPL-SCNC: 107 MMOL/L (ref 97–108)
CO2 SERPL-SCNC: 28 MMOL/L (ref 21–32)
CREAT SERPL-MCNC: 6.98 MG/DL (ref 0.55–1.02)
DIFFERENTIAL METHOD BLD: ABNORMAL
EOSINOPHIL # BLD: 0 K/UL (ref 0–0.4)
EOSINOPHIL NFR BLD: 0 % (ref 0–7)
ERYTHROCYTE [DISTWIDTH] IN BLOOD BY AUTOMATED COUNT: 18.1 % (ref 11.5–14.5)
GLOBULIN SER CALC-MCNC: 3.4 G/DL (ref 2–4)
GLUCOSE SERPL-MCNC: 155 MG/DL (ref 65–100)
HCT VFR BLD AUTO: 34.3 % (ref 35–47)
HGB BLD-MCNC: 10.6 G/DL (ref 11.5–16)
IMM GRANULOCYTES # BLD AUTO: 0 K/UL (ref 0–0.04)
IMM GRANULOCYTES NFR BLD AUTO: 0 % (ref 0–0.5)
LYMPHOCYTES # BLD: 1.9 K/UL (ref 0.8–3.5)
LYMPHOCYTES NFR BLD: 17 % (ref 12–49)
MCH RBC QN AUTO: 29.6 PG (ref 26–34)
MCHC RBC AUTO-ENTMCNC: 30.9 G/DL (ref 30–36.5)
MCV RBC AUTO: 95.8 FL (ref 80–99)
MONOCYTES # BLD: 0.5 K/UL (ref 0–1)
MONOCYTES NFR BLD: 5 % (ref 5–13)
NEUTS SEG # BLD: 9.1 K/UL (ref 1.8–8)
NEUTS SEG NFR BLD: 78 % (ref 32–75)
NRBC # BLD: 0 K/UL (ref 0–0.01)
NRBC BLD-RTO: 0 PER 100 WBC
PLATELET # BLD AUTO: 226 K/UL (ref 150–400)
PMV BLD AUTO: 12.4 FL (ref 8.9–12.9)
POTASSIUM SERPL-SCNC: 3.6 MMOL/L (ref 3.5–5.1)
PROT SERPL-MCNC: 6.6 G/DL (ref 6.4–8.2)
RBC # BLD AUTO: 3.58 M/UL (ref 3.8–5.2)
SODIUM SERPL-SCNC: 145 MMOL/L (ref 136–145)
WBC # BLD AUTO: 11.7 K/UL (ref 3.6–11)

## 2022-08-25 PROCEDURE — 71046 X-RAY EXAM CHEST 2 VIEWS: CPT

## 2022-08-26 DIAGNOSIS — R93.89 ABNORMAL CHEST X-RAY: Primary | ICD-10-CM

## 2022-08-26 NOTE — PROGRESS NOTES
Leah Rogers MD   8/26/2022 10:11 AM EDT  She has fluid around the right lung; let's order the CT as requested. Manuel Reese MD   8/26/2022 10:13 AM EDT  Kidney function is abnormal; gluc was a bit high. Continue follow up with nephrologist. [facility], spoke with Pt. Two pt identifiers confirmed. Patient notified of results for both chest x-ray and blood work. Patient state she did see them on MyChart. Number given for central scheduling. Pt verbalized understanding of information discussed w/ no further questions at this time.

## 2022-08-26 NOTE — PROGRESS NOTES
Kidney function is abnormal; gluc was a bit high.  Continue follow up with nephrologist. Kiarra Winter

## 2022-08-26 NOTE — PROGRESS NOTES
Miguelangel Milan MD   8/26/2022 10:11 AM EDT  She has fluid around the right lung; let's order the CT as requested. Byron Garner MD   8/26/2022 10:13 AM EDT  Kidney function is abnormal; gluc was a bit high. Continue follow up with nephrologist. Nahomi [facility], spoke with Pt. Two pt identifiers confirmed. Patient notified of results for both chest x-ray and blood work. Patient state she did see them on MyChart. Number given for central scheduling. Pt verbalized understanding of information discussed w/ no further questions at this time.

## 2022-09-06 ENCOUNTER — HOSPITAL ENCOUNTER (OUTPATIENT)
Dept: CT IMAGING | Age: 77
Discharge: HOME | End: 2022-09-06
Attending: INTERNAL MEDICINE
Payer: MEDICARE

## 2022-09-06 DIAGNOSIS — R93.89 ABNORMAL CHEST X-RAY: ICD-10-CM

## 2022-09-06 PROCEDURE — 71260 CT THORAX DX C+: CPT

## 2022-09-06 PROCEDURE — 74011000636 HC RX REV CODE- 636: Performed by: INTERNAL MEDICINE

## 2022-09-06 RX ADMIN — IOPAMIDOL 100 ML: 755 INJECTION, SOLUTION INTRAVENOUS at 14:14

## 2022-09-08 ENCOUNTER — APPOINTMENT (OUTPATIENT)
Dept: GENERAL RADIOLOGY | Age: 77
DRG: 291 | End: 2022-09-08
Attending: STUDENT IN AN ORGANIZED HEALTH CARE EDUCATION/TRAINING PROGRAM
Payer: MEDICARE

## 2022-09-08 ENCOUNTER — TELEPHONE (OUTPATIENT)
Dept: INTERNAL MEDICINE CLINIC | Age: 77
End: 2022-09-08

## 2022-09-08 ENCOUNTER — HOSPITAL ENCOUNTER (INPATIENT)
Age: 77
LOS: 5 days | Discharge: HOME | DRG: 291 | End: 2022-09-14
Attending: STUDENT IN AN ORGANIZED HEALTH CARE EDUCATION/TRAINING PROGRAM | Admitting: INTERNAL MEDICINE
Payer: MEDICARE

## 2022-09-08 DIAGNOSIS — J98.6 ELEVATED HEMIDIAPHRAGM: ICD-10-CM

## 2022-09-08 DIAGNOSIS — N18.30 STAGE 3 CHRONIC KIDNEY DISEASE, UNSPECIFIED WHETHER STAGE 3A OR 3B CKD (HCC): ICD-10-CM

## 2022-09-08 DIAGNOSIS — I10 HYPERTENSION, UNSPECIFIED TYPE: ICD-10-CM

## 2022-09-08 DIAGNOSIS — I50.9 CONGESTIVE HEART FAILURE, UNSPECIFIED HF CHRONICITY, UNSPECIFIED HEART FAILURE TYPE (HCC): ICD-10-CM

## 2022-09-08 DIAGNOSIS — R06.09 DOE (DYSPNEA ON EXERTION): Primary | ICD-10-CM

## 2022-09-08 DIAGNOSIS — I16.0 HYPERTENSIVE URGENCY: ICD-10-CM

## 2022-09-08 DIAGNOSIS — D64.9 ANEMIA, UNSPECIFIED TYPE: ICD-10-CM

## 2022-09-08 LAB
ALBUMIN SERPL-MCNC: 3.2 G/DL (ref 3.5–5)
ALBUMIN/GLOB SERPL: 0.7 {RATIO} (ref 1.1–2.2)
ALP SERPL-CCNC: 83 U/L (ref 45–117)
ALT SERPL-CCNC: 18 U/L (ref 12–78)
ANION GAP SERPL CALC-SCNC: 5 MMOL/L (ref 5–15)
AST SERPL-CCNC: 18 U/L (ref 15–37)
BASOPHILS # BLD: 0 K/UL (ref 0–0.1)
BASOPHILS NFR BLD: 0 % (ref 0–1)
BILIRUB SERPL-MCNC: 0.3 MG/DL (ref 0.2–1)
BNP SERPL-MCNC: 2277 PG/ML
BUN SERPL-MCNC: 40 MG/DL (ref 6–20)
BUN/CREAT SERPL: 6 (ref 12–20)
CALCIUM SERPL-MCNC: 10.1 MG/DL (ref 8.5–10.1)
CHLORIDE SERPL-SCNC: 106 MMOL/L (ref 97–108)
CO2 SERPL-SCNC: 30 MMOL/L (ref 21–32)
COMMENT, HOLDF: NORMAL
CREAT SERPL-MCNC: 7.17 MG/DL (ref 0.55–1.02)
DIFFERENTIAL METHOD BLD: ABNORMAL
EOSINOPHIL # BLD: 0.1 K/UL (ref 0–0.4)
EOSINOPHIL NFR BLD: 1 % (ref 0–7)
ERYTHROCYTE [DISTWIDTH] IN BLOOD BY AUTOMATED COUNT: 17.2 % (ref 11.5–14.5)
GLOBULIN SER CALC-MCNC: 4.3 G/DL (ref 2–4)
GLUCOSE SERPL-MCNC: 77 MG/DL (ref 65–100)
HCT VFR BLD AUTO: 33.2 % (ref 35–47)
HGB BLD-MCNC: 10.7 G/DL (ref 11.5–16)
IMM GRANULOCYTES # BLD AUTO: 0 K/UL (ref 0–0.04)
IMM GRANULOCYTES NFR BLD AUTO: 0 % (ref 0–0.5)
LYMPHOCYTES # BLD: 3.2 K/UL (ref 0.8–3.5)
LYMPHOCYTES NFR BLD: 25 % (ref 12–49)
MCH RBC QN AUTO: 29.5 PG (ref 26–34)
MCHC RBC AUTO-ENTMCNC: 32.2 G/DL (ref 30–36.5)
MCV RBC AUTO: 91.5 FL (ref 80–99)
MONOCYTES # BLD: 0.9 K/UL (ref 0–1)
MONOCYTES NFR BLD: 7 % (ref 5–13)
NEUTS SEG # BLD: 8.3 K/UL (ref 1.8–8)
NEUTS SEG NFR BLD: 67 % (ref 32–75)
NRBC # BLD: 0 K/UL (ref 0–0.01)
NRBC BLD-RTO: 0 PER 100 WBC
PLATELET # BLD AUTO: 205 K/UL (ref 150–400)
PMV BLD AUTO: 12.2 FL (ref 8.9–12.9)
POTASSIUM SERPL-SCNC: 3.9 MMOL/L (ref 3.5–5.1)
PROT SERPL-MCNC: 7.5 G/DL (ref 6.4–8.2)
RBC # BLD AUTO: 3.63 M/UL (ref 3.8–5.2)
SAMPLES BEING HELD,HOLD: NORMAL
SODIUM SERPL-SCNC: 141 MMOL/L (ref 136–145)
TROPONIN-HIGH SENSITIVITY: 46 NG/L (ref 0–51)
WBC # BLD AUTO: 12.6 K/UL (ref 3.6–11)

## 2022-09-08 PROCEDURE — 83880 ASSAY OF NATRIURETIC PEPTIDE: CPT

## 2022-09-08 PROCEDURE — 84484 ASSAY OF TROPONIN QUANT: CPT

## 2022-09-08 PROCEDURE — 85025 COMPLETE CBC W/AUTO DIFF WBC: CPT

## 2022-09-08 PROCEDURE — 80053 COMPREHEN METABOLIC PANEL: CPT

## 2022-09-08 PROCEDURE — 71046 X-RAY EXAM CHEST 2 VIEWS: CPT

## 2022-09-08 PROCEDURE — 36415 COLL VENOUS BLD VENIPUNCTURE: CPT

## 2022-09-08 PROCEDURE — 99285 EMERGENCY DEPT VISIT HI MDM: CPT

## 2022-09-08 PROCEDURE — 93005 ELECTROCARDIOGRAM TRACING: CPT

## 2022-09-08 NOTE — TELEPHONE ENCOUNTER
Received call from [facility] [address] at [facility]  Two pt identifiers confirmed. Patient with shortness of breath, POX currently 86%. Advised nurse to have patient sent to ER for evaluation.     Nurse agrees with plan

## 2022-09-08 NOTE — ED TRIAGE NOTES
She does home dialysis. She went for her appointment at the dialysis center today and they sent her here because they \"didn't like the way I was breathing\". She says she has had shortness of breath worse with activity for about a month.

## 2022-09-09 ENCOUNTER — APPOINTMENT (OUTPATIENT)
Dept: VASCULAR SURGERY | Age: 77
DRG: 291 | End: 2022-09-09
Attending: FAMILY MEDICINE
Payer: MEDICARE

## 2022-09-09 ENCOUNTER — APPOINTMENT (OUTPATIENT)
Dept: NUCLEAR MEDICINE | Age: 77
DRG: 291 | End: 2022-09-09
Attending: INTERNAL MEDICINE
Payer: MEDICARE

## 2022-09-09 ENCOUNTER — APPOINTMENT (OUTPATIENT)
Dept: ULTRASOUND IMAGING | Age: 77
DRG: 291 | End: 2022-09-09
Attending: INTERNAL MEDICINE
Payer: MEDICARE

## 2022-09-09 ENCOUNTER — APPOINTMENT (OUTPATIENT)
Dept: CT IMAGING | Age: 77
DRG: 291 | End: 2022-09-09
Attending: INTERNAL MEDICINE
Payer: MEDICARE

## 2022-09-09 PROBLEM — I50.31 ACUTE HEART FAILURE WITH PRESERVED EJECTION FRACTION (HCC): Status: ACTIVE | Noted: 2022-09-09

## 2022-09-09 LAB
ALBUMIN SERPL-MCNC: 3 G/DL (ref 3.5–5)
ALBUMIN/GLOB SERPL: 0.7 {RATIO} (ref 1.1–2.2)
ALP SERPL-CCNC: 76 U/L (ref 45–117)
ALT SERPL-CCNC: 15 U/L (ref 12–78)
ANION GAP SERPL CALC-SCNC: 6 MMOL/L (ref 5–15)
AST SERPL-CCNC: 17 U/L (ref 15–37)
ATRIAL RATE: 89 BPM
BASOPHILS # BLD: 0.1 K/UL (ref 0–0.1)
BASOPHILS NFR BLD: 1 % (ref 0–1)
BILIRUB SERPL-MCNC: 0.3 MG/DL (ref 0.2–1)
BUN SERPL-MCNC: 46 MG/DL (ref 6–20)
BUN/CREAT SERPL: 6 (ref 12–20)
CALCIUM SERPL-MCNC: 9.8 MG/DL (ref 8.5–10.1)
CALCULATED P AXIS, ECG09: 68 DEGREES
CALCULATED R AXIS, ECG10: 16 DEGREES
CALCULATED T AXIS, ECG11: 1 DEGREES
CHLORIDE SERPL-SCNC: 107 MMOL/L (ref 97–108)
CO2 SERPL-SCNC: 29 MMOL/L (ref 21–32)
CREAT SERPL-MCNC: 8.23 MG/DL (ref 0.55–1.02)
DIAGNOSIS, 93000: NORMAL
DIFFERENTIAL METHOD BLD: ABNORMAL
EOSINOPHIL # BLD: 0.1 K/UL (ref 0–0.4)
EOSINOPHIL NFR BLD: 1 % (ref 0–7)
ERYTHROCYTE [DISTWIDTH] IN BLOOD BY AUTOMATED COUNT: 16.9 % (ref 11.5–14.5)
FERRITIN SERPL-MCNC: 417 NG/ML (ref 8–252)
FOLATE SERPL-MCNC: 39.7 NG/ML (ref 5–21)
GLOBULIN SER CALC-MCNC: 4.1 G/DL (ref 2–4)
GLUCOSE BLD STRIP.AUTO-MCNC: 130 MG/DL (ref 65–117)
GLUCOSE BLD STRIP.AUTO-MCNC: 139 MG/DL (ref 65–117)
GLUCOSE BLD STRIP.AUTO-MCNC: 209 MG/DL (ref 65–117)
GLUCOSE SERPL-MCNC: 170 MG/DL (ref 65–100)
HBV SURFACE AB SER QL: REACTIVE
HBV SURFACE AB SER-ACNC: 88.42 MIU/ML
HBV SURFACE AG SER QL: <0.1 INDEX
HBV SURFACE AG SER QL: NEGATIVE
HCT VFR BLD AUTO: 30.6 % (ref 35–47)
HGB BLD-MCNC: 9.7 G/DL (ref 11.5–16)
IMM GRANULOCYTES # BLD AUTO: 0 K/UL (ref 0–0.04)
IMM GRANULOCYTES NFR BLD AUTO: 0 % (ref 0–0.5)
IRON SERPL-MCNC: 151 UG/DL (ref 35–150)
LYMPHOCYTES # BLD: 1.8 K/UL (ref 0.8–3.5)
LYMPHOCYTES NFR BLD: 15 % (ref 12–49)
MAGNESIUM SERPL-MCNC: 2.3 MG/DL (ref 1.6–2.4)
MCH RBC QN AUTO: 29 PG (ref 26–34)
MCHC RBC AUTO-ENTMCNC: 31.7 G/DL (ref 30–36.5)
MCV RBC AUTO: 91.6 FL (ref 80–99)
MONOCYTES # BLD: 0.6 K/UL (ref 0–1)
MONOCYTES NFR BLD: 5 % (ref 5–13)
NEUTS SEG # BLD: 9.2 K/UL (ref 1.8–8)
NEUTS SEG NFR BLD: 78 % (ref 32–75)
NRBC # BLD: 0 K/UL (ref 0–0.01)
NRBC BLD-RTO: 0 PER 100 WBC
P-R INTERVAL, ECG05: 152 MS
PHOSPHATE SERPL-MCNC: 5.5 MG/DL (ref 2.6–4.7)
PLATELET # BLD AUTO: 202 K/UL (ref 150–400)
PMV BLD AUTO: 12.2 FL (ref 8.9–12.9)
POTASSIUM SERPL-SCNC: 4.1 MMOL/L (ref 3.5–5.1)
PROT SERPL-MCNC: 7.1 G/DL (ref 6.4–8.2)
Q-T INTERVAL, ECG07: 362 MS
QRS DURATION, ECG06: 70 MS
QTC CALCULATION (BEZET), ECG08: 440 MS
RBC # BLD AUTO: 3.34 M/UL (ref 3.8–5.2)
SERVICE CMNT-IMP: ABNORMAL
SODIUM SERPL-SCNC: 142 MMOL/L (ref 136–145)
TROPONIN-HIGH SENSITIVITY: 60 NG/L (ref 0–51)
TSH SERPL DL<=0.05 MIU/L-ACNC: 1.51 UIU/ML (ref 0.36–3.74)
VENTRICULAR RATE, ECG03: 89 BPM
VIT B12 SERPL-MCNC: 1187 PG/ML (ref 193–986)
WBC # BLD AUTO: 11.8 K/UL (ref 3.6–11)

## 2022-09-09 PROCEDURE — 74011250636 HC RX REV CODE- 250/636: Performed by: INTERNAL MEDICINE

## 2022-09-09 PROCEDURE — 74011250636 HC RX REV CODE- 250/636: Performed by: FAMILY MEDICINE

## 2022-09-09 PROCEDURE — 99222 1ST HOSP IP/OBS MODERATE 55: CPT | Performed by: SPECIALIST

## 2022-09-09 PROCEDURE — 84443 ASSAY THYROID STIM HORMONE: CPT

## 2022-09-09 PROCEDURE — 90945 DIALYSIS ONE EVALUATION: CPT

## 2022-09-09 PROCEDURE — 86706 HEP B SURFACE ANTIBODY: CPT

## 2022-09-09 PROCEDURE — A4726 DIALYS SOL FLD VOL > 5999CC: HCPCS | Performed by: INTERNAL MEDICINE

## 2022-09-09 PROCEDURE — 80053 COMPREHEN METABOLIC PANEL: CPT

## 2022-09-09 PROCEDURE — 87340 HEPATITIS B SURFACE AG IA: CPT

## 2022-09-09 PROCEDURE — 82607 VITAMIN B-12: CPT

## 2022-09-09 PROCEDURE — 85025 COMPLETE CBC W/AUTO DIFF WBC: CPT

## 2022-09-09 PROCEDURE — 74011000250 HC RX REV CODE- 250: Performed by: FAMILY MEDICINE

## 2022-09-09 PROCEDURE — 74011250637 HC RX REV CODE- 250/637: Performed by: INTERNAL MEDICINE

## 2022-09-09 PROCEDURE — 82728 ASSAY OF FERRITIN: CPT

## 2022-09-09 PROCEDURE — 74176 CT ABD & PELVIS W/O CONTRAST: CPT

## 2022-09-09 PROCEDURE — 3E1M39Z IRRIGATION OF PERITONEAL CAVITY USING DIALYSATE, PERCUTANEOUS APPROACH: ICD-10-PCS | Performed by: INTERNAL MEDICINE

## 2022-09-09 PROCEDURE — 82746 ASSAY OF FOLIC ACID SERUM: CPT

## 2022-09-09 PROCEDURE — 83735 ASSAY OF MAGNESIUM: CPT

## 2022-09-09 PROCEDURE — 84100 ASSAY OF PHOSPHORUS: CPT

## 2022-09-09 PROCEDURE — 74011636637 HC RX REV CODE- 636/637: Performed by: INTERNAL MEDICINE

## 2022-09-09 PROCEDURE — 36415 COLL VENOUS BLD VENIPUNCTURE: CPT

## 2022-09-09 PROCEDURE — 96374 THER/PROPH/DIAG INJ IV PUSH: CPT

## 2022-09-09 PROCEDURE — A9540 TC99M MAA: HCPCS

## 2022-09-09 PROCEDURE — 74011000250 HC RX REV CODE- 250: Performed by: INTERNAL MEDICINE

## 2022-09-09 PROCEDURE — 84484 ASSAY OF TROPONIN QUANT: CPT

## 2022-09-09 PROCEDURE — 65270000046 HC RM TELEMETRY

## 2022-09-09 PROCEDURE — 82962 GLUCOSE BLOOD TEST: CPT

## 2022-09-09 PROCEDURE — 76536 US EXAM OF HEAD AND NECK: CPT

## 2022-09-09 PROCEDURE — 93970 EXTREMITY STUDY: CPT

## 2022-09-09 PROCEDURE — 74011250636 HC RX REV CODE- 250/636: Performed by: STUDENT IN AN ORGANIZED HEALTH CARE EDUCATION/TRAINING PROGRAM

## 2022-09-09 PROCEDURE — 83540 ASSAY OF IRON: CPT

## 2022-09-09 RX ORDER — MAGNESIUM SULFATE 100 %
4 CRYSTALS MISCELLANEOUS AS NEEDED
Status: DISCONTINUED | OUTPATIENT
Start: 2022-09-09 | End: 2022-09-14 | Stop reason: HOSPADM

## 2022-09-09 RX ORDER — ONDANSETRON 2 MG/ML
4 INJECTION INTRAMUSCULAR; INTRAVENOUS
Status: DISCONTINUED | OUTPATIENT
Start: 2022-09-09 | End: 2022-09-14 | Stop reason: HOSPADM

## 2022-09-09 RX ORDER — HYDRALAZINE HYDROCHLORIDE 20 MG/ML
10 INJECTION INTRAMUSCULAR; INTRAVENOUS
Status: DISCONTINUED | OUTPATIENT
Start: 2022-09-09 | End: 2022-09-14 | Stop reason: HOSPADM

## 2022-09-09 RX ORDER — HEPARIN SODIUM 5000 [USP'U]/ML
5000 INJECTION, SOLUTION INTRAVENOUS; SUBCUTANEOUS EVERY 8 HOURS
Status: DISCONTINUED | OUTPATIENT
Start: 2022-09-09 | End: 2022-09-14 | Stop reason: HOSPADM

## 2022-09-09 RX ORDER — METOPROLOL SUCCINATE 25 MG/1
25 TABLET, EXTENDED RELEASE ORAL DAILY
Status: DISCONTINUED | OUTPATIENT
Start: 2022-09-09 | End: 2022-09-14

## 2022-09-09 RX ORDER — FUROSEMIDE 10 MG/ML
80 INJECTION INTRAMUSCULAR; INTRAVENOUS ONCE
Status: COMPLETED | OUTPATIENT
Start: 2022-09-09 | End: 2022-09-09

## 2022-09-09 RX ORDER — ALLOPURINOL 100 MG/1
300 TABLET ORAL DAILY
Status: DISCONTINUED | OUTPATIENT
Start: 2022-09-09 | End: 2022-09-10

## 2022-09-09 RX ORDER — INSULIN LISPRO 100 [IU]/ML
INJECTION, SOLUTION INTRAVENOUS; SUBCUTANEOUS
Status: DISCONTINUED | OUTPATIENT
Start: 2022-09-09 | End: 2022-09-14 | Stop reason: HOSPADM

## 2022-09-09 RX ORDER — SODIUM CHLORIDE 0.9 % (FLUSH) 0.9 %
5-40 SYRINGE (ML) INJECTION EVERY 8 HOURS
Status: DISCONTINUED | OUTPATIENT
Start: 2022-09-09 | End: 2022-09-14 | Stop reason: HOSPADM

## 2022-09-09 RX ORDER — ACETAMINOPHEN 650 MG/1
650 SUPPOSITORY RECTAL
Status: DISCONTINUED | OUTPATIENT
Start: 2022-09-09 | End: 2022-09-14 | Stop reason: HOSPADM

## 2022-09-09 RX ORDER — POLYETHYLENE GLYCOL 3350 17 G/17G
17 POWDER, FOR SOLUTION ORAL DAILY PRN
Status: DISCONTINUED | OUTPATIENT
Start: 2022-09-09 | End: 2022-09-14 | Stop reason: HOSPADM

## 2022-09-09 RX ORDER — SODIUM BICARBONATE 650 MG/1
650 TABLET ORAL 3 TIMES DAILY
Status: DISCONTINUED | OUTPATIENT
Start: 2022-09-09 | End: 2022-09-09

## 2022-09-09 RX ORDER — GENTAMICIN SULFATE 1 MG/G
CREAM TOPICAL
Status: DISCONTINUED | OUTPATIENT
Start: 2022-09-09 | End: 2022-09-14 | Stop reason: HOSPADM

## 2022-09-09 RX ORDER — ATORVASTATIN CALCIUM 20 MG/1
20 TABLET, FILM COATED ORAL
Status: DISCONTINUED | OUTPATIENT
Start: 2022-09-09 | End: 2022-09-14 | Stop reason: HOSPADM

## 2022-09-09 RX ORDER — ACETAMINOPHEN 325 MG/1
650 TABLET ORAL
Status: DISCONTINUED | OUTPATIENT
Start: 2022-09-09 | End: 2022-09-14 | Stop reason: HOSPADM

## 2022-09-09 RX ORDER — LEVOTHYROXINE SODIUM 50 UG/1
50 TABLET ORAL
Status: DISCONTINUED | OUTPATIENT
Start: 2022-09-09 | End: 2022-09-14 | Stop reason: HOSPADM

## 2022-09-09 RX ORDER — HYDRALAZINE HYDROCHLORIDE 20 MG/ML
20 INJECTION INTRAMUSCULAR; INTRAVENOUS ONCE
Status: COMPLETED | OUTPATIENT
Start: 2022-09-09 | End: 2022-09-09

## 2022-09-09 RX ORDER — ACETAMINOPHEN 325 MG/1
650 TABLET ORAL
Status: DISCONTINUED | OUTPATIENT
Start: 2022-09-09 | End: 2022-09-09

## 2022-09-09 RX ORDER — FENTANYL CITRATE 50 UG/ML
25 INJECTION, SOLUTION INTRAMUSCULAR; INTRAVENOUS
Status: DISCONTINUED | OUTPATIENT
Start: 2022-09-09 | End: 2022-09-14 | Stop reason: HOSPADM

## 2022-09-09 RX ORDER — ONDANSETRON 4 MG/1
4 TABLET, ORALLY DISINTEGRATING ORAL
Status: DISCONTINUED | OUTPATIENT
Start: 2022-09-09 | End: 2022-09-14 | Stop reason: HOSPADM

## 2022-09-09 RX ORDER — SODIUM CHLORIDE 0.9 % (FLUSH) 0.9 %
5-40 SYRINGE (ML) INJECTION AS NEEDED
Status: DISCONTINUED | OUTPATIENT
Start: 2022-09-09 | End: 2022-09-14 | Stop reason: HOSPADM

## 2022-09-09 RX ADMIN — SODIUM CHLORIDE, SODIUM LACTATE, CALCIUM CHLORIDE, MAGNESIUM CHLORIDE AND DEXTROSE 6000 ML: 2.5; 538; 448; 18.3; 5.08 INJECTION, SOLUTION INTRAPERITONEAL at 16:25

## 2022-09-09 RX ADMIN — HEPARIN SODIUM 5000 UNITS: 5000 INJECTION INTRAVENOUS; SUBCUTANEOUS at 19:11

## 2022-09-09 RX ADMIN — Medication 2 UNITS: at 21:31

## 2022-09-09 RX ADMIN — ACETAMINOPHEN 650 MG: 325 TABLET ORAL at 10:49

## 2022-09-09 RX ADMIN — SODIUM BICARBONATE 650 MG: 650 TABLET ORAL at 10:50

## 2022-09-09 RX ADMIN — FUROSEMIDE 80 MG: 10 INJECTION, SOLUTION INTRAVENOUS at 10:56

## 2022-09-09 RX ADMIN — GENTAMICIN SULFATE: 1 CREAM TOPICAL at 16:26

## 2022-09-09 RX ADMIN — METOPROLOL SUCCINATE 25 MG: 25 TABLET, EXTENDED RELEASE ORAL at 10:50

## 2022-09-09 RX ADMIN — HEPARIN SODIUM 5000 UNITS: 5000 INJECTION INTRAVENOUS; SUBCUTANEOUS at 10:56

## 2022-09-09 RX ADMIN — CEFTRIAXONE SODIUM 1 G: 1 INJECTION, POWDER, FOR SOLUTION INTRAMUSCULAR; INTRAVENOUS at 10:56

## 2022-09-09 RX ADMIN — ATORVASTATIN CALCIUM 20 MG: 20 TABLET, FILM COATED ORAL at 21:30

## 2022-09-09 RX ADMIN — HYDRALAZINE HYDROCHLORIDE 20 MG: 20 INJECTION INTRAMUSCULAR; INTRAVENOUS at 02:59

## 2022-09-09 RX ADMIN — ALLOPURINOL 300 MG: 100 TABLET ORAL at 10:50

## 2022-09-09 RX ADMIN — SODIUM CHLORIDE, PRESERVATIVE FREE 10 ML: 5 INJECTION INTRAVENOUS at 21:30

## 2022-09-09 RX ADMIN — SODIUM CHLORIDE, PRESERVATIVE FREE 10 ML: 5 INJECTION INTRAVENOUS at 10:52

## 2022-09-09 RX ADMIN — LEVOTHYROXINE SODIUM 50 MCG: 0.05 TABLET ORAL at 10:50

## 2022-09-09 NOTE — CONSULTS
[facility]  Cardiology Care Note                  [x]Initial visit     []Established visit     Patient Name: Ashleigh Ortiz - :1945 - KRW:[id number]  Primary Cardiologist: Talon Howard MD  Consulting Cardiologist: Yazan Maldonado MD     Reason for initial visit: worsening SOB    HPI:   Patient is a 69year old female with a med hx significant for ESRD on peritoneal dialysis, DM type 2, HTN, HLD, hypothyroidism post left thyroidectomy, renal cancer, and previous tobacco use. She presented to the ED on 22 with worsening SOB. She does home dialysis, but she was seen at the dialysis center and they felt she was noticeably more sob and advised to go to the hospital for evaluation. She reports she would get out of breath with exertion with less distance. She would have to take rest breaks due to feeling out of breath. She denies orthopnea or PND. She hasnt noticed increased abdominal girth or swelling in her legs. She denies CP, palpitations, or lightheadedness. She has been having this issue since at least March of this year. She was seen by Dr Michelle Pool in the office  with echo performed  showing EF 60-65% with GR I DD, no valvular disease. Nuclear stress test  was neg for ischemia. She had a CXR  for her SOB which showed right pleural effusion, RLL volume loss. CT chest  showed new 2.8cm hypodense nodule in right lobe of thyroid. Persistent elevation of right hemidiaphragm which had increased and was causing severe atelectasis of RML and mod atelectasis RLL, small right pleural effusion. Mod to large amount of ascites in abdomen. In the ED EKG showed NSR with nonspecific T wave abnormality in lateral leads. WBC 12.6, Hgb 10.7, ProBNP 2277, Creatinine 7.17, K 3.9. She was hypertensive with her BP trending 180/102 to 195/110.  CXR   showed no interval change in small right pleural effusion or right basilar subsegmental atelectasis and right hemidiaphragm elevation. U/S today of thyroid showed a 1.2 cm hypoechoic nodule within  the LEFT surgical bed which has increased in size. NM lung scan low prob for PE. SUBJECTIVE:  at time of exam she reports her breathing seems to be improved since admission. She feels she has a raspy voice still. She denies orthopnea, PND, edema or chest pain. Assessment and Plan   Patient seen on the day of progress note and examined  and agree with Advance Practice Provider (KATELYN, NP,PA)  assessment and plans. 1.  Shortness of breath: Suspect multifactorial but I do not feel it is a primary cardiac issue at this time although she does have some degree of diastolic dysfunction. Cardiac work-up in May 2022 with normal nuclear stress test normal ejection fraction. Clearly hypertensive urgency is likely contributing to her shortness of breath. Medication has been restarted blood pressure is improved at this time. Troponin essentially normal in her particular case. EKG with no acute ischemic changes. Proceed with dialysis as planned. Anemia likely contributing to her shortness of breath. For now no additional cardiac interventions. Will remain available during the weekend and re see patient on Monday unless of change in clinical or hemodynamic status during the weekend. Worsening SOB: pt has had SOB since at least March 2022, progressively worse and was referred to ED by dialysis staff. CXR showing small right pleural effusion, right basilar subsegmental atelectasis and elevated rt hemidiaphragm. ProBNP 2277. Echo 5/22 showed EF 60-65% with GR I DD. NST neg for ischemia. She had malignant HTN on arrival but this has improved. Cont with dialysis and work up that is currently in process. ESRD: on peritoneal dialysis.  Managed by nephrology  New thyroid nodule: per U/S today there is a 1.2cm hypoechoic nodule in left surgical bed increased in size. TSH 1.51. Management per primary team.  HTN: improved since admission. Cont home medications  DM: management per primary team.   HLD: LDL 71.2 5/19/22. Cont Lipitor.             ____________________________________________________________    Cardiac testing  05/18/22    ECHO ADULT COMPLETE 05/18/2022 5/18/2022    Interpretation Summary  Formatting of this result is different from the original.      Left Ventricle: Normal left ventricular systolic function with a visually estimated EF of 60 - 65%. Left ventricle size is normal. Findings consistent with mild concentric hypertrophy. Normal wall motion. Signed by: Linnea Keen MD on 5/18/2022  4:19 PM        05/18/22    NUCLEAR CARDIAC STRESS TEST 05/18/2022 5/20/2022    Interpretation Summary  Formatting of this result is different from the original.      Nuclear Findings: Normal pharmacological myocardial perfusion study. Nuclear Findings: LV perfusion is normal.    Nuclear Findings: There is a mild artifact caused by breast attenuation. ECG: Resting ECG demonstrates normal sinus rhythm. ECG: Stress ECG was negative for ischemia. Stress Test: A Venkata protocol stress test was performed. Overall, the patient's exercise capacity was below average for their age. The patient reached stage 1 of the protocol after exercising for 3 min and 1 sec. Hemodynamics are adequate for diagnosis. Blood pressure demonstrated a normal response and heart rate demonstrated a normal response to stress.  The patient's heart rate recovery was normal.    Signed by: Linnea Keen MD on 5/18/2022  4:30 PM          Most recent HS troponins:  Recent Labs     09/09/22  1237 09/08/22  1805   TROPHS 60* 46     Review of Systems    [x]All other systems reviewed and all negative except as written in HPI    [] Patient unable to provide secondary to condition         Past Medical History:   Diagnosis Date    Arthritis     LOWER SPINE    Bell's palsy     23yrs old [facility])     kidney (right)    Chronic kidney disease 1996    right kidney removed - CANCER    Diabetes ([facility] [address]     She is controlling with weight loss    Diabetes mellitus ([facility] [address] 10/8/2014    NIDDM    Hepatitis C 2011    hx hep C from blood transfusion per pt; Treated by Dr. Katiuska Odom    Hyperlipidemia     Hypertension     Ill-defined condition 1990s    HEPATITIS C - treated with medication    Personal history of renal cancer 3/2/2015    Psychiatric disorder     depression     Thyroid disease 1999    Thyroidectomy    Vertigo      Past Surgical History:   Procedure Laterality Date    COLONOSCOPY N/A 6/20/2017    COLONOSCOPY performed by Antonio Corea. Chris Montgomery MD at [facility] ENDOSCOPY    HX BREAST REDUCTION Bilateral 1996    HX COLONOSCOPY  3/5/12    Repeat in 5 years (Dr. Katiuska Odom)    HX CYST REMOVAL      HX GYN  1980s    tubal pregnancy    HX OTHER SURGICAL  1999    thyroidectomy    HX OTHER SURGICAL      colonoscopy x 2 - ?polyps    HX OTHER SURGICAL  04/08/2019    surg for prolasp bladder at [address]  04/08/2019    prolasp bladder surg    HX RITESH AND BSO Bilateral 1995    HX TONSILLECTOMY      2or 4years old    [address]    kidney removed on right    IR BX BONE MARROW DIAGNOSTIC  04/05/2021     Social Hx:  reports that she quit smoking about 30 years ago. Her smoking use included cigarettes. She has a 2.00 pack-year smoking history. She has never used smokeless tobacco. She reports that she does not drink alcohol and does not use drugs. Family Hx: family history includes Diabetes in her daughter, mother, and sister; Heart Disease in her father; Hypertension in her daughter and son; Kidney Disease in her father and mother; No Known Problems in her brother and son.   Allergies   Allergen Reactions    Codeine Hives          OBJECTIVE:  Wt Readings from Last 3 Encounters:   06/02/22 187 lb 6.3 oz (85 kg)   05/19/22 190 lb (86.2 kg)   05/18/22 190 lb (86.2 kg)       Intake/Output Summary (Last 24 hours) at 9/9/2022 1733  Last data filed at 9/9/2022 1056  Gross per 24 hour   Intake 10 ml   Output --   Net 10 ml         Physical Exam    Vitals:   Vitals:    09/09/22 1300 09/09/22 1400 09/09/22 1500 09/09/22 1600   BP: (!) 159/85  131/82 (!) 142/94   Pulse: 92 95 97 94   Resp: 17 20 19 22   Temp:       SpO2: 100% 100% 100% 100%     Telemetry: normal sinus rhythm      General:    Alert, cooperative, no distress, appears stated age. Neck:   Supple, no carotid bruit and no JVD. Back:     Symmetric,    Lungs:     Crackles posterior bases bilaterally. Heart[de-identified]    Regular rate and rhythm, S1, S2 normal, no murmur, click, rub or gallop. Abdomen:     Distended, mildly tender. Bowel sounds normal. Peritoneal dialysis catheter noted. Extremities:   Extremities normal, atraumatic, no cyanosis, trace leslie pedal edema. Vascular:   Pulses - leslie UE/LE equal   Skin:   Skin color normal. No rashes or lesions on visible areas   Neurologic:   Alert, Moves all extremities. Data Review:     Radiology:   XR Results (most recent):  Results from Hospital Encounter encounter on 09/08/22    XR CHEST PA LAT    Narrative  EXAM: XR CHEST PA LAT    INDICATION: Shortness of breath    COMPARISON: 8/25/2022 chest x-ray, 9/6/2022 CT    FINDINGS: PA and lateral radiographs of the chest demonstrate persistent  elevation of the right hemidiaphragm with a minimal subsegmental atelectasis and  small right pleural effusion. The cardiac and mediastinal contours and pulmonary  vascularity are stable. The bones and soft tissues are within normal limits. Impression  No interval change in small right pleural effusion, right basilar subsegmental  atelectasis, and right hemidiaphragm elevation    CT Results (most recent):  Results from Hospital Encounter encounter on 09/08/22    CT ABD PELV WO CONT    Narrative  EXAM: CT ABD PELV WO CONT    INDICATION: Abdominal pain, ascites    COMPARISON: 3/3/2013    IV CONTRAST: None.     ORAL CONTRAST: None    TECHNIQUE:  Thin axial images were obtained through the abdomen and pelvis. Coronal and  sagittal reformats were generated. CT dose reduction was achieved through use of  a standardized protocol tailored for this examination and automatic exposure  control for dose modulation. The absence of intravenous contrast material reduces the sensitivity for  evaluation of the vasculature and solid organs. FINDINGS:  LOWER THORAX: Atelectasis right base  LIVER: No mass. BILIARY TREE: Contrast within the gallbladder from previous study CBD is not  dilated. SPLEEN: within normal limits. PANCREAS: No focal abnormality. ADRENALS: Unremarkable. KIDNEYS/URETERS: Post right nephrectomy. Surgical clips in the nephrectomy bed. No recurrent mass. No left-sided hydronephrosis or stone. High density lesion  lower pole left kidney 13 mm is indeterminate but may represent a high density  cyst. It has enlarged  STOMACH: Unremarkable. SMALL BOWEL: No dilatation or wall thickening. COLON: Left-sided diverticulosis. No evidence of acute diverticulitis  APPENDIX: Normal  PERITONEUM: Small volume of ascites. The patient has a left mid abdominal  peritoneal dialysis catheter  RETROPERITONEUM: There are vascular calcifications. No aneurysm  REPRODUCTIVE ORGANS: Surgically absent  URINARY BLADDER: No mass or calculus. BONES: No destructive bone lesion. ABDOMINAL WALL: No mass or hernia. ADDITIONAL COMMENTS: N/A    Impression  1. Peritoneal dialysis catheter with small volume of free fluid within the  abdomen  2. Post right nephrectomy. No recurrent mass lesion within the nephrectomy bed  3. 13 mm high density lesion lower pole left kidney may represent a high density  cyst but is nonspecific    MRI Results (most recent):  No results found for this or any previous visit. No results for input(s): CPK, TROIQ in the last 72 hours.     No lab exists for component: CKQMB, CPKMB, BMPP  Recent Labs 09/09/22  1237 09/08/22  1805    141   K 4.1 3.9    106   CO2 29 30   BUN 46* 40*   CREA 8.23* 7.17*   * 77   PHOS 5.5*  --    CA 9.8 10.1     Recent Labs     09/09/22  1237 09/08/22  1805   WBC 11.8* 12.6*   HGB 9.7* 10.7*   HCT 30.6* 33.2*    205     Recent Labs     09/09/22  1237 09/08/22  1805   AP 76 83     No results for input(s): CHOL, LDLC in the last 72 hours.     No lab exists for component: TGL, HDLC,  HBA1C  Recent Labs     09/09/22  1237   TSH 1.51           Current meds:    Current Facility-Administered Medications:     gentamicin (GARAMYCIN) 0.1 % cream, , Topical, DIALYSIS PRN, Rey Bowers MD, Given at 09/09/22 1626    allopurinoL (ZYLOPRIM) tablet 300 mg, 300 mg, Oral, DAILY, Rey Bowers MD, 300 mg at 09/09/22 1050    atorvastatin (LIPITOR) tablet 20 mg, 20 mg, Oral, QHS, Rey Bowers MD    levothyroxine (SYNTHROID) tablet 50 mcg, 50 mcg, Oral, 7am, Rey Bowers MD, 50 mcg at 09/09/22 1050    metoprolol succinate (TOPROL-XL) XL tablet 25 mg, 25 mg, Oral, DAILY, Rey Bowers MD, 25 mg at 09/09/22 1050    sodium chloride (NS) flush 5-40 mL, 5-40 mL, IntraVENous, Q8H, Rey Bowers MD, 10 mL at 09/09/22 1052    sodium chloride (NS) flush 5-40 mL, 5-40 mL, IntraVENous, PRN, Rey Bowers MD    acetaminophen (TYLENOL) tablet 650 mg, 650 mg, Oral, Q6H PRN, 650 mg at 09/09/22 1049 **OR** acetaminophen (TYLENOL) suppository 650 mg, 650 mg, Rectal, Q6H PRN, Rey Bowers MD    polyethylene glycol (MIRALAX) packet 17 g, 17 g, Oral, DAILY PRN, Rey Bowers MD    ondansetron (ZOFRAN ODT) tablet 4 mg, 4 mg, Oral, Q8H PRN **OR** ondansetron (ZOFRAN) injection 4 mg, 4 mg, IntraVENous, Q6H PRN, Rey Bowers MD    heparin (porcine) injection 5,000 Units, 5,000 Units, SubCUTAneous, Q8H, Rey Bowers MD, 5,000 Units at 09/09/22 1056    insulin lispro (HUMALOG) injection, , SubCUTAneous, AC&HS, Rey Bowers MD    glucose chewable tablet 16 g, 4 Tablet, Oral, PRN, Rey Bowers MD    glucagon (GLUCAGEN) injection 1 mg, 1 mg, IntraMUSCular, PRN, Rey Bowers MD    dextrose 10 % infusion 0-250 mL, 0-250 mL, IntraVENous, PRN, Rey Bowers MD    hydrALAZINE (APRESOLINE) 20 mg/mL injection 10 mg, 10 mg, IntraVENous, Q6H PRN, Rey Bowers MD    fentaNYL citrate (PF) injection 25 mcg, 25 mcg, IntraVENous, Q4H PRN, Rey Bowers MD    cefTRIAXone (ROCEPHIN) 1 g in 0.9% sodium chloride 10 mL IV syringe, 1 g, IntraVENous, Q24H, Leoncio Burgess MD, 1 g at 09/09/22 1056    peritoneal dialysis DEXTROSE 2.5% (2.5 mEq/L low calcium) solution 6,000 mL, 6,000 mL, IntraPERitoneal, DAILY, Marilyn Bo MD, 6,000 mL at 09/09/22 1625    peritoneal dialysis DEXTROSE 2.5% (2.5 mEq/L low calcium) solution 6,000 mL, 6,000 mL, IntraPERitoneal, DAILY, Diego Escalante MD, 6,000 mL at 09/09/22 1625    Ronen Johnson MD  [facility] [address], [address] [address]  [place], [facility]  [phone]      CC: Mitesh Coburn MD

## 2022-09-09 NOTE — CONSULTS
Assessment:  ESRD: CCPD. Followed by Dr. Suzanne Wong  SOB/POSEY: elevated right hemidiaphragm on CXR. Awaiting CT reading  Volume overload: mild. 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 Renal service will follow patient with you. Please contact me with any questions or concerns. Initial Consult note         Patient name: Zacarias Lin  MR no: [id number]  Date of admission: 9/8/2022  Date of consultation: 9/9/2022  Requested by: Dr. Mustapha Bills  Reason for consult: ESRD/PD    Patient seen and examined. History obtained from patient and chart review. Relevant labs, data and notes reviewed. HPI: Zacarias Lin is a 69 y.o. female with PMH significant for ESRD on CCPD, Dm2, HTN, Hep C presented overnight with SOB. SBP elevated on presentation. CXR in ED showed elevated right hemidiaphragm/small right pleural effusion. Nephrology consulted to resume PD. Patient reports no significant weight gain. Has noted some POSEY. No significant orthopnea. No CP.  Good compliance with PD.     PMH:  Past Medical History:   Diagnosis Date    Arthritis     LOWER SPINE    Bell's palsy     23yrs old     Cancer ([address]     kidney (right)    Chronic kidney disease 1996    right kidney removed - CANCER    Diabetes ([address]     She is controlling with weight loss    Diabetes mellitus ([address] 10/8/2014    NIDDM    Hepatitis C 2011    hx hep C from blood transfusion per pt; Treated by Dr. Kait Leigh    Hyperlipidemia     Hypertension     Ill-defined condition 1990s    HEPATITIS C - treated with medication    Personal history of renal cancer 3/2/2015    Psychiatric disorder depression     Thyroid disease     Thyroidectomy    Vertigo      PSH:  Past Surgical History:   Procedure Laterality Date    COLONOSCOPY N/A 2017    COLONOSCOPY performed by Timothy Yi. Chino Howard MD at [facility] ENDOSCOPY    HX BREAST REDUCTION Bilateral     HX COLONOSCOPY  3/5/12    Repeat in 5 years (Dr. Corinne Davis)    HX CYST REMOVAL      HX GYN      tubal pregnancy    HX OTHER SURGICAL      thyroidectomy    HX OTHER SURGICAL      colonoscopy x 2 - ?polyps    HX OTHER SURGICAL  2019    surg for prolasp bladder at [address]  2019    prolasp bladder surg    HX RITESH AND BSO Bilateral     HX TONSILLECTOMY      2or 4years old    [address]    kidney removed on right    IR BX BONE MARROW DIAGNOSTIC  2021       Social history:   Social History     Tobacco Use    Smoking status: Former     Packs/day: 0.25     Years: 8.00     Pack years: 2.00     Types: Cigarettes     Quit date: 1992     Years since quittin.4    Smokeless tobacco: Never    Tobacco comments:     quit smoking 30-40 yrs ago   Vaping Use    Vaping Use: Never used   Substance Use Topics    Alcohol use: No    Drug use: No       Family history:  No history of CKD or ESRD in the family.      Allergies   Allergen Reactions    Codeine Hives       Current Facility-Administered Medications   Medication Dose Route Frequency Last Admin    gentamicin (GARAMYCIN) 0.1 % cream   Topical DIALYSIS PRN      allopurinoL (ZYLOPRIM) tablet 300 mg  300 mg Oral DAILY 300 mg at 22 1050    atorvastatin (LIPITOR) tablet 20 mg  20 mg Oral QHS      levothyroxine (SYNTHROID) tablet 50 mcg  50 mcg Oral 7am 50 mcg at 22 1050    metoprolol succinate (TOPROL-XL) XL tablet 25 mg  25 mg Oral DAILY 25 mg at 22 1050    sodium bicarbonate tablet 650 mg  650 mg Oral  mg at 22 1050    sodium chloride (NS) flush 5-40 mL  5-40 mL IntraVENous Q8H 10 mL at 22 1052    sodium chloride (NS) flush 5-40 mL  5-40 mL IntraVENous PRN      acetaminophen (TYLENOL) tablet 650 mg  650 mg Oral Q6H  mg at 09/09/22 1049    Or    acetaminophen (TYLENOL) suppository 650 mg  650 mg Rectal Q6H PRN      polyethylene glycol (MIRALAX) packet 17 g  17 g Oral DAILY PRN      ondansetron (ZOFRAN ODT) tablet 4 mg  4 mg Oral Q8H PRN      Or    ondansetron (ZOFRAN) injection 4 mg  4 mg IntraVENous Q6H PRN      heparin (porcine) injection 5,000 Units  5,000 Units SubCUTAneous Q8H 5,000 Units at 09/09/22 1056    insulin lispro (HUMALOG) injection   SubCUTAneous AC&HS      glucose chewable tablet 16 g  4 Tablet Oral PRN      glucagon (GLUCAGEN) injection 1 mg  1 mg IntraMUSCular PRN      dextrose 10 % infusion 0-250 mL  0-250 mL IntraVENous PRN      hydrALAZINE (APRESOLINE) 20 mg/mL injection 10 mg  10 mg IntraVENous Q6H PRN      fentaNYL citrate (PF) injection 25 mcg  25 mcg IntraVENous Q4H PRN      cefTRIAXone (ROCEPHIN) 1 g in 0.9% sodium chloride 10 mL IV syringe  1 g IntraVENous Q24H 1 g at 09/09/22 1056    peritoneal dialysis DEXTROSE 2.5% (2.5 mEq/L low calcium) solution 6,000 mL  6,000 mL IntraPERitoneal DAILY      peritoneal dialysis DEXTROSE 2.5% (2.5 mEq/L low calcium) solution 6,000 mL  6,000 mL IntraPERitoneal DAILY         ROS (besides HPI):    General: No fever. No weight changes  ENT: No hearing loss or visual changes  Cardiovascular: No Chest pain. +POSEY  Pulmonary: +SOB  GI: No abdominal pain. No Nausea/Vomiting/Diarrhea. No blood in stool  : No blood in urine. No foamy or cloudy urine  Musculoskeletal: No joint swelling or redness.  No morning stiffness  Endocrine: no cold or heat intolerance  Psych: denies anxiety or depression  Neuro: No light headedness or dizziness    Objective   Visit Vitals  BP (!) 145/94   Pulse 91   Temp 98.6 °F (37 °C)   Resp 19   LMP  (LMP Unknown)   SpO2 100%       Physical Exam:    Gen: NAD    HEENT: AT/NC, EOMI, moist mucous membrane, no scleral icterus    Neck: no JVD, no cervical lymphadenopathy, no carotid bruit    Lungs/Chest wall: Breath sounds normal. Symmetrical chest wall expansion. No accessory muscle use. Clear to auscultation    Cardiovascular: Normal S1/S2, normal rate, regular rhythm. Abdomen: soft, NT, ND, BS+, +PD catheter-> exit site clean. Ext: no clubbing or cyanosis. No edema    Skin: warm and dry. No rashes    : no del angel    CNS: alert awake. Answers appropriately. Labs/Data:    Lab Results   Component Value Date/Time    Sodium 141 09/08/2022 06:05 PM    Potassium 3.9 09/08/2022 06:05 PM    Chloride 106 09/08/2022 06:05 PM    CO2 30 09/08/2022 06:05 PM    Anion gap 5 09/08/2022 06:05 PM    Glucose 77 09/08/2022 06:05 PM    BUN 40 (H) 09/08/2022 06:05 PM    Creatinine 7.17 (H) 09/08/2022 06:05 PM    BUN/Creatinine ratio 6 (L) 09/08/2022 06:05 PM    GFR est AA 7 (L) 09/08/2022 06:05 PM    GFR est non-AA 6 (L) 09/08/2022 06:05 PM    Calcium 10.1 09/08/2022 06:05 PM       Lab Results   Component Value Date/Time    WBC 12.6 (H) 09/08/2022 06:05 PM    HGB 10.7 (L) 09/08/2022 06:05 PM    HCT 33.2 (L) 09/08/2022 06:05 PM    PLATELET 883 32/90/6178 06:05 PM    MCV 91.5 09/08/2022 06:05 PM       Urine analysis: No results found for this or any previous visit.           No components found for: SPEP, UPEP  No results found for: PUQ, PROTU2, PROTU1, BJP1, CPE1, IMEL1, MET2  Lab Results   Component Value Date/Time    Microalb/Creat ratio (ug/mg creat.) 3,461.1 (H) 10/31/2019 09:15 AM         Intake/Output Summary (Last 24 hours) at 9/9/2022 1243  Last data filed at 9/9/2022 1056  Gross per 24 hour   Intake 10 ml   Output --   Net 10 ml       Wt Readings from Last 3 Encounters:   06/02/22 85 kg (187 lb 6.3 oz)   05/19/22 86.2 kg (190 lb)   05/18/22 86.2 kg (190 lb)       Signed by:  Savi Marquez MD  Nephrology and Hypertension  [facility]

## 2022-09-09 NOTE — H&P
[address]  HISTORY AND PHYSICAL    Name:  Sharon Robbins  MR#:  [id number]  :  1945  ACCOUNT #:  [de-identified]  ADMIT DATE:  2022      The patient was seen, evaluated, and admitted by me on 2022. PRIMARY CARE PHYSICIAN:  Marleen Lr MD    SOURCE OF INFORMATION:  Patient and review of ED and old electronic medical record. CHIEF COMPLAINT:  Shortness of breath. HISTORY OF PRESENT ILLNESS:  This is a 56-year-old woman with a past medical history significant for end-stage renal disease on peritoneal dialysis, type 2 diabetes, hypertension, dyslipidemia, hypothyroidism, renal cancer, who presented at the emergency room with shortness of breath. This has been going on for a couple of weeks. The shortness of breath is progressive, worse with mild exertion such as walking, also worse when the patient is lying down flat. No associated fever, rigors, or chills. The shortness of breath is associated with cough. The patient was sent to the emergency room from the dialysis center because of the worsening respiratory symptoms. When the patient arrived at the emergency room, chest x-ray was obtained. The chest x-ray shows right pleural effusion which is unchanged from prior study. The patient also has elevated BNP level and she was referred to the hospitalist service for admission. Echocardiogram done in 2022 shows ejection fraction of 60% to 65%. The patient also recently had a CT scan of the chest done without contrast which shows moderate to large amount of ascites as well as new right thyroid nodule. Her blood pressure was markedly elevated on arrival at the emergency room. The patient stated that she was told to stop taking all antihypertensive medication when she started the peritoneal dialysis. The patient was last admitted to the hospitalist service from 2020 to 2020. The patient was admitted and treated for acute gastroenteritis.     PAST MEDICAL HISTORY:  End-stage renal disease on peritoneal dialysis, type 2 diabetes, hypertension, hypothyroidism, dyslipidemia, and renal cancer. ALLERGIES:  THE PATIENT IS ALLERGIC TO CODEINE. MEDICATIONS:  1. Tylenol 500 mg every 6 hours as needed for pain. 2.  Allopurinol 300 mg daily. 3.  Lipitor 20 mg daily. 4.  Lasix 20 mg twice daily. 5.  Amaryl 1 mg daily. 6.  Synthroid 50 mcg daily. 7.  Toprol-XL 25 mg daily. 8.  Januvia 25 mg daily. 9.  Sodium bicarbonate 650 mg 3 times daily. FAMILY HISTORY:  This was reviewed. Her mother had diabetes and kidney disease. Her father had heart disease and kidney disease. PAST SURGICAL HISTORY:  This is significant for placement of peritoneal dialysis catheter, tonsillectomy, total abdominal hysterectomy, and thyroidectomy. SOCIAL HISTORY:  The patient is a former smoker, quit tobacco abuse in 04/1992. No history of alcohol abuse. REVIEW OF SYSTEMS:  HEAD, EYES, EARS, NOSE, AND THROAT:  No headache, no dizziness, no blurring of vision, no photophobia. RESPIRATORY:  This is positive for shortness of breath. No cough, no hemoptysis. CARDIOVASCULAR:  This is positive for orthopnea. No chest pain, no palpitation. GASTROINTESTINAL:  No nausea or vomiting. No diarrhea. No constipation. GENITOURINARY:  No dysuria, no urgency, and no frequency. All other systems are reviewed and they are negative. PHYSICAL EXAMINATION:  GENERAL APPEARANCE:  The patient appeared ill, in moderate distress. VITAL SIGNS:  On arrival at the emergency room, temperature 97, pulse 88, respiratory rate 16, blood pressure 195/110, and oxygen saturation 96% on room air. HEAD:  Normocephalic, atraumatic. EYES:  Normal eye movement. No redness, no drainage, no discharge. EARS:  Normal external ears with no obvious drainage. NOSE:  No deformity and no drainage. MOUTH AND THROAT:  No visible oral lesion. NECK:  Neck is supple. Mild JVD. No thyromegaly.   CHEST: Bilateral basilar crackles and few expiratory wheezing. HEART:  Normal S1 and S2, regular. No clinically appreciable murmur. ABDOMEN:  Slightly distended. Diffuse tenderness. No rebound tenderness. No guarding. Normal bowel sounds. Peritoneal dialysis catheter noted. EXTREMITIES:  No edema. Pulses 2+ bilaterally. MUSCULOSKELETAL:  No obvious joint deformity or swelling. SKIN:  No active skin lesions seen in the exposed parts of the body. PSYCHIATRIC:  Normal mood and affect. LYMPHATIC:  No cervical lymphadenopathy. DIAGNOSTIC DATA:  EKG shows normal sinus rhythm and nonspecific T-wave abnormalities. LABORATORY DATA:  Hematology, WBC 12.6, hemoglobin 10.7, hematocrit 33.2, platelets 075. Pro-BNP level 2277. Chemistry, sodium 141, potassium 3.9, chloride 106, CO2 of 30, glucose 77, BUN 40, creatinine 7.17, calcium 10.1. Total bilirubin 0.3, ALT 18, AST 18, alkaline phosphatase 83, total protein 7.5, albumin level 3.2, globulin 4.3. Cardiac profile, troponin high-sensitivity 46. ASSESSMENT:  1. Acute heart failure with preserved ejection fraction. 2.  End-stage renal disease needing dialysis. 3.  Anemia. 4.  Type 2 diabetes. 5.  Malignant hypertensive urgency. 6.  Leukocytosis. 7.  Thyroid nodule. 8.  Abdominal ascites. 9.  Suspected spontaneous bacterial peritonitis. 10.  Dyslipidemia. 11.  Hypothyroidism. PLAN:  1. Acute heart failure with preserved ejection fraction. We will admit the patient for further evaluation and treatment. Echocardiogram was done in 05/2022, that shows EF of 60% to 65%, because of that, we will not repeat the echocardiogram.  We will check serial cardiac markers to rule out acute myocardial infarction as a possible cause of acute heart failure. Cardiology consult will be requested to assist in further evaluation and treatment. Noncontrast CT scan of the chest done recently did not show any evidence of pneumonia.   We will obtain V/Q scan of the chest to evaluate the patient for pulmonary embolism as another possible cause of shortness of breath. 2.  End-stage renal disease needing dialysis. The patient's Nephrology group will be consulted for continuation of dialysis during hospitalization. Fluid overload from end-stage renal disease could also be contributing to the patient's shortness of breath. 3.  Anemia. This is most likely due to chronic disease. We will carry out anemia workup to evaluate the patient for other possible causes of anemia including checking stool guaiac to rule out occult GI bleed. 4.  Type 2 diabetes. The patient will be placed on sliding scale with insulin coverage. We will check hemoglobin A1c level. We will hold oral agent till the time of discharge from the hospital.  5.  Malignant hypertensive urgency. This is most likely due to noncompliance. We will resume preadmission medication. The patient will also be placed on hydralazine as needed for better blood pressure control. We will check TSH level. 6.  Leukocytosis. The patient is afebrile. We will check lactic acid level. 7.  Thyroid nodule. We will check ultrasound of the neck for further evaluation. The patient may require General Surgery consult depending on the result of the thyroid ultrasound. 8.  Abdominal ascites. This is based on the CT scan of the chest without contrast performed a couple of days ago. We will obtain CT scan of the abdomen and pelvis for further evaluation. Depending on the result of the CT scan of the abdomen and pelvis, the patient may require abdominal paracentesis. 9.  Suspected spontaneous bacterial peritonitis. The patient has mild abdominal tenderness and in the setting of leukocytosis and presence of peritoneal dialysis catheter, we will start the patient on Rocephin for suspected spontaneous bacterial peritonitis. We will await analysis of the peritoneal fluid. 10.  Dyslipidemia.   We will continue with preadmission medication. 11.  Hypothyroidism. We will continue with Synthroid and check TSH level. 12.  Other issues:  Code status, the patient is a full code. We will place the patient on heparin for DVT prophylaxis. FUNCTIONAL STATUS PRIOR TO ADMISSION:  The patient came from home. The patient is ambulatory with no assistive device. COVID PRECAUTION:  The patient was wearing a face mask. I was wearing a face mask and gloves for this patient's encounter.         Quynh Rodney MD      [id number]  D:  09/09/2022 6:59  T:  09/09/2022 8:45  JOB #:  [id number]  CC:  Rui Hardy MD

## 2022-09-09 NOTE — PROCEDURES
Bneitez Mondragon [facility] [phone]     Pt orders, notes, labs, code status and consent reviewed. Time out complete. Orders: Tx time:  8.5  Cycles:  4  Fill Volume:  2000  Last fill:  1500  Total Volume:   9500  Dianeal:  2.5%     Left abdominal PD site cleansed with Exsept followed by Gentamycin and dry gauze dressing dated XXX, no redness or drainage at exit site. Prior to connection, one minute Alcavis scrub performed. PD Start time:  16:40  PD End time:  01:40     Remained present during initial drain followed by initiation of first fill. Upon departure, bed in lowest position, personal belongings within reach. Education pre/post with primary RN Salima Leonard RN.

## 2022-09-09 NOTE — ED PROVIDER NOTES
Gwyn Barrientos is a 69 y.o. female with past medical history notable for esrd, on pd since July, presenting with progressive mandel over the past month, now with dyspnea at rest.  She has not had change in urine output. Has not noticed significant lower extremity edema. She had a normal echocardiogram several months ago. CT yesterday revealed large ascites associated with PD although patient states that she suddenly saw nurse at the dialysis center and was drained for 100 ml with some improvement. No chest pain, f/c/cough. States that when she was taken off all of her blood pressure medications around the time that she started PD. Past Medical History:   Diagnosis Date    Arthritis     LOWER SPINE    Bell's palsy     23yrs old     Cancer ([address]     kidney (right)    Chronic kidney disease 1996    right kidney removed - CANCER    Diabetes ([address]     She is controlling with weight loss    Diabetes mellitus ([facility] [address] [address] 10/8/2014    NIDDM    Hepatitis C 2011    hx hep C from blood transfusion per pt; Treated by Dr. Katiuska Odom    Hyperlipidemia     Hypertension     Ill-defined condition 1990s    HEPATITIS C - treated with medication    Personal history of renal cancer 3/2/2015    Psychiatric disorder     depression     Thyroid disease 1999    Thyroidectomy    Vertigo        Past Surgical History:   Procedure Laterality Date    COLONOSCOPY N/A 6/20/2017    COLONOSCOPY performed by Antonio Corea.  Chris Montgomery MD at [facility] ENDOSCOPY    HX BREAST REDUCTION Bilateral 1996    HX COLONOSCOPY  3/5/12    Repeat in 5 years (Dr. Katiuska Odom)    HX CYST REMOVAL      HX GYN  1980s    tubal pregnancy    HX OTHER SURGICAL  1999    thyroidectomy    HX OTHER SURGICAL      colonoscopy x 2 - ?polyps    HX OTHER SURGICAL  04/08/2019    surg for prolasp bladder at [address]  04/08/2019    prolasp bladder surg    HX RITESH AND BSO Bilateral 1995    HX TONSILLECTOMY      2or 4years old    [address]    kidney removed on right IR [facility]  2021         Family History:   Problem Relation Age of Onset    Diabetes Mother     Kidney Disease Mother     Heart Disease Father     Kidney Disease Father         dialysis    Diabetes Sister     Diabetes Daughter     Hypertension Daughter     Hypertension Son     No Known Problems Son     No Known Problems Brother     Anesth Problems Neg Hx        Social History     Socioeconomic History    Marital status:      Spouse name: Not on file    Number of children: Not on file    Years of education: Not on file    Highest education level: Not on file   Occupational History    Not on file   Tobacco Use    Smoking status: Former     Packs/day: 0.25     Years: 8.00     Pack years: 2.00     Types: Cigarettes     Quit date: 1992     Years since quittin.4    Smokeless tobacco: Never    Tobacco comments:     quit smoking 30-40 yrs ago   Vaping Use    Vaping Use: Never used   Substance and Sexual Activity    Alcohol use: No    Drug use: No    Sexual activity: Yes     Partners: Male   Other Topics Concern    Not on file   Social History Narrative    Not on file     Social Determinants of Health     Financial Resource Strain: Not on file   Food Insecurity: Not on file   Transportation Needs: Not on file   Physical Activity: Not on file   Stress: Not on file   Social Connections: Not on file   Intimate Partner Violence: Not on file   Housing Stability: Not on file         ALLERGIES: Codeine    Review of Systems   Constitutional:  Negative for chills, fever and unexpected weight change. Eyes:  Negative for photophobia. Respiratory:  Positive for shortness of breath. Cardiovascular:  Negative for chest pain. Gastrointestinal:  Negative for abdominal pain. Genitourinary:  Negative for dysuria. Musculoskeletal:  Negative for back pain. Neurological:  Negative for headaches. Psychiatric/Behavioral:  Negative for confusion.     All other systems reviewed and are negative. Vitals:    22 1654 22 0104 22 0259 22 0300   BP: (!) 195/110 (!) 180/102  (!) 170/123   Pulse: 88 88 88 90   Resp:    Temp: 97 °F (36.1 °C)      SpO2: 96% 95%  95%            Physical Exam  Constitutional:       General: She is not in acute distress. Appearance: She is not toxic-appearing. HENT:      Head: Normocephalic and atraumatic. Mouth/Throat:      Mouth: Mucous membranes are moist.   Eyes:      Extraocular Movements: Extraocular movements intact. Cardiovascular:      Rate and Rhythm: Normal rate and regular rhythm. Heart sounds: Normal heart sounds. Pulmonary:      Effort: Pulmonary effort is normal. No respiratory distress. Breath sounds: Normal breath sounds. Abdominal:      Palpations: Abdomen is soft. Tenderness: There is no abdominal tenderness. Musculoskeletal:      Cervical back: Normal range of motion. Right lower leg: No edema. Left lower leg: No edema. Skin:     Capillary Refill: Capillary refill takes less than 2 seconds. Neurological:      General: No focal deficit present. Mental Status: She is alert and oriented to person, place, and time.    Psychiatric:         Mood and Affect: Mood normal.        [facility]    ED Course as of 22 0325   Fri Sep 09, 2022   004 ECHO ADULT COMPLETE (22 1553)(!)  Prior to initiation of PD,  [NS]   0304 D/w Dr Fariba Garcia, will place PD orders, suspects diaphragmatic paralysis may be contributing to the patients symptoms  [NS]      ED Course User Index  [NS] Bartolome Muñoz MD       Procedures      MEDICAL DECISION MAKIN y.o. female presents with Referral / Consult and Shortness of Breath    Differential diagnosis includes but not limited to: CHF related to afterload in the absence of antihypertensives, less likely volume overload, her abdomen is soft and does not appear tensely distended, no fevers, chills, doubt pulmonary infection    LABORATORY TESTS:  Labs Reviewed   CBC WITH AUTOMATED DIFF - Abnormal; Notable for the following components:       Result Value    WBC 12.6 (*)     RBC 3.63 (*)     HGB 10.7 (*)     HCT 33.2 (*)     RDW 17.2 (*)     ABS. NEUTROPHILS 8.3 (*)     All other components within normal limits   METABOLIC PANEL, COMPREHENSIVE - Abnormal; Notable for the following components:    BUN 40 (*)     Creatinine 7.17 (*)     BUN/Creatinine ratio 6 (*)     GFR est AA 7 (*)     GFR est non-AA 6 (*)     Albumin 3.2 (*)     Globulin 4.3 (*)     A-G Ratio 0.7 (*)     All other components within normal limits   NT-PRO BNP - Abnormal; Notable for the following components:    NT pro-BNP 2,277 (*)     All other components within normal limits   SAMPLES BEING HELD   TROPONIN-HIGH SENSITIVITY       IMAGING RESULTS:  XR CHEST PA LAT   Final Result      No interval change in small right pleural effusion, right basilar subsegmental   atelectasis, and right hemidiaphragm elevation          MEDICATIONS GIVEN:  Medications   hydrALAZINE (APRESOLINE) 20 mg/mL injection 20 mg (20 mg IntraVENous Given 9/9/22 0259)       PROGRESS NOTE:   3:22 AM Patient's symptoms have not improved    EKG:  Reviewed   5:58 PM, normal sinus rhythm, ventricular rate 89, normal axis, no ST elevation. CONSULTS:  Hospitalist Consult: Perfect Serve Consult for Admission  3:23 AM    ED Room Number: ER26/26  Patient Name and age:  Edgar Bar 69 y.o.  female  Working Diagnosis:   1. POSEY (dyspnea on exertion)    2. Congestive heart failure, unspecified HF chronicity, unspecified heart failure type ([address]    3.  Hypertensive urgency        COVID-19 Suspicion:  no  Sepsis present:  no  Reassessment needed: no  Code Status:  Full Code  Readmission: no  Isolation Requirements:  no  Recommended Level of Care:  telemetry  Department:[facility] Adult ED - 21   Other:    Very nice 50-year-old woman with history of renal failure, on PD, with progressive POSEY, recently started PD several months ago, no history of heart failure, does not appear to be significantly hypervolemic, chest CT with evidence of elevated right diaphragm although abdomen is soft and not tensely distended. Cardiology consult ordered. Discussed with renal.  They will place PD orders. Ordered hydralazine. IMPRESSION:  1. POSEY (dyspnea on exertion)    2. Congestive heart failure, unspecified HF chronicity, unspecified heart failure type ([address]    3. Hypertensive urgency        3:25 AM     I have spent 36 minutes of critical care time involved in lab review, consultations with specialist, family decision-making, and documentation. During this entire length of time I was immediately available to the patient and/or family. Stone Jeffrey MD      PLAN:  - Admit to hospitalist    Stone Jeffrey MD      Please note that this dictation was completed with [employer], the computer voice recognition software. Quite often unanticipated grammatical, syntax, homophones, and other interpretive errors are inadvertently transcribed by the computer software. Please disregard these errors. Please excuse any errors that have escaped final proofreading.

## 2022-09-09 NOTE — NURSE NAVIGATOR
HEART FAILURE NURSE NAVIGATOR NOTE  [address] [facility]    Patient chart was reviewed by Heart Failure (HF) Nurse Navigators for compliance of prescribed treatment with guidelines directed medical therapy (GDMT) and HF database completed. Please, review beneath recommendations for symptomatic patients with HF with Preserved Ejection Fraction ? 50% (HFpEF) for your consideration when taking care of this patient. Current HF Medical Therapy:      Name Yasmine KEENAN 1945   LVEF 60/65% (echo 22)   NYHA Functional Class Documentation requested   ARNi/ACEi/ARB    Aldosterone Antagonist    SGLT2 inhibitor    Consulting Cardiologist CAV to be consulted (Dr. Levi Medina)     Recommendations for HF Management:    For patients with HFpEF ? 50%, consider adding the following GDMT, if appropriate:  SGLT2 inhibitor [Class 2a]  ARNi or ARB [Class 2b]  Aldosterone antagonist [Class 2b]  Adjust antihypertensive therapy [Class 1]  Adjust diuretic dose at discharge if hospitalized for volume overload [Class 1]  For patient with hyperkalemia while on RAASi > 5.5, consider adding potassium binders (patiromer, sodium zirconium cycosilicate) [Class 2a]    Patients with suspected cardiac amyloidosis (older > 61years old with LVH > 1.2cm and/or any other signs of amyloidosis) should be offered screening labs and imaging [Class 1]: (a) serum gammopathy profile and UPEP with immunofixation, and (b) PYP test. If PYP test is positive patient should have genetic testing done for inherited ATTR amyloidosis. If any findings are positive or you need genetic testing ordered, please, consider in-patient consultation or referral to [facility]. Note that the following medications may be potentially harmful in heart failure [Class 3].   Calcium channel blockers (doxazosin, diltiazem, verapamil, nifedipine)  Antiarrhythmics (flecanide, disopyrimide, sotalol, dronedarone)  Diabetes medications (thiasolidinediones, saxagliptin, alogliptin)  NSAIDs and HENRIQUEZ 2 inhibitors    Consider vaccinations for respiratory illnesses (flu, pneumonia, covid) [Class 2b]    Due to h/o recurrent hospitalizations this patient may benefit from referral to Advanced Heart Failure Program to assess suitability for advanced therapies, such as left-ventricular assist device, heart transplantation, palliative inotropes or palliation [Class 1]. To obtain in-patient consultation or refer to [address] [place] [address], call [phone]    Patient Heart Failure Education:     Teach back in heart failure education provided, including information about medical therapy, lifestyle modifications, diet and fluid restrictions, physical activity. Educational resources provided: Living with Heart Failure booklet; Signs/Symptoms magnet; Weight Calendar; [facility] flyer; Preparing for Successful Discharge check list.  Information provided about HF support group. Heart failure avoiding triggers on discharge instructions. Plan for Transitional Care:    Post discharge follow up phone call to be made within 48-72 hours of discharge. Patient will follow-up with PCP. Patient will follow-up with Primary Cardiologist.  Patient screened for obstructive sleep apnea and referred to Sleep Medicine. Referral/follow-up with Cardiac Rehabilitation. Referral/follow-up with Advanced Heart Failure Specialist.  Referral/follow-up with Palliative Care Specialist.        Heart Failure Nurse Navigator  Phone: [phone] / [phone]    *Recommendations listed above are based on the guidelines: 2022 AHA/ACC/HFSA Guideline for the Management of Heart Failure: A Report of the [address] on Clinical Practice Guidelines. Circulation 2022; [id number].  and 2017 AHA/ACC/HRS guideline for management of patients with ventricular arrhythmias and the prevention of sudden cardiac death: A Report of the [address] Task Force on Clinical Practice Guidelines and the Heart Rhythm Society.  Heart Rhythm, Vol 15, No 10, October 2018 *Class of Recommendation: Class 1 (strong), Class 2a (moderate), Class 2b (weak), Class 3 (not recommended, potentially harmful)

## 2022-09-09 NOTE — PROGRESS NOTES
Clinical Pharmacy Note: Antibiotic Dosing    Medication: Rocephin 2 grams every 24 hours  Indication:  Intra-abdominal infection    Recent Labs     22  1805   WBC 12.6*   CREA 7.17*   BUN 40*     Temp (24hrs), Av.8 °F (36.6 °C), Min:97 °F (36.1 °C), Max:98.6 °F (37 °C)    Plan: Change to 1 gram every 24 hours per [facility] P&T approved dosing protocol.

## 2022-09-09 NOTE — PROGRESS NOTES
Spiritual Care Partner Volunteer visited patient at [facility] in [facility] on 9/9/2022   Documented by:  Chaplain Bianca Carter  [phone]

## 2022-09-09 NOTE — PROGRESS NOTES
Transition of Care    Reason for Admission:   Shortness of Breath                  RUR Score:   16%               PCP: First and Last name:   Gracy Barnes MD     Name of Practice:    Are you a current patient: Yes/No: Yes   Approximate date of last visit: 8/29/2022   Can you participate in a virtual visit if needed: Yes    Do you (patient/family) have any concerns for transition/discharge? Mrs. Sanford Jimenez stated that she may need a walker. She is having some issues with ambulation. Plan for utilizing home health:   Mrs. Sanford Jimenez is willing to have home health services, if needed. Current Advanced Directive/Advance Care Plan:  Full Code  Mrs. Sanford Jimenez does have an advance care Plan. She was asked to provide a copy for her record. Healthcare Decision Maker:   Click here to complete [address] including selection of the Healthcare Decision Maker Relationship (ie \"Primary\")            Primary Decision MakerMgiselejose l Nixon Child - [phone]    Secondary Decision Maker: [address] [place] [address] - Other Relative - [phone]    Secondary Decision Maker: Shonna Combs - Sister - [phone]    Transition of Care Plan:   Mrs. Sanford Jimenez was seen in her room. Her address and phone number were verified. She lives in a 1st floor apt with one step to enter. She does not have any durable medical equipment. She did state that she may need a walker and a bedside commode. She is having difficulty ambulating. He son does stay with her intermittently. She is a retired nurse. She does drive. She was independent with her ADLs and IADLs prior to her admission. She uses the [facility] on [address] for her prescriptions. She does not have issues affording or acquiring her medications. Care Management Interventions  PCP Verified by CM:  Yes  Last Visit to PCP: 08/29/22  Palliative Care Criteria Met (RRAT>21 & CHF Dx)?: No  Mode of Transport at Discharge: Self  Transition of Care Consult (CM Consult): Discharge Planning  MyChart Signup: No  Discharge Durable Medical Equipment: No  Physical Therapy Consult: No  Occupational Therapy Consult: No  Speech Therapy Consult: No  Support Systems: Child(katie)  Confirm Follow Up Transport: Self  The Patient and/or Patient Representative was Provided with a Choice of Provider and Agrees with the Discharge Plan?: No  Freedom of Choice List was Provided with Basic Dialogue that Supports the Patient's Individualized Plan of Care/Goals, Treatment Preferences and Shares the Quality Data Associated with the Providers?: No  Ronda Resource Information Provided?: No  Discharge Location  Patient Expects to be Discharged to[de-identified] Home    Will continue to follow for discharge planning.   Signed By: Adam Jacobson LCSW     September 9, 2022           ]

## 2022-09-09 NOTE — DISCHARGE INSTRUCTIONS
You were admitted with shortness of breath with is likely due to a combination of medical issues with discharge recommendations as follows:  1) elevated heart rates- we increased your metoprolol dose  2) dysfunctional right diaphragm-and suspected sleep apnea  - follow up with pulmonary for sleep study and Nocturnal CPAP could proved to be helpful to manage nighttime symptoms if you have sleep apnea  3) abdominal fluid/ ascites associated with volume of peritoneal dialysis  -Reduction in intra-abdominal PD fill volume could also be helpful in reducing pressure on the right diaphragm in supine position - Nephrology making these changes     Follow up with your primary care MD or endocrinologist regarding thyroid nodules for further evaluation and possible biopsy if indicated    Patient will be scheduled for new patient sleep consult with Dr. Nicolle Vale 10/17/22.

## 2022-09-10 ENCOUNTER — APPOINTMENT (OUTPATIENT)
Dept: GENERAL RADIOLOGY | Age: 77
DRG: 291 | End: 2022-09-10
Attending: FAMILY MEDICINE
Payer: MEDICARE

## 2022-09-10 LAB
ALBUMIN SERPL-MCNC: 3 G/DL (ref 3.5–5)
ALBUMIN/GLOB SERPL: 0.8 {RATIO} (ref 1.1–2.2)
ALP SERPL-CCNC: 87 U/L (ref 45–117)
ALT SERPL-CCNC: 16 U/L (ref 12–78)
ANION GAP SERPL CALC-SCNC: 9 MMOL/L (ref 5–15)
AST SERPL-CCNC: 15 U/L (ref 15–37)
BILIRUB SERPL-MCNC: 0.3 MG/DL (ref 0.2–1)
BUN SERPL-MCNC: 43 MG/DL (ref 6–20)
BUN/CREAT SERPL: 5 (ref 12–20)
CALCIUM SERPL-MCNC: 9.6 MG/DL (ref 8.5–10.1)
CHLORIDE SERPL-SCNC: 108 MMOL/L (ref 97–108)
CO2 SERPL-SCNC: 25 MMOL/L (ref 21–32)
CREAT SERPL-MCNC: 8.07 MG/DL (ref 0.55–1.02)
ERYTHROCYTE [DISTWIDTH] IN BLOOD BY AUTOMATED COUNT: 17 % (ref 11.5–14.5)
EST. AVERAGE GLUCOSE BLD GHB EST-MCNC: 146 MG/DL
GLOBULIN SER CALC-MCNC: 4 G/DL (ref 2–4)
GLUCOSE BLD STRIP.AUTO-MCNC: 110 MG/DL (ref 65–117)
GLUCOSE BLD STRIP.AUTO-MCNC: 119 MG/DL (ref 65–117)
GLUCOSE BLD STRIP.AUTO-MCNC: 122 MG/DL (ref 65–117)
GLUCOSE BLD STRIP.AUTO-MCNC: 237 MG/DL (ref 65–117)
GLUCOSE BLD STRIP.AUTO-MCNC: 315 MG/DL (ref 65–117)
GLUCOSE SERPL-MCNC: 182 MG/DL (ref 65–100)
HBA1C MFR BLD: 6.7 % (ref 4–5.6)
HCT VFR BLD AUTO: 32.8 % (ref 35–47)
HGB BLD-MCNC: 10.5 G/DL (ref 11.5–16)
MAGNESIUM SERPL-MCNC: 2.3 MG/DL (ref 1.6–2.4)
MCH RBC QN AUTO: 28.8 PG (ref 26–34)
MCHC RBC AUTO-ENTMCNC: 32 G/DL (ref 30–36.5)
MCV RBC AUTO: 90.1 FL (ref 80–99)
NRBC # BLD: 0 K/UL (ref 0–0.01)
NRBC BLD-RTO: 0 PER 100 WBC
PHOSPHATE SERPL-MCNC: 5.7 MG/DL (ref 2.6–4.7)
PLATELET # BLD AUTO: 114 K/UL (ref 150–400)
POTASSIUM SERPL-SCNC: 3.7 MMOL/L (ref 3.5–5.1)
PROT SERPL-MCNC: 7 G/DL (ref 6.4–8.2)
RBC # BLD AUTO: 3.64 M/UL (ref 3.8–5.2)
SERVICE CMNT-IMP: ABNORMAL
SERVICE CMNT-IMP: NORMAL
SODIUM SERPL-SCNC: 142 MMOL/L (ref 136–145)
WBC # BLD AUTO: 11.1 K/UL (ref 3.6–11)

## 2022-09-10 PROCEDURE — 65270000046 HC RM TELEMETRY

## 2022-09-10 PROCEDURE — 90945 DIALYSIS ONE EVALUATION: CPT

## 2022-09-10 PROCEDURE — A4726 DIALYS SOL FLD VOL > 5999CC: HCPCS | Performed by: INTERNAL MEDICINE

## 2022-09-10 PROCEDURE — 74011250636 HC RX REV CODE- 250/636: Performed by: INTERNAL MEDICINE

## 2022-09-10 PROCEDURE — 80053 COMPREHEN METABOLIC PANEL: CPT

## 2022-09-10 PROCEDURE — 36415 COLL VENOUS BLD VENIPUNCTURE: CPT

## 2022-09-10 PROCEDURE — 74011250637 HC RX REV CODE- 250/637: Performed by: INTERNAL MEDICINE

## 2022-09-10 PROCEDURE — 74011636637 HC RX REV CODE- 636/637: Performed by: INTERNAL MEDICINE

## 2022-09-10 PROCEDURE — 74011250636 HC RX REV CODE- 250/636: Performed by: FAMILY MEDICINE

## 2022-09-10 PROCEDURE — 83735 ASSAY OF MAGNESIUM: CPT

## 2022-09-10 PROCEDURE — 84100 ASSAY OF PHOSPHORUS: CPT

## 2022-09-10 PROCEDURE — 85027 COMPLETE CBC AUTOMATED: CPT

## 2022-09-10 PROCEDURE — 74011000250 HC RX REV CODE- 250: Performed by: INTERNAL MEDICINE

## 2022-09-10 PROCEDURE — 74011000250 HC RX REV CODE- 250: Performed by: FAMILY MEDICINE

## 2022-09-10 PROCEDURE — 71045 X-RAY EXAM CHEST 1 VIEW: CPT

## 2022-09-10 PROCEDURE — 94760 N-INVAS EAR/PLS OXIMETRY 1: CPT

## 2022-09-10 PROCEDURE — 83036 HEMOGLOBIN GLYCOSYLATED A1C: CPT

## 2022-09-10 PROCEDURE — 82962 GLUCOSE BLOOD TEST: CPT

## 2022-09-10 RX ORDER — ALLOPURINOL 100 MG/1
100 TABLET ORAL DAILY
Status: DISCONTINUED | OUTPATIENT
Start: 2022-09-11 | End: 2022-09-14 | Stop reason: HOSPADM

## 2022-09-10 RX ADMIN — SODIUM CHLORIDE, PRESERVATIVE FREE 10 ML: 5 INJECTION INTRAVENOUS at 13:32

## 2022-09-10 RX ADMIN — SODIUM CHLORIDE, SODIUM LACTATE, CALCIUM CHLORIDE, MAGNESIUM CHLORIDE AND DEXTROSE 6000 ML: 2.5; 538; 448; 18.3; 5.08 INJECTION, SOLUTION INTRAPERITONEAL at 16:04

## 2022-09-10 RX ADMIN — HEPARIN SODIUM 5000 UNITS: 5000 INJECTION INTRAVENOUS; SUBCUTANEOUS at 19:19

## 2022-09-10 RX ADMIN — CEFTRIAXONE SODIUM 1 G: 1 INJECTION, POWDER, FOR SOLUTION INTRAMUSCULAR; INTRAVENOUS at 09:28

## 2022-09-10 RX ADMIN — HEPARIN SODIUM 5000 UNITS: 5000 INJECTION INTRAVENOUS; SUBCUTANEOUS at 02:19

## 2022-09-10 RX ADMIN — SODIUM CHLORIDE, PRESERVATIVE FREE 10 ML: 5 INJECTION INTRAVENOUS at 06:23

## 2022-09-10 RX ADMIN — LEVOTHYROXINE SODIUM 50 MCG: 0.05 TABLET ORAL at 06:22

## 2022-09-10 RX ADMIN — ALLOPURINOL 300 MG: 100 TABLET ORAL at 09:28

## 2022-09-10 RX ADMIN — HEPARIN SODIUM 5000 UNITS: 5000 INJECTION INTRAVENOUS; SUBCUTANEOUS at 13:29

## 2022-09-10 RX ADMIN — GENTAMICIN SULFATE: 1 CREAM TOPICAL at 16:09

## 2022-09-10 RX ADMIN — SODIUM CHLORIDE, PRESERVATIVE FREE 10 ML: 5 INJECTION INTRAVENOUS at 21:23

## 2022-09-10 RX ADMIN — METOPROLOL SUCCINATE 25 MG: 25 TABLET, EXTENDED RELEASE ORAL at 09:28

## 2022-09-10 RX ADMIN — Medication 2 UNITS: at 21:21

## 2022-09-10 RX ADMIN — ATORVASTATIN CALCIUM 20 MG: 20 TABLET, FILM COATED ORAL at 21:22

## 2022-09-10 NOTE — DIALYSIS
Peritoneal Dialysis Disconnection / [phone]     Metrics   I-Drain: 4 mls   Total UF: 709 mls   Last Fill: 0 mls   Last Manual Drain: 0 mls   Net UF:         713 mls   Avg Dwell Time: 1 hr 38 mins   Lost Dwell Time: 9 mins   Alarms: None   Effluent: Clear/Yellow     Access   Type & Location: Left ABD PD Catheter   Comments: Dsg clean, dry and intact. Sterile mini cap applied & secured cath to abdomen. Dsg date: 9/9/22                                         Comments: At bedside to disconnect pt from CCPD tx. Orders, consent, notes, labs, and code status reviewed. Tx completed. Prior to aseptic disconnection 2 minute Alcavis scrub/soak performed to transfer set. Used cassette and bags discarded in biohazard bag. Education & pre/post report given to pt & primary RN.

## 2022-09-10 NOTE — DIALYSIS
[facility] [phone]     Pt orders, notes, labs, code status and consent reviewed. Time out complete. Orders: Tx time:  8.5  Cycles:  4  Fill Volume:  2000  Last fill:  1500  Total Volume:   9500  Dianeal:  2.5%     Left abdominal PD site cleansed with Exsept followed by Gentamycin and dry gauze dressing dated XXX, no redness or drainage at exit site. Prior to connection, one minute Alcavis scrub performed. PD Start time:  16:30  PD End time:  01:00     Remained present during initial drain followed by initiation of first fill. Upon departure, bed in lowest position, personal belongings within reach.  Education pre/post with primary RN Justin Carson

## 2022-09-10 NOTE — PROGRESS NOTES
[facility]  Hospitalist Group                                                                                          Hospitalist Progress Note  Socorro Hutchison MD  Answering service: [phone] from in house phone        Date of Service:  2022  NAME:  Marium Bear  :  1945  MRN:  [id number]      Admission Summary: This is a 79-year-old woman with a past medical history significant for end-stage renal disease on peritoneal dialysis, type 2 diabetes, hypertension, dyslipidemia, hypothyroidism, renal cancer, who presented at the emergency room with shortness of breath. This has been going on for a couple of weeks. The shortness of breath is progressive, worse with mild exertion such as walking, also worse when the patient is lying down flat. No associated fever, rigors, or chills. The shortness of breath is associated with cough. The patient was sent to the emergency room from the dialysis center because of the worsening respiratory symptoms. When the patient arrived at the emergency room, chest x-ray was obtained. The chest x-ray shows right pleural effusion which is unchanged from prior study. The patient also has elevated BNP level and she was referred to the hospitalist service for admission. Echocardiogram done in 2022 shows ejection fraction of 60% to 65%. The patient also recently had a CT scan of the chest done without contrast which shows moderate to large amount of ascites as well as new right thyroid nodule. Her blood pressure was markedly elevated on arrival at the emergency room. The patient stated that she was told to stop taking all antihypertensive medication when she started the peritoneal dialysis. The patient was last admitted to the hospitalist service from 2020 to 2020. The patient was admitted and treated for acute gastroenteritis.        Interval history / Subjective:   Patient seen and examined  Reviewed her vitals, labs, scans, and care plan  Consults to cardiology and nephrology today     Assessment & Plan:      1. Acute heart failure with preserved ejection fraction. - elevated BNP- echo ordered- cardiology consulted for eval and tx recs  2. End-stage renal disease on PD- management per nephrology  3. Anemia of Chronic dz- monitor Hb-  4. Type 2 diabetes. Accuchecks and ss insulinimp  5. HTN- improved with current treatments  6. Leukocytosis- monitor. 7.  Thyroid nodule- ultrasound done- patinet reports nodules previously biopsied. 8.  Abdominal ascites- nephrology planning to decreased volume of PD  9. Dyslipidemia. - resume statin med  10. Hypothyroidism- cont levothyroxine. Code status: Full Code  Prophylaxis: SCDs  Care Plan discussed with:   Anticipated Disposition:      Hospital Problems  Date Reviewed: 9/9/2022            Codes Class Noted POA    * (Principal) Acute heart failure with preserved ejection fraction ([facility] [address] ICD-10-CM: I50.31  ICD-9-CM: 428.9  9/9/2022 Yes             Review of Systems:   A comprehensive review of systems was negative except for that written in the HPI. Vital Signs:    Last 24hrs VS reviewed since prior progress note. Most recent are:  Visit Vitals  BP (!) 156/93 (BP 1 Location: Left upper arm, BP Patient Position: At rest;Lying)   Pulse (!) 108   Temp 98.2 °F (36.8 °C)   Resp 26   SpO2 98%         Intake/Output Summary (Last 24 hours) at 9/9/2022 2307  Last data filed at 9/9/2022 1056  Gross per 24 hour   Intake 10 ml   Output --   Net 10 ml        Physical Examination:     I had a face to face encounter with this patient and independently examined them on 9/9/2022 as outlined below:          Constitutional:  No acute distress, cooperative, pleasant    ENT:  Oral mucosa moist, oropharynx benign. Resp:  Decreased breath sounds bilaterally. No wheezing/rhonchi/rales. No accessory muscle use.     CV:  Regular rhythm, normal rate, no murmurs, gallops, rubs    GI: Soft, non distended, non tender. normoactive bowel sounds, no hepatosplenomegaly     Musculoskeletal:  Bilat LE edema, warm, 2+ pulses throughout    Neurologic:  Moves all extremities. AAOx3, CN II-XII reviewed            Data Review:    Review and/or order of clinical lab test  Review and/or order of tests in the radiology section of CPT  Review and/or order of tests in the medicine section of CPT  CT Results  (Last 48 hours)                 09/09/22 1026  CT ABD PELV WO CONT Final result    Impression:      1. Peritoneal dialysis catheter with small volume of free fluid within the   abdomen   2. Post right nephrectomy. No recurrent mass lesion within the nephrectomy bed   3. 13 mm high density lesion lower pole left kidney may represent a high density   cyst but is nonspecific       Narrative:  EXAM: CT ABD PELV WO CONT       INDICATION: Abdominal pain, ascites       COMPARISON: 3/3/2013       IV CONTRAST: None. ORAL CONTRAST: None       TECHNIQUE:    Thin axial images were obtained through the abdomen and pelvis. Coronal and   sagittal reformats were generated. CT dose reduction was achieved through use of   a standardized protocol tailored for this examination and automatic exposure   control for dose modulation. The absence of intravenous contrast material reduces the sensitivity for   evaluation of the vasculature and solid organs. FINDINGS:    LOWER THORAX: Atelectasis right base   LIVER: No mass. BILIARY TREE: Contrast within the gallbladder from previous study CBD is not   dilated. SPLEEN: within normal limits. PANCREAS: No focal abnormality. ADRENALS: Unremarkable. KIDNEYS/URETERS: Post right nephrectomy. Surgical clips in the nephrectomy bed. No recurrent mass. No left-sided hydronephrosis or stone. High density lesion   lower pole left kidney 13 mm is indeterminate but may represent a high density   cyst. It has enlarged   STOMACH: Unremarkable.    SMALL BOWEL: No dilatation or wall thickening. COLON: Left-sided diverticulosis. No evidence of acute diverticulitis   APPENDIX: Normal   PERITONEUM: Small volume of ascites. The patient has a left mid abdominal   peritoneal dialysis catheter   RETROPERITONEUM: There are vascular calcifications. No aneurysm   REPRODUCTIVE ORGANS: Surgically absent   URINARY BLADDER: No mass or calculus. BONES: No destructive bone lesion. ABDOMINAL WALL: No mass or hernia. ADDITIONAL COMMENTS: N/A                      Labs:     Recent Labs     09/09/22  1237 09/08/22  1805   WBC 11.8* 12.6*   HGB 9.7* 10.7*   HCT 30.6* 33.2*    205     Recent Labs     09/09/22  1237 09/08/22  1805    141   K 4.1 3.9    106   CO2 29 30   BUN 46* 40*   CREA 8.23* 7.17*   * 77   CA 9.8 10.1   MG 2.3  --    PHOS 5.5*  --      Recent Labs     09/09/22  1237 09/08/22  1805   ALT 15 18   AP 76 83   TBILI 0.3 0.3   TP 7.1 7.5   ALB 3.0* 3.2*   GLOB 4.1* 4.3*     No results for input(s): INR, PTP, APTT, INREXT in the last 72 hours. Recent Labs     09/09/22  1237   FERR 417*      Lab Results   Component Value Date/Time    Folate 39.7 (H) 09/09/2022 12:37 PM      No results for input(s): PH, PCO2, PO2 in the last 72 hours. No results for input(s): CPK, CKNDX, TROIQ in the last 72 hours.     No lab exists for component: CPKMB  Lab Results   Component Value Date/Time    Cholesterol, total 158 05/19/2022 09:44 AM    HDL Cholesterol 56 05/19/2022 09:44 AM    LDL, calculated 71.2 05/19/2022 09:44 AM    Triglyceride 154 (H) 05/19/2022 09:44 AM    CHOL/HDL Ratio 2.8 05/19/2022 09:44 AM     Lab Results   Component Value Date/Time    Glucose (POC) 209 (H) 09/09/2022 09:27 PM    Glucose (POC) 139 (H) 09/09/2022 04:32 PM    Glucose (POC) 130 (H) 09/09/2022 12:22 PM    Glucose (POC) 156 (H) 06/02/2022 04:49 PM    Glucose (POC) 129 (H) 06/02/2022 11:48 AM    Glucose (POC) 132 (H) 04/12/2021 08:33 AM     Lab Results   Component Value Date/Time    Color YELLOW/STRAW 02/22/2020 11:30 AM    Appearance CLEAR 02/22/2020 11:30 AM    Specific gravity 1.019 02/22/2020 11:30 AM    pH (UA) 7.0 02/22/2020 11:30 AM    Protein >300 (A) 02/22/2020 11:30 AM    Glucose 100 (A) 02/22/2020 11:30 AM    Ketone NEGATIVE  02/22/2020 11:30 AM    Bilirubin NEGATIVE  02/22/2020 11:30 AM    Urobilinogen 0.2 02/22/2020 11:30 AM    Nitrites NEGATIVE  02/22/2020 11:30 AM    Leukocyte Esterase NEGATIVE  02/22/2020 11:30 AM    Epithelial cells MODERATE (A) 02/22/2020 11:30 AM    Bacteria NEGATIVE  02/22/2020 11:30 AM    WBC 0-4 02/22/2020 11:30 AM    RBC 0-5 02/22/2020 11:30 AM         Medications Reviewed:     Current Facility-Administered Medications   Medication Dose Route Frequency    gentamicin (GARAMYCIN) 0.1 % cream   Topical DIALYSIS PRN    allopurinoL (ZYLOPRIM) tablet 300 mg  300 mg Oral DAILY    atorvastatin (LIPITOR) tablet 20 mg  20 mg Oral QHS    levothyroxine (SYNTHROID) tablet 50 mcg  50 mcg Oral 7am    metoprolol succinate (TOPROL-XL) XL tablet 25 mg  25 mg Oral DAILY    sodium chloride (NS) flush 5-40 mL  5-40 mL IntraVENous Q8H    sodium chloride (NS) flush 5-40 mL  5-40 mL IntraVENous PRN    acetaminophen (TYLENOL) tablet 650 mg  650 mg Oral Q6H PRN    Or    acetaminophen (TYLENOL) suppository 650 mg  650 mg Rectal Q6H PRN    polyethylene glycol (MIRALAX) packet 17 g  17 g Oral DAILY PRN    ondansetron (ZOFRAN ODT) tablet 4 mg  4 mg Oral Q8H PRN    Or    ondansetron (ZOFRAN) injection 4 mg  4 mg IntraVENous Q6H PRN    heparin (porcine) injection 5,000 Units  5,000 Units SubCUTAneous Q8H    insulin lispro (HUMALOG) injection   SubCUTAneous AC&HS    glucose chewable tablet 16 g  4 Tablet Oral PRN    glucagon (GLUCAGEN) injection 1 mg  1 mg IntraMUSCular PRN    dextrose 10 % infusion 0-250 mL  0-250 mL IntraVENous PRN    hydrALAZINE (APRESOLINE) 20 mg/mL injection 10 mg  10 mg IntraVENous Q6H PRN    fentaNYL citrate (PF) injection 25 mcg  25 mcg IntraVENous Q4H PRN cefTRIAXone (ROCEPHIN) 1 g in 0.9% sodium chloride 10 mL IV syringe  1 g IntraVENous Q24H    peritoneal dialysis DEXTROSE 2.5% (2.5 mEq/L low calcium) solution 6,000 mL  6,000 mL IntraPERitoneal DAILY    peritoneal dialysis DEXTROSE 2.5% (2.5 mEq/L low calcium) solution 6,000 mL  6,000 mL IntraPERitoneal DAILY     ______________________________________________________________________  EXPECTED LENGTH OF STAY: - - -  ACTUAL LENGTH OF STAY:          0                 Dominique Hoang MD

## 2022-09-10 NOTE — CONSULTS
Assessment:  ESRD: CCPD. Followed by Dr. Lucas Nielson  SOB/POSEY: elevated right hemidiaphragm on CXR. Volume overload: mild. No overt pulm edema noted on CXR  HTN: elevated on presentation  Anemia 2 to ESRD: Hgb stable  Hypercalcemia  DM2    Plan/Recommendations:  Continue CCPD orders to lower fill volume of 2L and change bags to 2.5% Dianeal bags  Stop Sodium Bicarbonate  Follow up CT abdomen reading  Strict I/Os  Am labs          Initial Consult note         Patient name: Darwin Middleton  MR no: [id number]  Date of admission: 9/8/2022  Date of consultation: 9/10/2022  Requested by: Dr. Morgan Gongora  Reason for consult: ESRD/PD    Patient seen and examined. History obtained from patient and chart review. Relevant labs, data and notes reviewed. HPI: Darwin Middleton is a 69 y.o. female with PMH significant for ESRD on CCPD, Dm2, HTN, Hep C presented overnight with SOB. SBP elevated on presentation. CXR in ED showed elevated right hemidiaphragm/small right pleural effusion. Nephrology consulted to resume PD. Patient reports no significant weight gain. Has noted some POSEY. No significant orthopnea. No CP. Good compliance with PD. Feels well today. Still with dyspnea that \"comes and goes. \"    PMH:  Past Medical History:   Diagnosis Date    Arthritis     LOWER SPINE    Bell's palsy     23yrs old     Cancer ([address]     kidney (right)    Chronic kidney disease 1996    right kidney removed - CANCER    Diabetes ([address]     She is controlling with weight loss    Diabetes mellitus ([address] 10/8/2014    NIDDM    Hepatitis C 2011    hx hep C from blood transfusion per pt; Treated by Dr. Samuel Torres    Hyperlipidemia     Hypertension     Ill-defined condition 1990s    HEPATITIS C - treated with medication    Personal history of renal cancer 3/2/2015    Psychiatric disorder     depression     Thyroid disease 1999    Thyroidectomy    Vertigo      PSH:  Past Surgical History:   Procedure Laterality Date    COLONOSCOPY N/A 2017    COLONOSCOPY performed by Desire Elliott. Elvera Buerger, MD at [facility] ENDOSCOPY    HX BREAST REDUCTION Bilateral     HX COLONOSCOPY  3/5/12    Repeat in 5 years (Dr. Krysta Santizo)    HX CYST REMOVAL      HX GYN  1980s    tubal pregnancy    HX OTHER SURGICAL      thyroidectomy    HX OTHER SURGICAL      colonoscopy x 2 - ?polyps    HX OTHER SURGICAL  2019    surg for prolasp bladder at [address]  2019    prolasp bladder surg    HX RITESH AND BSO Bilateral     HX TONSILLECTOMY      2or 4years old    [address]    kidney removed on right    IR BX BONE MARROW DIAGNOSTIC  2021       Social history:   Social History     Tobacco Use    Smoking status: Former     Packs/day: 0.25     Years: 8.00     Pack years: 2.00     Types: Cigarettes     Quit date: 1992     Years since quittin.4    Smokeless tobacco: Never    Tobacco comments:     quit smoking 30-40 yrs ago   Vaping Use    Vaping Use: Never used   Substance Use Topics    Alcohol use: No    Drug use: No       Family history:  No history of CKD or ESRD in the family.      Allergies   Allergen Reactions    Codeine Hives       Current Facility-Administered Medications   Medication Dose Route Frequency Last Admin    gentamicin (GARAMYCIN) 0.1 % cream   Topical DIALYSIS PRN Given at 22 1626    allopurinoL (ZYLOPRIM) tablet 300 mg  300 mg Oral DAILY 300 mg at 09/10/22 0928    atorvastatin (LIPITOR) tablet 20 mg  20 mg Oral QHS 20 mg at 22 2130    levothyroxine (SYNTHROID) tablet 50 mcg  50 mcg Oral 7am 50 mcg at 09/10/22 8901    metoprolol succinate (TOPROL-XL) XL tablet 25 mg  25 mg Oral DAILY 25 mg at 09/10/22 0928    sodium chloride (NS) flush 5-40 mL  5-40 mL IntraVENous Q8H 10 mL at 09/10/22 0623    sodium chloride (NS) flush 5-40 mL  5-40 mL IntraVENous PRN      acetaminophen (TYLENOL) tablet 650 mg  650 mg Oral Q6H  mg at 22 1049    Or    acetaminophen (TYLENOL) suppository 650 mg  650 mg Rectal Q6H PRN      polyethylene glycol (MIRALAX) packet 17 g  17 g Oral DAILY PRN      ondansetron (ZOFRAN ODT) tablet 4 mg  4 mg Oral Q8H PRN      Or    ondansetron (ZOFRAN) injection 4 mg  4 mg IntraVENous Q6H PRN      heparin (porcine) injection 5,000 Units  5,000 Units SubCUTAneous Q8H 5,000 Units at 09/10/22 0219    insulin lispro (HUMALOG) injection   SubCUTAneous AC&HS 2 Units at 09/09/22 2131    glucose chewable tablet 16 g  4 Tablet Oral PRN      glucagon (GLUCAGEN) injection 1 mg  1 mg IntraMUSCular PRN      dextrose 10 % infusion 0-250 mL  0-250 mL IntraVENous PRN      hydrALAZINE (APRESOLINE) 20 mg/mL injection 10 mg  10 mg IntraVENous Q6H PRN      fentaNYL citrate (PF) injection 25 mcg  25 mcg IntraVENous Q4H PRN      cefTRIAXone (ROCEPHIN) 1 g in 0.9% sodium chloride 10 mL IV syringe  1 g IntraVENous Q24H 1 g at 09/10/22 0928    peritoneal dialysis DEXTROSE 2.5% (2.5 mEq/L low calcium) solution 6,000 mL  6,000 mL IntraPERitoneal DAILY 6,000 mL at 09/09/22 1625    peritoneal dialysis DEXTROSE 2.5% (2.5 mEq/L low calcium) solution 6,000 mL  6,000 mL IntraPERitoneal DAILY 6,000 mL at 09/09/22 1625       ROS (besides HPI):    General: No fever. No weight changes  ENT: No hearing loss or visual changes  Cardiovascular: No Chest pain. +POSEY  Pulmonary: +SOB  GI: No abdominal pain. No Nausea/Vomiting/Diarrhea. No blood in stool  : No blood in urine. No foamy or cloudy urine  Musculoskeletal: No joint swelling or redness.  No morning stiffness  Endocrine: no cold or heat intolerance  Psych: denies anxiety or depression  Neuro: No light headedness or dizziness    Objective   Visit Vitals  BP (!) 159/84 (BP 1 Location: Left upper arm, BP Patient Position: At rest)   Pulse 94   Temp 98.4 °F (36.9 °C)   Resp 19   Wt 86.7 kg (191 lb 2.2 oz)   LMP  (LMP Unknown)   SpO2 100%   BMI 32.81 kg/m²       Physical Exam:    Gen: NAD    HEENT: AT/NC, EOMI, moist mucous membrane, no scleral icterus    Neck: no JVD, no cervical lymphadenopathy, no carotid bruit    Lungs/Chest wall: Breath sounds normal. Symmetrical chest wall expansion. No accessory muscle use. Clear to auscultation    Cardiovascular: Normal S1/S2, normal rate, regular rhythm. Abdomen: soft, NT, ND, BS+, +PD catheter-> exit site clean. Ext: no clubbing or cyanosis. No edema    Skin: warm and dry. No rashes    : no del angel    CNS: alert awake. Answers appropriately. Labs/Data:    Lab Results   Component Value Date/Time    Sodium 142 09/10/2022 03:57 AM    Potassium 3.7 09/10/2022 03:57 AM    Chloride 108 09/10/2022 03:57 AM    CO2 25 09/10/2022 03:57 AM    Anion gap 9 09/10/2022 03:57 AM    Glucose 182 (H) 09/10/2022 03:57 AM    BUN 43 (H) 09/10/2022 03:57 AM    Creatinine 8.07 (H) 09/10/2022 03:57 AM    BUN/Creatinine ratio 5 (L) 09/10/2022 03:57 AM    GFR est AA 6 (L) 09/10/2022 03:57 AM    GFR est non-AA 5 (L) 09/10/2022 03:57 AM    Calcium 9.6 09/10/2022 03:57 AM       Lab Results   Component Value Date/Time    WBC 11.1 (H) 09/10/2022 02:33 AM    HGB 10.5 (L) 09/10/2022 02:33 AM    HCT 32.8 (L) 09/10/2022 02:33 AM    PLATELET 303 (L) 66/62/3235 02:33 AM    MCV 90.1 09/10/2022 02:33 AM       Urine analysis: No results found for this or any previous visit.           No components found for: SPEP, UPEP  No results found for: PUQ, PROTU2, PROTU1, BJP1, CPE1, IMEL1, MET2  Lab Results   Component Value Date/Time    Microalb/Creat ratio (ug/mg creat.) 3,461.1 (H) 10/31/2019 09:15 AM         Intake/Output Summary (Last 24 hours) at 9/10/2022 1246  Last data filed at 9/10/2022 0200  Gross per 24 hour   Intake --   Output 713 ml   Net -713 ml         Wt Readings from Last 3 Encounters:   09/10/22 86.7 kg (191 lb 2.2 oz)   06/02/22 85 kg (187 lb 6.3 oz)   05/19/22 86.2 kg (190 lb)       Signed by:  Fartun Felton MD  Nephrology and Hypertension  [facility]

## 2022-09-10 NOTE — PROGRESS NOTES
Problem: Pressure Injury - Risk of  Goal: *Prevention of pressure injury  Description: Document Ford Scale and appropriate interventions in the flowsheet. Outcome: Progressing Towards Goal  Note: Pressure Injury Interventions:  Sensory Interventions: Assess changes in LOC, Minimize linen layers, Monitor skin under medical devices, Float heels, Keep linens dry and wrinkle-free         Activity Interventions: Increase time out of bed, Pressure redistribution bed/mattress(bed type), PT/OT evaluation    Mobility Interventions: Float heels, HOB 30 degrees or less, Pressure redistribution bed/mattress (bed type), PT/OT evaluation    Nutrition Interventions: Document food/fluid/supplement intake                     Problem: Patient Education: Go to Patient Education Activity  Goal: Patient/Family Education  Outcome: Progressing Towards Goal     Problem: Falls - Risk of  Goal: *Absence of Falls  Description: Document Laurie Fall Risk and appropriate interventions in the flowsheet.   Outcome: Progressing Towards Goal  Note: Fall Risk Interventions:  Mobility Interventions: Patient to call before getting OOB, Strengthening exercises (ROM-active/passive), Utilize walker, cane, or other assistive device              Elimination Interventions: Call light in reach, Stay With Me (per policy), Toilet paper/wipes in reach, Toileting schedule/hourly rounds              Problem: Patient Education: Go to Patient Education Activity  Goal: Patient/Family Education  Outcome: Progressing Towards Goal

## 2022-09-11 LAB
ERYTHROCYTE [DISTWIDTH] IN BLOOD BY AUTOMATED COUNT: 17.1 % (ref 11.5–14.5)
GLUCOSE BLD STRIP.AUTO-MCNC: 138 MG/DL (ref 65–117)
GLUCOSE BLD STRIP.AUTO-MCNC: 141 MG/DL (ref 65–117)
GLUCOSE BLD STRIP.AUTO-MCNC: 148 MG/DL (ref 65–117)
GLUCOSE BLD STRIP.AUTO-MCNC: 153 MG/DL (ref 65–117)
GLUCOSE BLD STRIP.AUTO-MCNC: 195 MG/DL (ref 65–117)
HCT VFR BLD AUTO: 33 % (ref 35–47)
HGB BLD-MCNC: 10.3 G/DL (ref 11.5–16)
MCH RBC QN AUTO: 29.1 PG (ref 26–34)
MCHC RBC AUTO-ENTMCNC: 31.2 G/DL (ref 30–36.5)
MCV RBC AUTO: 93.2 FL (ref 80–99)
NRBC # BLD: 0 K/UL (ref 0–0.01)
NRBC BLD-RTO: 0 PER 100 WBC
PLATELET # BLD AUTO: 229 K/UL (ref 150–400)
PMV BLD AUTO: 12.3 FL (ref 8.9–12.9)
RBC # BLD AUTO: 3.54 M/UL (ref 3.8–5.2)
SERVICE CMNT-IMP: ABNORMAL
WBC # BLD AUTO: 12.7 K/UL (ref 3.6–11)

## 2022-09-11 PROCEDURE — 74011250636 HC RX REV CODE- 250/636: Performed by: FAMILY MEDICINE

## 2022-09-11 PROCEDURE — 74011250637 HC RX REV CODE- 250/637: Performed by: INTERNAL MEDICINE

## 2022-09-11 PROCEDURE — 36415 COLL VENOUS BLD VENIPUNCTURE: CPT

## 2022-09-11 PROCEDURE — 90945 DIALYSIS ONE EVALUATION: CPT

## 2022-09-11 PROCEDURE — 85027 COMPLETE CBC AUTOMATED: CPT

## 2022-09-11 PROCEDURE — 74011250637 HC RX REV CODE- 250/637: Performed by: FAMILY MEDICINE

## 2022-09-11 PROCEDURE — 74011250636 HC RX REV CODE- 250/636: Performed by: INTERNAL MEDICINE

## 2022-09-11 PROCEDURE — 74011000250 HC RX REV CODE- 250: Performed by: FAMILY MEDICINE

## 2022-09-11 PROCEDURE — 82962 GLUCOSE BLOOD TEST: CPT

## 2022-09-11 PROCEDURE — 65270000046 HC RM TELEMETRY

## 2022-09-11 PROCEDURE — A4726 DIALYS SOL FLD VOL > 5999CC: HCPCS | Performed by: INTERNAL MEDICINE

## 2022-09-11 PROCEDURE — 74011000250 HC RX REV CODE- 250: Performed by: INTERNAL MEDICINE

## 2022-09-11 PROCEDURE — 74011636637 HC RX REV CODE- 636/637: Performed by: INTERNAL MEDICINE

## 2022-09-11 RX ADMIN — CEFTRIAXONE SODIUM 1 G: 1 INJECTION, POWDER, FOR SOLUTION INTRAMUSCULAR; INTRAVENOUS at 09:52

## 2022-09-11 RX ADMIN — HEPARIN SODIUM 5000 UNITS: 5000 INJECTION INTRAVENOUS; SUBCUTANEOUS at 04:54

## 2022-09-11 RX ADMIN — ALLOPURINOL 100 MG: 100 TABLET ORAL at 09:52

## 2022-09-11 RX ADMIN — SODIUM CHLORIDE, SODIUM LACTATE, CALCIUM CHLORIDE, MAGNESIUM CHLORIDE AND DEXTROSE 6000 ML: 2.5; 538; 448; 18.3; 5.08 INJECTION, SOLUTION INTRAPERITONEAL at 21:05

## 2022-09-11 RX ADMIN — SODIUM CHLORIDE, PRESERVATIVE FREE 10 ML: 5 INJECTION INTRAVENOUS at 22:35

## 2022-09-11 RX ADMIN — Medication 2 UNITS: at 18:14

## 2022-09-11 RX ADMIN — Medication 2 UNITS: at 13:10

## 2022-09-11 RX ADMIN — SODIUM CHLORIDE, PRESERVATIVE FREE 10 ML: 5 INJECTION INTRAVENOUS at 13:12

## 2022-09-11 RX ADMIN — HEPARIN SODIUM 5000 UNITS: 5000 INJECTION INTRAVENOUS; SUBCUTANEOUS at 18:15

## 2022-09-11 RX ADMIN — GENTAMICIN SULFATE: 1 CREAM TOPICAL at 21:04

## 2022-09-11 RX ADMIN — LEVOTHYROXINE SODIUM 50 MCG: 0.05 TABLET ORAL at 07:30

## 2022-09-11 RX ADMIN — HEPARIN SODIUM 5000 UNITS: 5000 INJECTION INTRAVENOUS; SUBCUTANEOUS at 11:00

## 2022-09-11 RX ADMIN — METOPROLOL SUCCINATE 25 MG: 25 TABLET, EXTENDED RELEASE ORAL at 09:52

## 2022-09-11 RX ADMIN — SODIUM CHLORIDE, PRESERVATIVE FREE 5 ML: 5 INJECTION INTRAVENOUS at 06:00

## 2022-09-11 RX ADMIN — ATORVASTATIN CALCIUM 20 MG: 20 TABLET, FILM COATED ORAL at 22:33

## 2022-09-11 NOTE — PROGRESS NOTES
Bedside shift change report given to Ashley Williamson (oncoming nurse) by Alexandria Hernández RN (offgoing nurse). Report included the following information SBAR, Kardex, MAR, Recent Results, and Cardiac Rhythm NSR .

## 2022-09-11 NOTE — PROGRESS NOTES
Problem: Pressure Injury - Risk of  Goal: *Prevention of pressure injury  Description: Document Ford Scale and appropriate interventions in the flowsheet. Outcome: Progressing Towards Goal  Note: Pressure Injury Interventions:  Sensory Interventions: Assess changes in LOC, Discuss PT/OT consult with provider, Float heels, Keep linens dry and wrinkle-free, Minimize linen layers, Monitor skin under medical devices         Activity Interventions: Increase time out of bed, Pressure redistribution bed/mattress(bed type), PT/OT evaluation    Mobility Interventions: HOB 30 degrees or less, Pressure redistribution bed/mattress (bed type)    Nutrition Interventions: Document food/fluid/supplement intake                     Problem: Patient Education: Go to Patient Education Activity  Goal: Patient/Family Education  Outcome: Progressing Towards Goal     Problem: Falls - Risk of  Goal: *Absence of Falls  Description: Document Laurie Fall Risk and appropriate interventions in the flowsheet. Outcome: Progressing Towards Goal  Note: Fall Risk Interventions:  Mobility Interventions: Patient to call before getting OOB, Utilize walker, cane, or other assistive device, Strengthening exercises (ROM-active/passive), Communicate number of staff needed for ambulation/transfer              Elimination Interventions: Call light in reach, Toilet paper/wipes in reach, Toileting schedule/hourly rounds              Problem: Diabetes Self-Management  Goal: *Disease process and treatment process  Description: Define diabetes and identify own type of diabetes; list 3 options for treating diabetes. Outcome: Progressing Towards Goal  Goal: *Incorporating nutritional management into lifestyle  Description: Describe effect of type, amount and timing of food on blood glucose; list 3 methods for planning meals.   Outcome: Progressing Towards Goal  Goal: *Incorporating physical activity into lifestyle  Description: State effect of exercise on blood glucose levels. Outcome: Progressing Towards Goal  Goal: *Developing strategies to promote health/change behavior  Description: Define the ABC's of diabetes; identify appropriate screenings, schedule and personal plan for screenings. Outcome: Progressing Towards Goal  Goal: *Using medications safely  Description: State effect of diabetes medications on diabetes; name diabetes medication taking, action and side effects. Outcome: Progressing Towards Goal  Goal: *Monitoring blood glucose, interpreting and using results  Description: Identify recommended blood glucose targets  and personal targets. Outcome: Progressing Towards Goal  Goal: *Prevention, detection, treatment of acute complications  Description: List symptoms of hyper- and hypoglycemia; describe how to treat low blood sugar and actions for lowering  high blood glucose level. Outcome: Progressing Towards Goal  Goal: *Prevention, detection and treatment of chronic complications  Description: Define the natural course of diabetes and describe the relationship of blood glucose levels to long term complications of diabetes.   Outcome: Progressing Towards Goal  Goal: *Developing strategies to address psychosocial issues  Description: Describe feelings about living with diabetes; identify support needed and support network  Outcome: Progressing Towards Goal  Goal: *Insulin pump training  Outcome: Progressing Towards Goal  Goal: *Sick day guidelines  Outcome: Progressing Towards Goal  Goal: *Patient Specific Goal (EDIT GOAL, INSERT TEXT)  Outcome: Progressing Towards Goal     Problem: Patient Education: Go to Patient Education Activity  Goal: Patient/Family Education  Outcome: Progressing Towards Goal

## 2022-09-11 NOTE — PROGRESS NOTES
Assessment:  ESRD: CCPD. Followed by Dr. Max Bear  SOB/POSEY: elevated right hemidiaphragm on CXR. Volume overload: mild. 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 Ivett Jenkins  Reason for consult: ESRD/PD    Patient seen and examined. History obtained from patient and chart review. Relevant labs, data and notes reviewed. HPI: Gwyn Barrientos is a 69 y.o. female with PMH significant for ESRD on CCPD, Dm2, HTN, Hep C presented overnight with SOB. SBP elevated on presentation. CXR in ED showed elevated right hemidiaphragm/small right pleural effusion. Nephrology consulted to resume PD. Patient reports no significant weight gain. Has noted some POSEY. No significant orthopnea. No CP. Good compliance with PD. Feels well today. Still with dyspnea that is about the same.     PMH:  Past Medical History:   Diagnosis Date    Arthritis     LOWER SPINE    Bell's palsy     23yrs old     Cancer ([address]     kidney (right)    Chronic kidney disease 1996    right kidney removed - CANCER    Diabetes ([address]     She is controlling with weight loss    Diabetes mellitus ([address] 10/8/2014    NIDDM    Hepatitis C 2011    hx hep C from blood transfusion per pt; Treated by Dr. Katiuska Odom    Hyperlipidemia     Hypertension     Ill-defined condition 1990s    HEPATITIS C - treated with medication    Personal history of renal cancer 3/2/2015    Psychiatric disorder     depression     Thyroid disease 1999    Thyroidectomy    Vertigo      PSH:  Past Surgical History:   Procedure Laterality Date COLONOSCOPY N/A 2017    COLONOSCOPY performed by Timothy Yi. Chino Howard MD at [facility] ENDOSCOPY    HX BREAST REDUCTION Bilateral     HX COLONOSCOPY  3/5/12    Repeat in 5 years (Dr. Corinne Davis)    HX CYST REMOVAL      HX GYN  1980s    tubal pregnancy    HX OTHER SURGICAL      thyroidectomy    HX OTHER SURGICAL      colonoscopy x 2 - ?polyps    HX OTHER SURGICAL  2019    surg for prolasp bladder at [address]  2019    prolasp bladder surg    HX RITESH AND BSO Bilateral     HX TONSILLECTOMY      2or 4years old    [address]    kidney removed on right    IR BX BONE MARROW DIAGNOSTIC  2021       Social history:   Social History     Tobacco Use    Smoking status: Former     Packs/day: 0.25     Years: 8.00     Pack years: 2.00     Types: Cigarettes     Quit date: 1992     Years since quittin.4    Smokeless tobacco: Never    Tobacco comments:     quit smoking 30-40 yrs ago   Vaping Use    Vaping Use: Never used   Substance Use Topics    Alcohol use: No    Drug use: No       Family history:  No history of CKD or ESRD in the family.      Allergies   Allergen Reactions    Codeine Hives       Current Facility-Administered Medications   Medication Dose Route Frequency Last Admin    allopurinoL (ZYLOPRIM) tablet 100 mg  100 mg Oral DAILY 100 mg at 22 0952    gentamicin (GARAMYCIN) 0.1 % cream   Topical DIALYSIS PRN Given at 09/10/22 1609    atorvastatin (LIPITOR) tablet 20 mg  20 mg Oral QHS 20 mg at 09/10/22 2122    levothyroxine (SYNTHROID) tablet 50 mcg  50 mcg Oral 7am 50 mcg at 22 0730    metoprolol succinate (TOPROL-XL) XL tablet 25 mg  25 mg Oral DAILY 25 mg at 22 0952    sodium chloride (NS) flush 5-40 mL  5-40 mL IntraVENous Q8H 5 mL at 22 0600    sodium chloride (NS) flush 5-40 mL  5-40 mL IntraVENous PRN      acetaminophen (TYLENOL) tablet 650 mg  650 mg Oral Q6H  mg at 22 1049    Or    acetaminophen (TYLENOL) suppository 650 mg  650 mg Rectal Q6H PRN      polyethylene glycol (MIRALAX) packet 17 g  17 g Oral DAILY PRN      ondansetron (ZOFRAN ODT) tablet 4 mg  4 mg Oral Q8H PRN      Or    ondansetron (ZOFRAN) injection 4 mg  4 mg IntraVENous Q6H PRN      heparin (porcine) injection 5,000 Units  5,000 Units SubCUTAneous Q8H 5,000 Units at 09/11/22 0454    insulin lispro (HUMALOG) injection   SubCUTAneous AC&HS 2 Units at 09/10/22 2121    glucose chewable tablet 16 g  4 Tablet Oral PRN      glucagon (GLUCAGEN) injection 1 mg  1 mg IntraMUSCular PRN      dextrose 10 % infusion 0-250 mL  0-250 mL IntraVENous PRN      hydrALAZINE (APRESOLINE) 20 mg/mL injection 10 mg  10 mg IntraVENous Q6H PRN      fentaNYL citrate (PF) injection 25 mcg  25 mcg IntraVENous Q4H PRN      cefTRIAXone (ROCEPHIN) 1 g in 0.9% sodium chloride 10 mL IV syringe  1 g IntraVENous Q24H 1 g at 09/11/22 0952    peritoneal dialysis DEXTROSE 2.5% (2.5 mEq/L low calcium) solution 6,000 mL  6,000 mL IntraPERitoneal DAILY 6,000 mL at 09/10/22 1604    peritoneal dialysis DEXTROSE 2.5% (2.5 mEq/L low calcium) solution 6,000 mL  6,000 mL IntraPERitoneal DAILY 6,000 mL at 09/10/22 1604       ROS (besides HPI):    General: No fever. No weight changes  ENT: No hearing loss or visual changes  Cardiovascular: No Chest pain. +POSEY  Pulmonary: +SOB  GI: No abdominal pain. No Nausea/Vomiting/Diarrhea. No blood in stool  : No blood in urine. No foamy or cloudy urine  Musculoskeletal: No joint swelling or redness.  No morning stiffness  Endocrine: no cold or heat intolerance  Psych: denies anxiety or depression  Neuro: No light headedness or dizziness    Objective   Visit Vitals  /84 (BP 1 Location: Left upper arm, BP Patient Position: At rest)   Pulse (!) 105   Temp 98 °F (36.7 °C)   Resp 22   Wt 86.1 kg (189 lb 13.1 oz)   LMP  (LMP Unknown)   SpO2 97%   BMI 32.58 kg/m²       Physical Exam:    Gen: NAD    HEENT: AT/NC, EOMI, moist mucous membrane, no scleral icterus    Neck: no JVD, no cervical lymphadenopathy, no carotid bruit    Lungs/Chest wall: Breath sounds normal. Symmetrical chest wall expansion. No accessory muscle use. Clear to auscultation    Cardiovascular: Normal S1/S2, normal rate, regular rhythm. Abdomen: soft, NT, ND, BS+, +PD catheter-> exit site clean. Ext: no clubbing or cyanosis. No edema    Skin: warm and dry. No rashes    : no del angel    CNS: alert awake. Answers appropriately. Labs/Data:    Lab Results   Component Value Date/Time    Sodium 142 09/10/2022 03:57 AM    Potassium 3.7 09/10/2022 03:57 AM    Chloride 108 09/10/2022 03:57 AM    CO2 25 09/10/2022 03:57 AM    Anion gap 9 09/10/2022 03:57 AM    Glucose 182 (H) 09/10/2022 03:57 AM    BUN 43 (H) 09/10/2022 03:57 AM    Creatinine 8.07 (H) 09/10/2022 03:57 AM    BUN/Creatinine ratio 5 (L) 09/10/2022 03:57 AM    GFR est AA 6 (L) 09/10/2022 03:57 AM    GFR est non-AA 5 (L) 09/10/2022 03:57 AM    Calcium 9.6 09/10/2022 03:57 AM       Lab Results   Component Value Date/Time    WBC 12.7 (H) 09/11/2022 04:48 AM    HGB 10.3 (L) 09/11/2022 04:48 AM    HCT 33.0 (L) 09/11/2022 04:48 AM    PLATELET 266 84/49/2227 04:48 AM    MCV 93.2 09/11/2022 04:48 AM       Urine analysis: No results found for this or any previous visit.           No components found for: SPEP, UPEP  No results found for: PUQ, PROTU2, PROTU1, BJP1, CPE1, IMEL1, MET2  Lab Results   Component Value Date/Time    Microalb/Creat ratio (ug/mg creat.) 3,461.1 (H) 10/31/2019 09:15 AM         Intake/Output Summary (Last 24 hours) at 9/11/2022 1139  Last data filed at 9/11/2022 0130  Gross per 24 hour   Intake --   Output 890 ml   Net -890 ml         Wt Readings from Last 3 Encounters:   09/11/22 86.1 kg (189 lb 13.1 oz)   06/02/22 85 kg (187 lb 6.3 oz)   05/19/22 86.2 kg (190 lb)       Signed by:  Abbi Arana MD  Nephrology and Hypertension  [facility]

## 2022-09-11 NOTE — PROGRESS NOTES
[facility]                                                                                          Hospitalist Progress Note  Miles Ceron MD  Answering service: [phone] or [phone] from in house phone        Date of Service:  9/10/2022  NAME:  Ese Bloom  :  1945  MRN:  [id number]      Admission Summary: This is a 78-year-old woman with a past medical history significant for end-stage renal disease on peritoneal dialysis, type 2 diabetes, hypertension, dyslipidemia, hypothyroidism, renal cancer, who presented at the emergency room with shortness of breath. This has been going on for a couple of weeks. The shortness of breath is progressive, worse with mild exertion such as walking, also worse when the patient is lying down flat. No associated fever, rigors, or chills. The shortness of breath is associated with cough. The patient was sent to the emergency room from the dialysis center because of the worsening respiratory symptoms. When the patient arrived at the emergency room, chest x-ray was obtained. The chest x-ray shows right pleural effusion which is unchanged from prior study. The patient also has elevated BNP level and she was referred to the hospitalist service for admission. Echocardiogram done in 2022 shows ejection fraction of 60% to 65%. The patient also recently had a CT scan of the chest done without contrast which shows moderate to large amount of ascites as well as new right thyroid nodule. Her blood pressure was markedly elevated on arrival at the emergency room. The patient stated that she was told to stop taking all antihypertensive medication when she started the peritoneal dialysis. The patient was last admitted to the hospitalist service from 2020 to 2020. The patient was admitted and treated for acute gastroenteritis.        Interval history / Subjective:   Patient seen and examined  Reviewed her vitals, labs, scans, and care plan  cardiology and nephrology consulted     Assessment & Plan:      1. Acute heart failure with preserved ejection fraction. - elevated BNP- echo ordered- cardiology consulted for eval and tx recs  2. End-stage renal disease on PD- management per nephrology  3. Anemia of Chronic dz- monitor Hb-  4. Type 2 diabetes. Accuchecks and ss insulinimp  5. HTN- improved with current treatments  6. Leukocytosis- monitor. 7.  Thyroid nodule- ultrasound done- patinet reports nodules previously biopsied. 8.  Abdominal ascites- nephrology planning to decreased volume of PD  9. Dyslipidemia. - resume statin med  10. Hypothyroidism- cont levothyroxine  11. Shortness of breath- - chest films suspicious for right hemidiaphragm dysfunction- SNIFF test on Monday and pulmonary consultation . Code status: Full Code  Prophylaxis: SCDs  Care Plan discussed with:   Anticipated Disposition:      Hospital Problems  Date Reviewed: 9/9/2022            Codes Class Noted POA    * (Principal) Acute heart failure with preserved ejection fraction ([facility] [address] ICD-10-CM: I50.31  ICD-9-CM: 428.9  9/9/2022 Yes           Review of Systems:   A comprehensive review of systems was negative except for that written in the HPI. Vital Signs:    Last 24hrs VS reviewed since prior progress note.  Most recent are:  Visit Vitals  /78 (BP 1 Location: Left upper arm, BP Patient Position: At rest;Lying left side)   Pulse (!) 117   Temp 98.1 °F (36.7 °C)   Resp 22   Wt 86.7 kg (191 lb 2.2 oz)   SpO2 96%   BMI 32.81 kg/m²     Patient Vitals for the past 24 hrs:   Temp Pulse Resp BP SpO2   09/10/22 2200 -- (!) 117 -- -- --   09/10/22 2113 98.1 °F (36.7 °C) (!) 120 22 112/78 96 %   09/10/22 2000 -- (!) 112 -- -- --   09/10/22 1932 98.2 °F (36.8 °C) (!) 107 27 (!) 147/90 95 %   09/10/22 1800 -- (!) 103 -- -- --   09/10/22 1605 98 °F (36.7 °C) 100 19 (!) 139/104 99 %   09/10/22 1600 -- 97 -- -- --   09/10/22 1400 -- 77 -- -- --   09/10/22 1200 -- 94 -- -- --   09/10/22 1112 98.4 °F (36.9 °C) (!) 107 19 (!) 159/84 100 %   09/10/22 1000 -- (!) 104 -- -- --   09/10/22 0800 98.2 °F (36.8 °C) 97 18 (!) 162/80 100 %   09/10/22 0600 -- (!) 105 -- -- --   09/10/22 0400 -- (!) 104 -- -- --   09/10/22 0357 -- -- -- (!) 163/103 --   09/10/22 0351 -- -- -- (!) 155/111 --   09/10/22 0349 98.8 °F (37.1 °C) 100 18 (!) 174/95 97 %   09/10/22 0200 -- (!) 116 -- -- --   09/09/22 2329 97.8 °F (36.6 °C) (!) 110 27 135/84 97 %        Intake/Output Summary (Last 24 hours) at 9/10/2022 2226  Last data filed at 9/10/2022 0200  Gross per 24 hour   Intake --   Output 713 ml   Net -713 ml          Physical Examination:     I had a face to face encounter with this patient and independently examined them on 9/10/2022 as outlined below:          Constitutional:  No acute distress, cooperative, pleasant    ENT:  Oral mucosa moist, oropharynx benign. Resp:  Decreased breath sounds R>L. No wheezing/rhonchi/rales. No accessory muscle use. CV:  tachycardia, no murmurs, gallops, rubs    GI:  Soft, non distended, non tender. normoactive bowel sounds, no hepatosplenomegaly     Musculoskeletal:  Bilat LE edema, warm, 2+ pulses throughout    Neurologic:  Moves all extremities. AAOx3, CN II-XII reviewed            Data Review:    Review and/or order of clinical lab test  Review and/or order of tests in the radiology section of [facility]  Review and/or order of tests in the medicine section of [facility]  CT Results  (Last 48 hours)                 09/09/22 1026  CT ABD PELV WO CONT Final result    Impression:      1. Peritoneal dialysis catheter with small volume of free fluid within the   abdomen   2. Post right nephrectomy.  No recurrent mass lesion within the nephrectomy bed   3. 13 mm high density lesion lower pole left kidney may represent a high density   cyst but is nonspecific       Narrative:  EXAM: CT ABD PELV WO CONT       INDICATION: Abdominal pain, ascites COMPARISON: 3/3/2013       IV CONTRAST: None. ORAL CONTRAST: None       TECHNIQUE:    Thin axial images were obtained through the abdomen and pelvis. Coronal and   sagittal reformats were generated. CT dose reduction was achieved through use of   a standardized protocol tailored for this examination and automatic exposure   control for dose modulation. The absence of intravenous contrast material reduces the sensitivity for   evaluation of the vasculature and solid organs. FINDINGS:    LOWER THORAX: Atelectasis right base   LIVER: No mass. BILIARY TREE: Contrast within the gallbladder from previous study CBD is not   dilated. SPLEEN: within normal limits. PANCREAS: No focal abnormality. ADRENALS: Unremarkable. KIDNEYS/URETERS: Post right nephrectomy. Surgical clips in the nephrectomy bed. No recurrent mass. No left-sided hydronephrosis or stone. High density lesion   lower pole left kidney 13 mm is indeterminate but may represent a high density   cyst. It has enlarged   STOMACH: Unremarkable. SMALL BOWEL: No dilatation or wall thickening. COLON: Left-sided diverticulosis. No evidence of acute diverticulitis   APPENDIX: Normal   PERITONEUM: Small volume of ascites. The patient has a left mid abdominal   peritoneal dialysis catheter   RETROPERITONEUM: There are vascular calcifications. No aneurysm   REPRODUCTIVE ORGANS: Surgically absent   URINARY BLADDER: No mass or calculus. BONES: No destructive bone lesion. ABDOMINAL WALL: No mass or hernia. ADDITIONAL COMMENTS: N/A                  CXR Results  (Last 48 hours)                 09/10/22 1231  XR CHEST PORT Final result    Impression:  Chronic elevation of the right hemidiaphragm, with findings   consistent with hemidiaphragm paralysis.            Narrative:  INDICATION:  suspected paralyzed R hemidiaphragm=- expiratory film please        COMPARISON: Earlier today       FINDINGS: Single AP portable view of the chest obtained at 1220 demonstrates a   stable cardiomediastinal silhouette. Again demonstrated is elevation of the   right hemidiaphragm, similar to prior study. There is mild interval elevation of   the left hemidiaphragm with expiration. 09/10/22 1126  XR CHEST PORT Final result    Impression:  Compatible with right hemidiaphragm paralysis. No acute finding or   change. Narrative:  EXAM: Portable CXR. 1054 hours. COMPARISON: CXR 9/8/2022. Chest CT 9/6/2022. INDICATION: suspected paralyzed R hemidiaphragm - please take inspiratory film       FINDINGS:   Full inspiratory AP portable chest shows right hemidiaphragm remains elevated   without change, favoring paralysis. Lungs remain clear other than stable minimal right base atelectasis. There is no cardiomegaly, pulmonary edema, pneumothorax, midline shift or   increase of small right pleural effusion. Labs:     Recent Labs     09/10/22  0233 09/09/22  1237   WBC 11.1* 11.8*   HGB 10.5* 9.7*   HCT 32.8* 30.6*   * 202       Recent Labs     09/10/22  0357 09/09/22  1237 09/08/22  1805    142 141   K 3.7 4.1 3.9    107 106   CO2 25 29 30   BUN 43* 46* 40*   CREA 8.07* 8.23* 7.17*   * 170* 77   CA 9.6 9.8 10.1   MG 2.3 2.3  --    PHOS 5.7* 5.5*  --        Recent Labs     09/10/22  0357 09/09/22  1237 09/08/22  1805   ALT 16 15 18   AP 87 76 83   TBILI 0.3 0.3 0.3   TP 7.0 7.1 7.5   ALB 3.0* 3.0* 3.2*   GLOB 4.0 4.1* 4.3*       No results for input(s): INR, PTP, APTT, INREXT, INREXT in the last 72 hours. Recent Labs     09/09/22  1237   FERR 417*        Lab Results   Component Value Date/Time    Folate 39.7 (H) 09/09/2022 12:37 PM        No results for input(s): PH, PCO2, PO2 in the last 72 hours. No results for input(s): CPK, CKNDX, TROIQ in the last 72 hours.     No lab exists for component: CPKMB  Lab Results   Component Value Date/Time    Cholesterol, total 158 05/19/2022 09:44 AM    HDL Cholesterol 56 05/19/2022 09:44 AM    LDL, calculated 71.2 05/19/2022 09:44 AM    Triglyceride 154 (H) 05/19/2022 09:44 AM    CHOL/HDL Ratio 2.8 05/19/2022 09:44 AM     Lab Results   Component Value Date/Time    Glucose (POC) 237 (H) 09/10/2022 09:18 PM    Glucose (POC) 315 (H) 09/10/2022 09:15 PM    Glucose (POC) 119 (H) 09/10/2022 04:12 PM    Glucose (POC) 110 09/10/2022 01:09 PM    Glucose (POC) 122 (H) 09/10/2022 08:17 AM     Lab Results   Component Value Date/Time    Color YELLOW/STRAW 02/22/2020 11:30 AM    Appearance CLEAR 02/22/2020 11:30 AM    Specific gravity 1.019 02/22/2020 11:30 AM    pH (UA) 7.0 02/22/2020 11:30 AM    Protein >300 (A) 02/22/2020 11:30 AM    Glucose 100 (A) 02/22/2020 11:30 AM    Ketone NEGATIVE  02/22/2020 11:30 AM    Bilirubin NEGATIVE  02/22/2020 11:30 AM    Urobilinogen 0.2 02/22/2020 11:30 AM    Nitrites NEGATIVE  02/22/2020 11:30 AM    Leukocyte Esterase NEGATIVE  02/22/2020 11:30 AM    Epithelial cells MODERATE (A) 02/22/2020 11:30 AM    Bacteria NEGATIVE  02/22/2020 11:30 AM    WBC 0-4 02/22/2020 11:30 AM    RBC 0-5 02/22/2020 11:30 AM         Medications Reviewed:     Current Facility-Administered Medications   Medication Dose Route Frequency    [START ON 9/11/2022] allopurinoL (ZYLOPRIM) tablet 100 mg  100 mg Oral DAILY    gentamicin (GARAMYCIN) 0.1 % cream   Topical DIALYSIS PRN    atorvastatin (LIPITOR) tablet 20 mg  20 mg Oral QHS    levothyroxine (SYNTHROID) tablet 50 mcg  50 mcg Oral 7am    metoprolol succinate (TOPROL-XL) XL tablet 25 mg  25 mg Oral DAILY    sodium chloride (NS) flush 5-40 mL  5-40 mL IntraVENous Q8H    sodium chloride (NS) flush 5-40 mL  5-40 mL IntraVENous PRN    acetaminophen (TYLENOL) tablet 650 mg  650 mg Oral Q6H PRN    Or    acetaminophen (TYLENOL) suppository 650 mg  650 mg Rectal Q6H PRN    polyethylene glycol (MIRALAX) packet 17 g  17 g Oral DAILY PRN    ondansetron (ZOFRAN ODT) tablet 4 mg  4 mg Oral Q8H PRN    Or    ondansetron (ZOFRAN) injection 4 mg  4 mg IntraVENous Q6H PRN    heparin (porcine) injection 5,000 Units  5,000 Units SubCUTAneous Q8H    insulin lispro (HUMALOG) injection   SubCUTAneous AC&HS    glucose chewable tablet 16 g  4 Tablet Oral PRN    glucagon (GLUCAGEN) injection 1 mg  1 mg IntraMUSCular PRN    dextrose 10 % infusion 0-250 mL  0-250 mL IntraVENous PRN    hydrALAZINE (APRESOLINE) 20 mg/mL injection 10 mg  10 mg IntraVENous Q6H PRN    fentaNYL citrate (PF) injection 25 mcg  25 mcg IntraVENous Q4H PRN    cefTRIAXone (ROCEPHIN) 1 g in 0.9% sodium chloride 10 mL IV syringe  1 g IntraVENous Q24H    peritoneal dialysis DEXTROSE 2.5% (2.5 mEq/L low calcium) solution 6,000 mL  6,000 mL IntraPERitoneal DAILY    peritoneal dialysis DEXTROSE 2.5% (2.5 mEq/L low calcium) solution 6,000 mL  6,000 mL IntraPERitoneal DAILY     ______________________________________________________________________  EXPECTED LENGTH OF STAY: - - -  ACTUAL LENGTH OF STAY:          1                 Dawn Waters MD

## 2022-09-11 NOTE — DIALYSIS
Peritoneal Dialysis Disconnection / [phone]     Metrics   I-Drain: 1,629 mls   Total UF: 761 mls   Last Fill: 1,500 mls   Last Manual Drain: 0 mls   Net UF:         890 mls   Avg Dwell Time: 1 hr 35 mins   Lost Dwell Time: 14 mins   Alarms: None   Effluent: Clear/Yellow     Access   Type & Location: Left ABD PD Catheter   Comments: Dsg clean, dry and intact. Sterile mini cap applied & secured cath to abdomen. Dsg date: 9/10/22                                         Comments: At bedside to disconnect pt from [facility] tx. Orders, consent, notes, labs, and code status reviewed. Tx completed. Prior to aseptic disconnection 2 minute Alcavis scrub/soak performed to transfer set. Used cassette and bags discarded in biohazard bag. Education & pre/post report given to pt & primary RN.

## 2022-09-12 ENCOUNTER — APPOINTMENT (OUTPATIENT)
Dept: GENERAL RADIOLOGY | Age: 77
DRG: 291 | End: 2022-09-12
Attending: FAMILY MEDICINE
Payer: MEDICARE

## 2022-09-12 LAB
ALBUMIN SERPL-MCNC: 2.8 G/DL (ref 3.5–5)
ANION GAP SERPL CALC-SCNC: 9 MMOL/L (ref 5–15)
BUN SERPL-MCNC: 45 MG/DL (ref 6–20)
BUN/CREAT SERPL: 5 (ref 12–20)
CALCIUM SERPL-MCNC: 9.6 MG/DL (ref 8.5–10.1)
CHLORIDE SERPL-SCNC: 107 MMOL/L (ref 97–108)
CO2 SERPL-SCNC: 26 MMOL/L (ref 21–32)
CREAT SERPL-MCNC: 8.9 MG/DL (ref 0.55–1.02)
ERYTHROCYTE [DISTWIDTH] IN BLOOD BY AUTOMATED COUNT: 16.9 % (ref 11.5–14.5)
GLUCOSE BLD STRIP.AUTO-MCNC: 103 MG/DL (ref 65–117)
GLUCOSE BLD STRIP.AUTO-MCNC: 185 MG/DL (ref 65–117)
GLUCOSE BLD STRIP.AUTO-MCNC: 229 MG/DL (ref 65–117)
GLUCOSE BLD STRIP.AUTO-MCNC: 231 MG/DL (ref 65–117)
GLUCOSE SERPL-MCNC: 148 MG/DL (ref 65–100)
HCT VFR BLD AUTO: 31.3 % (ref 35–47)
HGB BLD-MCNC: 9.8 G/DL (ref 11.5–16)
MCH RBC QN AUTO: 29.3 PG (ref 26–34)
MCHC RBC AUTO-ENTMCNC: 31.3 G/DL (ref 30–36.5)
MCV RBC AUTO: 93.4 FL (ref 80–99)
NRBC # BLD: 0 K/UL (ref 0–0.01)
NRBC BLD-RTO: 0 PER 100 WBC
PHOSPHATE SERPL-MCNC: 5.9 MG/DL (ref 2.6–4.7)
PLATELET # BLD AUTO: 205 K/UL (ref 150–400)
PMV BLD AUTO: 12 FL (ref 8.9–12.9)
POTASSIUM SERPL-SCNC: 3.5 MMOL/L (ref 3.5–5.1)
RBC # BLD AUTO: 3.35 M/UL (ref 3.8–5.2)
SERVICE CMNT-IMP: ABNORMAL
SERVICE CMNT-IMP: NORMAL
SODIUM SERPL-SCNC: 142 MMOL/L (ref 136–145)
WBC # BLD AUTO: 13.1 K/UL (ref 3.6–11)

## 2022-09-12 PROCEDURE — 90945 DIALYSIS ONE EVALUATION: CPT

## 2022-09-12 PROCEDURE — 36415 COLL VENOUS BLD VENIPUNCTURE: CPT

## 2022-09-12 PROCEDURE — 74011250637 HC RX REV CODE- 250/637: Performed by: INTERNAL MEDICINE

## 2022-09-12 PROCEDURE — 82962 GLUCOSE BLOOD TEST: CPT

## 2022-09-12 PROCEDURE — 74011000250 HC RX REV CODE- 250: Performed by: INTERNAL MEDICINE

## 2022-09-12 PROCEDURE — 76000 FLUOROSCOPY <1 HR PHYS/QHP: CPT

## 2022-09-12 PROCEDURE — 74011250636 HC RX REV CODE- 250/636: Performed by: FAMILY MEDICINE

## 2022-09-12 PROCEDURE — 65270000046 HC RM TELEMETRY

## 2022-09-12 PROCEDURE — 85027 COMPLETE CBC AUTOMATED: CPT

## 2022-09-12 PROCEDURE — 74011250636 HC RX REV CODE- 250/636: Performed by: INTERNAL MEDICINE

## 2022-09-12 PROCEDURE — 74011250637 HC RX REV CODE- 250/637: Performed by: FAMILY MEDICINE

## 2022-09-12 PROCEDURE — 74011636637 HC RX REV CODE- 636/637: Performed by: INTERNAL MEDICINE

## 2022-09-12 PROCEDURE — 74011000250 HC RX REV CODE- 250: Performed by: FAMILY MEDICINE

## 2022-09-12 PROCEDURE — 80069 RENAL FUNCTION PANEL: CPT

## 2022-09-12 PROCEDURE — 99233 SBSQ HOSP IP/OBS HIGH 50: CPT | Performed by: INTERNAL MEDICINE

## 2022-09-12 RX ADMIN — ATORVASTATIN CALCIUM 20 MG: 20 TABLET, FILM COATED ORAL at 22:27

## 2022-09-12 RX ADMIN — Medication 2 UNITS: at 22:27

## 2022-09-12 RX ADMIN — HEPARIN SODIUM 5000 UNITS: 5000 INJECTION INTRAVENOUS; SUBCUTANEOUS at 18:06

## 2022-09-12 RX ADMIN — SODIUM CHLORIDE, PRESERVATIVE FREE 10 ML: 5 INJECTION INTRAVENOUS at 14:00

## 2022-09-12 RX ADMIN — LEVOTHYROXINE SODIUM 50 MCG: 0.05 TABLET ORAL at 06:46

## 2022-09-12 RX ADMIN — CEFTRIAXONE SODIUM 1 G: 1 INJECTION, POWDER, FOR SOLUTION INTRAMUSCULAR; INTRAVENOUS at 08:28

## 2022-09-12 RX ADMIN — ALLOPURINOL 100 MG: 100 TABLET ORAL at 08:28

## 2022-09-12 RX ADMIN — HEPARIN SODIUM 5000 UNITS: 5000 INJECTION INTRAVENOUS; SUBCUTANEOUS at 03:57

## 2022-09-12 RX ADMIN — SODIUM CHLORIDE, PRESERVATIVE FREE 10 ML: 5 INJECTION INTRAVENOUS at 22:27

## 2022-09-12 RX ADMIN — Medication 3 UNITS: at 11:03

## 2022-09-12 RX ADMIN — SODIUM CHLORIDE, PRESERVATIVE FREE 10 ML: 5 INJECTION INTRAVENOUS at 06:46

## 2022-09-12 RX ADMIN — METOPROLOL SUCCINATE 25 MG: 25 TABLET, EXTENDED RELEASE ORAL at 08:28

## 2022-09-12 RX ADMIN — HEPARIN SODIUM 5000 UNITS: 5000 INJECTION INTRAVENOUS; SUBCUTANEOUS at 10:34

## 2022-09-12 NOTE — PROGRESS NOTES
Bedside shift change report given to Reyna Kennedy (oncoming nurse) by AMOR Lazcano (offgoing nurse). Report included the following information SBAR, Kardex, MAR, Accordion, Recent Results, and Cardiac Rhythm NSR .

## 2022-09-12 NOTE — PROGRESS NOTES
RENAL  PROGRESS NOTE        Subjective:   Doing well  Objective:   VITALS SIGNS:    Visit Vitals  BP (!) 140/96 (BP 1 Location: Right upper arm, BP Patient Position: At rest;Lying)   Pulse (!) 105   Temp 98 °F (36.7 °C)   Resp 18   Wt 86.1 kg (189 lb 13.1 oz)   LMP  (LMP Unknown)   SpO2 99%   BMI 32.58 kg/m²       O2 Device: None (Room air)       Temp (24hrs), Av.3 °F (36.8 °C), Min:98 °F (36.7 °C), Max:98.9 °F (37.2 °C)         PHYSICAL EXAM:  NAD    DATA REVIEW:     INTAKE / OUTPUT:   Last shift:      No intake/output data recorded. Last 3 shifts: 09/10 1901 -  0700  In: -   Out: 890   No intake or output data in the 24 hours ending 22 0907      LABS:   Recent Labs     22  0011 22  0448 09/10/22  0233   WBC 13.1* 12.7* 11.1*   HGB 9.8* 10.3* 10.5*   HCT 31.3* 33.0* 32.8*    229 114*     Recent Labs     22  0011 09/10/22  0357 22  1237    142 142   K 3.5 3.7 4.1    108 107   CO2 26 25 29   * 182* 170*   BUN 45* 43* 46*   CREA 8.90* 8.07* 8.23*   CA 9.6 9.6 9.8   MG  --  2.3 2.3   PHOS 5.9* 5.7* 5.5*   ALB 2.8* 3.0* 3.0*   TBILI  --  0.3 0.3   ALT  --  16 15           Assessment:      ESRD: CCPD. Followed by Dr. Alessandra Burgess  SOB/POSEY: elevated right hemidiaphragm on CXR.     HTN:   High density lesion on left kidney;need to be followed as OP   DM2     Plan:         Continue [facility] orders to lower fill volume of 2L and change bags to 2.5% Dianeal bags   Pulm to see  Am labs  Will follow   Swati Hernandez MD

## 2022-09-12 NOTE — DIALYSIS
Peritoneal Dialysis Disconnection / [phone]     Metrics   I-Drain: 1,063 mls   Total UF: 1229 mls   Last Fill: 1,500 mls   Last Manual Drain: 0 mls   Net UF:         792 mls   Avg Dwell Time: 1 hr 36 mins   Lost Dwell Time: 9 mins   Alarms: None   Effluent: Toilet flushed PTA     Access   Type & Location: Left ABD PD Catheter   Comments: Dsg clean, dry and intact. Sterile mini cap applied & secured cath to abdomen. Dsg date: 9/11/22                                         Comments: At bedside to disconnect pt from [facility] tx. Orders, consent, notes, labs, and code status reviewed. Tx completed. Prior to aseptic disconnection 2 minute Alcavis scrub/soak performed to transfer set. Used cassette and bags discarded in biohazard bag. Education & pre/post report given to pt & primary RN.

## 2022-09-12 NOTE — PROGRESS NOTES
[address] [facility] Adult  Hospitalist Group                                                                                          Hospitalist Progress Note  Lyle Mathews MD  Answering service: [phone] or [phone] from in house phone        Date of Service:  2022  NAME:  Dyan Gu  :  1945  MRN:  [id number]      Admission Summary: This is a 69-year-old woman with a past medical history significant for end-stage renal disease on peritoneal dialysis, type 2 diabetes, hypertension, dyslipidemia, hypothyroidism, renal cancer, who presented at the emergency room with shortness of breath. This has been going on for a couple of weeks. The shortness of breath is progressive, worse with mild exertion such as walking, also worse when the patient is lying down flat. No associated fever, rigors, or chills. The shortness of breath is associated with cough. The patient was sent to the emergency room from the dialysis center because of the worsening respiratory symptoms. When the patient arrived at the emergency room, chest x-ray was obtained. The chest x-ray shows right pleural effusion which is unchanged from prior study. The patient also has elevated BNP level and she was referred to the hospitalist service for admission. Echocardiogram done in 2022 shows ejection fraction of 60% to 65%. The patient also recently had a CT scan of the chest done without contrast which shows moderate to large amount of ascites as well as new right thyroid nodule. Her blood pressure was markedly elevated on arrival at the emergency room. The patient stated that she was told to stop taking all antihypertensive medication when she started the peritoneal dialysis. The patient was last admitted to the hospitalist service from 2020 to 2020. The patient was admitted and treated for acute gastroenteritis.        Interval history / Subjective:   Patient seen and examined  Reviewed her vitals, labs, scans, and care plan  cardiology and nephrology consulted     Assessment & Plan:      1. Acute heart failure with preserved ejection fraction. - elevated BNP- echo ordered- cardiology consulted for eval and tx recs  2. End-stage renal disease on PD- management per nephrology  3. Anemia of Chronic dz- monitor Hb-  4. Type 2 diabetes. Accuchecks and ss insulinimp  5. HTN- improved with current treatments  6. Leukocytosis- monitor. 7.  Thyroid nodule- ultrasound done- patinet reports nodules previously biopsied. 8.  Abdominal ascites- nephrology planning to decreased volume of PD  9. Dyslipidemia. - resume statin med  10. Hypothyroidism- cont levothyroxine  11. Shortness of breath- - chest films suspicious for right hemidiaphragm dysfunction- SNIFF test on Monday and pulmonary consultation . Code status: Full Code  Prophylaxis: SCDs  Care Plan discussed with:   Anticipated Disposition:      Hospital Problems  Date Reviewed: 9/9/2022            Codes Class Noted POA    * (Principal) Acute heart failure with preserved ejection fraction ([facility], [place] [address] ICD-10-CM: I50.31  ICD-9-CM: 428.9  9/9/2022 Yes         Review of Systems:   A comprehensive review of systems was negative except for that written in the HPI. Vital Signs:    Last 24hrs VS reviewed since prior progress note.  Most recent are:  Visit Vitals  BP (!) 159/96 (BP 1 Location: Left upper arm, BP Patient Position: At rest)   Pulse (!) (P) 102   Temp 98.2 °F (36.8 °C)   Resp 24   Wt 86.1 kg (189 lb 13.1 oz)   SpO2 100%   BMI 32.58 kg/m²     Patient Vitals for the past 24 hrs:   Temp Pulse Resp BP SpO2   09/11/22 2000 -- (!) (P) 102 -- -- --   09/11/22 1900 98.2 °F (36.8 °C) -- -- (!) 159/96 --   09/11/22 1800 -- (!) 104 -- -- --   09/11/22 1531 98.2 °F (36.8 °C) (!) 103 24 135/80 100 %   09/11/22 1400 -- (!) 106 -- -- --   09/11/22 1200 -- 96 -- -- --   09/11/22 1148 98 °F (36.7 °C) 95 17 117/77 97 %   09/11/22 1000 -- (!) 105 -- -- -- 09/11/22 0800 98 °F (36.7 °C) (!) 108 22 139/84 97 %   09/11/22 0758 97.2 °F (36.2 °C) -- -- -- --   09/11/22 0700 -- 96 22 (!) 147/81 93 %   09/11/22 0600 -- 100 18 (!) 144/103 95 %   09/11/22 0400 -- (!) 105 -- -- --   09/11/22 0328 98.2 °F (36.8 °C) (!) 110 16 (!) 123/96 98 %   09/11/22 0200 -- (!) 110 -- -- --   09/11/22 0000 -- 98 -- -- --   09/10/22 2318 98.4 °F (36.9 °C) (!) 117 12 122/88 99 %   09/10/22 2200 -- (!) 117 -- -- --   09/10/22 2113 98.1 °F (36.7 °C) (!) 120 22 112/78 96 %          Intake/Output Summary (Last 24 hours) at 9/11/2022 2104  Last data filed at 9/11/2022 0130  Gross per 24 hour   Intake --   Output 890 ml   Net -890 ml          Physical Examination:     I had a face to face encounter with this patient and independently examined them on 9/11/2022 as outlined below:          Constitutional:  No acute distress, cooperative, pleasant    ENT:  Oral mucosa moist, oropharynx benign. Resp:  Decreased breath sounds R>L. No wheezing/rhonchi/rales. No accessory muscle use. CV:  tachycardia, no murmurs, gallops, rubs    GI:  Soft, non distended, non tender. normoactive bowel sounds, no hepatosplenomegaly     Musculoskeletal:  Bilat LE edema, warm, 2+ pulses throughout    Neurologic:  Moves all extremities. AAOx3, CN II-XII reviewed            Data Review:    Review and/or order of clinical lab test  Review and/or order of tests in the radiology section of CPT  Review and/or order of tests in the medicine section of CPT  CT Results  (Last 48 hours)      None           CXR Results  (Last 48 hours)                 09/10/22 1231  XR CHEST PORT Final result    Impression:  Chronic elevation of the right hemidiaphragm, with findings   consistent with hemidiaphragm paralysis.            Narrative:  INDICATION:  suspected paralyzed R hemidiaphragm=- expiratory film please        COMPARISON: Earlier today       FINDINGS: Single AP portable view of the chest obtained at 1220 demonstrates a   stable cardiomediastinal silhouette. Again demonstrated is elevation of the   right hemidiaphragm, similar to prior study. There is mild interval elevation of   the left hemidiaphragm with expiration. 09/10/22 1126  XR CHEST PORT Final result    Impression:  Compatible with right hemidiaphragm paralysis. No acute finding or   change. Narrative:  EXAM: Portable CXR. 1054 hours. COMPARISON: CXR 9/8/2022. Chest CT 9/6/2022. INDICATION: suspected paralyzed R hemidiaphragm - please take inspiratory film       FINDINGS:   Full inspiratory AP portable chest shows right hemidiaphragm remains elevated   without change, favoring paralysis. Lungs remain clear other than stable minimal right base atelectasis. There is no cardiomegaly, pulmonary edema, pneumothorax, midline shift or   increase of small right pleural effusion. Labs:     Recent Labs     09/11/22  0448 09/10/22  0233   WBC 12.7* 11.1*   HGB 10.3* 10.5*   HCT 33.0* 32.8*    114*       Recent Labs     09/10/22  0357 09/09/22  1237    142   K 3.7 4.1    107   CO2 25 29   BUN 43* 46*   CREA 8.07* 8.23*   * 170*   CA 9.6 9.8   MG 2.3 2.3   PHOS 5.7* 5.5*       Recent Labs     09/10/22  0357 09/09/22  1237   ALT 16 15   AP 87 76   TBILI 0.3 0.3   TP 7.0 7.1   ALB 3.0* 3.0*   GLOB 4.0 4.1*       No results for input(s): INR, PTP, APTT, INREXT, INREXT in the last 72 hours. Recent Labs     09/09/22  1237   FERR 417*        Lab Results   Component Value Date/Time    Folate 39.7 (H) 09/09/2022 12:37 PM        No results for input(s): PH, PCO2, PO2 in the last 72 hours. No results for input(s): CPK, CKNDX, TROIQ in the last 72 hours.     No lab exists for component: CPKMB  Lab Results   Component Value Date/Time    Cholesterol, total 158 05/19/2022 09:44 AM    HDL Cholesterol 56 05/19/2022 09:44 AM    LDL, calculated 71.2 05/19/2022 09:44 AM    Triglyceride 154 (H) 05/19/2022 09:44 AM    CHOL/HDL Ratio 2.8 05/19/2022 09:44 AM     Lab Results   Component Value Date/Time    Glucose (POC) 153 (H) 09/11/2022 08:13 PM    Glucose (POC) 141 (H) 09/11/2022 04:10 PM    Glucose (POC) 148 (H) 09/11/2022 11:47 AM    Glucose (POC) 138 (H) 09/11/2022 08:00 AM    Glucose (POC) 237 (H) 09/10/2022 09:18 PM     Lab Results   Component Value Date/Time    Color YELLOW/STRAW 02/22/2020 11:30 AM    Appearance CLEAR 02/22/2020 11:30 AM    Specific gravity 1.019 02/22/2020 11:30 AM    pH (UA) 7.0 02/22/2020 11:30 AM    Protein >300 (A) 02/22/2020 11:30 AM    Glucose 100 (A) 02/22/2020 11:30 AM    Ketone NEGATIVE  02/22/2020 11:30 AM    Bilirubin NEGATIVE  02/22/2020 11:30 AM    Urobilinogen 0.2 02/22/2020 11:30 AM    Nitrites NEGATIVE  02/22/2020 11:30 AM    Leukocyte Esterase NEGATIVE  02/22/2020 11:30 AM    Epithelial cells MODERATE (A) 02/22/2020 11:30 AM    Bacteria NEGATIVE  02/22/2020 11:30 AM    WBC 0-4 02/22/2020 11:30 AM    RBC 0-5 02/22/2020 11:30 AM         Medications Reviewed:     Current Facility-Administered Medications   Medication Dose Route Frequency    allopurinoL (ZYLOPRIM) tablet 100 mg  100 mg Oral DAILY    gentamicin (GARAMYCIN) 0.1 % cream   Topical DIALYSIS PRN    atorvastatin (LIPITOR) tablet 20 mg  20 mg Oral QHS    levothyroxine (SYNTHROID) tablet 50 mcg  50 mcg Oral 7am    metoprolol succinate (TOPROL-XL) XL tablet 25 mg  25 mg Oral DAILY    sodium chloride (NS) flush 5-40 mL  5-40 mL IntraVENous Q8H    sodium chloride (NS) flush 5-40 mL  5-40 mL IntraVENous PRN    acetaminophen (TYLENOL) tablet 650 mg  650 mg Oral Q6H PRN    Or    acetaminophen (TYLENOL) suppository 650 mg  650 mg Rectal Q6H PRN    polyethylene glycol (MIRALAX) packet 17 g  17 g Oral DAILY PRN    ondansetron (ZOFRAN ODT) tablet 4 mg  4 mg Oral Q8H PRN    Or    ondansetron (ZOFRAN) injection 4 mg  4 mg IntraVENous Q6H PRN    heparin (porcine) injection 5,000 Units  5,000 Units SubCUTAneous Q8H    insulin lispro (HUMALOG) injection SubCUTAneous AC&HS    glucose chewable tablet 16 g  4 Tablet Oral PRN    glucagon (GLUCAGEN) injection 1 mg  1 mg IntraMUSCular PRN    dextrose 10 % infusion 0-250 mL  0-250 mL IntraVENous PRN    hydrALAZINE (APRESOLINE) 20 mg/mL injection 10 mg  10 mg IntraVENous Q6H PRN    fentaNYL citrate (PF) injection 25 mcg  25 mcg IntraVENous Q4H PRN    cefTRIAXone (ROCEPHIN) 1 g in 0.9% sodium chloride 10 mL IV syringe  1 g IntraVENous Q24H    peritoneal dialysis DEXTROSE 2.5% (2.5 mEq/L low calcium) solution 6,000 mL  6,000 mL IntraPERitoneal DAILY    peritoneal dialysis DEXTROSE 2.5% (2.5 mEq/L low calcium) solution 6,000 mL  6,000 mL IntraPERitoneal DAILY     ______________________________________________________________________  EXPECTED LENGTH OF STAY: - - -  ACTUAL LENGTH OF STAY:          2                 Edel Hicks MD

## 2022-09-12 NOTE — DIALYSIS
[facility]       [phone]       Pt orders, notes, labs, code status and consent reviewed. Time out complete. Orders    Treatment Time 8.5 hrs   Cycles 4   Fill Volume 2000 ml   Last Fill 1500 ml   Total Volume 9500 ml   Dianeal 2.5% Dextrose x2 bags      Left abdominal PD site cleansed with Exsept followed by Gentamycin and dry gauze dressing dated 9/12/22, no redness or drainage at exit site. Prior to connection, one minute Alcavis scrub & soak performed. PD Start Time 1640   PD End Time: 0110     Remained present during initial drain followed by initiation of first fill. Upon departure, bed in lowest position, personal belongings within reach. Education & pre/post report with Malina Mcnamara primary RN.

## 2022-09-12 NOTE — PROGRESS NOTES
ATTENDING CARDIOLOGIST    The patient was personally examined and chart reviewed. All the elements of history and examination were personally performed and I agree with the plan as listed by advanced practice provider. Treatment plan was addressed with the patient. Subjective:  Feels well  Breathing comfortably  PD - started in July  Retired LPN    Blood pressure 137/88, pulse 99, temperature 97.6 °F (36.4 °C), resp. rate 19, weight 189 lb 13.1 oz (86.1 kg), SpO2 96 %. Normal rate, regular rhythm, S1/S2  Lungs clear  Mild edema      A/P:  SOB: Echo  showed EF 60-65% with GR I DD. Nuclear stress test negative for ischemia. Do not feel etiology is primary cardiac at this time. She has an elevated right hemidiaphragm and plans are for a SNIFF test.  ESRD: on peritoneal dialysis. Managed by nephrology  Thyroid nodule: per U/S 1.2cm hypoechoic nodule in left surgical bed increased in size. TSH NL Management per primary team.  HTN: mildly elevated. Continue toprol XL. Defer management to renal/primary teams. DM: management per primary team.   HLD: Cont Lipitor. Will follow peripherally - please call with any questions. 12 minutes spent reviewing chart and writing note/documentation. 7 minutes spent talking to patient. []    High complexity decision making was performed  []    Patient is at high-risk of decompensation with multiple organ involvement    Abbie Ely. Frida Meek MD, Anaya Meek MD, [facility] [place] [facility]  [facility] [address]. [place] , [address]                                                               Office [phone]  Fax [phone]                                                                               [facility]  Cardiology Care Note                  []Initial visit     [x]Established visit     Patient Name: Renee Beebe - :1945 - NPA:[id number]  Primary Cardiologist: Adilson Castro MD     Reason for initial visit: worsening SOB    HPI:   Patient is a 69year old female with a med hx significant for ESRD on peritoneal dialysis, DM type 2, HTN, HLD, hypothyroidism post left thyroidectomy, renal cancer, and previous tobacco use. She presented to the ED on 9/8/22 with worsening SOB. She does home dialysis, but she was seen at the dialysis center and they felt she was noticeably more sob and advised to go to the hospital for evaluation. She reports she would get out of breath with exertion with less distance, has to take rest breaks due to SOB. She denies orthopnea or PND. Denies any increased abdominal girth or BLE edema. She denies CP, palpitations, or lightheadedness. She has been having this issue since at least March of this year. She was seen by Dr Jackie Martinez in the office 4/22 with echo performed 5/18 showing EF 60-65% with GR I DD, no valvular disease. Nuclear stress test 5/18 was neg for ischemia. She had a CXR 8/25 for her SOB which showed right pleural effusion, RLL volume loss. CT chest 9/6 showed new 2.8cm hypodense nodule in right lobe of thyroid. Persistent elevation of right hemidiaphragm which had increased and was causing severe atelectasis of RML and mod atelectasis RLL, small right pleural effusion. Mod to large amount of ascites in abdomen. In the ED EKG showed NSR with nonspecific T wave abnormality in lateral leads. WBC 12.6, Hgb 10.7, ProBNP 2277, She was hypertensive with her BP trending 180/102 to 195/110. CXR  9/8 showed no interval change in small right pleural effusion or right basilar subsegmental atelectasis and right hemidiaphragm elevation. . NM lung scan low prob for PE. SUBJECTIVE:  Today, pt reports she feels a little better. She still has POSEY with ambulating to bathroom, sometimes has orthopnea. She denies dizziness. She is awaiting SNIFF test for today. She is in NSR- sinus tachycardia on telemetry.  P     Assessment and Plan     SOB: pt has had SOB since at least March 2022, Her  ProBNP 2277 on admission. . Echo 5/22 showed EF 60-65% with GR I DD. NST neg for ischemia. She had malignant HTN on arrival but this has improved. Cont with dialysis and work up that is currently in process   Etiology may be multifactorial but Given findings of markedly elevated Rt hemidiaphragm during inspiration and expiration, suspect there may be diaphragm dysfunction and SNiff test is pending. Anemia may also be contributing. Do not feel etiology is primary cardiac at this time. ESRD: on peritoneal dialysis. Managed by nephrology  New thyroid nodule: per U/S 1.2cm hypoechoic nodule in left surgical bed increased in size. TSH NL Management per primary team.  HTN: mildly elevated. Continue toprol XL. Defer management to renal/primary teams. DM: management per primary team.   HLD: LDL 71.2 5/19/22. Cont Lipitor.             ____________________________________________________________    Cardiac testing  05/18/22    ECHO ADULT COMPLETE 05/18/2022 5/18/2022    Interpretation Summary  Formatting of this result is different from the original.      Left Ventricle: Normal left ventricular systolic function with a visually estimated EF of 60 - 65%. Left ventricle size is normal. Findings consistent with mild concentric hypertrophy. Normal wall motion. Signed by: Steve Fisher MD on 5/18/2022  4:19 PM        05/18/22    NUCLEAR CARDIAC STRESS TEST 05/18/2022 5/20/2022    Interpretation Summary  Formatting of this result is different from the original.      Nuclear Findings: Normal pharmacological myocardial perfusion study. Nuclear Findings: LV perfusion is normal.    Nuclear Findings: There is a mild artifact caused by breast attenuation. ECG: Resting ECG demonstrates normal sinus rhythm. ECG: Stress ECG was negative for ischemia. Stress Test: A Venkata protocol stress test was performed.  Overall, the patient's exercise capacity was below average for their age. The patient reached stage 1 of the protocol after exercising for 3 min and 1 sec. Hemodynamics are adequate for diagnosis. Blood pressure demonstrated a normal response and heart rate demonstrated a normal response to stress. The patient's heart rate recovery was normal.    Signed by: Maria Luisa Phillips MD on 5/18/2022  4:30 PM          Most recent HS troponins:  Recent Labs     09/09/22  1237   TROPHS 60*     Review of Systems    [x]All other systems reviewed and all negative except as written in HPI    [] Patient unable to provide secondary to condition         Past Medical History:   Diagnosis Date    Arthritis     LOWER SPINE    Bell's palsy     23yrs old     Cancer ([address]     kidney (right)    Chronic kidney disease 1996    right kidney removed - CANCER    Diabetes ([address]     She is controlling with weight loss    Diabetes mellitus ([address] 10/8/2014    NIDDM    Hepatitis C 2011    hx hep C from blood transfusion per pt; Treated by Dr. Benedict John    Hyperlipidemia     Hypertension     Ill-defined condition 1990s    HEPATITIS C - treated with medication    Personal history of renal cancer 3/2/2015    Psychiatric disorder     depression     Thyroid disease 1999    Thyroidectomy    Vertigo      Past Surgical History:   Procedure Laterality Date    COLONOSCOPY N/A 6/20/2017    COLONOSCOPY performed by Trish Ward.  Wilson Daniels MD at [facility] ENDOSCOPY    HX BREAST REDUCTION Bilateral 1996    HX COLONOSCOPY  3/5/12    Repeat in 5 years (Dr. Benedict John)    HX CYST REMOVAL      HX GYN  1980s    tubal pregnancy    HX OTHER SURGICAL  1999    thyroidectomy    HX OTHER SURGICAL      colonoscopy x 2 - ?polyps    HX OTHER SURGICAL  04/08/2019    surg for prolasp bladder at [address]  04/08/2019    prolasp bladder surg    HX RITESH AND BSO Bilateral 1995    HX TONSILLECTOMY      2or 4years old    [address]    kidney removed on right    IR BX BONE MARROW DIAGNOSTIC  04/05/2021     Social Hx:  reports that she quit smoking about 30 years ago. Her smoking use included cigarettes. She has a 2.00 pack-year smoking history. She has never used smokeless tobacco. She reports that she does not drink alcohol and does not use drugs. Family Hx: family history includes Diabetes in her daughter, mother, and sister; Heart Disease in her father; Hypertension in her daughter and son; Kidney Disease in her father and mother; No Known Problems in her brother and son. Allergies   Allergen Reactions    Codeine Hives          OBJECTIVE:  Wt Readings from Last 3 Encounters:   09/11/22 86.1 kg (189 lb 13.1 oz)   06/02/22 85 kg (187 lb 6.3 oz)   05/19/22 86.2 kg (190 lb)       Intake/Output Summary (Last 24 hours) at 9/12/2022 1141  Last data filed at 9/12/2022 0820  Gross per 24 hour   Intake --   Output 1229 ml   Net -1229 ml         Physical Exam    Vitals:   Vitals:    09/12/22 0600 09/12/22 0643 09/12/22 1000 09/12/22 1106   BP:  (!) 140/96  (!) 142/76   Pulse: (!) 102 (!) 105 99 (!) 104   Resp:  18  18   Temp:  98 °F (36.7 °C)  97.6 °F (36.4 °C)   SpO2:  99%  100%   Weight:         Telemetry: normal sinus rhythm      General:    Alert, cooperative, no distress, appears stated age. Neck:   Supple, no JVD. Back:     Symmetric,    Lungs:     Clear to auscultation bilaterally. Benedetta Ou Heart[de-identified]    Regular rate and rhythm, S1, S2 normal, no murmur, click, rub or gallop. Abdomen:     Soft, nontender. Bowel sounds normal. Peritoneal dialysis catheter noted. Extremities:   Extremities normal, atraumatic, no cyanosis, trace leslie pedal edema. Vascular:   Pulses - leslie UE/LE equal   Skin:   Skin color normal. No rashes or lesions on visible areas   Neurologic:   Alert, Moves all extremities. Data Review:     Radiology:   XR Results (most recent):  Results from Hospital Encounter encounter on 09/08/22    XR SNIFF TEST    Narrative  INDICATION: Immobile diaphragm. COMPARISON: None.     TECHNIQUE: Sniff test. Fluoroscopy utilized to visualize movement of the  diaphragm during patient breathing. FLUOROSCOPY TIME: 0.8 min  FLUOROSCOPY DOSE (air kerma): 2.3 mGy    FINDINGS:    Elevated right hemidiaphragm. Right-sided diaphragmatic excursion is  significantly diminished compared to the left. Normal left-sided diaphragmatic  movement. 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 CT Results (most recent):  Results from Hospital Encounter encounter on 09/08/22    CT ABD PELV WO CONT    Narrative  EXAM: CT ABD PELV WO CONT    INDICATION: Abdominal pain, ascites    COMPARISON: 3/3/2013    IV CONTRAST: None. ORAL CONTRAST: None    TECHNIQUE:  Thin axial images were obtained through the abdomen and pelvis. Coronal and  sagittal reformats were generated. CT dose reduction was achieved through use of  a standardized protocol tailored for this examination and automatic exposure  control for dose modulation. The absence of intravenous contrast material reduces the sensitivity for  evaluation of the vasculature and solid organs. FINDINGS:  LOWER THORAX: Atelectasis right base  LIVER: No mass. BILIARY TREE: Contrast within the gallbladder from previous study CBD is not  dilated. SPLEEN: within normal limits. PANCREAS: No focal abnormality. ADRENALS: Unremarkable. KIDNEYS/URETERS: Post right nephrectomy. Surgical clips in the nephrectomy bed. No recurrent mass. No left-sided hydronephrosis or stone. High density lesion  lower pole left kidney 13 mm is indeterminate but may represent a high density  cyst. It has enlarged  STOMACH: Unremarkable. SMALL BOWEL: No dilatation or wall thickening. COLON: Left-sided diverticulosis. No evidence of acute diverticulitis  APPENDIX: Normal  PERITONEUM: Small volume of ascites. The patient has a left mid abdominal  peritoneal dialysis catheter  RETROPERITONEUM: There are vascular calcifications.  No aneurysm  REPRODUCTIVE ORGANS: Surgically absent  URINARY BLADDER: No mass or calculus. BONES: No destructive bone lesion. ABDOMINAL WALL: No mass or hernia. ADDITIONAL COMMENTS: N/A    Impression  1. Peritoneal dialysis catheter with small volume of free fluid within the  abdomen  2. Post right nephrectomy. No recurrent mass lesion within the nephrectomy bed  3. 13 mm high density lesion lower pole left kidney may represent a high density  cyst but is nonspecific    MRI Results (most recent):  No results found for this or any previous visit. No results for input(s): CPK, TROIQ in the last 72 hours. No lab exists for component: CKQMB, CPKMB, BMPP  Recent Labs     09/12/22  0011 09/10/22  0357    142   K 3.5 3.7    108   CO2 26 25   BUN 45* 43*   CREA 8.90* 8.07*   * 182*   PHOS 5.9* 5.7*   CA 9.6 9.6     Recent Labs     09/12/22  0011 09/11/22  0448   WBC 13.1* 12.7*   HGB 9.8* 10.3*   HCT 31.3* 33.0*    229     Recent Labs     09/10/22  0357 09/09/22  1237   AP 87 76     No results for input(s): CHOL, LDLC in the last 72 hours.     No lab exists for component: TGL, HDLC,  HBA1C  Recent Labs     09/09/22  1237   TSH 1.51           Current meds:    Current Facility-Administered Medications:     allopurinoL (ZYLOPRIM) tablet 100 mg, 100 mg, Oral, DAILY, Daiana Richardson MD, 100 mg at 09/12/22 0009    gentamicin (GARAMYCIN) 0.1 % cream, , Topical, DIALYSIS PRN, Rey Pritchard MD, Given at 09/11/22 2104    atorvastatin (LIPITOR) tablet 20 mg, 20 mg, Oral, QHS, Rey Bowers MD, 20 mg at 09/11/22 2233    levothyroxine (SYNTHROID) tablet 50 mcg, 50 mcg, Oral, 7am, Rey Bowers MD, 50 mcg at 09/12/22 0646    metoprolol succinate (TOPROL-XL) XL tablet 25 mg, 25 mg, Oral, DAILY, Rey Bowers MD, 25 mg at 09/12/22 0828    sodium chloride (NS) flush 5-40 mL, 5-40 mL, IntraVENous, Q8H, Rey Bowers MD, 10 mL at 09/12/22 0646    sodium chloride (NS) flush 5-40 mL, 5-40 mL, IntraVENous, PRN, Rey Bowers MD    acetaminophen (TYLENOL) tablet 650 mg, 650 mg, Oral, Q6H PRN, 650 mg at 09/09/22 1049 **OR** acetaminophen (TYLENOL) suppository 650 mg, 650 mg, Rectal, Q6H PRN, Rey Bowers MD    polyethylene glycol (MIRALAX) packet 17 g, 17 g, Oral, DAILY PRN, Rey Bowers MD    ondansetron (ZOFRAN ODT) tablet 4 mg, 4 mg, Oral, Q8H PRN **OR** ondansetron (ZOFRAN) injection 4 mg, 4 mg, IntraVENous, Q6H PRN, Rey Bowers MD    heparin (porcine) injection 5,000 Units, 5,000 Units, SubCUTAneous, Q8H, Rey Bowers MD, 5,000 Units at 09/12/22 1034    insulin lispro (HUMALOG) injection, , SubCUTAneous, AC&HS, Rey Bowers MD, 3 Units at 09/12/22 1103    glucose chewable tablet 16 g, 4 Tablet, Oral, PRN, Rey Bowers MD    glucagon (GLUCAGEN) injection 1 mg, 1 mg, IntraMUSCular, PRN, Rey Bowers MD    dextrose 10 % infusion 0-250 mL, 0-250 mL, IntraVENous, PRN, Rey Bowers MD    hydrALAZINE (APRESOLINE) 20 mg/mL injection 10 mg, 10 mg, IntraVENous, Q6H PRN, Rey Bowers MD    fentaNYL citrate (PF) injection 25 mcg, 25 mcg, IntraVENous, Q4H PRN, Rey Bowers MD    cefTRIAXone (ROCEPHIN) 1 g in 0.9% sodium chloride 10 mL IV syringe, 1 g, IntraVENous, Q24H, Daiana Richardson MD, 1 g at 09/12/22 0828    peritoneal dialysis DEXTROSE 2.5% (2.5 mEq/L low calcium) solution 6,000 mL, 6,000 mL, IntraPERitoneal, DAILY, Marilyn Bo MD, 6,000 mL at 09/11/22 2105    peritoneal dialysis DEXTROSE 2.5% (2.5 mEq/L low calcium) solution 6,000 mL, 6,000 mL, IntraPERitoneal, DAILY, Diego Escalante MD, 6,000 mL at 09/11/22 2105    Ting Arce. NANCY Aragon  [facility], [address] [facility] [address]  [address]  [phone]      CC: Tianna Alexis MD

## 2022-09-12 NOTE — PROGRESS NOTES
Bedside and Verbal shift change report given to Missael Sam (oncoming nurse) by Farooq Perez (offgoing nurse).  Report included the following information SBAR, Kardex, Recent Results, Med Rec Status, and Cardiac Rhythm NSR to ST .

## 2022-09-12 NOTE — PROCEDURES
Peritoneal Dialysis Initiation / [phone]     Orders   Therapy Time: 8.5 hrs   Cycles: 4   Fill Volume: 2000 ml   Last Fill Volume: 1500 ml   Total Volume: 9500 ml   Solution: 2x 2.5% Dextrose Dianeal bags      Access   Type & Location: RLQ PD catheter   Comments: Prior to connection, one-minute Alcavis scrub performed. Dsg change date:   09/11/22                                 Labs   HBsAg (Antigen) / date: 09/09/22 negative                                              HBsAb (Antibody) / date: 09/09/22 immune   Source: epic     Safety:   Time Out Done:   (Time) 0915   Consent obtained/signed: Chronic PT   Education: Access care   Primary Nurse Rpt Pre: Keisha Winters RN   Primary Nurse Rpt Post: Keisha Winters RN     Comments:   Pt orders, notes, labs, code status reviewed. Start time: 2120 09/11/22  Estimated End Time: 8959 09/12/22    Remained present during initial drain followed by initiation of first fill. Prior to departure, bed in lowest position, call bell and personal belongings within reach.

## 2022-09-12 NOTE — PROGRESS NOTES
Problem: Pressure Injury - Risk of  Goal: *Prevention of pressure injury  Description: Document Ford Scale and appropriate interventions in the flowsheet. Outcome: Progressing Towards Goal  Note: Pressure Injury Interventions:  Sensory Interventions: Discuss PT/OT consult with provider, Keep linens dry and wrinkle-free, Maintain/enhance activity level, Minimize linen layers         Activity Interventions: Increase time out of bed, Pressure redistribution bed/mattress(bed type), PT/OT evaluation    Mobility Interventions: HOB 30 degrees or less, Pressure redistribution bed/mattress (bed type), PT/OT evaluation    Nutrition Interventions: Document food/fluid/supplement intake                     Problem: Patient Education: Go to Patient Education Activity  Goal: Patient/Family Education  Outcome: Progressing Towards Goal     Problem: Falls - Risk of  Goal: *Absence of Falls  Description: Document Laurie Fall Risk and appropriate interventions in the flowsheet. Outcome: Progressing Towards Goal  Note: Fall Risk Interventions:  Mobility Interventions: Communicate number of staff needed for ambulation/transfer, Patient to call before getting OOB, PT Consult for mobility concerns, Utilize walker, cane, or other assistive device         Medication Interventions: Patient to call before getting OOB, Evaluate medications/consider consulting pharmacy    Elimination Interventions: Call light in reach, Toileting schedule/hourly rounds, Toilet paper/wipes in reach              Problem: Patient Education: Go to Patient Education Activity  Goal: Patient/Family Education  Outcome: Progressing Towards Goal     Problem: Diabetes Self-Management  Goal: *Disease process and treatment process  Description: Define diabetes and identify own type of diabetes; list 3 options for treating diabetes.   Outcome: Progressing Towards Goal  Goal: *Incorporating nutritional management into lifestyle  Description: Describe effect of type, amount and timing of food on blood glucose; list 3 methods for planning meals. Outcome: Progressing Towards Goal  Goal: *Incorporating physical activity into lifestyle  Description: State effect of exercise on blood glucose levels. Outcome: Progressing Towards Goal  Goal: *Developing strategies to promote health/change behavior  Description: Define the ABC's of diabetes; identify appropriate screenings, schedule and personal plan for screenings. Outcome: Progressing Towards Goal  Goal: *Using medications safely  Description: State effect of diabetes medications on diabetes; name diabetes medication taking, action and side effects. Outcome: Progressing Towards Goal  Goal: *Monitoring blood glucose, interpreting and using results  Description: Identify recommended blood glucose targets  and personal targets. Outcome: Progressing Towards Goal  Goal: *Prevention, detection, treatment of acute complications  Description: List symptoms of hyper- and hypoglycemia; describe how to treat low blood sugar and actions for lowering  high blood glucose level. Outcome: Progressing Towards Goal  Goal: *Prevention, detection and treatment of chronic complications  Description: Define the natural course of diabetes and describe the relationship of blood glucose levels to long term complications of diabetes.   Outcome: Progressing Towards Goal  Goal: *Developing strategies to address psychosocial issues  Description: Describe feelings about living with diabetes; identify support needed and support network  Outcome: Progressing Towards Goal  Goal: *Insulin pump training  Outcome: Progressing Towards Goal  Goal: *Sick day guidelines  Outcome: Progressing Towards Goal  Goal: *Patient Specific Goal (EDIT GOAL, INSERT TEXT)  Outcome: Progressing Towards Goal     Problem: Patient Education: Go to Patient Education Activity  Goal: Patient/Family Education  Outcome: Progressing Towards Goal

## 2022-09-12 NOTE — CONSULTS
PULMONARY MEDICINE    Initial Physician Consultation Note    Name: Zacarias Lin   : 1945   MRN: [id number]   Date: 2022      Subjective:   Consult Note: 2022   Requesting Physician: Dr. Lorenzo Hand  Reason for consult: Shortness of breath with suspected right paralyzed diaphragm    Medical records and data reviewed. Patient is a 69 y.o. female who presented to the hospital with shortness of breath for several days. She reports shortness of breath was episodic prior to presentation occurring at rest as well as with exertion. She also noted worsening symptoms in supine position. She had mild cough that seems to have improved. She was noted to have controlled hypertension on presentation. She has underlying history of end-stage renal disease and manages clearance with peritoneal dialysis. Chest imaging that was done showed elevated right hemidiaphragm with compressive atelectasis along with moderate to large ascites compatible with her history of peritoneal dialysis. There was no evidence of mediastinal mass or mediastinal lymphadenopathy. Patient has not had a major surgical procedure to the chest.  She did have a history of fall approximately a month ago but did not injure her chest.  She denies recent history of shingles. Last chest x-ray available for comparison in the system is from . She does not recall interim chest x-rays. X-ray that was done in 2013 had not shown elevated diaphragm. She has had a history of right-sided nephrectomy around that time. CT scan had also shown other abnormalities like a thyroid nodule and renal cysts. Is a remote smoker but does not have a known history of obstructive lung disease. She does snore occasionally and has not been previously investigated for underlying sleep apnea. She currently appears to be comfortable on room air. Echocardiogram has shown evidence of left ventricular diastolic dysfunction.   Sniff test that was done under fluoroscopy showed diminished excursion of right diaphragm with breathing. Review of Systems:     A comprehensive 12 system review of systems was negative except for as documented in HPI    Assessment:     Shortness of breath, multifactorial  Elevated right hemidiaphragm, there appears to be evidence of initial excursion with breathing effort suggestive of right diaphragmatic paralysis, etiology is unknown clear and undetermined  Associated tiny right effusion and basilar atelectasis  History of end-stage renal disease, peritoneal dialysis, moderate to large ascites  History of right nephrectomy with no evidence of recurrence of renal mass  Suspected sleep apnea  Previous history of tobacco use without a prior diagnosis of obstructive lung disease  Incidental finding of thyroid nodule    Recommendations:     She could benefit from outpatient work-up including PFTs and polysomnogram  Incentive spirometer  Optimization of volume status and systemic blood pressure could be helpful  There is no clear-cut etiology explaining right diaphragm paralysis, there are no interim x-rays between 2013 and now to assess chronicity of elevated right hemidiaphragm  Nocturnal CPAP could proved to be helpful to manage nighttime symptoms if she has sleep apnea  Reduction in intra-abdominal PD fill volume could also be helpful in reducing pressure on the right diaphragm in supine position - Nephrology following  Surgical fixation and repair of elevated diaphragm is an extensive procedure but can be considered if supportive care fails to improve symptoms -could consider referral to thoracic surgery as an outpatient  Discussed with patient, we will arrange outpatient follow-up      Active Problem List:     Problem List  Date Reviewed: 9/9/2022            Codes Class    * (Principal) Acute heart failure with preserved ejection fraction ([facility] [address] ICD-10-CM: I50.31  ICD-9-CM: 428. 9         Chronic kidney disease, stage IV (severe) ([address] ICD-10-CM: N18.4  ICD-9-CM: 917. 4         Chronic kidney disease (CKD), stage V ([facility] [[id number]] ICD-10-CM: N18.5  ICD-9-CM: 585.5         CKD (chronic kidney disease) stage 4, GFR 15-29 ml/min (HCC) ICD-10-CM: N18.4  ICD-9-CM: 016. 4         Vomiting ICD-10-CM: R11.10  ICD-9-CM: 787.03         CKD (chronic kidney disease) stage 3, GFR 30-59 ml/min (HCC) ICD-10-CM: N18.30  ICD-9-CM: 142. 3         Vaginal vault prolapse ICD-10-CM: N81.9  ICD-9-CM: 618.00         Type 2 diabetes mellitus with nephropathy (HCC) ICD-10-CM: E11.21  ICD-9-CM: 250.40, 583.81         History of hysterectomy including cervix ICD-10-CM: Z90.710  ICD-9-CM: V88.01         History of bilateral salpingo-oophorectomy ICD-10-CM: Z90.79, C28.627  ICD-9-CM: V45.77         Personal history of renal cancer ICD-10-CM: Z85.528  ICD-9-CM: V10.52         Bartholin cyst ICD-10-CM: N75.0  ICD-9-CM: 616.2         Diabetes mellitus (HCC) ICD-10-CM: E11.9  ICD-9-CM: 250.00         S/p nephrectomy, right ICD-10-CM: Z90.5  ICD-9-CM: V45.73     Overview Signed 12/30/2013 12:23 PM by Milton Gay     Renal cancer             Glucose intolerance (impaired glucose tolerance) ICD-10-CM: R73.02  ICD-9-CM: 790.22         Anemia ICD-10-CM: D64.9  ICD-9-CM: 285.9         Hypertension ICD-10-CM: I10  ICD-9-CM: 401.9         Hepatitis C ICD-10-CM: B19.20  ICD-9-CM: 070.70     Overview Signed 12/30/2013 12:24 PM by David Brady by Gelrund - stopped due to anemia from medications but also levels shows viral load undetectable despite incomplete treatment             Renal cancer ([facility] ICD-10-CM: C64.9  ICD-9-CM: 189.0         Hypothyroid ICD-10-CM: E03.9  ICD-9-CM: 244.9            Past Medical History:      has a past medical history of Arthritis, Bell's palsy, Cancer ([facility] [address] Chronic kidney disease (1996), Diabetes ([facility] [address] Diabetes mellitus ([facility] [address] (10/8/2014), Hepatitis C (2011), Hyperlipidemia, Hypertension, Ill-defined condition (1990s), Personal history of renal cancer (3/2/2015), Psychiatric disorder, Thyroid disease (1999), and Vertigo. Past Surgical History:      has a past surgical history that includes hx colonoscopy (3/5/12); colonoscopy (N/A, 6/20/2017); ir bx bone marrow diagnostic (04/05/2021); hx cyst removal; hx gyn (1980s); hx brent and bso (Bilateral, 1995); hx other surgical (1999); hx other surgical; hx other surgical (04/08/2019); hx other surgical (04/08/2019); hx breast reduction (Bilateral, 1996); hx urological (1996); and hx tonsillectomy. Home Medications:     Prior to Admission medications    Medication Sig Start Date End Date Taking? Authorizing Provider   potassium chloride (K-DUR, KLOR-CON M20) 20 mEq tablet Take 20 mEq by mouth daily. 8/9/22   Provider, Historical   metoprolol succinate (TOPROL-XL) 25 mg XL tablet metoprolol succinate ER 25 mg tablet,extended release 24 hr    Provider, Historical   glimepiride (AMARYL) 1 mg tablet Take 1 Tablet by mouth Daily (before breakfast). 8/19/22   Markos Pierson MD   atorvastatin (LIPITOR) 20 mg tablet Take 1 tablet by mouth once daily 7/1/22   Markos Pierson MD   glucose blood VI test strips (OneTouch Ultra Test) strip CHECK BLOOD SUGAR 2-3 TIMES A WEEK 6/24/22   Markos Pierson MD   sodium bicarbonate 650 mg tablet Take 650 mg by mouth three (3) times daily. Provider, Historical   levothyroxine (Euthyrox) 50 mcg tablet Take 1 Tablet by mouth Daily (before breakfast). 4/18/22   Markos Pierson MD   allopurinoL (ZYLOPRIM) 300 mg tablet Take 300 mg by mouth daily. Provider, Historical   SITagliptin (JANUVIA) 25 mg tablet Take 25 mg by mouth daily. Provider, Historical   b complex vitamins tablet Take 1 Tablet by mouth daily. Provider, Historical   furosemide (LASIX) 20 mg tablet Take 20 mg by mouth two (2) times a day. 10/19/21   Provider, Historical   flash glucose sensor (FreeStyle Peyton 2 Sensor) kit 1 Each by Does Not Apply route continuous.  11/19/21   Markos Pierson MD   flash glucose scanning reader (FreeStyle Peyton 2 Pooler) misc 1 Device by Does Not Apply route continuous. 21   Shilpa Bar MD   flaxseed oil (OMEGA 3 PO) Take  by mouth. Provider, Historical   acetaminophen (TYLENOL) 500 mg tablet Take 500-1,000 mg by mouth every six (6) hours as needed for Pain. Provider, Historical       Allergies/Social/Family History: Allergies   Allergen Reactions    Codeine Hives      Social History     Tobacco Use    Smoking status: Former     Packs/day: 0.25     Years: 8.00     Pack years: 2.00     Types: Cigarettes     Quit date: 1992     Years since quittin.4    Smokeless tobacco: Never    Tobacco comments:     quit smoking 30-40 yrs ago   Substance Use Topics    Alcohol use: No      Family History   Problem Relation Age of Onset    Diabetes Mother     Kidney Disease Mother     Heart Disease Father     Kidney Disease Father         dialysis    Diabetes Sister     Diabetes Daughter     Hypertension Daughter     Hypertension Son     No Known Problems Son     No Known Problems Brother     Anesth Problems Neg Hx             Objective:   Vital Signs:  Visit Vitals  BP (!) 142/76 (BP 1 Location: Left upper arm, BP Patient Position: At rest)   Pulse (!) 104   Temp 97.6 °F (36.4 °C)   Resp 18   Wt 86.1 kg (189 lb 13.1 oz)   LMP  (LMP Unknown)   SpO2 100%   BMI 32.58 kg/m²      O2 Device: None (Room air) Temp (24hrs), Av.2 °F (36.8 °C), Min:97.6 °F (36.4 °C), Max:98.9 °F (37.2 °C)           Intake/Output:     Intake/Output Summary (Last 24 hours) at 2022 1254  Last data filed at 2022 0820  Gross per 24 hour   Intake --   Output 1229 ml   Net -1229 ml       Physical Exam:   General:  Alert, cooperative, no distress, appears stated age. Head:  Normocephalic, without obvious abnormality, atraumatic. Eyes:  Conjunctivae/corneas clear. PERRL   Neck: Supple, symmetrical, no adenopathy, no carotid bruit and no JVD.    Lungs:   Diminished breath sounds over right infrascapular region   Chest wall:  No tenderness or deformity. Heart:  Regular rate and rhythm, S1-S2 normal, no murmur, no click, rub or gallop. Abdomen:   Distended abdomen, nontender   Extremities: Atraumatic, no cyanosis or edema. Pulses: Palpable   Skin: No rashes or lesions   Neurologic: Grossly nonfocal        LABS AND  DATA: Personally reviewed  Recent Labs     09/12/22 0011 09/11/22  0448   WBC 13.1* 12.7*   HGB 9.8* 10.3*   HCT 31.3* 33.0*    229     Recent Labs     09/12/22  0011 09/10/22  0357    142   K 3.5 3.7    108   CO2 26 25   BUN 45* 43*   CREA 8.90* 8.07*   * 182*   CA 9.6 9.6   MG  --  2.3   PHOS 5.9* 5.7*     Recent Labs     09/12/22  0011 09/10/22  0357   AP  --  87   TP  --  7.0   ALB 2.8* 3.0*   GLOB  --  4.0     No results for input(s): INR, PTP, APTT, INREXT in the last 72 hours. No results for input(s): PHI, PCO2I, PO2I, FIO2I in the last 72 hours. No results for input(s): CPK, CKMB, TROIQ, BNPP in the last 72 hours.     MEDS: Reviewed  Current Facility-Administered Medications   Medication Dose Route Frequency    allopurinoL (ZYLOPRIM) tablet 100 mg  100 mg Oral DAILY    gentamicin (GARAMYCIN) 0.1 % cream   Topical DIALYSIS PRN    atorvastatin (LIPITOR) tablet 20 mg  20 mg Oral QHS    levothyroxine (SYNTHROID) tablet 50 mcg  50 mcg Oral 7am    metoprolol succinate (TOPROL-XL) XL tablet 25 mg  25 mg Oral DAILY    sodium chloride (NS) flush 5-40 mL  5-40 mL IntraVENous Q8H    sodium chloride (NS) flush 5-40 mL  5-40 mL IntraVENous PRN    acetaminophen (TYLENOL) tablet 650 mg  650 mg Oral Q6H PRN    Or    acetaminophen (TYLENOL) suppository 650 mg  650 mg Rectal Q6H PRN    polyethylene glycol (MIRALAX) packet 17 g  17 g Oral DAILY PRN    ondansetron (ZOFRAN ODT) tablet 4 mg  4 mg Oral Q8H PRN    Or    ondansetron (ZOFRAN) injection 4 mg  4 mg IntraVENous Q6H PRN    heparin (porcine) injection 5,000 Units  5,000 Units SubCUTAneous Q8H    insulin lispro (HUMALOG) injection   SubCUTAneous AC&HS    glucose chewable tablet 16 g  4 Tablet Oral PRN    glucagon (GLUCAGEN) injection 1 mg  1 mg IntraMUSCular PRN    dextrose 10 % infusion 0-250 mL  0-250 mL IntraVENous PRN    hydrALAZINE (APRESOLINE) 20 mg/mL injection 10 mg  10 mg IntraVENous Q6H PRN    fentaNYL citrate (PF) injection 25 mcg  25 mcg IntraVENous Q4H PRN    cefTRIAXone (ROCEPHIN) 1 g in 0.9% sodium chloride 10 mL IV syringe  1 g IntraVENous Q24H    peritoneal dialysis DEXTROSE 2.5% (2.5 mEq/L low calcium) solution 6,000 mL  6,000 mL IntraPERitoneal DAILY    peritoneal dialysis DEXTROSE 2.5% (2.5 mEq/L low calcium) solution 6,000 mL  6,000 mL IntraPERitoneal DAILY       Chest Imaging: personally reviewed and report checked  Results from Hospital Encounter encounter on 09/08/22    XR SNIFF TEST    Narrative  INDICATION: Immobile diaphragm. COMPARISON: None. TECHNIQUE: Sniff test. Fluoroscopy utilized to visualize movement of the  diaphragm during patient breathing. FLUOROSCOPY TIME: 0.8 min  FLUOROSCOPY DOSE (air kerma): 2.3 mGy    FINDINGS:    Elevated right hemidiaphragm. Right-sided diaphragmatic excursion is  significantly diminished compared to the left. Normal left-sided diaphragmatic  movement. 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 Results from [place] encounter on 09/08/22    CT ABD PELV WO CONT    Narrative  EXAM: CT ABD PELV WO CONT    INDICATION: Abdominal pain, ascites    COMPARISON: 3/3/2013    IV CONTRAST: None. ORAL CONTRAST: None    TECHNIQUE:  Thin axial images were obtained through the abdomen and pelvis. Coronal and  sagittal reformats were generated. CT dose reduction was achieved through use of  a standardized protocol tailored for this examination and automatic exposure  control for dose modulation.     The absence of intravenous contrast material reduces the sensitivity for  evaluation of the vasculature and solid organs. FINDINGS:  LOWER THORAX: Atelectasis right base  LIVER: No mass. BILIARY TREE: Contrast within the gallbladder from previous study CBD is not  dilated. SPLEEN: within normal limits. PANCREAS: No focal abnormality. ADRENALS: Unremarkable. KIDNEYS/URETERS: Post right nephrectomy. Surgical clips in the nephrectomy bed. No recurrent mass. No left-sided hydronephrosis or stone. High density lesion  lower pole left kidney 13 mm is indeterminate but may represent a high density  cyst. It has enlarged  STOMACH: Unremarkable. SMALL BOWEL: No dilatation or wall thickening. COLON: Left-sided diverticulosis. No evidence of acute diverticulitis  APPENDIX: Normal  PERITONEUM: Small volume of ascites. The patient has a left mid abdominal  peritoneal dialysis catheter  RETROPERITONEUM: There are vascular calcifications. No aneurysm  REPRODUCTIVE ORGANS: Surgically absent  URINARY BLADDER: No mass or calculus. BONES: No destructive bone lesion. ABDOMINAL WALL: No mass or hernia. ADDITIONAL COMMENTS: N/A    Impression  1. Peritoneal dialysis catheter with small volume of free fluid within the  abdomen  2. Post right nephrectomy. No recurrent mass lesion within the nephrectomy bed  3. 13 mm high density lesion lower pole left kidney may represent a high density  cyst but is nonspecific        Tele- reviewed    Medical decision making:   I have reviewed the flowsheet and previous day's notes  Patient has acute or chronic illness that poses a threat to life or bodily function  Review and order of Clinical lab tests  Review and Order of Radiology tests  Independent visualization of Image          Thank you for allowing me to participate in this patient's care.     Shelli Shelby MD      [facility] Emergency Department

## 2022-09-13 LAB
ALBUMIN SERPL-MCNC: 3 G/DL (ref 3.5–5)
ANION GAP SERPL CALC-SCNC: 9 MMOL/L (ref 5–15)
BUN SERPL-MCNC: 37 MG/DL (ref 6–20)
BUN/CREAT SERPL: 4 (ref 12–20)
CALCIUM SERPL-MCNC: 9.3 MG/DL (ref 8.5–10.1)
CHLORIDE SERPL-SCNC: 106 MMOL/L (ref 97–108)
CO2 SERPL-SCNC: 28 MMOL/L (ref 21–32)
CREAT SERPL-MCNC: 8.45 MG/DL (ref 0.55–1.02)
ERYTHROCYTE [DISTWIDTH] IN BLOOD BY AUTOMATED COUNT: 17 % (ref 11.5–14.5)
GLUCOSE BLD STRIP.AUTO-MCNC: 130 MG/DL (ref 65–117)
GLUCOSE BLD STRIP.AUTO-MCNC: 148 MG/DL (ref 65–117)
GLUCOSE BLD STRIP.AUTO-MCNC: 154 MG/DL (ref 65–117)
GLUCOSE BLD STRIP.AUTO-MCNC: 165 MG/DL (ref 65–117)
GLUCOSE BLD STRIP.AUTO-MCNC: 169 MG/DL (ref 65–117)
GLUCOSE SERPL-MCNC: 113 MG/DL (ref 65–100)
HCT VFR BLD AUTO: 31.7 % (ref 35–47)
HGB BLD-MCNC: 10.2 G/DL (ref 11.5–16)
MCH RBC QN AUTO: 29.6 PG (ref 26–34)
MCHC RBC AUTO-ENTMCNC: 32.2 G/DL (ref 30–36.5)
MCV RBC AUTO: 91.9 FL (ref 80–99)
NRBC # BLD: 0 K/UL (ref 0–0.01)
NRBC BLD-RTO: 0 PER 100 WBC
PHOSPHATE SERPL-MCNC: 6 MG/DL (ref 2.6–4.7)
PLATELET # BLD AUTO: 206 K/UL (ref 150–400)
PMV BLD AUTO: 11.7 FL (ref 8.9–12.9)
POTASSIUM SERPL-SCNC: 3.5 MMOL/L (ref 3.5–5.1)
RBC # BLD AUTO: 3.45 M/UL (ref 3.8–5.2)
SERVICE CMNT-IMP: ABNORMAL
SODIUM SERPL-SCNC: 143 MMOL/L (ref 136–145)
WBC # BLD AUTO: 14.5 K/UL (ref 3.6–11)

## 2022-09-13 PROCEDURE — 74011636637 HC RX REV CODE- 636/637: Performed by: INTERNAL MEDICINE

## 2022-09-13 PROCEDURE — 90945 DIALYSIS ONE EVALUATION: CPT

## 2022-09-13 PROCEDURE — 74011250637 HC RX REV CODE- 250/637: Performed by: FAMILY MEDICINE

## 2022-09-13 PROCEDURE — 74011250637 HC RX REV CODE- 250/637: Performed by: INTERNAL MEDICINE

## 2022-09-13 PROCEDURE — 74011250636 HC RX REV CODE- 250/636: Performed by: INTERNAL MEDICINE

## 2022-09-13 PROCEDURE — 82962 GLUCOSE BLOOD TEST: CPT

## 2022-09-13 PROCEDURE — A4726 DIALYS SOL FLD VOL > 5999CC: HCPCS | Performed by: INTERNAL MEDICINE

## 2022-09-13 PROCEDURE — 65270000046 HC RM TELEMETRY

## 2022-09-13 PROCEDURE — 74011000250 HC RX REV CODE- 250: Performed by: INTERNAL MEDICINE

## 2022-09-13 PROCEDURE — 94760 N-INVAS EAR/PLS OXIMETRY 1: CPT

## 2022-09-13 PROCEDURE — 36415 COLL VENOUS BLD VENIPUNCTURE: CPT

## 2022-09-13 PROCEDURE — 80069 RENAL FUNCTION PANEL: CPT

## 2022-09-13 PROCEDURE — 85027 COMPLETE CBC AUTOMATED: CPT

## 2022-09-13 RX ADMIN — ALLOPURINOL 100 MG: 100 TABLET ORAL at 08:43

## 2022-09-13 RX ADMIN — SODIUM CHLORIDE, PRESERVATIVE FREE 10 ML: 5 INJECTION INTRAVENOUS at 06:32

## 2022-09-13 RX ADMIN — HEPARIN SODIUM 5000 UNITS: 5000 INJECTION INTRAVENOUS; SUBCUTANEOUS at 11:47

## 2022-09-13 RX ADMIN — SODIUM CHLORIDE, PRESERVATIVE FREE 10 ML: 5 INJECTION INTRAVENOUS at 22:26

## 2022-09-13 RX ADMIN — METOPROLOL SUCCINATE 25 MG: 25 TABLET, EXTENDED RELEASE ORAL at 08:43

## 2022-09-13 RX ADMIN — Medication 2 UNITS: at 08:42

## 2022-09-13 RX ADMIN — SODIUM CHLORIDE, SODIUM LACTATE, CALCIUM CHLORIDE, MAGNESIUM CHLORIDE AND DEXTROSE 6000 ML: 2.5; 538; 448; 18.3; 5.08 INJECTION, SOLUTION INTRAPERITONEAL at 16:06

## 2022-09-13 RX ADMIN — ACETAMINOPHEN 650 MG: 325 TABLET ORAL at 22:26

## 2022-09-13 RX ADMIN — SODIUM CHLORIDE, SODIUM LACTATE, CALCIUM CHLORIDE, MAGNESIUM CHLORIDE AND DEXTROSE 6000 ML: 2.5; 538; 448; 18.3; 5.08 INJECTION, SOLUTION INTRAPERITONEAL at 16:07

## 2022-09-13 RX ADMIN — HEPARIN SODIUM 5000 UNITS: 5000 INJECTION INTRAVENOUS; SUBCUTANEOUS at 19:06

## 2022-09-13 RX ADMIN — HYDRALAZINE HYDROCHLORIDE 10 MG: 20 INJECTION, SOLUTION INTRAMUSCULAR; INTRAVENOUS at 03:36

## 2022-09-13 RX ADMIN — SODIUM CHLORIDE, PRESERVATIVE FREE 10 ML: 5 INJECTION INTRAVENOUS at 14:23

## 2022-09-13 RX ADMIN — GENTAMICIN SULFATE: 1 CREAM TOPICAL at 16:07

## 2022-09-13 RX ADMIN — LEVOTHYROXINE SODIUM 50 MCG: 0.05 TABLET ORAL at 06:46

## 2022-09-13 RX ADMIN — Medication 2 UNITS: at 12:29

## 2022-09-13 RX ADMIN — ATORVASTATIN CALCIUM 20 MG: 20 TABLET, FILM COATED ORAL at 22:26

## 2022-09-13 RX ADMIN — HEPARIN SODIUM 5000 UNITS: 5000 INJECTION INTRAVENOUS; SUBCUTANEOUS at 03:36

## 2022-09-13 NOTE — PROGRESS NOTES
Problem: Pressure Injury - Risk of  Goal: *Prevention of pressure injury  Description: Document Ford Scale and appropriate interventions in the flowsheet. Outcome: Progressing Towards Goal  Note: Pressure Injury Interventions:  Sensory Interventions: Assess changes in LOC, Discuss PT/OT consult with provider, Maintain/enhance activity level, Monitor skin under medical devices, Turn and reposition approx. every two hours (pillows and wedges if needed)    Moisture Interventions: Absorbent underpads, Apply protective barrier, creams and emollients, Check for incontinence Q2 hours and as needed    Activity Interventions: Increase time out of bed    Mobility Interventions: HOB 30 degrees or less    Nutrition Interventions: Document food/fluid/supplement intake                     Problem: Patient Education: Go to Patient Education Activity  Goal: Patient/Family Education  Outcome: Progressing Towards Goal     Problem: Falls - Risk of  Goal: *Absence of Falls  Description: Document Laurie Fall Risk and appropriate interventions in the flowsheet. Outcome: Progressing Towards Goal  Note: Fall Risk Interventions:  Mobility Interventions: Bed/chair exit alarm, Strengthening exercises (ROM-active/passive)         Medication Interventions: Bed/chair exit alarm, Patient to call before getting OOB, Teach patient to arise slowly    Elimination Interventions: Bed/chair exit alarm, Call light in reach, Elevated toilet seat, Patient to call for help with toileting needs, Stay With Me (per policy), Toilet paper/wipes in reach, Toileting schedule/hourly rounds              Problem: Patient Education: Go to Patient Education Activity  Goal: Patient/Family Education  Outcome: Progressing Towards Goal     Problem: Diabetes Self-Management  Goal: *Disease process and treatment process  Description: Define diabetes and identify own type of diabetes; list 3 options for treating diabetes.   Outcome: Progressing Towards Goal  Goal: *Incorporating nutritional management into lifestyle  Description: Describe effect of type, amount and timing of food on blood glucose; list 3 methods for planning meals. Outcome: Progressing Towards Goal  Goal: *Incorporating physical activity into lifestyle  Description: State effect of exercise on blood glucose levels. Outcome: Progressing Towards Goal  Goal: *Developing strategies to promote health/change behavior  Description: Define the ABC's of diabetes; identify appropriate screenings, schedule and personal plan for screenings. Outcome: Progressing Towards Goal  Goal: *Using medications safely  Description: State effect of diabetes medications on diabetes; name diabetes medication taking, action and side effects. Outcome: Progressing Towards Goal  Goal: *Monitoring blood glucose, interpreting and using results  Description: Identify recommended blood glucose targets  and personal targets. Outcome: Progressing Towards Goal  Goal: *Prevention, detection, treatment of acute complications  Description: List symptoms of hyper- and hypoglycemia; describe how to treat low blood sugar and actions for lowering  high blood glucose level. Outcome: Progressing Towards Goal  Goal: *Prevention, detection and treatment of chronic complications  Description: Define the natural course of diabetes and describe the relationship of blood glucose levels to long term complications of diabetes.   Outcome: Progressing Towards Goal  Goal: *Developing strategies to address psychosocial issues  Description: Describe feelings about living with diabetes; identify support needed and support network  Outcome: Progressing Towards Goal  Goal: *Insulin pump training  Outcome: Progressing Towards Goal  Goal: *Sick day guidelines  Outcome: Progressing Towards Goal  Goal: *Patient Specific Goal (EDIT GOAL, INSERT TEXT)  Outcome: Progressing Towards Goal     Problem: Patient Education: Go to Patient Education Activity  Goal: Patient/Family Education  Outcome: Progressing Towards Goal

## 2022-09-13 NOTE — PROGRESS NOTES
[address] [facility]  Hospitalist Group                                                                                          Hospitalist Progress Note  Socorro Hutchison MD  Answering service: [phone] OR [phone] from in house phone        Date of Service:  2022  NAME:  Marium Bear  :  1945  MRN:  [id number]      Admission Summary: This is a 67-year-old woman with a past medical history significant for end-stage renal disease on peritoneal dialysis, type 2 diabetes, hypertension, dyslipidemia, hypothyroidism, renal cancer, who presented at the emergency room with shortness of breath. This has been going on for a couple of weeks. The shortness of breath is progressive, worse with mild exertion such as walking, also worse when the patient is lying down flat. No associated fever, rigors, or chills. The shortness of breath is associated with cough. The patient was sent to the emergency room from the dialysis center because of the worsening respiratory symptoms. When the patient arrived at the emergency room, chest x-ray was obtained. The chest x-ray shows right pleural effusion which is unchanged from prior study. The patient also has elevated BNP level and she was referred to the hospitalist service for admission. Echocardiogram done in 2022 shows ejection fraction of 60% to 65%. The patient also recently had a CT scan of the chest done without contrast which shows moderate to large amount of ascites as well as new right thyroid nodule. Her blood pressure was markedly elevated on arrival at the emergency room. The patient stated that she was told to stop taking all antihypertensive medication when she started the peritoneal dialysis. The patient was last admitted to the hospitalist service from 2020 to 2020. The patient was admitted and treated for acute gastroenteritis.        Interval history / Subjective:   Patient seen and examined  Reviewed her vitals, labs, scans, and care plan  Cardiology, nephrology, pulmonary have been consulted     Assessment & Plan:      1. Acute heart failure with preserved ejection fraction. - elevated BNP-    cardiology consulted for eval and tx recs  2. End-stage renal disease on PD- management per nephrology  - reduced volumes with pd seems to have helped some  3. Anemia of Chronic dz- monitor Hb-low/stable  4. Type 2 diabetes. Accuchecks and ss insulin  5. HTN- improved with current treatments  6. Leukocytosis- monitor- stable mildly elevated   No clear source of infection- stop antibiotics. 7.  Thyroid nodule- ultrasound done- patient reports nodules previously biopsied. 8.  Abdominal ascites- improved with nephrology changing to decreased volume of PD  9. Dyslipidemia. - resumed statin med  10. Hypothyroidism- cont levothyroxine  11. Shortness of breath- - SNIFF test showing dysfunction of right hemidiaphragm   - appreciate recs from pulmonary. Code status: Full Code  Prophylaxis: SCDs  Care Plan discussed with:   Anticipated Disposition:      Hospital Problems  Date Reviewed: 9/9/2022            Codes Class Noted POA    * (Principal) Acute heart failure with preserved ejection fraction ([facility] [address] ICD-10-CM: I50.31  ICD-9-CM: 428.9  9/9/2022 Yes         Review of Systems:   A comprehensive review of systems was negative except for that written in the HPI. Vital Signs:    Last 24hrs VS reviewed since prior progress note.  Most recent are:  Visit Vitals  BP (!) 169/86 (BP 1 Location: Left upper arm)   Pulse (!) (P) 106   Temp 97.3 °F (36.3 °C)   Resp 20   Wt 86.1 kg (189 lb 13.1 oz)   SpO2 100%   BMI 32.58 kg/m²     Patient Vitals for the past 24 hrs:   Temp Pulse Resp BP SpO2   09/12/22 2000 -- (!) (P) 106 -- -- --   09/12/22 1900 97.3 °F (36.3 °C) (!) 108 20 (!) 169/86 100 %   09/12/22 1800 -- (!) 106 -- -- --   09/12/22 1455 -- 99 19 137/88 96 %   09/12/22 1400 -- (!) 102 -- -- --   09/12/22 1200 -- (!) 104 -- -- --   09/12/22 1106 97.6 °F (36.4 °C) (!) 104 18 (!) 142/76 100 %   09/12/22 1000 -- 99 -- -- --   09/12/22 0643 98 °F (36.7 °C) (!) 105 18 (!) 140/96 99 %   09/12/22 0600 -- (!) 102 -- -- --   09/12/22 0400 -- (!) 101 -- -- --   09/12/22 0357 98.1 °F (36.7 °C) (!) 105 19 (!) 156/91 100 %   09/12/22 0200 -- (!) 107 -- -- --   09/12/22 0005 98.6 °F (37 °C) (!) 110 17 (!) 153/93 92 %   09/11/22 2228 -- -- -- (!) 145/88 --   09/11/22 2226 98.9 °F (37.2 °C) (!) 109 14 (!) 135/103 96 %   09/11/22 2158 -- (!) 107 -- -- --          Intake/Output Summary (Last 24 hours) at 9/12/2022 2142  Last data filed at 9/12/2022 0820  Gross per 24 hour   Intake --   Output 1229 ml   Net -1229 ml          Physical Examination:     I had a face to face encounter with this patient and independently examined them on 9/12/2022 as outlined below:          Constitutional:  No acute distress, cooperative, pleasant    ENT:  Oral mucosa moist, oropharynx benign. Resp:  Decreased breath sounds R>L. No wheezing/rhonchi/rales. CV:  tachycardia, no murmurs, gallops, rubs    GI:  Soft, non distended, non tender. normoactive bowel sounds, no hepatosplenomegaly     Musculoskeletal:  Bilat LE edema, warm, 2+ pulses throughout    Neurologic:  Moves all extremities. AAOx3, CN II-XII reviewed            Data Review:    Review and/or order of clinical lab test  Review and/or order of tests in the radiology section of CPT  Review and/or order of tests in the medicine section of CPT    Sniff test. Fluoroscopy utilized to visualize movement of the  diaphragm during patient breathing. FLUOROSCOPY TIME: 0.8 min  FLUOROSCOPY DOSE (air kerma): 2.3 mGy     FINDINGS:      Elevated right hemidiaphragm. Right-sided diaphragmatic excursion is  significantly diminished compared to the left. Normal left-sided diaphragmatic  movement.      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 No results for input(s): FE, TIBC, PSAT, FERR in the last 72 hours. Lab Results   Component Value Date/Time    Folate 39.7 (H) 09/09/2022 12:37 PM        No results for input(s): PH, PCO2, PO2 in the last 72 hours. No results for input(s): CPK, CKNDX, TROIQ in the last 72 hours.     No lab exists for component: CPKMB  Lab Results   Component Value Date/Time    Cholesterol, total 158 05/19/2022 09:44 AM    HDL Cholesterol 56 05/19/2022 09:44 AM    LDL, calculated 71.2 05/19/2022 09:44 AM    Triglyceride 154 (H) 05/19/2022 09:44 AM    CHOL/HDL Ratio 2.8 05/19/2022 09:44 AM     Lab Results   Component Value Date/Time    Glucose (POC) 229 (H) 09/12/2022 09:39 PM    Glucose (POC) 103 09/12/2022 05:14 PM    Glucose (POC) 231 (H) 09/12/2022 11:02 AM    Glucose (POC) 185 (H) 09/12/2022 08:44 AM    Glucose (POC) 195 (H) 09/11/2022 10:32 PM     Lab Results   Component Value Date/Time    Color YELLOW/STRAW 02/22/2020 11:30 AM    Appearance CLEAR 02/22/2020 11:30 AM    Specific gravity 1.019 02/22/2020 11:30 AM    pH (UA) 7.0 02/22/2020 11:30 AM    Protein >300 (A) 02/22/2020 11:30 AM    Glucose 100 (A) 02/22/2020 11:30 AM    Ketone NEGATIVE  02/22/2020 11:30 AM    Bilirubin NEGATIVE  02/22/2020 11:30 AM    Urobilinogen 0.2 02/22/2020 11:30 AM    Nitrites NEGATIVE  02/22/2020 11:30 AM    Leukocyte Esterase NEGATIVE  02/22/2020 11:30 AM Epithelial cells MODERATE (A) 02/22/2020 11:30 AM    Bacteria NEGATIVE  02/22/2020 11:30 AM    WBC 0-4 02/22/2020 11:30 AM    RBC 0-5 02/22/2020 11:30 AM         Medications Reviewed:     Current Facility-Administered Medications   Medication Dose Route Frequency    allopurinoL (ZYLOPRIM) tablet 100 mg  100 mg Oral DAILY    gentamicin (GARAMYCIN) 0.1 % cream   Topical DIALYSIS PRN    atorvastatin (LIPITOR) tablet 20 mg  20 mg Oral QHS    levothyroxine (SYNTHROID) tablet 50 mcg  50 mcg Oral 7am    metoprolol succinate (TOPROL-XL) XL tablet 25 mg  25 mg Oral DAILY    sodium chloride (NS) flush 5-40 mL  5-40 mL IntraVENous Q8H    sodium chloride (NS) flush 5-40 mL  5-40 mL IntraVENous PRN    acetaminophen (TYLENOL) tablet 650 mg  650 mg Oral Q6H PRN    Or    acetaminophen (TYLENOL) suppository 650 mg  650 mg Rectal Q6H PRN    polyethylene glycol (MIRALAX) packet 17 g  17 g Oral DAILY PRN    ondansetron (ZOFRAN ODT) tablet 4 mg  4 mg Oral Q8H PRN    Or    ondansetron (ZOFRAN) injection 4 mg  4 mg IntraVENous Q6H PRN    heparin (porcine) injection 5,000 Units  5,000 Units SubCUTAneous Q8H    insulin lispro (HUMALOG) injection   SubCUTAneous AC&HS    glucose chewable tablet 16 g  4 Tablet Oral PRN    glucagon (GLUCAGEN) injection 1 mg  1 mg IntraMUSCular PRN    dextrose 10 % infusion 0-250 mL  0-250 mL IntraVENous PRN    hydrALAZINE (APRESOLINE) 20 mg/mL injection 10 mg  10 mg IntraVENous Q6H PRN    fentaNYL citrate (PF) injection 25 mcg  25 mcg IntraVENous Q4H PRN    peritoneal dialysis DEXTROSE 2.5% (2.5 mEq/L low calcium) solution 6,000 mL  6,000 mL IntraPERitoneal DAILY    peritoneal dialysis DEXTROSE 2.5% (2.5 mEq/L low calcium) solution 6,000 mL  6,000 mL IntraPERitoneal DAILY     ______________________________________________________________________  EXPECTED LENGTH OF STAY: 4d 2h  ACTUAL LENGTH OF STAY:          3                 Miles Ceron MD

## 2022-09-13 NOTE — PROGRESS NOTES
[address] [place]. [place], [address]   Tel.  [phone]   Fax. [address] [facility] [address]   [place], [address] [facility]   Tel.  [phone]   Fax. [phone] [address]   Tel.  [phone]   Fax. [phone]         Subjective:     Misty Riddle is a 69y.o. year old female seen in-patient for sleep evaluation in collaboration with the Heart Failure Nurse Navigator. She reports she has experienced excessive daytime sleepiness for several years and it has worsened. The sleepiness is described as being moderate, she has been taking naps during the day. The patient notes that she will experience frequent awakening from sleep. In general she is able to return to sleep after awakening, she tends to awaken spontaneously. The patient has not undergone diagnostic testing for the current problems. Active Problems List   CHF with an EF of 60-65%  ESRD on PD  HTN  DM2  BMI 32    Assessment:     [place] Sleepiness Score: 15     Stop Bang 9/13/2022 6/2/2022 5/26/2022 4/1/2019   Does the patient snore loudly (louder than talking or loud enough to be heard through closed doors)? 1 0 0 0   Does the patient often feel tired, fatigued, or sleepy during the daytime, even after a \"good\" night's sleep? 1 0 0 0   Has anyone ever observed the patient stop breathing during their sleep?  0 0 0 0   Does the patient have or are they being treated for high blood pressure? 1 1 1 1   Is the patient's BMI greater than 35? 0 0 0 0   Is your neck circumference greater than 17 inches (Male) or 16 inches (Female)? 1 0 0 0   Is the patient older than 48? 1 1 1 1   Is the patient male? 0 0 0 0   TYLER Score 5 2 2 2       Plan:     The patient currently has significant risk for having sleep apnea. Patient will be scheduled for new patient sleep consult with Dr. Tez Pichardo 10/17/22.        She was provided information on the correlation between sleep apnea and heart failure. Corresponding risk factors for sleep apnea and the importance of proper treatment were reviewed. Reviewed how treating sleep apnea can help improve heart function     The patient was counseled regarding proper sleep hygiene, with emphasis on ensuring sufficient total sleep time; safe driving and the benefits of exercise and weight loss. All of her questions were addressed. LIBERTAD Goldman  Electronically signed.  09/13/22

## 2022-09-13 NOTE — PROGRESS NOTES
RENAL  PROGRESS NOTE        Subjective:   Feels ok  Objective:   VITALS SIGNS:    Visit Vitals  BP (!) 141/75 (BP 1 Location: Left upper arm, BP Patient Position: At rest)   Pulse 99   Temp 97.7 °F (36.5 °C)   Resp 19   Wt 85 kg (187 lb 6.3 oz)   LMP  (LMP Unknown)   SpO2 100%   BMI 32.17 kg/m²       O2 Device: None (Room air)       Temp (24hrs), Av °F (36.7 °C), Min:97.3 °F (36.3 °C), Max:98.7 °F (37.1 °C)         PHYSICAL EXAM:  NAD    DATA REVIEW:     INTAKE / OUTPUT:   Last shift:      No intake/output data recorded. Last 3 shifts:  1901 -  0700  In: -   Out: 1643     Intake/Output Summary (Last 24 hours) at 2022 0913  Last data filed at 2022 0334  Gross per 24 hour   Intake --   Output 414 ml   Net -414 ml         LABS:   Recent Labs     22  0342 22  0011 22  0448   WBC 14.5* 13.1* 12.7*   HGB 10.2* 9.8* 10.3*   HCT 31.7* 31.3* 33.0*    205 229       Recent Labs     22  0011      K 3.5      CO2 26   *   BUN 45*   CREA 8.90*   CA 9.6   PHOS 5.9*   ALB 2.8*             Assessment:      ESRD: CCPD. Followed by Dr. Susan Boateng  SOB/POSEY: elevated right hemidiaphragm on CXR.     HTN:   High density lesion on left kidney;need to be followed as OP   DM2     Plan:         Continue CCPD orders to lower fill volume of 2L and change bags to 2.5% Dianeal bags;good UF   Pulm to see     Will follow   Ford Puente MD

## 2022-09-13 NOTE — PROGRESS NOTES
PULMONARY MEDICINE    Progress Note    Name: Yesenia Shaffer   : 1945   MRN: [id number]   Date: 2022      Subjective:     Intermittent dyspnea     Consult Note:   Requesting Physician: Dr. Iqra Cerda  Reason for consult: Shortness of breath with suspected right paralyzed diaphragm    Medical records and data reviewed. Patient is a 69 y.o. female who presented to the hospital with shortness of breath for several days. She reports shortness of breath was episodic prior to presentation occurring at rest as well as with exertion. She also noted worsening symptoms in supine position. She had mild cough that seems to have improved. She was noted to have controlled hypertension on presentation. She has underlying history of end-stage renal disease and manages clearance with peritoneal dialysis. Chest imaging that was done showed elevated right hemidiaphragm with compressive atelectasis along with moderate to large ascites compatible with her history of peritoneal dialysis. There was no evidence of mediastinal mass or mediastinal lymphadenopathy. Patient has not had a major surgical procedure to the chest.  She did have a history of fall approximately a month ago but did not injure her chest.  She denies recent history of shingles. Last chest x-ray available for comparison in the system is from . She does not recall interim chest x-rays. X-ray that was done in 2013 had not shown elevated diaphragm. She has had a history of right-sided nephrectomy around that time. CT scan had also shown other abnormalities like a thyroid nodule and renal cysts. Is a remote smoker but does not have a known history of obstructive lung disease. She does snore occasionally and has not been previously investigated for underlying sleep apnea. She currently appears to be comfortable on room air. Echocardiogram has shown evidence of left ventricular diastolic dysfunction.   Sniff test that was done under fluoroscopy showed diminished excursion of right diaphragm with breathing.     Review of Systems:     A comprehensive 12 system review of systems was negative except for as documented in HPI    Assessment:     Shortness of breath, multifactorial  Elevated right hemidiaphragm, there appears to be evidence of initial excursion with breathing effort suggestive of right diaphragmatic paralysis, etiology is unknown clear and undetermined  Associated tiny right effusion and basilar atelectasis  History of end-stage renal disease, peritoneal dialysis, moderate to large ascites  History of right nephrectomy with no evidence of recurrence of renal mass  Suspected sleep apnea  Previous history of tobacco use without a prior diagnosis of obstructive lung disease  Incidental finding of thyroid nodule    Recommendations:     She could benefit from outpatient work-up including PFTs and polysomnogram at our office  Incentive spirometer  Optimization of volume status and systemic blood pressure could be helpful  There is no clear-cut etiology explaining right diaphragm paralysis, there are no interim x-rays between 2013 and now to assess chronicity of elevated right hemidiaphragm  Nocturnal CPAP could proved to be helpful to manage nighttime symptoms if she has sleep apnea  Reduction in intra-abdominal PD fill volume could also be helpful in reducing pressure on the right diaphragm in supine position - Nephrology following  Surgical fixation and repair of elevated diaphragm is an extensive procedure but can be considered if supportive care fails to improve symptoms -could consider referral to thoracic surgery as an outpatient  Discussed with patient, we will arrange outpatient follow-up  We will be available again to see if needed       Active Problem List:     Problem List  Date Reviewed: 9/9/2022            Codes Class    * (Principal) Acute heart failure with preserved ejection fraction ([facility] ICD-10-CM: I50.31  ICD-9-CM: 428.9 Chronic kidney disease, stage IV (severe) ([facility] ICD-10-CM: N18.4  ICD-9-CM: 441. 4         Chronic kidney disease (CKD), stage V ([facility] [[id number]] ICD-10-CM: N18.5  ICD-9-CM: 585.5         CKD (chronic kidney disease) stage 4, GFR 15-29 ml/min (HCC) ICD-10-CM: N18.4  ICD-9-CM: 202. 4         Vomiting ICD-10-CM: R11.10  ICD-9-CM: 787.03         CKD (chronic kidney disease) stage 3, GFR 30-59 ml/min (HCC) ICD-10-CM: N18.30  ICD-9-CM: 872. 3         Vaginal vault prolapse ICD-10-CM: N81.9  ICD-9-CM: 618.00         Type 2 diabetes mellitus with nephropathy (HCC) ICD-10-CM: E11.21  ICD-9-CM: 250.40, 583.81         History of hysterectomy including cervix ICD-10-CM: Z90.710  ICD-9-CM: V88.01         History of bilateral salpingo-oophorectomy ICD-10-CM: Z90.79, J98.633  ICD-9-CM: V45.77         Personal history of renal cancer ICD-10-CM: Z85.528  ICD-9-CM: V10.52         Bartholin cyst ICD-10-CM: N75.0  ICD-9-CM: 616.2         Diabetes mellitus (HCC) ICD-10-CM: E11.9  ICD-9-CM: 250.00         S/p nephrectomy, right ICD-10-CM: Z90.5  ICD-9-CM: V45.73     Overview Signed 12/30/2013 12:23 PM by Earnestine Irizarry     Renal cancer             Glucose intolerance (impaired glucose tolerance) ICD-10-CM: R73.02  ICD-9-CM: 790.22         Anemia ICD-10-CM: D64.9  ICD-9-CM: 285.9         Hypertension ICD-10-CM: I10  ICD-9-CM: 401.9         Hepatitis C ICD-10-CM: B19.20  ICD-9-CM: 070.70     Overview Signed 12/30/2013 12:24 PM by Tigist Mccall by Monalisa - stopped due to anemia from medications but also levels shows viral load undetectable despite incomplete treatment             Ren[facility] ([facility] ICD-10-CM: C64.9  ICD-9-CM: 189.0         Hypothyroid ICD-10-CM: E03.9  ICD-9-CM: 244.9          Past Medical History:      has a past medical history of Arthritis, Bell's palsy, Cancer ([facility] Chronic kidney disease (1996), Diabetes ([facility] Diabetes mellitus ([facility] (10/8/2014), Hepatitis C (2011), Hyperlipidemia, Hypertension, Ill-defined condition (1990s), Personal history of renal cancer (3/2/2015), Psychiatric disorder, Thyroid disease (1999), and Vertigo. Past Surgical History:      has a past surgical history that includes hx colonoscopy (3/5/12); colonoscopy (N/A, 6/20/2017); ir bx bone marrow diagnostic (04/05/2021); hx cyst removal; hx gyn (1980s); hx brent and bso (Bilateral, 1995); hx other surgical (1999); hx other surgical; hx other surgical (04/08/2019); hx other surgical (04/08/2019); hx breast reduction (Bilateral, 1996); hx urological (1996); and hx tonsillectomy. Home Medications:     Prior to Admission medications    Medication Sig Start Date End Date Taking? Authorizing Provider   potassium chloride (K-DUR, KLOR-CON M20) 20 mEq tablet Take 20 mEq by mouth daily. 8/9/22   Provider, Historical   metoprolol succinate (TOPROL-XL) 25 mg XL tablet metoprolol succinate ER 25 mg tablet,extended release 24 hr    Provider, Historical   glimepiride (AMARYL) 1 mg tablet Take 1 Tablet by mouth Daily (before breakfast). 8/19/22   Maureen Jha MD   atorvastatin (LIPITOR) 20 mg tablet Take 1 tablet by mouth once daily 7/1/22   Maureen Jha MD   glucose blood VI test strips (OneTouch Ultra Test) strip CHECK BLOOD SUGAR 2-3 TIMES A WEEK 6/24/22   Maureen Jha MD   sodium bicarbonate 650 mg tablet Take 650 mg by mouth three (3) times daily. Provider, Historical   levothyroxine (Euthyrox) 50 mcg tablet Take 1 Tablet by mouth Daily (before breakfast). 4/18/22   Maureen Jha MD   allopurinoL (ZYLOPRIM) 300 mg tablet Take 300 mg by mouth daily. Provider, Historical   SITagliptin (JANUVIA) 25 mg tablet Take 25 mg by mouth daily. Provider, Historical   b complex vitamins tablet Take 1 Tablet by mouth daily. Provider, Historical   furosemide (LASIX) 20 mg tablet Take 20 mg by mouth two (2) times a day.  10/19/21   Provider, Historical   flash glucose sensor (FreeStyle Peyton 2 Sensor) kit 1 Each by Does Not Apply route continuous. 21   Patria Michelle MD   flash glucose scanning reader [facility] 2 [place]) misc 1 Device by Does Not Apply route continuous. 21   Patria Michelle MD   flaxseed oil (OMEGA 3 PO) Take  by mouth. Provider, Historical   acetaminophen (TYLENOL) 500 mg tablet Take 500-1,000 mg by mouth every six (6) hours as needed for Pain. Provider, Historical       Allergies/Social/Family History: Allergies   Allergen Reactions    Codeine Hives      Social History     Tobacco Use    Smoking status: Former     Packs/day: 0.25     Years: 8.00     Pack years: 2.00     Types: Cigarettes     Quit date: 1992     Years since quittin.4    Smokeless tobacco: Never    Tobacco comments:     quit smoking 30-40 yrs ago   Substance Use Topics    Alcohol use: No      Family History   Problem Relation Age of Onset    Diabetes Mother     Kidney Disease Mother     Heart Disease Father     Kidney Disease Father         dialysis    Diabetes Sister     Diabetes Daughter     Hypertension Daughter     Hypertension Son     No Known Problems Son     No Known Problems Brother     Anesth Problems Neg Hx             Objective:   Vital Signs:  Visit Vitals  BP (!) 130/92 (BP 1 Location: Right upper arm, BP Patient Position: At rest;Sitting)   Pulse (!) 106   Temp 97.7 °F (36.5 °C)   Resp 19   Wt 85 kg (187 lb 6.3 oz)   LMP  (LMP Unknown)   SpO2 100%   BMI 32.17 kg/m²      O2 Device: None (Room air) Temp (24hrs), Av °F (36.7 °C), Min:97.3 °F (36.3 °C), Max:98.7 °F (37.1 °C)           Intake/Output:     Intake/Output Summary (Last 24 hours) at 2022 1243  Last data filed at 2022 0334  Gross per 24 hour   Intake --   Output 414 ml   Net -414 ml         Physical Exam:   General:  Alert, cooperative, no distress, appears stated age. Head:  Normocephalic, without obvious abnormality, atraumatic. Eyes:  Conjunctivae/corneas clear.  PERRL   Neck: Supple, symmetrical, no adenopathy, no carotid bruit and no JVD. Lungs:   Diminished breath sounds over right infrascapular region   Chest wall:  No tenderness or deformity. Heart:  Regular rate and rhythm, S1-S2 normal, no murmur, no click, rub or gallop. Abdomen:   Distended abdomen, nontender   Extremities: Atraumatic, no cyanosis or edema. Pulses: Palpable   Skin: No rashes or lesions   Neurologic: Grossly nonfocal        LABS AND  DATA: Personally reviewed  Recent Labs     09/13/22 0342 09/12/22  0011   WBC 14.5* 13.1*   HGB 10.2* 9.8*   HCT 31.7* 31.3*    205       Recent Labs     09/13/22  0342 09/12/22  0011    142   K 3.5 3.5    107   CO2 28 26   BUN 37* 45*   CREA 8.45* 8.90*   * 148*   CA 9.3 9.6   PHOS 6.0* 5.9*       Recent Labs     09/13/22 0342 09/12/22  0011   ALB 3.0* 2.8*       No results for input(s): INR, PTP, APTT, INREXT, INREXT in the last 72 hours. No results for input(s): PHI, PCO2I, PO2I, FIO2I in the last 72 hours. No results for input(s): CPK, CKMB, TROIQ, BNPP in the last 72 hours.     MEDS: Reviewed  Current Facility-Administered Medications   Medication Dose Route Frequency    allopurinoL (ZYLOPRIM) tablet 100 mg  100 mg Oral DAILY    gentamicin (GARAMYCIN) 0.1 % cream   Topical DIALYSIS PRN    atorvastatin (LIPITOR) tablet 20 mg  20 mg Oral QHS    levothyroxine (SYNTHROID) tablet 50 mcg  50 mcg Oral 7am    metoprolol succinate (TOPROL-XL) XL tablet 25 mg  25 mg Oral DAILY    sodium chloride (NS) flush 5-40 mL  5-40 mL IntraVENous Q8H    sodium chloride (NS) flush 5-40 mL  5-40 mL IntraVENous PRN    acetaminophen (TYLENOL) tablet 650 mg  650 mg Oral Q6H PRN    Or    acetaminophen (TYLENOL) suppository 650 mg  650 mg Rectal Q6H PRN    polyethylene glycol (MIRALAX) packet 17 g  17 g Oral DAILY PRN    ondansetron (ZOFRAN ODT) tablet 4 mg  4 mg Oral Q8H PRN    Or    ondansetron (ZOFRAN) injection 4 mg  4 mg IntraVENous Q6H PRN    heparin (porcine) injection 5,000 Units  5,000 Units SubCUTAneous Q8H insulin lispro (HUMALOG) injection   SubCUTAneous AC&HS    glucose chewable tablet 16 g  4 Tablet Oral PRN    glucagon (GLUCAGEN) injection 1 mg  1 mg IntraMUSCular PRN    dextrose 10 % infusion 0-250 mL  0-250 mL IntraVENous PRN    hydrALAZINE (APRESOLINE) 20 mg/mL injection 10 mg  10 mg IntraVENous Q6H PRN    fentaNYL citrate (PF) injection 25 mcg  25 mcg IntraVENous Q4H PRN    peritoneal dialysis DEXTROSE 2.5% (2.5 mEq/L low calcium) solution 6,000 mL  6,000 mL IntraPERitoneal DAILY    peritoneal dialysis DEXTROSE 2.5% (2.5 mEq/L low calcium) solution 6,000 mL  6,000 mL IntraPERitoneal DAILY       Chest Imaging: personally reviewed and report checked  Results from Hospital Encounter encounter on 09/08/22    XR SNIFF TEST    Narrative  INDICATION: Immobile diaphragm. COMPARISON: None. TECHNIQUE: Sniff test. Fluoroscopy utilized to visualize movement of the  diaphragm during patient breathing. FLUOROSCOPY TIME: 0.8 min  FLUOROSCOPY DOSE (air kerma): 2.3 mGy    FINDINGS:    Elevated right hemidiaphragm. Right-sided diaphragmatic excursion is  significantly diminished compared to the left. Normal left-sided diaphragmatic  movement. 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 Results from [place] encounter on 09/08/22    CT ABD PELV WO CONT    Narrative  EXAM: CT ABD PELV WO CONT    INDICATION: Abdominal pain, ascites    COMPARISON: 3/3/2013    IV CONTRAST: None. ORAL CONTRAST: None    TECHNIQUE:  Thin axial images were obtained through the abdomen and pelvis. Coronal and  sagittal reformats were generated. CT dose reduction was achieved through use of  a standardized protocol tailored for this examination and automatic exposure  control for dose modulation.     The absence of intravenous contrast material reduces the sensitivity for  evaluation of the vasculature and solid organs. FINDINGS:  LOWER THORAX: Atelectasis right base  LIVER: No mass. BILIARY TREE: Contrast within the gallbladder from previous study CBD is not  dilated. SPLEEN: within normal limits. PANCREAS: No focal abnormality. ADRENALS: Unremarkable. KIDNEYS/URETERS: Post right nephrectomy. Surgical clips in the nephrectomy bed. No recurrent mass. No left-sided hydronephrosis or stone. High density lesion  lower pole left kidney 13 mm is indeterminate but may represent a high density  cyst. It has enlarged  STOMACH: Unremarkable. SMALL BOWEL: No dilatation or wall thickening. COLON: Left-sided diverticulosis. No evidence of acute diverticulitis  APPENDIX: Normal  PERITONEUM: Small volume of ascites. The patient has a left mid abdominal  peritoneal dialysis catheter  RETROPERITONEUM: There are vascular calcifications. No aneurysm  REPRODUCTIVE ORGANS: Surgically absent  URINARY BLADDER: No mass or calculus. BONES: No destructive bone lesion. ABDOMINAL WALL: No mass or hernia. ADDITIONAL COMMENTS: N/A    Impression  1. Peritoneal dialysis catheter with small volume of free fluid within the  abdomen  2. Post right nephrectomy. No recurrent mass lesion within the nephrectomy bed  3. 13 mm high density lesion lower pole left kidney may represent a high density  cyst but is nonspecific        Tele- reviewed    Medical decision making:   I have reviewed the flowsheet and previous day's notes  Patient has acute or chronic illness that poses a threat to life or bodily function  Review and order of Clinical lab tests  Review and Order of Radiology tests  Independent visualization of Image          Thank you for allowing me to participate in this patient's care.     Asa Wolfe PA-C      [facility]

## 2022-09-13 NOTE — DIALYSIS
Peritoneal Dialysis Disconnection / [phone]     Metrics   I-Drain: 1,300 mls   Total UF: 614 mls   Last Fill: 1,500 mls   Last Manual Drain: 0 mls   Net UF:         414 mls   Avg Dwell Time: 1 hr 35 mins   Lost Dwell Time: 11 mins   Alarms: None   Effluent: Clear/Yellow     Access   Type & Location: Left ABD PD Catheter   Comments: Dsg clean, dry and intact. Sterile mini cap applied & secured cath to abdomen. Dsg date: 9/12/22                                         Comments: At bedside to disconnect pt from [facility] tx. Orders, consent, notes, labs, and code status reviewed. Tx completed. Prior to aseptic disconnection 2 minute Alcavis scrub/soak performed to transfer set. Used cassette and bags discarded in biohazard bag. Education & pre/post report given to pt & primary RN.

## 2022-09-13 NOTE — DIALYSIS
Peritoneal Dialysis Initiation / [phone]     Orders   Therapy Time:  8.5 hrs   Cycles: 4   Fill Volume: 2000   Last Fill Volume: 1500   Total Volume: 9500 ml   Solution: Dextrose Dianeal bags 2.5% x 2      Access   Type & Location: Left lower abdomen   Comments: Prior to connection, one-minute Alcavis scrub performed, followed by one minute soak. Dsg change date:    09/13/22 exit site without redness or drainage                                Labs   HBsAg (Antigen) / date:             09/09/22 negative                                   HBsAb (Antibody) / date: 09/09/22 immune   Source: Connect care      Safety:   Time Out Done:   (Time) 1630   Consent obtained/signed: Chronic consent applies    Education: Procedural    Primary Nurse Rpt Pre:  Dock , RN    Primary Nurse Rpt Post: Dock , RN      Comments:   Pt orders, notes, labs, code status reviewed. Start time: 1366  Estimated End Time: 0115 09/14/22    Remained present during initial drain followed by initiation of first fill. Prior to departure, bed in lowest position, call bell and personal belongings within reach.

## 2022-09-14 VITALS
SYSTOLIC BLOOD PRESSURE: 155 MMHG | BODY MASS INDEX: 32.17 KG/M2 | WEIGHT: 187.39 LBS | OXYGEN SATURATION: 100 % | DIASTOLIC BLOOD PRESSURE: 83 MMHG | TEMPERATURE: 98.1 F | HEART RATE: 95 BPM | RESPIRATION RATE: 23 BRPM

## 2022-09-14 LAB
ALBUMIN SERPL-MCNC: 2.8 G/DL (ref 3.5–5)
ANION GAP SERPL CALC-SCNC: 8 MMOL/L (ref 5–15)
BUN SERPL-MCNC: 37 MG/DL (ref 6–20)
BUN/CREAT SERPL: 4 (ref 12–20)
CALCIUM SERPL-MCNC: 9.7 MG/DL (ref 8.5–10.1)
CHLORIDE SERPL-SCNC: 106 MMOL/L (ref 97–108)
CO2 SERPL-SCNC: 28 MMOL/L (ref 21–32)
CREAT SERPL-MCNC: 8.42 MG/DL (ref 0.55–1.02)
ERYTHROCYTE [DISTWIDTH] IN BLOOD BY AUTOMATED COUNT: 17.2 % (ref 11.5–14.5)
GLUCOSE BLD STRIP.AUTO-MCNC: 165 MG/DL (ref 65–117)
GLUCOSE BLD STRIP.AUTO-MCNC: 227 MG/DL (ref 65–117)
GLUCOSE SERPL-MCNC: 157 MG/DL (ref 65–100)
HCT VFR BLD AUTO: 32.3 % (ref 35–47)
HGB BLD-MCNC: 10.3 G/DL (ref 11.5–16)
MCH RBC QN AUTO: 29.4 PG (ref 26–34)
MCHC RBC AUTO-ENTMCNC: 31.9 G/DL (ref 30–36.5)
MCV RBC AUTO: 92.3 FL (ref 80–99)
NRBC # BLD: 0 K/UL (ref 0–0.01)
NRBC BLD-RTO: 0 PER 100 WBC
PHOSPHATE SERPL-MCNC: 5.9 MG/DL (ref 2.6–4.7)
PLATELET # BLD AUTO: 230 K/UL (ref 150–400)
PMV BLD AUTO: 12 FL (ref 8.9–12.9)
POTASSIUM SERPL-SCNC: 3.2 MMOL/L (ref 3.5–5.1)
RBC # BLD AUTO: 3.5 M/UL (ref 3.8–5.2)
SERVICE CMNT-IMP: ABNORMAL
SERVICE CMNT-IMP: ABNORMAL
SODIUM SERPL-SCNC: 142 MMOL/L (ref 136–145)
WBC # BLD AUTO: 13.7 K/UL (ref 3.6–11)

## 2022-09-14 PROCEDURE — 74011000250 HC RX REV CODE- 250: Performed by: INTERNAL MEDICINE

## 2022-09-14 PROCEDURE — 74011636637 HC RX REV CODE- 636/637: Performed by: INTERNAL MEDICINE

## 2022-09-14 PROCEDURE — 74011250636 HC RX REV CODE- 250/636: Performed by: INTERNAL MEDICINE

## 2022-09-14 PROCEDURE — 74011250637 HC RX REV CODE- 250/637: Performed by: INTERNAL MEDICINE

## 2022-09-14 PROCEDURE — 94760 N-INVAS EAR/PLS OXIMETRY 1: CPT

## 2022-09-14 PROCEDURE — 36415 COLL VENOUS BLD VENIPUNCTURE: CPT

## 2022-09-14 PROCEDURE — 82962 GLUCOSE BLOOD TEST: CPT

## 2022-09-14 PROCEDURE — 74011250637 HC RX REV CODE- 250/637: Performed by: FAMILY MEDICINE

## 2022-09-14 PROCEDURE — 85027 COMPLETE CBC AUTOMATED: CPT

## 2022-09-14 PROCEDURE — 80069 RENAL FUNCTION PANEL: CPT

## 2022-09-14 RX ORDER — METOPROLOL SUCCINATE 50 MG/1
50 TABLET, EXTENDED RELEASE ORAL DAILY
Status: DISCONTINUED | OUTPATIENT
Start: 2022-09-14 | End: 2022-09-14 | Stop reason: HOSPADM

## 2022-09-14 RX ORDER — METOPROLOL SUCCINATE 50 MG/1
50 TABLET, EXTENDED RELEASE ORAL DAILY
Qty: 30 TABLET | Refills: 4 | Status: SHIPPED | OUTPATIENT
Start: 2022-09-15

## 2022-09-14 RX ORDER — POTASSIUM CHLORIDE 750 MG/1
40 TABLET, FILM COATED, EXTENDED RELEASE ORAL
Status: COMPLETED | OUTPATIENT
Start: 2022-09-14 | End: 2022-09-14

## 2022-09-14 RX ADMIN — METOPROLOL SUCCINATE 50 MG: 50 TABLET, EXTENDED RELEASE ORAL at 09:48

## 2022-09-14 RX ADMIN — Medication 2 UNITS: at 09:48

## 2022-09-14 RX ADMIN — SODIUM CHLORIDE, PRESERVATIVE FREE 10 ML: 5 INJECTION INTRAVENOUS at 06:55

## 2022-09-14 RX ADMIN — HEPARIN SODIUM 5000 UNITS: 5000 INJECTION INTRAVENOUS; SUBCUTANEOUS at 04:19

## 2022-09-14 RX ADMIN — HEPARIN SODIUM 5000 UNITS: 5000 INJECTION INTRAVENOUS; SUBCUTANEOUS at 12:09

## 2022-09-14 RX ADMIN — Medication 3 UNITS: at 12:08

## 2022-09-14 RX ADMIN — LEVOTHYROXINE SODIUM 50 MCG: 0.05 TABLET ORAL at 06:55

## 2022-09-14 RX ADMIN — ALLOPURINOL 100 MG: 100 TABLET ORAL at 09:48

## 2022-09-14 RX ADMIN — POTASSIUM CHLORIDE 40 MEQ: 750 TABLET, FILM COATED, EXTENDED RELEASE ORAL at 09:47

## 2022-09-14 NOTE — PROGRESS NOTES
RENAL  PROGRESS NOTE        Subjective:   Resting  Night was ok as per RN  VITALS SIGNS:    Visit Vitals  /80 (BP 1 Location: Right upper arm, BP Patient Position: At rest)   Pulse (!) 101   Temp 97.7 °F (36.5 °C)   Resp 25   Wt 85 kg (187 lb 6.3 oz)   LMP  (LMP Unknown)   SpO2 99%   BMI 32.17 kg/m²       O2 Device: None (Room air)       Temp (24hrs), Av °F (36.7 °C), Min:97.7 °F (36.5 °C), Max:98.3 °F (36.8 °C)         PHYSICAL EXAM:  NAD    DATA REVIEW:     INTAKE / OUTPUT:   Last shift:      No intake/output data recorded. Last 3 shifts:  1901 -  0700  In: -   Out: 455     Intake/Output Summary (Last 24 hours) at 2022 0847  Last data filed at 2022 0144  Gross per 24 hour   Intake --   Output 41 ml   Net -41 ml           LABS:   Recent Labs     22  0351 22  0342 22  0011   WBC 13.7* 14.5* 13.1*   HGB 10.3* 10.2* 9.8*   HCT 32.3* 31.7* 31.3*    206 205       Recent Labs     22  0351 22  0342 22  0011    143 142   K 3.2* 3.5 3.5    106 107   CO2 28 28 26   * 113* 148*   BUN 37* 37* 45*   CREA 8.42* 8.45* 8.90*   CA 9.7 9.3 9.6   PHOS 5.9* 6.0* 5.9*   ALB 2.8* 3.0* 2.8*             Assessment:      ESRD: [facility]CPD. Followed by Dr. Patito Davis  SOB/POSEY: elevated right hemidiaphragm on CXR.     HTN:   High density lesion on left kidney;need to be followed as OP   DM2     Plan:         Continue [facility] orders to lower fill volume of 2L and change bags to 2.5% Dianeal bags;good UF   Can dc from our standpoint  Will follow   Debra Gresham MD

## 2022-09-14 NOTE — PROGRESS NOTES
TRANSITION OF CARE  RUR--15%  Disposition--Home with family assist.  Transport--Family    CM gave patient second Medicare IM Letter which informed patient of the right to appeal the discharge. Patient signed the original which was given to the patient and a copy was placed on the chart. CM met with pttulio and her daughters. They requested general information regarding applying for Medicaid and acquiring DME such as a walker and bedside commode. CM provided the information requested.

## 2022-09-14 NOTE — PROGRESS NOTES
Attempted to schedule hospital follow up PCP appointment. Office /Nurse will contact the patient with appointment information. Pending patient discharge.  Chidi Smalls, Care Management Assistant

## 2022-09-14 NOTE — DIALYSIS
Peritoneal Dialysis Disconnection / [phone]     Metrics   I-Drain: 1,098 mls   Total UF: 443 mls   Last Fill: 1,500 mls   Last Manual Drain: 0 mls   Net UF:         41 mls   Avg Dwell Time: 1 hr 37 mins   Lost Dwell Time: 3 mins   Alarms: None   Effluent: Clear/Yellow     Access   Type & Location: Left ABD PD Catheter   Comments: Dsg clean, dry and intact. Sterile mini cap applied & secured cath to abdomen. Dsg date: 9/13/22                                         Comments: At bedside to disconnect pt from [facility] tx. Orders, consent, notes, labs, and code status reviewed. Tx completed. Prior to aseptic disconnection 2 minute Alcavis scrub/soak performed to transfer set. Used cassette and bags discarded in biohazard bag. Education & pre/post report given to pt & primary RN.

## 2022-09-14 NOTE — PROGRESS NOTES
I have reviewed discharge instructions with the patient. The patient verbalized understanding. Current Discharge Medication List        CONTINUE these medications which have CHANGED    Details   metoprolol succinate (TOPROL-XL) 50 mg XL tablet Take 1 Tablet by mouth daily. Qty: 30 Tablet, Refills: 4  Start date: 9/15/2022           CONTINUE these medications which have NOT CHANGED    Details   potassium chloride (K-DUR, KLOR-CON M20) 20 mEq tablet Take 20 mEq by mouth daily. glimepiride (AMARYL) 1 mg tablet Take 1 Tablet by mouth Daily (before breakfast). Qty: 90 Tablet, Refills: 3      atorvastatin (LIPITOR) 20 mg tablet Take 1 tablet by mouth once daily  Qty: 90 Tablet, Refills: 3    Associated Diagnoses: Mixed hyperlipidemia      glucose blood VI test strips (OneTouch Ultra Test) strip CHECK BLOOD SUGAR 2-3 TIMES A WEEK  Qty: 100 Strip, Refills: 0      sodium bicarbonate 650 mg tablet Take 650 mg by mouth three (3) times daily. levothyroxine (Euthyrox) 50 mcg tablet Take 1 Tablet by mouth Daily (before breakfast). Qty: 90 Tablet, Refills: 3      allopurinoL (ZYLOPRIM) 300 mg tablet Take 300 mg by mouth daily. SITagliptin (JANUVIA) 25 mg tablet Take 25 mg by mouth daily. b complex vitamins tablet Take 1 Tablet by mouth daily. furosemide (LASIX) 20 mg tablet Take 20 mg by mouth two (2) times a day. flash glucose sensor (FreeStyle Peyton 2 Sensor) kit 1 Each by Does Not Apply route continuous. Qty: 2 Kit, Refills: 11      flash glucose scanning reader (FreeStyle [place] 2 [place]) misc 1 Device by Does Not Apply route continuous. Qty: 1 Each, Refills: 0      flaxseed oil (OMEGA 3 PO) Take  by mouth. acetaminophen (TYLENOL) 500 mg tablet Take 500-1,000 mg by mouth every six (6) hours as needed for Pain.            Pt discharged with belonging with [employer]

## 2022-09-14 NOTE — PROGRESS NOTES
[facility] Adult  Hospitalist Group                                                                                          Hospitalist Progress Note  Reford Runner, MD  Answering service: [phone] or [phone] from in house phone        Date of Service:  2022  NAME:  Yasmine Coelho  :  1945  MRN:  [id number]      Admission Summary: This is a 60-year-old woman with a past medical history significant for end-stage renal disease on peritoneal dialysis, type 2 diabetes, hypertension, dyslipidemia, hypothyroidism, renal cancer, who presented at the emergency room with shortness of breath. This has been going on for a couple of weeks. The shortness of breath is progressive, worse with mild exertion such as walking, also worse when the patient is lying down flat. No associated fever, rigors, or chills. The shortness of breath is associated with cough. The patient was sent to the emergency room from the dialysis center because of the worsening respiratory symptoms. When the patient arrived at the emergency room, chest x-ray was obtained. The chest x-ray shows right pleural effusion which is unchanged from prior study. The patient also has elevated BNP level and she was referred to the hospitalist service for admission. Echocardiogram done in 2022 shows ejection fraction of 60% to 65%. The patient also recently had a CT scan of the chest done without contrast which shows moderate to large amount of ascites as well as new right thyroid nodule. Her blood pressure was markedly elevated on arrival at the emergency room. The patient stated that she was told to stop taking all antihypertensive medication when she started the peritoneal dialysis. The patient was last admitted to the hospitalist service from 2020 to 2020. The patient was admitted and treated for acute gastroenteritis.        Interval history / Subjective:   Patient seen and examined  Reviewed her vitals, labs, scans, and care plan  Cardiology, nephrology, pulmonary have been consulted     Assessment & Plan:      1. Acute heart failure with preserved ejection fraction. - elevated BNP-    cardiology consulted for eval and tx recs  2. End-stage renal disease on PD- management per nephrology  - reduced volumes with pd seems to have helped some  3. Anemia of Chronic dz- monitor Hb-low/stable  4. Type 2 diabetes. Accuchecks and ss insulin  5. HTN- improved with current treatments  6. Leukocytosis- monitor- stable mildly elevated   No clear source of infection- stop antibiotics. 7.  Thyroid nodule- ultrasound done- patient reports nodules previously biopsied. 8.  Abdominal ascites- improved with nephrology changing to decreased volume of PD  9. Dyslipidemia. - resumed statin med  10. Hypothyroidism- cont levothyroxine  11. Shortness of breath- - SNIFF test showing dysfunction of right hemidiaphragm   - appreciate recs from pulmonary. Code status: Full Code  Prophylaxis: SCDs  Care Plan discussed with:   Anticipated Disposition:      Hospital Problems  Date Reviewed: 9/9/2022            Codes Class Noted POA    * (Principal) Acute heart failure with preserved ejection fraction ([facility] [address] ICD-10-CM: I50.31  ICD-9-CM: 428.9  9/9/2022 Yes       Review of Systems:   A comprehensive review of systems was negative except for that written in the HPI. Vital Signs:    Last 24hrs VS reviewed since prior progress note.  Most recent are:  Visit Vitals  BP (!) 147/73 (BP 1 Location: Left upper arm, BP Patient Position: At rest)   Pulse (!) 120   Temp 98.1 °F (36.7 °C)   Resp 20   Wt 85 kg (187 lb 6.3 oz)   SpO2 98%   BMI 32.17 kg/m²     Patient Vitals for the past 24 hrs:   Temp Pulse Resp BP SpO2   09/13/22 2200 -- (!) 120 -- -- --   09/13/22 2000 -- (!) 121 -- -- --   09/13/22 1900 98.1 °F (36.7 °C) (!) 117 20 (!) 147/73 98 %   09/13/22 1800 -- (!) 110 -- -- --   09/13/22 1525 97.9 °F (36.6 °C) (!) 101 19 (!) 150/80 100 %   09/13/22 1400 -- (!) 103 -- -- --   09/13/22 1200 -- 99 -- -- --   09/13/22 1116 97.7 °F (36.5 °C) (!) 106 19 (!) 130/92 100 %   09/13/22 1000 -- 95 -- -- --   09/13/22 0831 97.7 °F (36.5 °C) 99 19 (!) 141/75 100 %   09/13/22 0700 -- (!) 116 -- -- --   09/13/22 0600 -- (!) 106 -- (!) 155/124 100 %   09/13/22 0557 -- (!) 106 -- -- --   09/13/22 0500 -- (!) 104 -- 109/88 98 %   09/13/22 0402 98.7 °F (37.1 °C) -- -- -- --   09/13/22 0400 -- (!) 108 -- 139/82 100 %   09/13/22 0300 -- (!) 107 -- (!) 171/99 99 %   09/13/22 0218 -- -- -- -- 98 %   09/13/22 0200 -- (!) 106 -- (!) 144/107 95 %   09/13/22 0100 -- (!) 110 -- 124/80 99 %   09/13/22 0000 -- (!) 112 -- (!) 121/96 98 %   09/12/22 2300 98.6 °F (37 °C) (!) 109 16 (!) 163/83 100 %          Intake/Output Summary (Last 24 hours) at 9/13/2022 2205  Last data filed at 9/13/2022 0334  Gross per 24 hour   Intake --   Output 414 ml   Net -414 ml          Physical Examination:     I had a face to face encounter with this patient and independently examined them on 9/13/2022 as outlined below:          Constitutional:  No acute distress, cooperative, pleasant    ENT:  Oral mucosa moist, oropharynx benign. Resp:  Decreased breath sounds R>L. No wheezing/rhonchi/rales. CV:  tachycardia, no murmurs, gallops, rubs    GI:  Soft, non distended, non tender. normoactive bowel sounds, no hepatosplenomegaly     Musculoskeletal:  Bilat LE edema, warm, 2+ pulses throughout    Neurologic:  Moves all extremities. AAOx3, CN II-XII reviewed            Data Review:    Review and/or order of clinical lab test  Review and/or order of tests in the radiology section of CPT  Review and/or order of tests in the medicine section of CPT    Sniff test. Fluoroscopy utilized to visualize movement of the  diaphragm during patient breathing. FLUOROSCOPY TIME: 0.8 min  FLUOROSCOPY DOSE (air kerma): 2.3 mGy     FINDINGS:      Elevated right hemidiaphragm.  Right-sided diaphragmatic excursion is  significantly diminished compared to the left. Normal left-sided diaphragmatic  movement. 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 No results for input(s): FE, TIBC, PSAT, FERR in the last 72 hours. Lab Results   Component Value Date/Time    Folate 39.7 (H) 09/09/2022 12:37 PM        No results for input(s): PH, PCO2, PO2 in the last 72 hours. No results for input(s): CPK, CKNDX, TROIQ in the last 72 hours.     No lab exists for component: CPKMB  Lab Results   Component Value Date/Time    Cholesterol, total 158 05/19/2022 09:44 AM    HDL Cholesterol 56 05/19/2022 09:44 AM    LDL, calculated 71.2 05/19/2022 09:44 AM    Triglyceride 154 (H) 05/19/2022 09:44 AM    CHOL/HDL Ratio 2.8 05/19/2022 09:44 AM     Lab Results   Component Value Date/Time    Glucose (POC) 130 (H) 09/13/2022 04:37 PM    Glucose (POC) 169 (H) 09/13/2022 11:20 AM    Glucose (POC) 148 (H) 09/13/2022 08:30 AM    Glucose (POC) 154 (H) 09/13/2022 06:46 AM    Glucose (POC) 229 (H) 09/12/2022 09:39 PM     Lab Results   Component Value Date/Time    Color YELLOW/STRAW 02/22/2020 11:30 AM    Appearance CLEAR 02/22/2020 11:30 AM    Specific gravity 1.019 02/22/2020 11:30 AM    pH (UA) 7.0 02/22/2020 11:30 AM    Protein >300 (A) 02/22/2020 11:30 AM    Glucose 100 (A) 02/22/2020 11:30 AM    Ketone NEGATIVE  02/22/2020 11:30 AM    Bilirubin NEGATIVE  02/22/2020 11:30 AM    Urobilinogen 0.2 02/22/2020 11:30 AM    Nitrites NEGATIVE  02/22/2020 11:30 AM    Leukocyte Esterase NEGATIVE  02/22/2020 11:30 AM    Epithelial cells MODERATE (A) 02/22/2020 11:30 AM    Bacteria NEGATIVE  02/22/2020 11:30 AM    WBC 0-4 02/22/2020 11:30 AM    RBC 0-5 02/22/2020 11:30 AM         Medications Reviewed:     Current Facility-Administered Medications   Medication Dose Route Frequency    allopurinoL (ZYLOPRIM) tablet 100 mg  100 mg Oral DAILY    gentamicin (GARAMYCIN) 0.1 % cream   Topical DIALYSIS PRN    atorvastatin (LIPITOR) tablet 20 mg  20 mg Oral QHS    levothyroxine (SYNTHROID) tablet 50 mcg  50 mcg Oral 7am    metoprolol succinate (TOPROL-XL) XL tablet 25 mg  25 mg Oral DAILY    sodium chloride (NS) flush 5-40 mL  5-40 mL IntraVENous Q8H    sodium chloride (NS) flush 5-40 mL  5-40 mL IntraVENous PRN    acetaminophen (TYLENOL) tablet 650 mg  650 mg Oral Q6H PRN    Or    acetaminophen (TYLENOL) suppository 650 mg  650 mg Rectal Q6H PRN    polyethylene glycol (MIRALAX) packet 17 g  17 g Oral DAILY PRN    ondansetron (ZOFRAN ODT) tablet 4 mg  4 mg Oral Q8H PRN    Or    ondansetron (ZOFRAN) injection 4 mg  4 mg IntraVENous Q6H PRN    heparin (porcine) injection 5,000 Units  5,000 Units SubCUTAneous Q8H    insulin lispro (HUMALOG) injection   SubCUTAneous AC&HS    glucose chewable tablet 16 g  4 Tablet Oral PRN    glucagon (GLUCAGEN) injection 1 mg  1 mg IntraMUSCular PRN    dextrose 10 % infusion 0-250 mL  0-250 mL IntraVENous PRN    hydrALAZINE (APRESOLINE) 20 mg/mL injection 10 mg  10 mg IntraVENous Q6H PRN    fentaNYL citrate (PF) injection 25 mcg  25 mcg IntraVENous Q4H PRN    peritoneal dialysis DEXTROSE 2.5% (2.5 mEq/L low calcium) solution 6,000 mL  6,000 mL IntraPERitoneal DAILY    peritoneal dialysis DEXTROSE 2.5% (2.5 mEq/L low calcium) solution 6,000 mL  6,000 mL IntraPERitoneal DAILY     ______________________________________________________________________  EXPECTED LENGTH OF STAY: 4d 2h  ACTUAL LENGTH OF STAY:          4                 Mayo Razo MD

## 2022-09-14 NOTE — PROGRESS NOTES
2000: Bedside and Verbal shift change report given to Betty Peacock (oncoming nurse) by Vy Rome (offgoing nurse). Report included the following information SBAR, MAR, and Recent Results.

## 2022-09-15 ENCOUNTER — PATIENT OUTREACH (OUTPATIENT)
Dept: CASE MANAGEMENT | Age: 77
End: 2022-09-15

## 2022-09-15 NOTE — PROGRESS NOTES
Care Transitions Initial Call    Call within 2 business days of discharge: Yes     Patient: Darling Lucas Patient : 1945 MRN: [id number]    Last Discharge  Street       Date Complaint Diagnosis Description Type Department Provider    22 Referral / Consult; Shortness of Breath POSEY (dyspnea on exertion) . .. ED to Hosp-Admission (Discharged) (ADMIT) Jesse Torre MD; Areli Hart. .. Was this an external facility discharge? No     Challenges to be reviewed by the provider   Additional needs identified to be addressed with provider: yes    Patient WBC 13.7 at discharge, no clear source of infection found, patient deneis any symptoms    K level 3.2 at discharge---PD dialysis patient, takes 20 meq potassium daily    Patient reports PD fluid prescription changed at discharge to reduce volume, has follow up with Dr Lani Rosa office in October. Shortness of breath- - SNIFF test showing dysfunction of right hemidiaphragm --pulmonary recommended nocturnal CPAP use (patient reports appt with Dr Bee Corrales on 10/17/22, use of incentive spirometer, blood pressure control, less PD fluid volume, and as last resort can consult thoracic surgeon if needed. Toprol dose changed to 50 mg daily at d/c    Patient may need help with obtaining cane, walker and BSC--she states she will call her SW at Dr Lani Rosa office first    Right Thyroid nodule mentioned in hospitalist note--pt states this has been biopsied in the past per chart notes. Method of communication with provider : chart routing    Discussed [phone] related testing which was not done at this time. Advance Care Planning:   Does patient have an Advance Directive: yes, reviewed and current. Inpatient Readmission Risk score: Unplanned Readmit Risk Score: 15.2    Was this a readmission?  no       Patients top risk factors for readmission:  none identified at time of call    Interventions to address risk factors: Scheduled appointment with PCP-see goal--pt will call to get appt, Scheduled appointment with [facility] goals, Education of patient/family/caregiver/guardian to support self-management-see goals, and Assistance in accessing community resources-see goals    Care Transition Nurse (CTN) contacted the patient by telephone to perform post hospital discharge assessment. Verified name and  with patient as identifiers. Provided introduction to self, and explanation of the CTN role. CTN reviewed discharge instructions, medical action plan and red flags with patient who verbalized understanding. Were discharge instructions available to patient? yes. Reviewed appropriate site of care based on symptoms and resources available to patient including: PCP and Specialist. Patient given an opportunity to ask questions and does not have any further questions or concerns at this time. The patient agrees to contact the PCP office for questions related to their healthcare. Medication reconciliation was performed with patient, who verbalizes understanding of administration of home medications. Advised obtaining a 90-day supply of all daily and as-needed medications. Referral to Pharm D needed: no     Home Health/Outpatient orders at discharge: none    Durable Medical Equipment ordered at discharge: None    Discussed follow-up appointments. If no appointment was previously scheduled, appointment scheduling offered: yes. Is follow up appointment scheduled within 7 days of discharge? Pending, CTN informed patient that PCP office is trying to reach her to schedule but could not leave message, she states she will call office today .    [facility] follow up appointment(s):   Future Appointments   Date Time Provider Rita Andrade   10/17/2022  4:00 PM Meliton Greco MD [address] BS AMB   2022  9:15 AM Jasmine Ruby MD [facility] BS AMB     Non-[facility] follow up appointment(s): Dr Tito Duke --patient states she was given a new appt in October, did not recall date but states they moved it up one week earlier than her normal monthly appt    Plan for follow-up call in 5-7 days based on severity of symptoms and risk factors. Plan for next call: symptom management-SOB, resources for DME  CTN provided contact information for future needs. Goals Addressed                   This Visit's Progress     Prevent complications post hospitalization. 09/15/22     CTN spoke to both patient and daughter on a three way call as CTN was unable to reach patient directly. CTN reviewed discharge instructions/red flags to prevent complications. PCP appt---CTN informed patient and daughter that PCP office is trying to contact patient for MARVIN appt. Patient will call MD office to schedule. Dr Meggan Layton reports she has a follow up in October. She states she has to see this MD every month due to her being on PD. Patient reports she has an appt with Dr Edin Delarosa for sleep study to get a CPAP on 10/17/2022. Patient states that nephrology is aware of cyst on L kidney and for now cyst is under surveillance. Patient reports she does not have IS, CTN gave her resources on where to purchase, instructed patient to perform Deep Breathing exercises to help with paralysis of diaphragm. Instructed her on pulmonary suggestions of using nocturnal CPAP when she gets one, IS, controlling BP, working with nephrology with fluid volume control with PD. Also informed them that Thoracic Surgeon could discuss possible surgery if all other means failed. Patient will monitor BS and call MD with any concerns  [facility] needed. CTN will follow up next week--mkrw       Supportive resources in place to maintain patient in the community (ie. Home Health, DME equipment, refer to, medication assistant plan, etc.)        09/15/22    Patient will call her SW at Dr Keron Castañeda office to help with ordering walker, cane, and BSC.   CTN informed patient to call her insurance company to find out what DME company they will cover in order to expedite any DME orders  Patient daughter given number to [address] [place] [address] to start [employer] process as patient interested in applying for medicaid.   Patient given contact number for [employer] to help with obtaining DME    ---carter

## 2022-09-20 ENCOUNTER — TELEPHONE (OUTPATIENT)
Dept: INTERNAL MEDICINE CLINIC | Age: 77
End: 2022-09-20

## 2022-09-20 NOTE — TELEPHONE ENCOUNTER
Called, spoke with Pt. Two pt identifiers confirmed. Patient states she is in the black hole of her prescription coverage until January 2023 and cannot afford to pay for her Januvia    Patient currently has none left, she would be interested in apply for prescription assistance program if Dr. Darin Dejesus wants to keep her on the medication.     Good RX is $571 plus for a 20 day supply

## 2022-09-20 NOTE — TELEPHONE ENCOUNTER
Januvia is at max she can get. [facility] can no longer afford. [facility] needs replacement please. Can something else be prescribed        Pt \[facility]\"" uses [address] on [facility].

## 2022-09-20 NOTE — TELEPHONE ENCOUNTER
Called, spoke with Pt. Two pt identifiers confirmed. Notified of response from Bina Barrios to hold [facility] until January. States understanding.     Patient is interested in prescription assistance program and will  application during her appt with Bina Martinez on Friday 9/23/22

## 2022-09-22 NOTE — PROGRESS NOTES
Physician Progress Note      Samuel Zamora  [facility] #:                  [id number]  :                       1945  ADMIT DATE:       2022 11:59 PM  [address] DATE:        2022 1:55 PM  RESPONDING  PROVIDER #:        Denilson Lund MD          QUERY TEXT:    Patient admitted with shortness of breath for several days. The Hospitalist has noted acute heart failure with preserved ejection fraction. Nephrology has noted no overt pulmonary edema on CXR. Cardiology does not feel that the etiology of the shortness of breath is cardiac, but noted that the pt does have some degree of diastolic dysfunction. Pulmonology noted elevated right hemidiaphragm, suggestive of right diaphragmatic paralysis. Pt was noted to have a pBNP of 2,277, but the pt also has documented ESRD and is on peritoneal dialysis. In order to support the diagnosis of acute heart failure with preserved ejection fraction, please include additional clinical indicators in your documentation. Or, please document if the diagnosis of acute heart failure with preserved ejection fraction has been ruled out after further study. The medical record reflects the following:    Risk Factors: HTN, h/o CHF, ESRD on PD, tobacco abuse    Clinical Indicators:    -- Cardiology documented : SOB: Echo  showed EF 60-65% with GR I DD. Nuclear stress test negative for ischemia. Do not feel etiology is primary cardiac at this time. She has an elevated right hemidiaphragm and plans are for a SNIFF test.    -- Cardiology documented : Shortness of breath: Suspect multifactorial but I do not feel it is a primary cardiac issue at this time although she does have some degree of diastolic dysfunction. Cardiac work-up in May 2022 with normal nuclear stress test normal ejection fraction. Clearly hypertensive urgency is likely contributing to her shortness of breath.   Medication has been restarted blood pressure is improved at this time.    -- Nephrology documented 9/11: Volume overload: mild. No overt pulm edema noted on CXR    -- Pulmonary documented 9/12:  Shortness of breath, multifactorial  Elevated right hemidiaphragm, there appears to be evidence of initial excursion with breathing effort suggestive of right diaphragmatic paralysis, etiology is unknown clear and undetermined  Associated tiny right effusion and basilar atelectasis  History of end-stage renal disease, peritoneal dialysis, moderate to large ascites  History of right nephrectomy with no evidence of recurrence of renal mass  Suspected sleep apnea    -- CXR 9/8 = No interval change in small right pleural effusion, right basilar subsegmental atelectasis, and right hemidiaphragm elevation; CXR 9/10 = Compatible with right hemidiaphragm paralysis. No acute finding or change. -- pBNP 9/8 = 2,277    -- Echo PTA (5/18/22) = Normal left ventricular systolic function with a visually estimated EF of 60 - 65%. Left ventricle size is normal. Increased wall thickness. Findings consistent with mild concentric hypertrophy. Normal wall motion. Grade I diastolic dysfunction. Treatment: Cardiology consult, Pulmonology consult, Nephrology consult, peritoneal dialsysis, hydralazine, metoprolol, imaging studies, pBNP testing    Thank-you,  Derick Pickering RN, [facility]  Clinical   Precious Riddle. [email or web address]  [phone]  You can also contact me via [address]. Options provided:  -- Acute diastolic CHF/HFpEF present as evidenced by, Please document evidence.   -- Chronic diastolic CHF/HFpEF only, acute diastolic CHF/HFpEF was ruled out  -- Other - I will add my own diagnosis  -- Disagree - Not applicable / Not valid  -- Disagree - Clinically unable to determine / Unknown  -- Refer to Clinical Documentation Reviewer    PROVIDER RESPONSE TEXT:    Acute diastolic CHF/HFpEF is present as evidenced by labs and clinical presentation    Query created by: Nargis Khan on 9/12/2022 4:24 PM      Electronically signed by:  Taylor Cabrera MD 9/22/2022 7:00 AM

## 2022-09-22 NOTE — DISCHARGE SUMMARY
Discharge Summary       PATIENT ID: Adrien Shrestha  MRN: [id number]   YOB: 1945    DATE OF ADMISSION: 9/8/2022 11:59 PM    DATE OF DISCHARGE: 9/14/22   PRIMARY CARE PROVIDER: Shilpa Bar MD     ATTENDING PHYSICIAN: Elliott Serrano  DISCHARGING PROVIDER: Lesleigh Cockayne, MD    To contact this individual call [phone] and ask the  to page. If unavailable ask to be transferred the Adult Hospitalist Department. CONSULTATIONS: IP CONSULT TO NEPHROLOGY  IP CONSULT TO CARDIOLOGY  IP CONSULT TO PULMONOLOGY    PROCEDURES/SURGERIES: * No surgery found *    ADMISSION SUMMARY AND HOSPITAL COURSE:   This is a 61-year-old woman with a past medical history significant for end-stage renal disease on peritoneal dialysis, type 2 diabetes, hypertension, dyslipidemia, hypothyroidism, renal cancer, who presented at the emergency room with shortness of breath. This has been going on for a couple of weeks. The shortness of breath is progressive, worse with mild exertion such as walking, also worse when the patient is lying down flat. No associated fever, rigors, or chills. The shortness of breath is associated with cough. The patient was sent to the emergency room from the dialysis center because of the worsening respiratory symptoms. When the patient arrived at the emergency room, chest x-ray was obtained. The chest x-ray shows right pleural effusion which is unchanged from prior study. The patient also has elevated BNP level and she was referred to the hospitalist service for admission. Echocardiogram done in 05/2022 shows ejection fraction of 60% to 65%. The patient also recently had a CT scan of the chest done without contrast which shows moderate to large amount of ascites as well as new right thyroid nodule. Her blood pressure was markedly elevated on arrival at the emergency room.   The patient stated that she was told to stop taking all antihypertensive medication when she started the peritoneal dialysis. The patient was last admitted to the hospitalist service from 02/22/2020 to 02/24/2020. The patient was admitted and treated for acute gastroenteritis    DISCHARGE DIAGNOSES / PLAN:       1. Acute heart failure with preserved ejection fraction. - elevated BNP-    cardiology consulted for eval and tx recs  2. End-stage renal disease on PD- management per nephrology  - reduced volumes with pd seems to have helped some  3. Anemia of Chronic dz- monitor Hb-low/stable  4. Type 2 diabetes. Accuchecks and ss insulin  5. HTN- improved with current treatments  6. Leukocytosis- monitor- stable mildly elevated   No clear source of infection- stop antibiotics. 7.  Thyroid nodule- ultrasound done- patient reports nodules previously biopsied. 8.  Abdominal ascites- improved with nephrology changing to decreased volume of PD  9. Dyslipidemia. - resumed statin med  10. Hypothyroidism- cont levothyroxine  11. Shortness of breath- - SNIFF test showing dysfunction of right hemidiaphragm   - appreciate recs from pulmonary.     PENDING TEST RESULTS:   At the time of discharge the following test results are still pending: none     ADDITIONAL CARE RECOMMENDATIONS:    You were admitted with shortness of breath with is likely due to a combination of medical issues with discharge recommendations as follows:  1) elevated heart rates- we increased your metoprolol dose  2) dysfunctional right diaphragm-and suspected sleep apnea  - follow up with pulmonary for sleep study and Nocturnal CPAP could proved to be helpful to manage nighttime symptoms if you have sleep apnea  3) abdominal fluid/ ascites associated with volume of peritoneal dialysis  -Reduction in intra-abdominal PD fill volume could also be helpful in reducing pressure on the right diaphragm in supine position - Nephrology making these changes     Follow up with your primary care MD or endocrinologist regarding thyroid nodules for further evaluation and possible biopsy if indicated    Patient will be scheduled for new patient sleep consult with Dr. Kaushik Colbert 10/17/22. NOTIFY YOUR PHYSICIAN FOR ANY OF THE FOLLOWING:   Fever over 101 degrees for 24 hours. Chest pain, shortness of breath, fever, chills, nausea, vomiting, diarrhea, change in mentation, falling, weakness, bleeding. Severe pain or pain not relieved by medications, as well as any other signs or symptoms that you may have questions about. FOLLOW UP APPOINTMENTS:    Follow-up Information       Follow up With Specialties Details Why Kp Eugene MD Internal Medicine Physician   [address]  [facility]  [phone]                 DIET: Cardiac Diet and Diabetic Diet    ACTIVITY: Activity as tolerated    EQUIPMENT needed: none    DISCHARGE MEDICATIONS:  Discharge Medication List as of 9/14/2022 11:55 AM        CONTINUE these medications which have CHANGED    Details   metoprolol succinate (TOPROL-XL) 50 mg XL tablet Take 1 Tablet by mouth daily. , Normal, Disp-30 Tablet, R-4           CONTINUE these medications which have NOT CHANGED    Details   potassium chloride (K-DUR, KLOR-CON M20) 20 mEq tablet Take 20 mEq by mouth daily. , Historical Med      glimepiride (AMARYL) 1 mg tablet Take 1 Tablet by mouth Daily (before breakfast). , Normal, Disp-90 Tablet, R-3      atorvastatin (LIPITOR) 20 mg tablet Take 1 tablet by mouth once daily, Normal, Disp-90 Tablet, R-3      glucose blood VI test strips (OneTouch Ultra Test) strip CHECK BLOOD SUGAR 2-3 TIMES A WEEK, Normal, Disp-100 Strip, R-0      sodium bicarbonate 650 mg tablet Take 650 mg by mouth three (3) times daily. , Historical Med      levothyroxine (Euthyrox) 50 mcg tablet Take 1 Tablet by mouth Daily (before breakfast). , Normal, Disp-90 Tablet, R-3      allopurinoL (ZYLOPRIM) 300 mg tablet Take 300 mg by mouth daily. , Historical Med      SITagliptin (JANUVIA) 25 mg tablet Take 25 mg by mouth daily. , Historical Med      b complex vitamins tablet Take 1 Tablet by mouth daily. , Historical Med      furosemide (LASIX) 20 mg tablet Take 20 mg by mouth two (2) times a day., Historical Med      flash glucose sensor (FreeStyle Peyton 2 Sensor) kit 1 Each by Does Not Apply route continuous. , Normal, Disp-2 Kit, R-11      flash glucose scanning reader (FreeStyle Peyton 2 [place]) misc 1 Device by Does Not Apply route continuous. , Normal, Disp-1 Each, R-0      flaxseed oil (OMEGA 3 PO) Take  by mouth., Historical Med      acetaminophen (TYLENOL) 500 mg tablet Take 500-1,000 mg by mouth every six (6) hours as needed for Pain., Historical Med             DISPOSITION:    Home With:   OT  PT  HH  RN       Long term SNF/Inpatient Rehab   x Independent  living    Hospice    Other:       PATIENT CONDITION AT DISCHARGE:     Functional status    Poor     Deconditioned    x Independent      Cognition    x Lucid     Forgetful     Dementia      Catheters/lines (plus indication)    [place]     PICC     PEG    x None      Code status   x  Full code     DNR      PHYSICAL EXAMINATION AT DISCHARGE:  Last 24hrs VS reviewed since prior progress note. Most recent are:  Visit Vitals  BP (!) 147/73 (BP 1 Location: Left upper arm, BP Patient Position: At rest)   Pulse (!) 120   Temp 98.1 °F (36.7 °C)   Resp 20   Wt 85 kg (187 lb 6.3 oz)   SpO2 98%   BMI 32.17 kg/m²           Intake/Output Summary (Last 24 hours) at 9/13/2022 2205  Last data filed at 9/13/2022 0334      Gross per 24 hour   Intake --   Output 414 ml   Net -414 ml            Physical Examination:                                                    Constitutional:  No acute distress, cooperative, pleasant    ENT:  Oral mucosa moist, oropharynx benign. Resp:  Decreased breath sounds R>L. No wheezing/rhonchi/rales. CV:  tachycardia, no murmurs, gallops, rubs    GI:  Soft, non distended, non tender.  normoactive bowel sounds, no hepatosplenomegaly     Musculoskeletal: Bilat LE edema, warm, 2+ pulses throughout    Neurologic:  Moves all extremities. AAOx3, CN II-XII reviewed                                  Data Review:    Review and/or order of clinical lab test  Review and/or order of tests in the radiology section of CPT  Review and/or order of tests in the medicine section of CPT     Sniff test. Fluoroscopy utilized to visualize movement of the  diaphragm during patient breathing. FLUOROSCOPY TIME: 0.8 min  FLUOROSCOPY DOSE (air kerma): 2.3 mGy     FINDINGS:      Elevated right hemidiaphragm. Right-sided diaphragmatic excursion is  significantly diminished compared to the left. Normal left-sided diaphragmatic  movement. 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 No results for input(s): FE, TIBC, PSAT, FERR in the last 72 hours. Lab Results   Component Value Date/Time     Folate 39.7 (H) 09/09/2022 12:37 PM         No results for input(s): PH, PCO2, PO2 in the last 72 hours. No results for input(s): CPK, CKNDX, TROIQ in the last 72 hours.      No lab exists for component: CPKMB        Lab Results   Component Value Date/Time     Cholesterol, total 158 05/19/2022 09:44 AM     HDL Cholesterol 56 05/19/2022 09:44 AM     LDL, calculated 71.2 05/19/2022 09:44 AM     Triglyceride 154 (H) 05/19/2022 09:44 AM     CHOL/HDL Ratio 2.8 05/19/2022 09:44 AM            Lab Results   Component Value Date/Time     Glucose (POC) 130 (H) 09/13/2022 04:37 PM     Glucose (POC) 169 (H) 09/13/2022 11:20 AM     Glucose (POC) 148 (H) 09/13/2022 08:30 AM     Glucose (POC) 154 (H) 09/13/2022 06:46 AM     Glucose (POC) 229 (H) 09/12/2022 09:39 PM            Lab Results   Component Value Date/Time     Color YELLOW/STRAW 02/22/2020 11:30 AM     Appearance CLEAR 02/22/2020 11:30 AM     Specific gravity 1.019 02/22/2020 11:30 AM     pH (UA) 7.0 02/22/2020 11:30 AM     Protein >300 (A) 02/22/2020 11:30 AM     Glucose 100 (A) 02/22/2020 11:30 AM     Ketone NEGATIVE  02/22/2020 11:30 AM     Bilirubin NEGATIVE  02/22/2020 11:30 AM     Urobilinogen 0.2 02/22/2020 11:30 AM     Nitrites NEGATIVE  02/22/2020 11:30 AM     Leukocyte Esterase NEGATIVE  02/22/2020 11:30 AM     Epithelial cells MODERATE (A) 02/22/2020 11:30 AM     Bacteria NEGATIVE  02/22/2020 11:30 AM     WBC 0-4 02/22/2020 11:30 AM     RBC 0-5 02/22/2020 11:30 AM           CHRONIC MEDICAL DIAGNOSES:  Problem List as of 9/14/2022 Date Reviewed: 9/9/2022            Codes Class Noted - Resolved    * (Principal) Acute heart failure with preserved ejection fraction ([facility] ICD-10-CM: I50.31  ICD-9-CM: 428.9  9/9/2022 - Present        Chronic kidney disease, stage IV (severe) ([facility] ICD-10-CM: N18.4  ICD-9-CM: 585.4  5/19/2022 - Present        Chronic kidney disease (CKD), stage V ([facility] [[id number]] ICD-10-CM: N18.5  ICD-9-CM: 585.5  5/19/2022 - Present        CKD (chronic kidney disease) stage 4, GFR 15-29 ml/min ([facility]) ICD-10-CM: N18.4  ICD-9-CM: 585.4  5/19/2022 - Present        Vomiting ICD-10-CM: R11.10  ICD-9-CM: 787.03  2/22/2020 - Present        CKD (chronic kidney disease) stage 3, GFR 30-59 ml/min ([facility]) ICD-10-CM: N18.30  ICD-9-CM: 585.3  11/4/2019 - Present        Vaginal vault prolapse ICD-10-CM: N81.9  ICD-9-CM: 618.00  4/8/2019 - Present        Type 2 diabetes mellitus with nephropathy ([facility] ICD-10-CM: E11.21  ICD-9-CM: 250.40, 583.81  2/6/2018 - Present        History of hysterectomy including cervix ICD-10-CM: Z90.710  ICD-9-CM: V88.01  3/2/2015 - Present        History of bilateral salpingo-oophorectomy ICD-10-CM: Z90.79, Z90.722  ICD-9-CM: V45.77  3/2/2015 - Present        Personal history of renal cancer ICD-10-CM: Z85.528  ICD-9-CM: V10.52  3/2/2015 - Present        Bartholin cyst ICD-10-CM: N75.0  ICD-9-CM: 616.2  3/2/2015 - Present        Diabetes mellitus ([facility] ICD-10-CM: E11.9  ICD-9-CM: 250.00  10/8/2014 - Present        S/p nephrectomy, right ICD-10-CM: Z90.5  ICD-9-CM: V45.73  12/30/2013 - Present    Overview Signed 12/30/2013 12:23 PM by Lanney [place]     Renal cancer             Glucose intolerance (impaired glucose tolerance) ICD-10-CM: R73.02  ICD-9-CM: 790.22  12/30/2013 - Present        Anemia ICD-10-CM: D64.9  ICD-9-CM: 285.9  2/11/2013 - Present        Hypertension ICD-10-CM: I10  ICD-9-CM: 401.9  8/31/2011 - Present        Hepatitis C ICD-10-CM: B19.20  ICD-9-CM: 070.70  8/31/2011 - Present    Overview Signed 12/30/2013 12:24 PM by Joon Chacon by [employer] - stopped due to anemia from medications but also levels shows viral load undetectable despite incomplete treatment             Renal cancer ([facility] ICD-10-CM: C64.9  ICD-9-CM: 189.0  8/31/2011 - Present        Hypothyroid ICD-10-CM: E03.9  ICD-9-CM: 244.9  8/31/2011 - Present           Greater than 30 minutes were spent with the patient on counseling and coordination of care    Signed:   Melida Mccall MD  9/22/2022  6:49 AM    .

## 2022-09-23 ENCOUNTER — OFFICE VISIT (OUTPATIENT)
Dept: INTERNAL MEDICINE CLINIC | Age: 77
End: 2022-09-23
Payer: MEDICARE

## 2022-09-23 VITALS
TEMPERATURE: 97.3 F | DIASTOLIC BLOOD PRESSURE: 79 MMHG | RESPIRATION RATE: 18 BRPM | BODY MASS INDEX: 32.03 KG/M2 | OXYGEN SATURATION: 99 % | HEIGHT: 64 IN | WEIGHT: 187.6 LBS | HEART RATE: 84 BPM | SYSTOLIC BLOOD PRESSURE: 159 MMHG

## 2022-09-23 DIAGNOSIS — R53.1 WEAKNESS: ICD-10-CM

## 2022-09-23 DIAGNOSIS — Z23 NEEDS FLU SHOT: ICD-10-CM

## 2022-09-23 DIAGNOSIS — I50.9 CONGESTIVE HEART FAILURE, UNSPECIFIED HF CHRONICITY, UNSPECIFIED HEART FAILURE TYPE (HCC): ICD-10-CM

## 2022-09-23 DIAGNOSIS — Z09 HOSPITAL DISCHARGE FOLLOW-UP: ICD-10-CM

## 2022-09-23 DIAGNOSIS — R06.09 DOE (DYSPNEA ON EXERTION): Primary | ICD-10-CM

## 2022-09-23 PROCEDURE — G8427 DOCREV CUR MEDS BY ELIG CLIN: HCPCS | Performed by: INTERNAL MEDICINE

## 2022-09-23 PROCEDURE — 1111F DSCHRG MED/CURRENT MED MERGE: CPT | Performed by: INTERNAL MEDICINE

## 2022-09-23 PROCEDURE — 90694 VACC AIIV4 NO PRSRV 0.5ML IM: CPT | Performed by: INTERNAL MEDICINE

## 2022-09-23 PROCEDURE — G0008 ADMIN INFLUENZA VIRUS VAC: HCPCS | Performed by: INTERNAL MEDICINE

## 2022-09-23 PROCEDURE — 99495 TRANSJ CARE MGMT MOD F2F 14D: CPT | Performed by: INTERNAL MEDICINE

## 2022-09-23 NOTE — PROGRESS NOTES
PROGRESS NOTE  Name: Jacqueline Benavides   : 1945       ASSESSMENT/ PLAN:     POSEY (dyspnea on exertion): Improved. Has future follow up with pulmonology. -     AMB SUPPLY ORDER--incentive spirometer. Congestive heart failure, unspecified HF chronicity, unspecified heart failure type [facility]): Clinically improved. ESRD on PD: Per Nephrology. Weakness  -     REFERRAL TO [address] [place] [address] discharge follow-up  -     CO DISCHARGE MEDS RECONCILED W/ CURRENT OUTPATIENT MED LIST    Needs flu shot  -     INFLUENZA, FLUAD, (AGE 72 Y+), IM, PF, 0.5 ML    Follow-up and Dispositions    Return in about 4 months (around 2023) for DM; may reschedule the Nov appt. I have reviewed the patient's medications and risks/side effects/benefits were discussed. Diagnosis(-es) explained to patient and questions answered. Literature provided where appropriate. SUBJECTIVE  Ms. Jacqueline Benavides presents today acutely for     Chief Complaint   Patient presents with    Hospital Follow Up     She was in the hospital  - : This is a 61-year-old woman with a past medical history significant for end-stage renal disease on peritoneal dialysis, type 2 diabetes, hypertension, dyslipidemia, hypothyroidism, renal cancer, who presented at the emergency room with shortness of breath. This has been going on for a couple of weeks. The shortness of breath is progressive, worse with mild exertion such as walking, also worse when the patient is lying down flat. No associated fever, rigors, or chills. The shortness of breath is associated with cough. The patient was sent to the emergency room from the dialysis center because of the worsening respiratory symptoms. When the patient arrived at the emergency room, chest x-ray was obtained. The chest x-ray shows right pleural effusion which is unchanged from prior study.   The patient also has elevated BNP level and she was referred to the hospitalist service for admission. Echocardiogram done in 05/2022 shows ejection fraction of 60% to 65%. The patient also recently had a CT scan of the chest done without contrast which shows moderate to large amount of ascites as well as new right thyroid nodule. Her blood pressure was markedly elevated on arrival at the emergency room. The patient stated that she was told to stop taking all antihypertensive medication when she started the peritoneal dialysis. The patient was last admitted to the hospitalist service from 02/22/2020 to 02/24/2020. The patient was admitted and treated for acute gastroenteritis    1. Acute heart failure with preserved ejection fraction. - elevated BNP-    cardiology consulted for eval and tx recs  2. End-stage renal disease on PD- management per nephrology  - reduced volumes with pd seems to have helped some  3. Anemia of Chronic dz- monitor Hb-low/stable  4. Type 2 diabetes. Accuchecks and ss insulin  5. HTN- improved with current treatments  6. Leukocytosis- monitor- stable mildly elevated   No clear source of infection- stop antibiotics. 7.  Thyroid nodule- ultrasound done- patient reports nodules previously biopsied. 8.  Abdominal ascites- improved with nephrology changing to decreased volume of PD  9. Dyslipidemia. - resumed statin med  10. Hypothyroidism- cont levothyroxine  11. Shortness of breath- - SNIFF test showing dysfunction of right hemidiaphragm   - appreciate recs from pulmonary. Now, she is back home. Doing well. Breathing improved, but still has some dyspnea now and then. She will see Pulmonologist on Oct 24. Gluc running  low to mid 100s. She saw Dr. Miriam Castellanos on Tuesday. She has generalized weakness, and Dr. Miriam Castellanos suggested PT.      She has some \"mental fog.\"       Past Medical History:   Diagnosis Date    Arthritis     LOWER SPINE    Bell's palsy     23yrs old     Cancer [facility])     kidney (right)    Chronic kidney disease 1996    right kidney removed - CANCER    Diabetes ([facility]     She is controlling with weight loss    Diabetes mellitus ([facility] 10/8/2014    NIDDM    Hepatitis C 2011    hx hep C from blood transfusion per pt; Treated by Dr. Sharron Bates    Hyperlipidemia     Hypertension     Ill-defined condition 1990s    HEPATITIS C - treated with medication    Personal history of renal cancer 3/2/2015    Psychiatric disorder     depression     Thyroid disease 1999    Thyroidectomy    Vertigo      Current Outpatient Medications on File Prior to Visit   Medication Sig Dispense Refill    metoprolol succinate (TOPROL-XL) 50 mg XL tablet Take 1 Tablet by mouth daily. 30 Tablet 4    potassium chloride (K-DUR, KLOR-CON M20) 20 mEq tablet Take 20 mEq by mouth daily. glimepiride (AMARYL) 1 mg tablet Take 1 Tablet by mouth Daily (before breakfast). 90 Tablet 3    atorvastatin (LIPITOR) 20 mg tablet Take 1 tablet by mouth once daily 90 Tablet 3    glucose blood VI test strips (OneTouch Ultra Test) strip CHECK BLOOD SUGAR 2-3 TIMES A WEEK 100 Strip 0    sodium bicarbonate 650 mg tablet Take 650 mg by mouth three (3) times daily. levothyroxine (Euthyrox) 50 mcg tablet Take 1 Tablet by mouth Daily (before breakfast). 90 Tablet 3    allopurinoL (ZYLOPRIM) 300 mg tablet Take 300 mg by mouth daily. b complex vitamins tablet Take 1 Tablet by mouth daily. furosemide (LASIX) 20 mg tablet Take 20 mg by mouth two (2) times a day. flash glucose sensor (FreeStyle Peyton 2 Sensor) kit 1 Each by Does Not Apply route continuous. 2 Kit 11    flash glucose scanning reader (FreeStyle Peyton 2 [place]) misc 1 Device by Does Not Apply route continuous. 1 Each 0    flaxseed oil (OMEGA 3 PO) Take  by mouth. acetaminophen (TYLENOL) 500 mg tablet Take 500-1,000 mg by mouth every six (6) hours as needed for Pain. SITagliptin (JANUVIA) 25 mg tablet Take 25 mg by mouth daily.  (Patient not taking: Reported on 9/23/2022)       No current facility-administered medications on file prior to visit. ROS: Complete review of systems was performed and is otherwise unremarkable except as noted elsewhere. OBJECTIVE  Visit Vitals  BP (!) 159/79 (BP 1 Location: Left upper arm, BP Patient Position: Sitting, BP Cuff Size: Adult)   Pulse 84   Temp 97.3 °F (36.3 °C) (Temporal)   Resp 18   Ht 5' 4\" (1.626 m)   Wt 187 lb 9.6 oz (85.1 kg)   LMP  (LMP Unknown)   SpO2 99%   BMI 32.20 kg/m²     Gen: Pleasant 69 y.o.  female in NAD. HEENT: PERRLA. EOMI. OP moist and pink. Neck: Supple. No LAD. HEART: RRR, No M/G/R.   LUNGS: CTAB No W/R. ABDOMEN: S, NT, ND, BS+. EXTREMITIES: Warm. No C/C/E. MUSCULOSKELETAL: Normal ROM, muscle strength 5/5 all groups. NEURO: Alert and oriented x 3. Cranial nerves grossly intact. No focal sensory or motor deficits noted. SKIN: Warm. Dry. No rashes or other lesions noted.

## 2022-09-23 NOTE — PROGRESS NOTES
1. \"Have you been to the ER, urgent care clinic since your last visit? Hospitalized since your last visit? Yes SOB    2. \"Have you seen or consulted any other health care providers outside of the [address] [place] [address] since your last visit? See care team     3. For patients aged 39-70: Has the patient had a colonoscopy / FIT/ Cologuard? Yes - no Care Gap present      If the patient is female:    4. For patients aged 41-77: Has the patient had a mammogram within the past 2 years? NA - based on age or sex      11. For patients aged 21-65: Has the patient had a pap smear?  NA - based on age or sex

## 2022-09-28 ENCOUNTER — PATIENT OUTREACH (OUTPATIENT)
Dept: CASE MANAGEMENT | Age: 77
End: 2022-09-28

## 2022-09-28 NOTE — PROGRESS NOTES
Goals Addressed                   This Visit's Progress     Prevent complications post hospitalization. 09/15/22     CTN spoke to both patient and daughter on a three way call as CTN was unable to reach patient directly. CTN reviewed discharge instructions/red flags to prevent complications. PCP appt---CTN informed patient and daughter that PCP office is trying to contact patient for MARVIN appt. Patient will call MD office to schedule. Dr Jorge Hicks reports she has a follow up in October. She states she has to see this MD every month due to her being on PD. Patient reports she has an appt with Dr George Gordon for sleep study to get a CPAP on 10/17/2022. Patient states that nephrology is aware of cyst on L kidney and for now cyst is under surveillance. Patient reports she does not have IS, CTN gave her resources on where to purchase, instructed patient to perform Deep Breathing exercises to help with paralysis of diaphragm. Instructed her on pulmonary suggestions of using nocturnal CPAP when she gets one, IS, controlling BP, working with nephrology with fluid volume control with PD. Also informed them that Thoracic Surgeon could discuss possible surgery if all other means failed. Patient will monitor BS and call MD with any concerns  [facility] CTN will follow up next week--mkrw    09/28/22  CTN spoke to Patient today--she reports she still has some SOB if she tries to perform too many activities at once, so she is  alternating activity with rest.   She denies any other health concerns at this time. Patient requests assistance with OP PT ordered by PCP as she has not been contacted to start services. CTN contacted Freya at [facility] OP PT services, she states she will call patient immediately to schedule. Patient called back, she reports she has an appt on 10/5/22 at start PT.     Patient reports she has attended appt with Dr Skylar Daily on 9/20/22, states that she has been doing well on new PD formula. Patient has pulmonary appt on 10/24/22. CTN will reach out again in 1 week. --carter

## 2022-10-05 ENCOUNTER — HOSPITAL ENCOUNTER (OUTPATIENT)
Dept: PHYSICAL THERAPY | Age: 77
Discharge: HOME | End: 2022-10-05
Payer: MEDICARE

## 2022-10-05 DIAGNOSIS — R53.1 WEAKNESS: Primary | ICD-10-CM

## 2022-10-05 PROCEDURE — 97162 PT EVAL MOD COMPLEX 30 MIN: CPT

## 2022-10-05 PROCEDURE — 97110 THERAPEUTIC EXERCISES: CPT

## 2022-10-05 NOTE — PROGRESS NOTES
PT INITIAL EVALUATION NOTE - [facility] 2-15    Patient Name: Taylor Leyva  Date:10/5/2022  : 1945  [x]  Patient  Verified  Payor: [facility] MEDICARE COMPLETE / [facility] / Product Type: Managed Care Medicare /    In time: 11:15  Out time: 12:10  Total Treatment Time (min): 55  Total Timed Codes (min): 15  1:1 Treatment Time ( only): 54   Visit #: 1     Treatment Area: Weakness [R53.1]    SUBJECTIVE  Pain Level (0-10 scale): 0/10  Any medication changes, allergies to medications, adverse drug reactions, diagnosis change, or new procedure performed?: [] No    [x] Yes (see summary sheet for update)  Subjective:  Patient reports she is here due to generalized muscle weakness that she has began to notice more since recent hospitalization. She was recently sent to emergency room from dialysis clinic on  due to shortness of breath and low SpO2. She was then admitted into acute care from - . She as there for 7 days. 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 She was given prescription for incentive spirometer, but has not yet purchased one. She reports overall improvement in her shortness of breath since discharge from hospital. Her SOB fluctuates. Sometimes she can walk from bedroom to living room wihtout any problem, but other times notices SOB. She notes overall weakness and fatigue increases throughout the day. She does a lot of sitting on a daily basis. Not very active and does not walk a lot. She often times has to sit down for 15-20 min for rest in between doing activities due to SOB and weakness. She requires frequent rest breaks when doing activities such as folding clothes, dishes, cooking. She has gradually noticed the change in her overall strength. Reports no pain anywhere. Pt has appts scheduled with pulmonologist and to get fitted for cpap machine. She goes in once a month for iron injections and blood drawn. Patient is currently on dialysis.  She does it every night at home. PT reports she regularly checks her BP, glucose levels, and oxygen. However, she has not checked them today nor has she taken her medications yet. Pt reports BP continues to fluctuate. Yesterday it was 144/85, but sometimes it is 118/76. Glucose level was 145 yesterday. Oxygen level this morning was 97%. Pt does report 1 recent fall within the past 2 months. She is unsure what happened, but was in her bedroom and thinks she tripped over something. No injuries. Pt reports she ordered a cane recently in case she needs it, but has never used an AD before. Current functional limitations include: walking, standing, stairs. Pt goals are \"to get back to where I was before all of this. \"      PLOF: independent, active  Mechanism of Injury: chronic  Previous Treatment/Compliance: n/a  PMHx/Surgical Hx: see chart  Living Situation: apartment, no stairs, one step to get in.   Pt Goals: \"getting back to where I was before all of this\"  Barriers: none  Motivation: good       OBJECTIVE/EXAMINATION    OBJECTIVE  Seated: BP: 170/114 - pt has not taken BP medication this morning  Pulse: 105 bpm  spO2: 100%    Posture:  inc thoracic kyphosis  Gait and Functional Mobility:  Pt ambulated into clinic (I) / transfers (I)  *Noted dec step length bilaterally, dec foot clearance bilaterally  *Intermittent scissoring pattern with step crossing over midline      UPPER QUARTER   MUSCLE STRENGTH  KEY       R  L  0 - No Contraction  Flexion   3+/5  4/5  1 - Trace   Abduction  3+/5  4/5  2 - Poor   Elbow Flexion  4/5  5/5  3 - Fair    Elbow Extension 4/5  5/5  4 - Good   Wrist Ext  5/5  5/5  5 - Normal   Wrist flex  4/5  4/5        LOWER QUARTER   MUSCLE STRENGTH  KEY        R  L  0 - No Contraction  Hip flexion   3+/5  3+/5   1- Trace   Hip Abduction   5/5  5/5  2 - Poor   Hip ER    nt  nt  3 - Fair    Hip Extension   nt  nt     4 - Good   Knee flexion   4/5  4/5  5 - Normal   Knee extension  3+/5  4/5      Ankle Dorsiflexion  4/5  4/5      Ankle Plantarflexion  4/5  4/5      Great toe extension  4/5  4/5    Balance Outcome Measures:  Single limb stance, EO: R = 9 seconds, L = 6 seconds  Tandem stance: R fwd= 2 seconds, L fwd= 7, 3, 2 seconds  5x STS: 25.05 seconds  spO2: 98%, HR: 111 bpm    Timed Up and Go: 16.4 seconds (avg of 2 trials)  2MWT: assess next visit     15 min Therapeutic Exercise:  [x] See flow sheet :   Rationale: increase ROM, increase strength, and improve balance to improve the patients ability to perform functional daily tasks with minimal to no pain.           With   [x] TE   [] TA   [] Neuro   [] SC   [] other: Patient Education: [x] Review HEP    [] Progressed/Changed HEP based on:   [] positioning   [] body mechanics   [] transfers   [] heat/ice application    [] other:      Other Objective/Functional Measures: FOTO Functional Measure: 48/100                   Pain Level (0-10 scale) post treatment: 0/10    ASSESSMENT/Changes in Function:     [x]  See Plan of [address], PT 10/5/2022

## 2022-10-05 NOTE — THERAPY EVALUATION
[facility]  [address] [place] [address] [address]  Phone: [phone] Fax: [phone]     Plan of Care/Statement of Necessity for Physical Therapy Services  2-15    Patient name: Jono Valencia  : 1945  Provider#: [id number]  Referral source: Wei Marr MD      Medical/Treatment Diagnosis: Weakness [R53.1]     Prior Hospitalization: see medical history     Comorbidities: BMI>30, DM type I or II, high blood pressure, incontinence, kidney, bladder, prostate or urination problems  Prior Level of Function: independent  Medications: Verified on Patient Summary List  Start of Care: 10/5/2022      Onset Date: chronic   The Plan of Care and following information is based on the information from the initial evaluation. Assessment/ key information: Patient is a 69year old female who presents to physical therapy services due to generalized muscle weakness. Patient presents today with functional mobility, muscle endurance, muscle power, and movement impairments. Patient demonstrated decreased upper and lower extremity strength during assessment, decreased functional mobility via 5xSTS test (25 sec), increased risk of falls seen via TUG score (16.4 avg), impaired static postural control and proprioceptive awareness during single limb stance exercise (R = 9sec avg, L = 6 sec avg), and shortness of breath limiting daily functional tasks. Patient pain responded favorable to manual intervention and strengthening exercises in todays session, with report of diminished symptoms post-treatment. Patient would benefit from continued skilled physical therapy services to address the impairments listed above to allow for full participation in daily functional tasks such as prolonged walking, standing, cooking, laundry safely and with minimal to no pain.       Evaluation Complexity History HIGH Complexity :3+ comorbidities / personal factors will impact the outcome/ POC ; Examination MEDIUM Complexity : 3 Standardized tests and measures addressing body structure, function, activity limitation and / or participation in recreation  ;Presentation MEDIUM Complexity : Evolving with changing characteristics  ; Clinical Decision Making MEDIUM Complexity : FOTO score of 26-74  Overall Complexity Rating: MEDIUM    Problem List: decrease ROM, decrease strength, impaired gait/ balance, decrease ADL/ functional abilitiies, decrease activity tolerance, and decrease flexibility/ joint mobility   Treatment Plan may include any combination of the following: Therapeutic exercise, Therapeutic activities, Neuromuscular re-education, Physical agent/modality, Gait/balance training, Manual therapy, Patient education, Self Care training, Functional mobility training, Home safety training, and Stair training  Patient / Family readiness to learn indicated by: asking questions, trying to perform skills, and interest  Persons(s) to be included in education: patient (P)  Barriers to Learning/Limitations: None  Patient Goal (s): getting back to where I was before all of this  Patient Self Reported Health Status: n/a  Rehabilitation Potential: good    Short Term Goals: To be accomplished in 10-12 treatments:   Patient will demonstrate understanding of and compliance with HEP showing full participation in physical therapy. Patient will improve 5xSTS test to </= 20 seconds showing improving functional mobility. Patient will demonstrate improved single limb postural control >/= 10 sec toward improved stability with prolonged walking tasks. Patient will improve Timed Up and Go test to </= 12 seconds showing improving functional mobility. Long Term Goals:  To be accomplished in 20-24 treatments:   Patient will tolerate >/= 5 min of prolonged standing during weightbearing exercises without seated rest break demonstrating improving strength and cardiovascular endurance to allow for improving participation in daily tasks. Patient will improve FOTO score by the [address] of 3 to >/= 51 showing significant improvement in their self-reported level of function. Patient will complete 2MWT with no rest breaks with a gait speed of >/= 0.8 m/s demonstrating improving cardiovascular endurance and safety with community ambulation. Frequency / Duration: Patient to be seen 2 times per week for 24 treatments. Patient/ Caregiver education and instruction: self care, activity modification, and exercises    [x]  Plan of care has been reviewed with PTA        Certification Period: 10/5/2022 - 01/3/2023  Kennyth Bernheim, PT 10/5/2022     ________________________________________________________________________    I certify that the above Therapy Services are being furnished while the patient is under my care. I agree with the treatment plan and certify that this therapy is necessary.     [de-identified] Signature:____________________  Date:____________Time: _________         Latanya Rodriguez MD

## 2022-10-10 ENCOUNTER — HOSPITAL ENCOUNTER (OUTPATIENT)
Dept: PHYSICAL THERAPY | Age: 77
Discharge: HOME | End: 2022-10-10
Payer: MEDICARE

## 2022-10-10 PROCEDURE — 97110 THERAPEUTIC EXERCISES: CPT

## 2022-10-10 NOTE — PROGRESS NOTES
PT DAILY TREATMENT NOTE - [facility] 2-15    Patient Name: Lizabeth Johnson  Date:10/10/2022  : 1945  [x]  Patient  Verified  Payor: [address] [place] [address] / Plan: [facility] MEDICARE COMPLETE / Product Type: Managed Care Medicare /    In time: 12:14  Out time: 1254  Total Treatment Time (min): 40  Total Timed Codes (min): 40  1:1 Treatment Time ( only): 40   Visit #:  2    *Pt arrived 13 minutes late to session today due to prior medical appointment running late. *    Treatment Area: Muscle weakness (generalized) [M62.81]    SUBJECTIVE  Pain Level (0-10 scale): 0/10  Any medication changes, allergies to medications, adverse drug reactions, diagnosis change, or new procedure performed?: [x] No    [] Yes (see summary sheet for update)  Subjective functional status/changes:   [] No changes reported  Pt reports compliance with HEP. Reports noticing improvement in overall breathing and shortness of breath. She has not gotten her incentive spirometer yet. OBJECTIVE    40 min Therapeutic Exercise:  [x] See flow sheet :   Rationale: increase ROM, increase strength, improve coordination, and improve balance to improve the patients ability to perform functional daily tasks with improving energy levels and decreased SOB. With   [] TE   [] TA   [] Neuro   [] SC   [] other: Patient Education: [x] Review HEP    [] Progressed/Changed HEP based on:   [] positioning   [] body mechanics   [] transfers   [] heat/ice application    [] other:      Other Objective/Functional Measures:      Pain Level (0-10 scale) post treatment: 0/10    ASSESSMENT/Changes in Function:   Pt tolerated treatment session well, but requires frequent rest breaks due to impaired muscular endurance and cardiovascular endurance. Pt tolerated progression of seated marching with ankle weights, but noted increased difficulty with RLE > LLE. Pt demonstrates improving glute strength performing resisted bridges and clamshells bilaterally.  Pt performed inc repetitions without rest compared to initial evaluation. Patient will continue to benefit from skilled PT services to modify and progress therapeutic interventions, address functional mobility deficits, address strength deficits, analyze and cue movement patterns, analyze and modify body mechanics/ergonomics, address imbalance/dizziness, and instruct in home and community integration to attain remaining goals. [x]  See Plan of Care  []  See progress note/recertification  []  See Discharge Summary         Progress towards goals / Updated goals:  Short Term Goals: To be accomplished in 10-12 treatments:              Patient will demonstrate understanding of and compliance with HEP showing full participation in physical therapy. Patient will improve 5xSTS test to </= 20 seconds showing improving functional mobility. Patient will demonstrate improved single limb postural control >/= 10 sec toward improved stability with prolonged walking tasks. Patient will improve Timed Up and Go test to </= 12 seconds showing improving functional mobility. Long Term Goals: To be accomplished in 20-24 treatments:              Patient will tolerate >/= 5 min of prolonged standing during weightbearing exercises without seated rest break demonstrating improving strength and cardiovascular endurance to allow for improving participation in daily tasks. Patient will improve FOTO score by the [facility] of 3 to >/= 51 showing significant improvement in their self-reported level of function. Patient will complete 2MWT with no rest breaks with a gait speed of >/= 0.8 m/s demonstrating improving cardiovascular endurance and safety with community ambulation.     PLAN  [x]  Upgrade activities as tolerated     [x]  Continue plan of care  [x]  Update interventions per flow sheet       []  Discharge due to:_  []  Other:_      [employer], PT 10/10/2022

## 2022-10-12 ENCOUNTER — HOSPITAL ENCOUNTER (OUTPATIENT)
Dept: PHYSICAL THERAPY | Age: 77
Discharge: HOME | End: 2022-10-12
Payer: MEDICARE

## 2022-10-12 PROCEDURE — 97110 THERAPEUTIC EXERCISES: CPT

## 2022-10-17 ENCOUNTER — OFFICE VISIT (OUTPATIENT)
Dept: SLEEP MEDICINE | Age: 77
End: 2022-10-17
Payer: MEDICARE

## 2022-10-17 ENCOUNTER — PATIENT OUTREACH (OUTPATIENT)
Dept: CASE MANAGEMENT | Age: 77
End: 2022-10-17

## 2022-10-17 VITALS
OXYGEN SATURATION: 96 % | BODY MASS INDEX: 30.9 KG/M2 | SYSTOLIC BLOOD PRESSURE: 137 MMHG | HEART RATE: 111 BPM | WEIGHT: 181 LBS | DIASTOLIC BLOOD PRESSURE: 80 MMHG | HEIGHT: 64 IN

## 2022-10-17 DIAGNOSIS — I50.31 ACUTE HEART FAILURE WITH PRESERVED EJECTION FRACTION (HCC): ICD-10-CM

## 2022-10-17 DIAGNOSIS — G47.33 OSA (OBSTRUCTIVE SLEEP APNEA): Primary | ICD-10-CM

## 2022-10-17 PROCEDURE — G8399 PT W/DXA RESULTS DOCUMENT: HCPCS | Performed by: SPECIALIST

## 2022-10-17 PROCEDURE — 1101F PT FALLS ASSESS-DOCD LE1/YR: CPT | Performed by: SPECIALIST

## 2022-10-17 PROCEDURE — G8427 DOCREV CUR MEDS BY ELIG CLIN: HCPCS | Performed by: SPECIALIST

## 2022-10-17 PROCEDURE — 1090F PRES/ABSN URINE INCON ASSESS: CPT | Performed by: SPECIALIST

## 2022-10-17 PROCEDURE — G9231 DOC ESRD DIA TRANS PREG: HCPCS | Performed by: SPECIALIST

## 2022-10-17 PROCEDURE — 1123F ACP DISCUSS/DSCN MKR DOCD: CPT | Performed by: SPECIALIST

## 2022-10-17 PROCEDURE — 99204 OFFICE O/P NEW MOD 45 MIN: CPT | Performed by: SPECIALIST

## 2022-10-17 PROCEDURE — G8536 NO DOC ELDER MAL SCRN: HCPCS | Performed by: SPECIALIST

## 2022-10-17 PROCEDURE — G8432 DEP SCR NOT DOC, RNG: HCPCS | Performed by: SPECIALIST

## 2022-10-17 PROCEDURE — G8417 CALC BMI ABV UP PARAM F/U: HCPCS | Performed by: SPECIALIST

## 2022-10-17 NOTE — PROGRESS NOTES
Patient has graduated from the Transitions of Care Coordination  program on 10/17/2022. Patient/family has the ability to self-manage at this time Care management goals have been completed. Patient was not referred to the [facility] team for further management. CTN unable to contact patient at final call. Goals Addressed                   This Visit's Progress     COMPLETED: Prevent complications post hospitalization. 09/15/22     CTN spoke to both patient and daughter on a three way call as CTN was unable to reach patient directly. CTN reviewed discharge instructions/red flags to prevent complications. PCP appt---CTN informed patient and daughter that PCP office is trying to contact patient for MARVIN appt. Patient will call MD office to schedule. Dr Carrie Washington reports she has a follow up in October. She states she has to see this MD every month due to her being on PD. Patient reports she has an appt with Dr Ysabel Coon for sleep study to get a CPAP on 10/17/2022. Patient states that nephrology is aware of cyst on L kidney and for now cyst is under surveillance. Patient reports she does not have IS, CTN gave her resources on where to purchase, instructed patient to perform Deep Breathing exercises to help with paralysis of diaphragm. Instructed her on pulmonary suggestions of using nocturnal CPAP when she gets one, IS, controlling BP, working with nephrology with fluid volume control with PD. Also informed them that Thoracic Surgeon could discuss possible surgery if all other means failed. Patient will monitor BS and call MD with any concerns  [facility]. CTN will follow up next week--mkclementina    09/28/22  CTN spoke to Patient today--she reports she still has some SOB if she tries to perform too many activities at once, so she is  alternating activity with rest.   She denies any other health concerns at this time.     Patient requests assistance with OP PT ordered by PCP as she has not been contacted to start services. CTN contacted Freya at [phone] OP PT services, she states she will call patient immediately to schedule. Patient called back, she reports she has an appt on 10/5/22 at start PT. Patient reports she has attended appt with Dr Bee Wilson on 9/20/22, states that she has been doing well on new PD formula. Patient has pulmonary appt on 10/24/22. CTN will reach out again in 1 week. --mkrw    10/17/22  CTN unable to reach patient on phone, LM on . Chart reviewed, patient has been working with PT, has had 3 visits per chart review. No evidence of readmission during 30 day MARVIN, no further outreach scheduled---mkrw       COMPLETED: Supportive resources in place to maintain patient in the community (ie. Home Health, DME equipment, refer to, medication assistant plan, etc.)        09/15/22    Patient will call her SW at Dr Royal Aguirre office to help with ordering walker, cane, and BSC. CTN informed patient to call her insurance company to find out what DME company they will cover in order to expedite any DME orders  Patient daughter given number to [facility] to start [employer] process as patient interested in applying for medicaid. Patient given contact number for [phone] [place] [address] to help with obtaining DME    ---mkrw              Patient has Care Transition Nurse's contact information for any further questions, concerns, or needs.   Patients upcoming visits:    Future Appointments   Date Time Provider Rita Andrade   10/17/2022  4:00 PM Marcell Morales MD [facility] [place] [facility]   10/18/2022 11:00 AM Yaya Singer, KELLY [facility]   10/26/2022 12:00 PM Dima Bowen PT [facility]   10/28/2022  9:00 AM Dima Bowen PT [facility]   10/31/2022 12:00 PM Dima Bowen, PT [facility]   1/23/2023 10:30 AM Cameron Jarquin MD [facility] BS AM[facility]

## 2022-10-17 NOTE — PROGRESS NOTES
[address] [facility]. [place], [address]  Tel.  [phone]  Fax. [phone] [facility], [address]  Tel.  [phone]  Fax. [phone] [address]  Tel.  [phone]  Fax. [phone]       Chief Complaint       Chief Complaint   Patient presents with    Sleep Problem     HF Patient - New patient consult       HPI      Shanel Crenshaw is 69 y.o. female seen for evaluation of a sleep disorder. She is accompanied by her daughter. Does note sleep difficulties; patient on home dialysis at night. Will nap during the day. Has been told of snoring of varying intensity. Loud at times. Also may nap if seated and inactive such as when watching TV. Otherwise, does not experience excessive daytime sleepiness. The patient has not undergone diagnostic testing for the current problems. [place] Sleepiness Score: 5       Allergies   Allergen Reactions    Codeine Hives       Current Outpatient Medications   Medication Sig Dispense Refill    metoprolol succinate (TOPROL-XL) 50 mg XL tablet Take 1 Tablet by mouth daily. 30 Tablet 4    potassium chloride (K-DUR, KLOR-CON M20) 20 mEq tablet Take 20 mEq by mouth daily. glimepiride (AMARYL) 1 mg tablet Take 1 Tablet by mouth Daily (before breakfast). 90 Tablet 3    atorvastatin (LIPITOR) 20 mg tablet Take 1 tablet by mouth once daily 90 Tablet 3    glucose blood VI test strips (OneTouch Ultra Test) strip CHECK BLOOD SUGAR 2-3 TIMES A WEEK 100 Strip 0    levothyroxine (Euthyrox) 50 mcg tablet Take 1 Tablet by mouth Daily (before breakfast). 90 Tablet 3    allopurinoL (ZYLOPRIM) 300 mg tablet Take 300 mg by mouth daily. b complex vitamins tablet Take 1 Tablet by mouth daily. furosemide (LASIX) 20 mg tablet Take 20 mg by mouth two (2) times a day. flash glucose sensor (FreeStyle Peyton 2 Sensor) kit 1 Each by Does Not Apply route continuous.  2 Kit 11 flash glucose scanning reader (FreeStyle Peyton 2 Rye) misc 1 Device by Does Not Apply route continuous. 1 Each 0    flaxseed oil (OMEGA 3 PO) Take  by mouth. acetaminophen (TYLENOL) 500 mg tablet Take 500-1,000 mg by mouth every six (6) hours as needed for Pain.      sodium bicarbonate 650 mg tablet Take 650 mg by mouth three (3) times daily. SITagliptin (JANUVIA) 25 mg tablet Take 25 mg by mouth daily. (Patient not taking: No sig reported)          She  has a past medical history of Arthritis, Bell's palsy, Cancer ([facility] [address] Chronic kidney disease (1996), Diabetes ([facility] [address] Diabetes mellitus ([facility] [address] (10/8/2014), Hepatitis C (2011), Hyperlipidemia, Hypertension, Ill-defined condition (1990s), Personal history of renal cancer (3/2/2015), Psychiatric disorder, Thyroid disease (1999), and Vertigo. She  has a past surgical history that includes hx colonoscopy (3/5/12); colonoscopy (N/A, 6/20/2017); ir bx bone marrow diagnostic (04/05/2021); hx cyst removal; hx gyn (1980s); hx brent and bso (Bilateral, 1995); hx other surgical (1999); hx other surgical; hx other surgical (04/08/2019); hx other surgical (04/08/2019); hx breast reduction (Bilateral, 1996); hx urological (1996); and hx tonsillectomy. She family history includes Diabetes in her daughter, mother, and sister; Heart Disease in her father; Hypertension in her daughter and son; Kidney Disease in her father and mother; No Known Problems in her brother and son. She  reports that she quit smoking about 30 years ago. Her smoking use included cigarettes. She has a 2.00 pack-year smoking history. She has never used smokeless tobacco. She reports that she does not drink alcohol and does not use drugs. Review of Systems:  Review of Systems   HENT:  Negative for hearing loss. Eyes:  Negative for blurred vision and double vision. Respiratory:  Positive for shortness of breath and wheezing. Negative for cough.     Cardiovascular:  Negative for chest pain and palpitations. Gastrointestinal:  Negative for heartburn. Genitourinary:  Negative for frequency and urgency. Musculoskeletal:  Positive for neck pain. Negative for back pain. Neurological:  Positive for tremors. Negative for dizziness and headaches. Psychiatric/Behavioral:  Negative for depression. The patient is not nervous/anxious. Objective:   Visit Vitals  /80 (BP 1 Location: Left upper arm, BP Patient Position: Sitting)   Pulse (!) 111   Ht 5' 4\" (1.626 m)   Wt 181 lb (82.1 kg)   LMP  (LMP Unknown)   SpO2 96%   BMI 31.07 kg/m²     Body mass index is 31.07 kg/m². General:   Conversant, cooperative   Eyes:  no nystagmus   Oropharynx:   Mallampati score IV, tongue large, narrow posterior oral airway           Chest/Lungs:  Clear on auscultation    CVS:  Normal rate, regular rhythm   Skin:  Warm to touch; no obvious rashes   Neuro:  Speech fluent, face symmetrical, tongue movement normal   Psych:  Normal affect,  normal countenance        Assessment:       ICD-10-CM ICD-9-CM    1. TYLER (obstructive sleep apnea)  G47.33 327.23       2. Acute heart failure with preserved ejection fraction (HCC)  I50.31 428.9         Potential sleep disordered breathing. Patient does have a narrow posterior oral airway. Sleep breathing abnormalities may be more prominent when supine, and/or in rem sleep. Impact of untreated sleep apnea on cardiac function reviewed. Given peritoneal dialysis history, patient will be evaluated with a home sleep test.      Plan:     No orders of the defined types were placed in this encounter. * Patient has a history and examination consistent with the diagnosis of sleep apnea. *Home sleep testing was ordered for initial evaluation. * She was provided information on sleep apnea including corresponding risk factors and the importance of proper treatment. * Treatment options if indicated were reviewed today.       Instructions:    Do not engage in activities requiring a normal degree of alertness if fatigue is present. The patient understands that untreated or undertreated sleep apnea could impair judgement and the ability to function normally during the day. Call or return if symptoms worsen or persist.          Arelis Black MD, [facility]  Electronically signed 10/17/22       This note was created using voice recognition software. Despite editing, there may be syntax errors. This note will not be viewable in [address]

## 2022-10-18 ENCOUNTER — HOSPITAL ENCOUNTER (OUTPATIENT)
Dept: PHYSICAL THERAPY | Age: 77
Discharge: HOME | End: 2022-10-18
Payer: MEDICARE

## 2022-10-18 PROCEDURE — 97110 THERAPEUTIC EXERCISES: CPT

## 2022-10-18 NOTE — PROGRESS NOTES
PT DAILY TREATMENT NOTE - [facility] 2-15    Patient Name: Mely Parada  Date:10/18/2022  : 1945  [x]  Patient  Verified  Payor: Brady Fernandez / Plan: [facility] MEDICARE COMPLETE / Product Type: Managed Care Medicare /    In time: 7296 Out time: 1148  Total Treatment Time (min): 42  Total Timed Codes (min): 42  1:1 Treatment Time ( only): 31  Visit #:  4    Treatment Area: Muscle weakness (generalized) [M62.81]    SUBJECTIVE  Pain Level (0-10 scale): 0/10  Any medication changes, allergies to medications, adverse drug reactions, diagnosis change, or new procedure performed?: [x] No    [] Yes (see summary sheet for update)  Subjective functional status/changes:   [] No changes reported  Patient noted they have continued working on HEP, noted they feel like they have increased amounts of strength and energy since initial treatment session. OBJECTIVE    42 min Therapeutic Exercise:  [x] See flow sheet :   Rationale: increase ROM, increase strength, improve coordination, and improve balance to improve the patients ability to perform functional daily tasks with improving energy levels and decreased SOB. With   [] TE   [] TA   [] Neuro   [] SC   [] other: Patient Education: [x] Review HEP    [] Progressed/Changed HEP based on:   [] positioning   [] body mechanics   [] transfers   [] heat/ice application    [] other:      Other Objective/Functional Measures:      Pain Level (0-10 scale) post treatment: 0/10    ASSESSMENT/Changes in Function:   Patient tolerated treatment session well, able to perform exercises and progressions without increasing symptoms today. Patient demonstrating improved exercise tolerance needed decreased amounts of rest periods to complete exercises today and was able to complete all exercises with minimal amounts of SOB. Patient did note increased SOB following standing marches and step ups. Provided verbal cues to improve eccentric control and pt improved.  Patient also demonstrated decreased stability during ambulation with anterior trunk lean, also showed increased lateral swaying ambulating across clinic. Patient will continue to benefit from skilled PT services to modify and progress therapeutic interventions, address functional mobility deficits, address strength deficits, analyze and cue movement patterns, analyze and modify body mechanics/ergonomics, address imbalance/dizziness, and instruct in home and community integration to attain remaining goals. [x]  See Plan of Care  []  See progress note/recertification  []  See Discharge Summary         Progress towards goals / Updated goals:  Short Term Goals: To be accomplished in 10-12 treatments:              Patient will demonstrate understanding of and compliance with HEP showing full participation in physical therapy. Patient will improve 5xSTS test to </= 20 seconds showing improving functional mobility. Patient will demonstrate improved single limb postural control >/= 10 sec toward improved stability with prolonged walking tasks. Patient will improve Timed Up and Go test to </= 12 seconds showing improving functional mobility. Long Term Goals: To be accomplished in 20-24 treatments:              Patient will tolerate >/= 5 min of prolonged standing during weightbearing exercises without seated rest break demonstrating improving strength and cardiovascular endurance to allow for improving participation in daily tasks. Patient will improve FOTO score by the [address] of 3 to >/= 51 showing significant improvement in their self-reported level of function. Patient will complete 2MWT with no rest breaks with a gait speed of >/= 0.8 m/s demonstrating improving cardiovascular endurance and safety with community ambulation.     PLAN  [x]  Upgrade activities as tolerated     [x]  Continue plan of care  [x]  Update interventions per flow sheet       []  Discharge due to:_  []  Other:_      Soledad Puente PTA 10/18/2022

## 2022-10-19 ENCOUNTER — APPOINTMENT (OUTPATIENT)
Dept: PHYSICAL THERAPY | Age: 77
End: 2022-10-19
Payer: MEDICARE

## 2022-10-24 ENCOUNTER — APPOINTMENT (OUTPATIENT)
Dept: PHYSICAL THERAPY | Age: 77
End: 2022-10-24
Payer: MEDICARE

## 2022-10-26 ENCOUNTER — HOSPITAL ENCOUNTER (OUTPATIENT)
Dept: PHYSICAL THERAPY | Age: 77
End: 2022-10-26
Payer: MEDICARE

## 2022-10-28 ENCOUNTER — APPOINTMENT (OUTPATIENT)
Dept: PHYSICAL THERAPY | Age: 77
End: 2022-10-28
Payer: MEDICARE

## 2022-10-31 ENCOUNTER — APPOINTMENT (OUTPATIENT)
Dept: PHYSICAL THERAPY | Age: 77
End: 2022-10-31
Payer: MEDICARE

## 2022-11-04 ENCOUNTER — TRANSCRIBE ORDER (OUTPATIENT)
Dept: REGISTRATION | Age: 77
End: 2022-11-04

## 2022-11-04 ENCOUNTER — HOSPITAL ENCOUNTER (OUTPATIENT)
Dept: GENERAL RADIOLOGY | Age: 77
Discharge: HOME | End: 2022-11-04
Payer: MEDICARE

## 2022-11-04 DIAGNOSIS — J98.6 ELEVATED HEMIDIAPHRAGM: ICD-10-CM

## 2022-11-04 DIAGNOSIS — J98.6 ELEVATED HEMIDIAPHRAGM: Primary | ICD-10-CM

## 2022-11-04 PROCEDURE — 71046 X-RAY EXAM CHEST 2 VIEWS: CPT

## 2022-11-09 NOTE — PROGRESS NOTES
[facility]  [address] [place]  Phone: [phone] Fax: [phone]    Discharge Summary 2-15    Patient name: Frank Hurd  : 1945  Provider#: [id number]  Referral source: Gilma Ann MD      Medical/Treatment Diagnosis: Muscle weakness (generalized) [M62.81]     Prior Hospitalization: see medical history     Comorbidities: See Plan of Care  Prior Level of Function: See Plan of Care  Medications: Verified on Patient Summary List    Start of Care: 10/5/2022      Onset Date:chronic   Visits from Start of Care: 4     Missed Visits: 3  Reporting Period : 10/5/2022 to 10/28/2022    Assessment/Summary of care: Pt is discharged today, 2022, as they have stopped attending therapy. Final objective data and outcomes were unable to be obtained.        RECOMMENDATIONS:  [x]Discontinue therapy: []Patient has reached or is progressing toward set goals     []Patient is non-compliant or has abdicated     []Due to lack of appreciable progress towards set goals     [x]Other: self-discharge    PATIENT’S [facility], PT, DPT 2022

## 2022-12-16 ENCOUNTER — HOSPITAL ENCOUNTER (OUTPATIENT)
Dept: SLEEP MEDICINE | Age: 77
Discharge: HOME | End: 2022-12-16
Payer: MEDICARE

## 2022-12-16 ENCOUNTER — OFFICE VISIT (OUTPATIENT)
Dept: SLEEP MEDICINE | Age: 77
End: 2022-12-16

## 2022-12-16 DIAGNOSIS — I50.31 ACUTE HEART FAILURE WITH PRESERVED EJECTION FRACTION (HCC): ICD-10-CM

## 2022-12-16 DIAGNOSIS — G47.33 OSA (OBSTRUCTIVE SLEEP APNEA): Primary | ICD-10-CM

## 2022-12-16 PROCEDURE — 95806 SLEEP STUDY UNATT&RESP EFFT: CPT | Performed by: SPECIALIST

## 2022-12-16 NOTE — PROGRESS NOTES
S>Christina Bueno is a 69 y.o. female seen today to receive a home sleep testing unit (HST). Patient was educated on proper hookup and operation of the HST via detailed instruction sheet . Instruction forms with after hours contact and documentation were signed. O>    Visit Vitals  LMP  (LMP Unknown)         A>  No diagnosis found. P>  General information regarding operations and maintenance of the device was provided. Follow-up appointment was made to return the HST. She will be contacted once the results have been reviewed. She was asked to contact our office for any problems regarding her home sleep test study.

## 2022-12-20 ENCOUNTER — TELEPHONE (OUTPATIENT)
Dept: SLEEP MEDICINE | Age: 77
End: 2022-12-20

## 2022-12-20 DIAGNOSIS — G47.33 OSA (OBSTRUCTIVE SLEEP APNEA): Primary | ICD-10-CM

## 2022-12-20 RX ORDER — BLOOD SUGAR DIAGNOSTIC
STRIP MISCELLANEOUS
Qty: 100 STRIP | Refills: 0 | Status: SHIPPED | OUTPATIENT
Start: 2022-12-20

## 2022-12-29 NOTE — TELEPHONE ENCOUNTER
HSAT demonstrated sleep disordered breathing characterized by an overall AHI of 39.4/h associated with minimal SaO2 of 88%. Events more prominently supine with supine related AHI of 63.6/h. Snoring during 11%. HSAT potentially underestimated severity of sleep disordered breathing due to periods of poor P flow and pulse ox signal.    Recommendation: APAP 7-18 cm    Sleep technologist: Please review HSAT results with the patient. Order has been entered for APAP 7-18 cm. Please schedule first compliance appointment.

## 2022-12-29 NOTE — TELEPHONE ENCOUNTER
Results have been sent to  \[facility]\"" & [facility] SERVICES. Fax DME order & Schedule 1st adherence visit in 60 to 90 days.

## 2023-01-04 ENCOUNTER — DOCUMENTATION ONLY (OUTPATIENT)
Dept: SLEEP MEDICINE | Age: 78
End: 2023-01-04

## 2023-01-04 ENCOUNTER — HOSPITAL ENCOUNTER (EMERGENCY)
Age: 78
Discharge: HOME | End: 2023-01-04
Attending: EMERGENCY MEDICINE
Payer: MEDICARE

## 2023-01-04 VITALS
OXYGEN SATURATION: 97 % | DIASTOLIC BLOOD PRESSURE: 87 MMHG | HEART RATE: 98 BPM | RESPIRATION RATE: 19 BRPM | TEMPERATURE: 97.7 F | SYSTOLIC BLOOD PRESSURE: 129 MMHG

## 2023-01-04 DIAGNOSIS — Z13.9 ENCOUNTER FOR MEDICAL SCREENING EXAMINATION: Primary | ICD-10-CM

## 2023-01-04 LAB
ALBUMIN SERPL-MCNC: 3.3 G/DL (ref 3.5–5)
ALBUMIN/GLOB SERPL: 0.8 {RATIO} (ref 1.1–2.2)
ALP SERPL-CCNC: 72 U/L (ref 45–117)
ALT SERPL-CCNC: 21 U/L (ref 12–78)
ANION GAP SERPL CALC-SCNC: 12 MMOL/L (ref 5–15)
AST SERPL-CCNC: 18 U/L (ref 15–37)
BASOPHILS # BLD: 0 K/UL (ref 0–0.1)
BASOPHILS NFR BLD: 0 % (ref 0–1)
BILIRUB SERPL-MCNC: 0.3 MG/DL (ref 0.2–1)
BUN SERPL-MCNC: 43 MG/DL (ref 6–20)
BUN/CREAT SERPL: 5 (ref 12–20)
CALCIUM SERPL-MCNC: 10.1 MG/DL (ref 8.5–10.1)
CHLORIDE SERPL-SCNC: 103 MMOL/L (ref 97–108)
CO2 SERPL-SCNC: 30 MMOL/L (ref 21–32)
COMMENT, HOLDF: NORMAL
CREAT SERPL-MCNC: 8.8 MG/DL (ref 0.55–1.02)
DIFFERENTIAL METHOD BLD: ABNORMAL
EOSINOPHIL # BLD: 0 K/UL (ref 0–0.4)
EOSINOPHIL NFR BLD: 0 % (ref 0–7)
ERYTHROCYTE [DISTWIDTH] IN BLOOD BY AUTOMATED COUNT: 16.8 % (ref 11.5–14.5)
GLOBULIN SER CALC-MCNC: 4.2 G/DL (ref 2–4)
GLUCOSE SERPL-MCNC: 137 MG/DL (ref 65–100)
HCT VFR BLD AUTO: 35.1 % (ref 35–47)
HGB BLD-MCNC: 11.4 G/DL (ref 11.5–16)
IMM GRANULOCYTES # BLD AUTO: 0 K/UL (ref 0–0.04)
IMM GRANULOCYTES NFR BLD AUTO: 0 % (ref 0–0.5)
LYMPHOCYTES # BLD: 2.5 K/UL (ref 0.8–3.5)
LYMPHOCYTES NFR BLD: 25 % (ref 12–49)
MCH RBC QN AUTO: 29.7 PG (ref 26–34)
MCHC RBC AUTO-ENTMCNC: 32.5 G/DL (ref 30–36.5)
MCV RBC AUTO: 91.4 FL (ref 80–99)
MONOCYTES # BLD: 0.7 K/UL (ref 0–1)
MONOCYTES NFR BLD: 7 % (ref 5–13)
NEUTS SEG # BLD: 6.6 K/UL (ref 1.8–8)
NEUTS SEG NFR BLD: 68 % (ref 32–75)
NRBC # BLD: 0 K/UL (ref 0–0.01)
NRBC BLD-RTO: 0 PER 100 WBC
PLATELET # BLD AUTO: 208 K/UL (ref 150–400)
PMV BLD AUTO: 10.6 FL (ref 8.9–12.9)
POTASSIUM SERPL-SCNC: 3.8 MMOL/L (ref 3.5–5.1)
PROT SERPL-MCNC: 7.5 G/DL (ref 6.4–8.2)
RBC # BLD AUTO: 3.84 M/UL (ref 3.8–5.2)
SAMPLES BEING HELD,HOLD: NORMAL
SODIUM SERPL-SCNC: 145 MMOL/L (ref 136–145)
TROPONIN-HIGH SENSITIVITY: 27 NG/L (ref 0–51)
WBC # BLD AUTO: 9.8 K/UL (ref 3.6–11)

## 2023-01-04 PROCEDURE — 84484 ASSAY OF TROPONIN QUANT: CPT

## 2023-01-04 PROCEDURE — 93005 ELECTROCARDIOGRAM TRACING: CPT

## 2023-01-04 PROCEDURE — 85025 COMPLETE CBC W/AUTO DIFF WBC: CPT

## 2023-01-04 PROCEDURE — 36415 COLL VENOUS BLD VENIPUNCTURE: CPT

## 2023-01-04 PROCEDURE — 99284 EMERGENCY DEPT VISIT MOD MDM: CPT

## 2023-01-04 PROCEDURE — 80053 COMPREHEN METABOLIC PANEL: CPT

## 2023-01-04 NOTE — ED NOTES

## 2023-01-04 NOTE — ED PROVIDER NOTES
80-year-old female with a history of chronic kidney disease on peritoneal dialysis presents with a chief complaint of abnormal heart beat. Patient was seen by nurse from [facility] today and she was felt to have an abnormal heartbeat. The nurse was concerned she may be in atrial fibrillation. Patient denies having any symptoms whatsoever. Past Medical History:   Diagnosis Date    Arthritis     LOWER SPINE    Bell's palsy     23yrs old     Cancer ([address]     kidney (right)    Chronic kidney disease 1996    right kidney removed - CANCER    Diabetes ([address]     She is controlling with weight loss    Diabetes mellitus ([facility] [address] 10/08/2014    NIDDM    Hepatitis C 2011    hx hep C from blood transfusion per pt; Treated by Dr. Codi Solorzano    Hyperlipidemia     Hypertension     Ill-defined condition 1990s    HEPATITIS C - treated with medication    Peritoneal dialysis catheter in place [facility])     Personal history of renal cancer 03/02/2015    Psychiatric disorder     depression     Thyroid disease 1999    Thyroidectomy    Vertigo        Past Surgical History:   Procedure Laterality Date    COLONOSCOPY N/A 6/20/2017    COLONOSCOPY performed by Syed Cowart.  Willem Lopez MD at [facility] ENDOSCOPY    HX BREAST REDUCTION Bilateral 1996    HX COLONOSCOPY  3/5/12    Repeat in 5 years (Dr. Codi Solorzano)    HX CYST REMOVAL      HX GYN  1980s    tubal pregnancy    HX OTHER SURGICAL  1999    thyroidectomy    HX OTHER SURGICAL      colonoscopy x 2 - ?polyps    HX OTHER SURGICAL  04/08/2019    surg for prolasp bladder at [address]  04/08/2019    prolasp bladder surg    HX RITESH AND BSO Bilateral 1995    HX TONSILLECTOMY      2or 4years old    [address]    kidney removed on right    IR BX BONE MARROW DIAGNOSTIC  04/05/2021         Family History:   Problem Relation Age of Onset    Diabetes Mother     Kidney Disease Mother     Heart Disease Father     Kidney Disease Father         dialysis    Diabetes Sister     Diabetes Daughter     Hypertension Daughter     Hypertension Son     No Known Problems Son     No Known Problems Brother     Anesth Problems Neg Hx        Social History     Socioeconomic History    Marital status:      Spouse name: Not on file    Number of children: Not on file    Years of education: Not on file    Highest education level: Not on file   Occupational History    Not on file   Tobacco Use    Smoking status: Former     Packs/day: 0.25     Years: 8.00     Pack years: 2.00     Types: Cigarettes     Quit date: 1992     Years since quittin.7    Smokeless tobacco: Never    Tobacco comments:     quit smoking 30-40 yrs ago   Vaping Use    Vaping Use: Never used   Substance and Sexual Activity    Alcohol use: No    Drug use: No    Sexual activity: Yes     Partners: Male   Other Topics Concern    Not on file   Social History Narrative    Not on file     Social Determinants of Health     Financial Resource Strain: Medium Risk    Difficulty of Paying Living Expenses: Somewhat hard   Food Insecurity: Food Insecurity Present    Worried About Running Out of Food in the Last Year: Sometimes true    Ran Out of Food in the Last Year: Sometimes true   Transportation Needs: Not on file   Physical Activity: Not on file   Stress: Not on file   Social Connections: Not on file   Intimate Partner Violence: Not on file   Housing Stability: Not on file         ALLERGIES: Codeine    Review of Systems   Respiratory:  Negative for shortness of breath. Cardiovascular:  Negative for chest pain. Gastrointestinal:  Negative for abdominal pain. Vitals:    23 1422   BP: 138/87   Pulse: 99   Resp: 16   Temp: 97.7 °F (36.5 °C)   SpO2: 99%            Physical Exam  Vitals and nursing note reviewed. Constitutional:       General: She is not in acute distress. Appearance: Normal appearance. She is not ill-appearing, toxic-appearing or diaphoretic. HENT:      Head: Normocephalic and atraumatic.    Eyes: Extraocular Movements: Extraocular movements intact. Cardiovascular:      Rate and Rhythm: Normal rate. Pulses: Normal pulses. Pulmonary:      Effort: Pulmonary effort is normal. No respiratory distress. Abdominal:      General: There is no distension. Musculoskeletal:         General: Normal range of motion. Cervical back: Normal range of motion. Skin:     General: Skin is dry. Neurological:      Mental Status: She is alert and oriented to person, place, and time. Psychiatric:         Mood and Affect: Mood normal.        Medical Decision Making    EKG and is in a sinus rhythm. Patient's labs consistent with known renal dysfunction. Discussed my clinical impression(s), any labs and/or radiology results with the patient. I answered any questions and addressed any concerns. Discussed the importance of following up with their primary care physician and/or specialist(s). Discussed signs or symptoms that would warrant return back to the ER for further evaluation. The patient is agreeable with discharge. Amount and/or Complexity of Data Reviewed  Labs: ordered. ECG/medicine tests: ordered. ED Course as of 01/04/23 1627   Wed Jan 04, 2023   1425 EKG shows sinus rhythm at a rate of 98, normal intervals, normal axis, no ischemic changes.  [RD]      ED Course User Index  [RD] Paulo Hoover MD       Procedures

## 2023-01-04 NOTE — ED TRIAGE NOTES
Pt arrives at instruction of a nurse who was doing a routine evaluation at home for an insurance policy. Nurse noted, per pt, to hear irregular HR during ascultation of heart sounds. Pt denies any complaints or medical problems.

## 2023-01-05 LAB
ATRIAL RATE: 99 BPM
CALCULATED P AXIS, ECG09: 53 DEGREES
CALCULATED R AXIS, ECG10: 10 DEGREES
CALCULATED T AXIS, ECG11: 111 DEGREES
DIAGNOSIS, 93000: NORMAL
P-R INTERVAL, ECG05: 136 MS
Q-T INTERVAL, ECG07: 336 MS
QRS DURATION, ECG06: 70 MS
QTC CALCULATION (BEZET), ECG08: 431 MS
VENTRICULAR RATE, ECG03: 99 BPM

## 2023-01-11 ENCOUNTER — PATIENT MESSAGE (OUTPATIENT)
Dept: SLEEP MEDICINE | Age: 78
End: 2023-01-11

## 2023-01-23 ENCOUNTER — OFFICE VISIT (OUTPATIENT)
Dept: INTERNAL MEDICINE CLINIC | Age: 78
End: 2023-01-23
Payer: MEDICARE

## 2023-01-23 VITALS
OXYGEN SATURATION: 100 % | HEART RATE: 104 BPM | WEIGHT: 177.4 LBS | HEIGHT: 64 IN | BODY MASS INDEX: 30.29 KG/M2 | RESPIRATION RATE: 18 BRPM | DIASTOLIC BLOOD PRESSURE: 91 MMHG | SYSTOLIC BLOOD PRESSURE: 136 MMHG | TEMPERATURE: 97.2 F

## 2023-01-23 DIAGNOSIS — E78.2 MIXED HYPERLIPIDEMIA: ICD-10-CM

## 2023-01-23 DIAGNOSIS — I10 HYPERTENSION, UNSPECIFIED TYPE: ICD-10-CM

## 2023-01-23 DIAGNOSIS — N18.5 CHRONIC KIDNEY DISEASE, STAGE 5 (HCC): ICD-10-CM

## 2023-01-23 DIAGNOSIS — C64.9 MALIGNANT NEOPLASM OF KIDNEY, UNSPECIFIED LATERALITY (HCC): ICD-10-CM

## 2023-01-23 DIAGNOSIS — Z00.00 MEDICARE ANNUAL WELLNESS VISIT, SUBSEQUENT: ICD-10-CM

## 2023-01-23 DIAGNOSIS — I50.31 ACUTE HEART FAILURE WITH PRESERVED EJECTION FRACTION (HCC): ICD-10-CM

## 2023-01-23 DIAGNOSIS — E11.21 TYPE 2 DIABETES MELLITUS WITH NEPHROPATHY (HCC): Primary | ICD-10-CM

## 2023-01-23 PROCEDURE — 1101F PT FALLS ASSESS-DOCD LE1/YR: CPT | Performed by: INTERNAL MEDICINE

## 2023-01-23 PROCEDURE — G8427 DOCREV CUR MEDS BY ELIG CLIN: HCPCS | Performed by: INTERNAL MEDICINE

## 2023-01-23 PROCEDURE — G8399 PT W/DXA RESULTS DOCUMENT: HCPCS | Performed by: INTERNAL MEDICINE

## 2023-01-23 PROCEDURE — G8510 SCR DEP NEG, NO PLAN REQD: HCPCS | Performed by: INTERNAL MEDICINE

## 2023-01-23 PROCEDURE — G8536 NO DOC ELDER MAL SCRN: HCPCS | Performed by: INTERNAL MEDICINE

## 2023-01-23 PROCEDURE — G8417 CALC BMI ABV UP PARAM F/U: HCPCS | Performed by: INTERNAL MEDICINE

## 2023-01-23 PROCEDURE — G0439 PPPS, SUBSEQ VISIT: HCPCS | Performed by: INTERNAL MEDICINE

## 2023-01-23 NOTE — PROGRESS NOTES
Well      This is the Subsequent Medicare Annual Wellness Exam, performed 12 months or more after the Initial AWV or the last Subsequent AWV    I have reviewed the patient's medical history in detail and updated the computerized patient record. History     Past Medical History:   Diagnosis Date    Arthritis     LOWER SPINE    Bell's palsy     23yrs old     Cancer ([address]     kidney (right)    Chronic kidney disease 1996    right kidney removed - CANCER    Diabetes ([address]     She is controlling with weight loss    Diabetes mellitus ([address] 10/08/2014    NIDDM    Hepatitis C 2011    hx hep C from blood transfusion per pt; Treated by Dr. Cathy Rico    Hyperlipidemia     Hypertension     Ill-defined condition 1990s    HEPATITIS C - treated with medication    Peritoneal dialysis catheter in place [facility])     Personal history of renal cancer 03/02/2015    Psychiatric disorder     depression     Thyroid disease 1999    Thyroidectomy    Vertigo       Past Surgical History:   Procedure Laterality Date    COLONOSCOPY N/A 6/20/2017    COLONOSCOPY performed by Iman Tyson. Fantasma Cantu MD at [facility] ENDOSCOPY    HX BREAST REDUCTION Bilateral 1996    HX COLONOSCOPY  3/5/12    Repeat in 5 years (Dr. Cathy Rico)    HX CYST REMOVAL      HX GYN  1980s    tubal pregnancy    HX OTHER SURGICAL  1999    thyroidectomy    HX OTHER SURGICAL      colonoscopy x 2 - ?polyps    HX OTHER SURGICAL  04/08/2019    surg for prolasp bladder at [address]  04/08/2019    prolasp bladder surg    HX RITESH AND BSO Bilateral 1995    HX TONSILLECTOMY      2or 4years old    [address]    kidney removed on right    IR BX BONE MARROW DIAGNOSTIC  04/05/2021     Current Outpatient Medications   Medication Sig Dispense Refill    glucose blood VI test strips (OneTouch Ultra Test) strip USE 1 STRIP TO CHECK GLUCOSE 2 TO 3 TIMES A WEEK 100 Strip 0    metoprolol succinate (TOPROL-XL) 50 mg XL tablet Take 1 Tablet by mouth daily.  30 Tablet 4    glimepiride (AMARYL) 1 mg tablet Take 1 Tablet by mouth Daily (before breakfast). 90 Tablet 3    atorvastatin (LIPITOR) 20 mg tablet Take 1 tablet by mouth once daily 90 Tablet 3    sodium bicarbonate 650 mg tablet Take 650 mg by mouth three (3) times daily. levothyroxine (Euthyrox) 50 mcg tablet Take 1 Tablet by mouth Daily (before breakfast). 90 Tablet 3    allopurinoL (ZYLOPRIM) 300 mg tablet Take 300 mg by mouth daily. SITagliptin (JANUVIA) 25 mg tablet Take 25 mg by mouth daily. b complex vitamins tablet Take 1 Tablet by mouth daily. furosemide (LASIX) 20 mg tablet Take 20 mg by mouth two (2) times a day. flash glucose sensor (FreeStyle Peyton 2 Sensor) kit 1 Each by Does Not Apply route continuous. 2 Kit 11    flash glucose scanning reader (FreeStyle Peyton 2 [place]) misc 1 Device by Does Not Apply route continuous. 1 Each 0    flaxseed oil (OMEGA 3 PO) Take  by mouth. acetaminophen (TYLENOL) 500 mg tablet Take 500-1,000 mg by mouth every six (6) hours as needed for Pain.      potassium chloride (K-DUR, KLOR-CON M20) 20 mEq tablet Take 40 mEq by mouth daily.        Allergies   Allergen Reactions    Codeine Hives     Family History   Problem Relation Age of Onset    Diabetes Mother     Kidney Disease Mother     Heart Disease Father     Kidney Disease Father         dialysis    Diabetes Sister     Diabetes Daughter     Hypertension Daughter     Hypertension Son     No Known Problems Son     No Known Problems Brother     Anesth Problems Neg Hx      Social History     Tobacco Use    Smoking status: Former     Packs/day: 0.25     Years: 8.00     Pack years: 2.00     Types: Cigarettes     Quit date: 1992     Years since quittin.8    Smokeless tobacco: Never    Tobacco comments:     quit smoking 30-40 yrs ago   Substance Use Topics    Alcohol use: No     Patient Active Problem List   Diagnosis Code    Hypertension I10    Hepatitis C B19.20    Renal cancer ([facility] C64.9    Hypothyroid E03.9 Anemia D64.9    S/p nephrectomy, right Z90.5    Glucose intolerance (impaired glucose tolerance) R73.02    Diabetes mellitus (HCC) E11.9    History of hysterectomy including cervix Z90.710    History of bilateral salpingo-oophorectomy Z90.79, L51.416    Personal history of renal cancer Z85.528    Bartholin cyst N75.0    Type 2 diabetes mellitus with nephropathy (HCC) E11.21    Vaginal vault prolapse N81.9    CKD (chronic kidney disease) stage 3, GFR 30-59 ml/min ([facility]) N18.30    Vomiting R11.10    Chronic kidney disease, stage IV (severe) ([facility]) N18.4    Chronic kidney disease (CKD), stage V ([facility] [[id number]] N18.5    CKD (chronic kidney disease) stage 4, GFR 15-29 ml/min ([facility]) N18.4    Acute heart failure with preserved ejection fraction ([facility]) I50.31       Depression Risk Factor Screening:     3 most recent PHQ Screens 1/23/2023   Little interest or pleasure in doing things Not at all   Feeling down, depressed, irritable, or hopeless Not at all   Total Score PHQ 2 0       Alcohol Risk Factor Screening: You do not drink alcohol or very rarely. Functional Ability and Level of Safety:   Hearing Loss  Hearing is good. Activities of Daily Living  The home contains: no safety equipment. Patient does total self care    Ambulates with cane. Fall Risk  Fall Risk Assessment, last 12 mths 1/23/2023   Able to walk? Yes   Fall in past 12 months? 1   Do you feel unsteady? 1   Are you worried about falling 0   Is TUG test greater than 12 seconds? 0   Is the gait abnormal? 0   Number of falls in past 12 months 2   Fall with injury? 0     Most recently, slipped on front stoop; had been raining.      Abuse Screen  Patient is not abused    Cognitive Screening   Evaluation of Cognitive Function:  Has your family/caregiver stated any concerns about your memory: no  Normal      Patient Care Team   Patient Care Team:  Jef Hargrove MD as PCP - General (Internal Medicine Physician)  Jef Hargrove MD as PCP - [facility] Provider  Lorenza Gaitan MD (Nephrology)  Pipe Petesr as Consulting Provider (Optometry)  Gabbie Adrian MD (Gastroenterology)  Fareed Edwards MD as Surgeon (General Surgery)  Eunice Weinberg MD (Nephrology)    Assessment/Plan   Education and counseling provided:  Are appropriate based on today's review and evaluation    Diagnoses and all orders for this visit:    1. Type 2 diabetes mellitus with nephropathy (HCC)  -      DIABETES FOOT EXAM    2. Hypertension, unspecified type    3. Mixed hyperlipidemia    4. Acute heart failure with preserved ejection fraction ([address]    5. Malignant neoplasm of kidney, unspecified laterality ([address]    6.  Chronic kidney disease, stage 5 ([address]      Health Maintenance Due   Topic Date Due    Shingles Vaccine (1 of 2) Never done    DTaP/Tdap/Td series (1 - Tdap) Never done    Eye Exam Retinal or Dilated  06/25/2020    COVID-19 Vaccine (4 - Booster for Pfizer series) 12/17/2021    Medicare Yearly Exam  11/20/2022

## 2023-01-23 NOTE — PROGRESS NOTES
SUBJECTIVE:   Ms. Keren Cano is a 69 y.o. female who is here for the following complaint, and also for follow up of routine medical issues. She had been seeing Dr. Alla tSoner, and Dr. Sergey Roberts before that. Chief Complaint   Patient presents with    Follow-up     6 month fu       She was seen in ER on 1/4: Patient was seen by nurse from [facility] today and she was felt to have an abnormal heartbeat. The nurse was concerned she may be in atrial fibrillation. Patient denies having any symptoms whatsoever  She was \"fine\"; EKG showed sinus rhythm. DM: Glucose has been ranging . \"Never over 200\". She has upcoming visit with GI to discuss colonoscopy in February. She has seen her ophthalmologist with in the past week. Cataracts are stable. She is seeing Dr. Kathy Torres now for kidney dysfunction. She is now doing peritoneal dialysis. Stress: Health, son is alcoholic. She had lost weight with help of nutritionist, but this is creeping up again:   Wt Readings from Last 3 Encounters:   01/23/23 177 lb 6.4 oz (80.5 kg)   10/17/22 181 lb (82.1 kg)   09/23/22 187 lb 9.6 oz (85.1 kg)       Her numbness L foot has improved. LBP is impreved. She has been through treatment with Dr. Yas Reid, now seeing someone else in his office, for Hep C, obtained during a prior blood transfusion. \"He said after the last visit I could return just as needed. \"    At this time, she is otherwise doing well and has brought no other complaints to my attention today. For a list of the medical issues addressed today, see the assessment and plan below.     PMH:   Past Medical History:   Diagnosis Date    Arthritis     LOWER SPINE    Bell's palsy     23yrs old     Cancer [facility])     kidney (right)    Chronic kidney disease 1996    right kidney removed - CANCER    Diabetes ([facility]     She is controlling with weight loss    Diabetes mellitus ([facility] 10/08/2014    NIDDM    Hepatitis C 2011    hx hep C from blood transfusion per pt; Treated by Dr. Sharron Bates    Hyperlipidemia     Hypertension     Ill-defined condition 1990s    HEPATITIS C - treated with medication    Peritoneal dialysis catheter in place [facility])     Personal history of renal cancer 03/02/2015    Psychiatric disorder     depression     Thyroid disease 1999    Thyroidectomy    Vertigo        Past Surgical History:   Procedure Laterality Date    COLONOSCOPY N/A 6/20/2017    COLONOSCOPY performed by Parrish Arce MD at [facility] ENDOSCOPY    HX BREAST REDUCTION Bilateral 1996    HX COLONOSCOPY  3/5/12    Repeat in 5 years (Dr. Sharron Bates)    HX CYST REMOVAL      HX GYN  1980s    tubal pregnancy    HX OTHER SURGICAL  1999    thyroidectomy    HX OTHER SURGICAL      colonoscopy x 2 - ?polyps    HX OTHER SURGICAL  04/08/2019    surg for prolasp bladder at [address]  04/08/2019    prolasp bladder surg    HX RITESH AND BSO Bilateral 1995    HX TONSILLECTOMY      2or 4years old    [address]    kidney removed on right    IR BX BONE MARROW DIAGNOSTIC  04/05/2021       All: She is allergic to codeine. Current Outpatient Medications   Medication Sig    glucose blood VI test strips (OneTouch Ultra Test) strip USE 1 STRIP TO CHECK GLUCOSE 2 TO 3 TIMES A WEEK    metoprolol succinate (TOPROL-XL) 50 mg XL tablet Take 1 Tablet by mouth daily. glimepiride (AMARYL) 1 mg tablet Take 1 Tablet by mouth Daily (before breakfast). atorvastatin (LIPITOR) 20 mg tablet Take 1 tablet by mouth once daily    sodium bicarbonate 650 mg tablet Take 650 mg by mouth three (3) times daily. levothyroxine (Euthyrox) 50 mcg tablet Take 1 Tablet by mouth Daily (before breakfast). allopurinoL (ZYLOPRIM) 300 mg tablet Take 300 mg by mouth daily. SITagliptin (JANUVIA) 25 mg tablet Take 25 mg by mouth daily. b complex vitamins tablet Take 1 Tablet by mouth daily. furosemide (LASIX) 20 mg tablet Take 20 mg by mouth two (2) times a day.     flash glucose sensor (FreeStyle Peyton 2 Sensor) kit 1 Each by Does Not Apply route continuous. flash glucose scanning reader (FreeStyle Peyton 2 [place]) misc 1 Device by Does Not Apply route continuous. flaxseed oil (OMEGA 3 PO) Take  by mouth. acetaminophen (TYLENOL) 500 mg tablet Take 500-1,000 mg by mouth every six (6) hours as needed for Pain.    potassium chloride (K-DUR, KLOR-CON M20) 20 mEq tablet Take 40 mEq by mouth daily. No current facility-administered medications for this visit. FH: Her family history includes Diabetes in her daughter, mother, and sister; Heart Disease in her father; Hypertension in her daughter and son; Kidney Disease in her father and mother; No Known Problems in her brother and son. SH: Retired Lpn, doing private duty nursing. She reports that she quit smoking about 30 years ago. Her smoking use included cigarettes. She has a 2.00 pack-year smoking history. She has never used smokeless tobacco. She reports that she does not drink alcohol and does not use drugs. ROS: See above; Complete ROS otherwise negative. OBJECTIVE:   Vitals:   Visit Vitals  BP (!) 136/91 (BP 1 Location: Right upper arm, BP Patient Position: Sitting, BP Cuff Size: Adult)   Pulse (!) 104   Temp 97.2 °F (36.2 °C) (Temporal)   Resp 18   Ht 5' 4\" (1.626 m)   Wt 177 lb 6.4 oz (80.5 kg)   LMP  (LMP Unknown)   SpO2 100%   BMI 30.45 kg/m²      Gen: Pleasant 69 y.o.  female in NAD. HEENT: PERRLA. EOMI. OP moist and pink. Neck: Supple. No LAD. HEART: RRR, No M/G/R.    LUNGS: CTAB No W/R. ABDOMEN: S, NT, ND, BS+. EXTREMITIES: Warm. No C/C/E.  MUSCULOSKELETAL: Normal ROM, muscle strength 5/5 all groups. NEURO: Alert and oriented x 3. Cranial nerves grossly intact. No focal sensory or motor deficits noted. SKIN: Warm. Dry. Keloid at site of prior thyroid surgery.        Lab Results   Component Value Date/Time    Sodium 145 01/04/2023 02:26 PM    Potassium 3.8 01/04/2023 02:26 PM    Chloride 103 01/04/2023 02:26 PM    CO2 30 01/04/2023 02:26 PM    Anion gap 12 01/04/2023 02:26 PM    Glucose 137 (H) 01/04/2023 02:26 PM    BUN 43 (H) 01/04/2023 02:26 PM    Creatinine 8.80 (H) 01/04/2023 02:26 PM    BUN/Creatinine ratio 5 (L) 01/04/2023 02:26 PM    GFR est AA 6 (L) 09/14/2022 03:51 AM    GFR est non-AA 5 (L) 09/14/2022 03:51 AM    Calcium 10.1 01/04/2023 02:26 PM    Bilirubin, total 0.3 01/04/2023 02:26 PM    ALT (SGPT) 21 01/04/2023 02:26 PM    Alk. phosphatase 72 01/04/2023 02:26 PM    Protein, total 7.5 01/04/2023 02:26 PM    Albumin 3.3 (L) 01/04/2023 02:26 PM    Globulin 4.2 (H) 01/04/2023 02:26 PM    A-G Ratio 0.8 (L) 01/04/2023 02:26 PM       Lab Results   Component Value Date/Time    Cholesterol, total 158 05/19/2022 09:44 AM    HDL Cholesterol 56 05/19/2022 09:44 AM    LDL, calculated 71.2 05/19/2022 09:44 AM    Triglyceride 154 (H) 05/19/2022 09:44 AM    CHOL/HDL Ratio 2.8 05/19/2022 09:44 AM        Lab Results   Component Value Date/Time    Hemoglobin A1c 6.7 (H) 09/10/2022 02:33 AM       Lab Results   Component Value Date/Time    WBC 9.8 01/04/2023 02:26 PM    HGB 11.4 (L) 01/04/2023 02:26 PM    HCT 35.1 01/04/2023 02:26 PM    PLATELET 311 80/34/5226 02:26 PM    MCV 91.4 01/04/2023 02:26 PM       Lab Results   Component Value Date/Time    TSH 1.51 09/09/2022 12:37 PM         ASSESSMENT/ PLAN:   Diabetes: Controlled. Continue current measures. Hypertension: fine today, okay previously. ?WCS  Thyroid disease: Euthyroid. Watch TSH. Chronic kidney disease:  Approaching dialysis. Watch labs. Seeing Dr. Ryne Yepez. Arthritis: Chronic and stable. Hyperlipidemia: Doing worse. Discussed her high risk of cardiovascular complications. Lipitor 20. Hepatitis C: F/U with GI as needed--apparently \"turned loose\". Obesity: Diet and exercise. Weight down a bit; keep up the good work. Neuropathy L toe: ?Sciatica vs other compressive neuropathy. Doing better lately. RTC 6 months.      I have reviewed the patient's medications and risks/side effects/benefits were discussed. Diagnosis(-es) explained to patient and questions answered. Literature provided where appropriate.

## 2023-01-23 NOTE — PATIENT INSTRUCTIONS
Medicare Wellness Visit, Female     The best way to live healthy is to have a lifestyle where you eat a well-balanced diet, exercise regularly, limit alcohol use, and quit all forms of tobacco/nicotine, if applicable. Regular preventive services are another way to keep healthy. Preventive services (vaccines, screening tests, monitoring & exams) can help personalize your care plan, which helps you manage your own care. Screening tests can find health problems at the earliest stages, when they are easiest to treat. [facility] follows the current, evidence-based guidelines published by the [facility] ([facility]) when recommending preventive services for our patients. Because we follow these guidelines, sometimes recommendations change over time as research supports it. (For example, mammograms used to be recommended annually. Even though Medicare will still pay for an annual mammogram, the newer guidelines recommend a mammogram every two years for women of average risk). Of course, you and your doctor may decide to screen more often for some diseases, based on your risk and your co-morbidities (chronic disease you are already diagnosed with). Preventive services for you include:  - Medicare offers their members a free annual wellness visit, which is time for you and your primary care provider to discuss and plan for your preventive service needs.  Take advantage of this benefit every year!    -Over the age of 72 should receive the recommended pneumonia vaccines.    -All adults should have a flu vaccine yearly.  -All adults should have a tetanus vaccine every 10 years.   -Over the age 48 should receive the shingles vaccines.        -All adults should be screened once for Hepatitis C.  -All adults age 38-68 who are overweight should have a diabetes screening test once every three years.   -Other screening tests and preventive services for persons with diabetes include: an eye exam to screen for diabetic retinopathy, a kidney function test, a foot exam, and stricter control over your cholesterol.   -Cardiovascular screening for adults with routine risk involves an electrocardiogram (ECG) at intervals determined by your doctor.     -Colorectal cancer screenings should be done for adults age 39-70 with no increased risk factors for colorectal cancer. There are a number of acceptable methods of screening for this type of cancer. Each test has its own benefits and drawbacks. Discuss with your doctor what is most appropriate for you during your annual wellness visit. The different tests include: colonoscopy (considered the best screening method), a fecal occult blood test, a fecal DNA test, and sigmoidoscopy.    -Lung cancer screening is recommended annually with a low dose CT scan for adults between age 54 and 68, who have smoked at least 30 pack years (equivalent of 1 pack per day for 30 days), and who is a current smoker or quit less than 15 years ago.    -A bone mass density test is recommended when a woman turns 65 to screen for osteoporosis. This test is only recommended one time, as a screening. Some providers will use this same test as a disease monitoring tool if you already have osteoporosis. -Breast cancer screenings are recommended every other year for women of normal risk, age 54-69.    -Cervical cancer screenings for women over age 72 are only recommended with certain risk factors.      Here is a list of your current Health Maintenance items (your personalized list of preventive services) with a due date:  Health Maintenance Due   Topic Date Due    Shingles Vaccine (1 of 2) Never done    DTaP/Tdap/Td  (1 - Tdap) Never done    Eye Exam  06/25/2020    COVID-19 Vaccine (4 - Booster for Zimmerman Peter series) 12/17/2021

## 2023-01-23 NOTE — PROGRESS NOTES
1. \"Have you been to the ER, urgent care clinic since your last visit? Hospitalized since your last visit? Yes 1/4/2023 Irregular heart rhythm     2. \"Have you seen or consulted any other health care providers outside of the [address] [place] [address] since your last visit? \" No     3. For patients aged 39-70: Has the patient had a colonoscopy / FIT/ Cologuard? Yes - no Care Gap present      If the patient is female:    4. For patients aged 41-77: Has the patient had a mammogram within the past 2 years? NA - based on age or sex      11. For patients aged 21-65: Has the patient had a pap smear?  NA - based on age or sex

## 2023-01-24 RX ORDER — ATORVASTATIN CALCIUM 20 MG/1
TABLET, FILM COATED ORAL
Qty: 90 TABLET | Refills: 3 | Status: SHIPPED | OUTPATIENT
Start: 2023-01-24

## 2023-01-24 NOTE — TELEPHONE ENCOUNTER
Future Appointments:  Future Appointments   Date Time Provider Rita Devii   7/24/2023 11:00 AM Brandt Ruiz MD [facility] BS AMB        Last Appointment With Me:  1/23/2023     Requested Prescriptions     Pending Prescriptions Disp Refills    atorvastatin (LIPITOR) 20 mg tablet 90 Tablet 3     Sig: Take 1 tablet by mouth once daily

## 2023-04-13 ENCOUNTER — HOSPITAL ENCOUNTER (OUTPATIENT)
Age: 78
Setting detail: OUTPATIENT SURGERY
Discharge: HOME | End: 2023-04-13
Attending: INTERNAL MEDICINE | Admitting: INTERNAL MEDICINE
Payer: MEDICARE

## 2023-04-13 VITALS
HEIGHT: 64 IN | RESPIRATION RATE: 23 BRPM | WEIGHT: 180.7 LBS | OXYGEN SATURATION: 96 % | HEART RATE: 97 BPM | SYSTOLIC BLOOD PRESSURE: 111 MMHG | DIASTOLIC BLOOD PRESSURE: 84 MMHG | BODY MASS INDEX: 30.85 KG/M2 | TEMPERATURE: 98.4 F

## 2023-04-13 LAB
GLUCOSE BLD STRIP.AUTO-MCNC: 217 MG/DL (ref 65–117)
SERVICE CMNT-IMP: ABNORMAL

## 2023-04-13 PROCEDURE — 74011250637 HC RX REV CODE- 250/637: Performed by: INTERNAL MEDICINE

## 2023-04-13 PROCEDURE — 2709999900 HC NON-CHARGEABLE SUPPLY: Performed by: INTERNAL MEDICINE

## 2023-04-13 PROCEDURE — 88305 TISSUE EXAM BY PATHOLOGIST: CPT

## 2023-04-13 PROCEDURE — 76040000019: Performed by: INTERNAL MEDICINE

## 2023-04-13 PROCEDURE — 77030013992 HC SNR POLYP ENDOSC BSC -B: Performed by: INTERNAL MEDICINE

## 2023-04-13 PROCEDURE — 76060000031 HC ANESTHESIA FIRST 0.5 HR: Performed by: INTERNAL MEDICINE

## 2023-04-13 PROCEDURE — 82962 GLUCOSE BLOOD TEST: CPT

## 2023-04-13 RX ORDER — SODIUM CHLORIDE 0.9 % (FLUSH) 0.9 %
5-40 SYRINGE (ML) INJECTION EVERY 8 HOURS
Status: DISCONTINUED | OUTPATIENT
Start: 2023-04-13 | End: 2023-04-13 | Stop reason: HOSPADM

## 2023-04-13 RX ORDER — FENTANYL CITRATE 50 UG/ML
25-200 INJECTION, SOLUTION INTRAMUSCULAR; INTRAVENOUS
Status: DISCONTINUED | OUTPATIENT
Start: 2023-04-13 | End: 2023-04-13 | Stop reason: HOSPADM

## 2023-04-13 RX ORDER — DEXTROMETHORPHAN/PSEUDOEPHED 2.5-7.5/.8
1.2 DROPS ORAL
Status: DISCONTINUED | OUTPATIENT
Start: 2023-04-13 | End: 2023-04-13 | Stop reason: HOSPADM

## 2023-04-13 RX ORDER — ATROPINE SULFATE 0.1 MG/ML
0.5 INJECTION INTRAVENOUS
Status: DISCONTINUED | OUTPATIENT
Start: 2023-04-13 | End: 2023-04-13 | Stop reason: HOSPADM

## 2023-04-13 RX ORDER — SODIUM CHLORIDE 0.9 % (FLUSH) 0.9 %
5-40 SYRINGE (ML) INJECTION AS NEEDED
Status: DISCONTINUED | OUTPATIENT
Start: 2023-04-13 | End: 2023-04-13 | Stop reason: HOSPADM

## 2023-04-13 RX ORDER — EPINEPHRINE 0.1 MG/ML
1 INJECTION INTRACARDIAC; INTRAVENOUS
Status: DISCONTINUED | OUTPATIENT
Start: 2023-04-13 | End: 2023-04-13 | Stop reason: HOSPADM

## 2023-04-13 RX ORDER — SODIUM CHLORIDE 9 MG/ML
50 INJECTION, SOLUTION INTRAVENOUS CONTINUOUS
Status: DISCONTINUED | OUTPATIENT
Start: 2023-04-13 | End: 2023-04-13 | Stop reason: HOSPADM

## 2023-04-13 RX ORDER — FLUMAZENIL 0.1 MG/ML
0.2 INJECTION INTRAVENOUS
Status: DISCONTINUED | OUTPATIENT
Start: 2023-04-13 | End: 2023-04-13 | Stop reason: HOSPADM

## 2023-04-13 RX ORDER — NALOXONE HYDROCHLORIDE 0.4 MG/ML
0.4 INJECTION, SOLUTION INTRAMUSCULAR; INTRAVENOUS; SUBCUTANEOUS
Status: DISCONTINUED | OUTPATIENT
Start: 2023-04-13 | End: 2023-04-13 | Stop reason: HOSPADM

## 2023-04-13 RX ORDER — MIDAZOLAM HYDROCHLORIDE 1 MG/ML
.25-5 INJECTION, SOLUTION INTRAMUSCULAR; INTRAVENOUS
Status: DISCONTINUED | OUTPATIENT
Start: 2023-04-13 | End: 2023-04-13 | Stop reason: HOSPADM

## 2023-04-13 NOTE — DISCHARGE INSTRUCTIONS
[address]  [id number]  1945    Discomfort:  Redness at IV site- apply warm compress to area; if redness or soreness persist- contact your physician  There may be a slight amount of blood passed from the rectum  Gaseous discomfort- walking, belching will help relieve any discomfort  You may not operate a vehicle for 12 hours  You may not engage in an occupation involving machinery or appliances for rest of today  You may not drink alcoholic beverages for at least 12 hours  Avoid making any critical decisions for at least 24 hour  DIET:  You may resume your regular diet - however -  remember your colon is empty and a heavy meal will produce gas. Avoid these foods:  vegetables, fried / greasy foods, carbonated drinks     ACTIVITY:  You may  resume your normal daily activities it is recommended that you spend the remainder of the day resting -  avoid any strenuous activity. CALL M.D. ANY SIGN OF:   Increasing pain, nausea, vomiting  Abdominal distension (swelling)  New increased bleeding (oral or rectal)  Fever (chills)  Pain in chest area  Bloody discharge from nose or mouth  Shortness of breath      Follow-up Instructions:   Call Dr. Alma Rosa Howard for any questions or problems at [address] [place] [address] St:   Your colonoscopy showed one small polyp, which was removed. We will contact you about the pathology results and when your next colonoscopy will be due. Please maintain a high fiber diet. Telephone # [phone]      Signed By: Abby Dennison MD     4/13/2023  12:09 PM       DISCHARGE SUMMARY from Nurse    The following personal items collected during your admission are returned to you:   Dental Appliance: Dental Appliances: None  Vision: Visual Aid:  At bedside  Hearing Aid:    Jewelry:    Clothing:    Other Valuables:    Valuables sent to safe:

## 2023-04-13 NOTE — PROCEDURES
[address], [address] [place] [address]      Colonoscopy Operative Report    Ruthy Kinsey  [id number]  1945      Procedure Type:   Colonoscopy with polypectomy (snare cautery)     Indications:    Personal history of colon polyps (screening only)         Pre-operative Diagnosis: see indication above    Post-operative Diagnosis:  See findings below    :  Kia Rain. Jazlyn Lynn MD    Staff: Endoscopy [facility] [place]: Aracely Regan  Endoscopy RN-1: Humble Hollis RN     Referring Provider: Frieda Hines MD      Sedation:  MAC anesthesia Propofol      Procedure Details:  After informed consent was obtained with all risks and benefits of procedure explained and preoperative exam completed, the patient was taken to the endoscopy suite and placed in the left lateral decubitus position. Upon sequential sedation as per above, a digital rectal exam was performed demonstrating internal hemorrhoids. The Olympus pediatric videocolonoscope  was inserted in the rectum and carefully advanced to the cecum, which was identified by the ileocecal valve and appendiceal orifice. The cecum was identified by the ileocecal valve and appendiceal orifice. The quality of preparation was good. The colonoscope was slowly withdrawn with careful evaluation between folds. Retroflexion in the rectum was completed . Findings:   Rectum: normal  Sigmoid: single 4-5 mm sessile polyp - removed with hot snare. Moderate diverticulosis  Descending Colon: mild diverticulosis  Transverse Colon: normal  Ascending Colon: normal  Cecum: normal  Terminal Ileum: not intubated      Specimen Removed:  1. Sigmoid colon polyp    Complications: None. EBL:  None. Impression:    As above    Recommendations: Follow up surgical pathology  Repeat colonoscopy in 5 years  High fiber diet education    Signed By: Kia Rain.  Jazlyn Lynn MD     4/13/2023  12:11 PM

## 2023-04-13 NOTE — PERIOP NOTES

## 2023-04-13 NOTE — H&P
[address], [address] [place] [address]      History and Physical       NAME:  Daphney Gonzalez   :   1945   MRN:   [id number]             History of Present Illness:  Patient is a 69 y.o. who is seen for history of colon polyps. Last colonoscopy was in  and no polyps were removed. PMH:  Past Medical History:   Diagnosis Date    Arthritis     LOWER SPINE    Bell's palsy     23yrs old     Cancer ([address]     kidney (right)    Chronic kidney disease     right kidney removed - CANCER    Diabetes ([address]     She is controlling with weight loss    Diabetes mellitus ([address] 10/08/2014    NIDDM    Hepatitis C     hx hep C from blood transfusion per pt; Treated by Dr. Darling Pan    Hyperlipidemia     Hypertension     Ill-defined condition     HEPATITIS C - treated with medication    Peritoneal dialysis catheter in place [facility])     Personal history of renal cancer 2015    Psychiatric disorder     depression     Thyroid disease     Thyroidectomy    Vertigo        PSH:  Past Surgical History:   Procedure Laterality Date    COLONOSCOPY N/A 2017    COLONOSCOPY performed by Matthew Tiwari. Sanjay Pink MD at [facility] ENDOSCOPY    HX BREAST REDUCTION Bilateral     HX COLONOSCOPY  3/5/12    Repeat in 5 years (Dr. Darling Pan)    HX CYST REMOVAL      HX GYN      tubal pregnancy    HX OTHER SURGICAL      thyroidectomy    HX OTHER SURGICAL      colonoscopy x 2 - ?polyps    HX OTHER SURGICAL  2019    surg for prolasp bladder at [address]  2019    prolasp bladder surg    HX RITESH AND BSO Bilateral     HX TONSILLECTOMY      2or 4years old    [address]    kidney removed on right    IR BX BONE MARROW DIAGNOSTIC  2021       Allergies: Allergies   Allergen Reactions    Codeine Hives       Home Medications:  Prior to Admission Medications   Prescriptions Last Dose Informant Patient Reported?  Taking?   acetaminophen (TYLENOL) 500 mg tablet 2023  Yes Yes   Sig: Take 1-2 Tablets by mouth every six (6) hours as needed for Pain. atorvastatin (LIPITOR) 20 mg tablet 2023  No No   Sig: Take 1 tablet by mouth once daily   b complex vitamins tablet 2023  Yes No   Sig: Take 1 Tablet by mouth daily. flash glucose scanning reader (FreeStyle Peyton 2 [place]) misc   No No   Si Device by Does Not Apply route continuous. flash glucose sensor (FreeStyle Peyton 2 Sensor) kit 2023  No No   Si Each by Does Not Apply route continuous. flaxseed oil (OMEGA 3 PO) 2023  Yes No   Sig: Take  by mouth. furosemide (LASIX) 20 mg tablet 2023  Yes Yes   Sig: Take 1 Tablet by mouth two (2) times a day. glimepiride (AMARYL) 1 mg tablet 2023  No Yes   Sig: Take 1 Tablet by mouth Daily (before breakfast). glucose blood VI test strips (OneTouch Ultra Test) strip   No No   Sig: USE 1 STRIP TO CHECK GLUCOSE 2 TO 3 TIMES A WEEK   levothyroxine (Euthyrox) 50 mcg tablet 2023  No Yes   Sig: Take 1 Tablet by mouth Daily (before breakfast). metoprolol succinate (TOPROL-XL) 50 mg XL tablet 2023  No Yes   Sig: Take 1 Tablet by mouth daily. potassium chloride (K-DUR, KLOR-CON M20) 20 mEq tablet 2023  Yes Yes   Sig: Take 2 Tablets by mouth daily. sodium bicarbonate 650 mg tablet 2023  Yes Yes   Sig: Take 1 Tablet by mouth daily. Facility-Administered Medications: None       Hospital Medications:  No current facility-administered medications for this encounter.        Social History:  Social History     Tobacco Use    Smoking status: Former     Packs/day: 0.25     Years: 8.00     Pack years: 2.00     Types: Cigarettes     Quit date: 1992     Years since quittin.0    Smokeless tobacco: Never    Tobacco comments:     quit smoking 30-40 yrs ago   Substance Use Topics    Alcohol use: No       Family History:  Family History   Problem Relation Age of Onset    Diabetes Mother     Kidney Disease Mother Heart Disease Father     Kidney Disease Father         dialysis    Diabetes Sister     Diabetes Daughter     Hypertension Daughter     Hypertension Son     No Known Problems Son     No Known Problems Brother     Anesth Problems Neg Hx              Review of Systems:      Constitutional: negative fever, negative chills, negative weight loss  Eyes:   negative visual changes  ENT:   negative sore throat, tongue or lip swelling  Respiratory:  negative cough, negative dyspnea  Cards:  negative for chest pain, palpitations, lower extremity edema  GI:   See HPI  :  negative for frequency, dysuria  Integument:  negative for rash and pruritus  Heme:  negative for easy bruising and gum/nose bleeding  Musculoskel: negative for myalgias,  back pain and muscle weakness  Neuro: negative for headaches, dizziness, vertigo  Psych:  negative for feelings of anxiety, depression       Objective:   Patient Vitals for the past 8 hrs:   BP Temp Pulse Resp SpO2 Height Weight   04/13/23 1125 (!) 132/92 98.4 °F (36.9 °C) 100 20 98 % 5' 4\" (1.626 m) 82 kg (180 lb 11.2 oz)     No intake/output data recorded. No intake/output data recorded. EXAM:     NEURO-a&o   HEENT-wnl   LUNGS-clear    COR-regular rate and rhythym     ABD-soft , no tenderness, no rebound, good bs     EXT-no edema     Data Review     No results for input(s): WBC, HGB, HCT, PLT, HGBEXT, HCTEXT, PLTEXT in the last 72 hours. No results for input(s): NA, K, CL, CO2, BUN, CREA, GLU, PHOS, CA in the last 72 hours. No results for input(s): AP, TBIL, TP, ALB, GLOB, GGT, AML, LPSE in the last 72 hours. No lab exists for component: SGOT, GPT, AMYP, HLPSE  No results for input(s): INR, PTP, APTT, INREXT in the last 72 hours.        Assessment:     History of colon polyps     Patient Active Problem List   Diagnosis Code    Hypertension I10    Hepatitis C B19.20    Renal cancer ([facility] C64.9    Hypothyroid E03.9    Anemia D64.9    S/p nephrectomy, right Z90.5    Glucose intolerance (impaired glucose tolerance) R73.02    Diabetes mellitus (HCC) E11.9    History of hysterectomy including cervix Z90.710    History of bilateral salpingo-oophorectomy Z90.79, E01.016    Personal history of renal cancer Z85.528    Bartholin cyst N75.0    Type 2 diabetes mellitus with nephropathy (HCC) E11.21    Vaginal vault prolapse N81.9    CKD (chronic kidney disease) stage 3, GFR 30-59 ml/min (HCC) N18.30    Vomiting R11.10    Chronic kidney disease, stage IV (severe) (HCC) N18.4    Chronic kidney disease (CKD), stage V ([address] [084802] N18.5    CKD (chronic kidney disease) stage 4, GFR 15-29 ml/min ([facility]) N18.4    Acute heart failure with preserved ejection fraction ([facility] I50.31     Plan:   The patient was counseled at length about the risks of elissa Covid-19 in the thomas-operative and post-operative states including the recovery window of their procedure. The patient was made aware that elissa Covid-19 after a surgical procedure may worsen their prognosis for recovering from the virus and lend to a higher morbidity and or mortality risk. The patient was given the options of postponing their procedure. All of the risks, benefits, and alternatives were discussed. The patient does wish to proceed with the procedure. Endoscopic procedure with MAC     Signed By: Marge Bains.  Lili Hallman MD     4/13/2023  11:37 AM

## 2023-05-18 ENCOUNTER — HOSPITAL ENCOUNTER (OUTPATIENT)
Facility: HOSPITAL | Age: 78
Discharge: HOME | End: 2023-05-18
Payer: MEDICARE

## 2023-05-18 DIAGNOSIS — Z12.31 SCREENING MAMMOGRAM FOR HIGH-RISK PATIENT: ICD-10-CM

## 2023-05-18 PROCEDURE — 77063 BREAST TOMOSYNTHESIS BI: CPT

## 2023-06-26 RX ORDER — BLOOD SUGAR DIAGNOSTIC
STRIP MISCELLANEOUS
Qty: 100 EACH | Refills: 0 | Status: SHIPPED | OUTPATIENT
Start: 2023-06-26

## 2023-07-24 ENCOUNTER — OFFICE VISIT (OUTPATIENT)
Age: 78
End: 2023-07-24
Payer: MEDICARE

## 2023-07-24 VITALS
RESPIRATION RATE: 16 BRPM | HEIGHT: 64 IN | SYSTOLIC BLOOD PRESSURE: 138 MMHG | OXYGEN SATURATION: 96 % | BODY MASS INDEX: 31.31 KG/M2 | HEART RATE: 69 BPM | DIASTOLIC BLOOD PRESSURE: 79 MMHG | WEIGHT: 183.4 LBS | TEMPERATURE: 97.5 F

## 2023-07-24 DIAGNOSIS — E03.9 HYPOTHYROIDISM, UNSPECIFIED TYPE: ICD-10-CM

## 2023-07-24 DIAGNOSIS — N18.5 CHRONIC KIDNEY DISEASE, STAGE 5 (HCC): ICD-10-CM

## 2023-07-24 DIAGNOSIS — E78.2 MIXED HYPERLIPIDEMIA: ICD-10-CM

## 2023-07-24 DIAGNOSIS — D51.1 VITAMIN B12 DEFICIENCY ANEMIA DUE TO SELECTIVE VITAMIN B12 MALABSORPTION WITH PROTEINURIA: ICD-10-CM

## 2023-07-24 DIAGNOSIS — E11.21 TYPE 2 DIABETES MELLITUS WITH DIABETIC NEPHROPATHY, UNSPECIFIED WHETHER LONG TERM INSULIN USE (HCC): Primary | ICD-10-CM

## 2023-07-24 DIAGNOSIS — E55.9 VITAMIN D DEFICIENCY: ICD-10-CM

## 2023-07-24 DIAGNOSIS — I50.22 CHRONIC SYSTOLIC (CONGESTIVE) HEART FAILURE (HCC): ICD-10-CM

## 2023-07-24 DIAGNOSIS — I10 PRIMARY HYPERTENSION: ICD-10-CM

## 2023-07-24 PROCEDURE — 3075F SYST BP GE 130 - 139MM HG: CPT | Performed by: INTERNAL MEDICINE

## 2023-07-24 PROCEDURE — 3078F DIAST BP <80 MM HG: CPT | Performed by: INTERNAL MEDICINE

## 2023-07-24 PROCEDURE — 99214 OFFICE O/P EST MOD 30 MIN: CPT | Performed by: INTERNAL MEDICINE

## 2023-07-24 PROCEDURE — 1123F ACP DISCUSS/DSCN MKR DOCD: CPT | Performed by: INTERNAL MEDICINE

## 2023-07-24 RX ORDER — ERGOCALCIFEROL 1.25 MG/1
CAPSULE ORAL
COMMUNITY
Start: 2023-07-20

## 2023-07-24 SDOH — ECONOMIC STABILITY: FOOD INSECURITY: WITHIN THE PAST 12 MONTHS, THE FOOD YOU BOUGHT JUST DIDN'T LAST AND YOU DIDN'T HAVE MONEY TO GET MORE.: NEVER TRUE

## 2023-07-24 SDOH — ECONOMIC STABILITY: FOOD INSECURITY: WITHIN THE PAST 12 MONTHS, YOU WORRIED THAT YOUR FOOD WOULD RUN OUT BEFORE YOU GOT MONEY TO BUY MORE.: NEVER TRUE

## 2023-07-24 SDOH — ECONOMIC STABILITY: HOUSING INSECURITY
IN THE LAST 12 MONTHS, WAS THERE A TIME WHEN YOU DID NOT HAVE A STEADY PLACE TO SLEEP OR SLEPT IN A SHELTER (INCLUDING NOW)?: NO

## 2023-07-24 SDOH — ECONOMIC STABILITY: INCOME INSECURITY: HOW HARD IS IT FOR YOU TO PAY FOR THE VERY BASICS LIKE FOOD, HOUSING, MEDICAL CARE, AND HEATING?: NOT HARD AT ALL

## 2023-07-24 ASSESSMENT — ANXIETY QUESTIONNAIRES
1. FEELING NERVOUS, ANXIOUS, OR ON EDGE: 0
2. NOT BEING ABLE TO STOP OR CONTROL WORRYING: 0
IF YOU CHECKED OFF ANY PROBLEMS ON THIS QUESTIONNAIRE, HOW DIFFICULT HAVE THESE PROBLEMS MADE IT FOR YOU TO DO YOUR WORK, TAKE CARE OF THINGS AT HOME, OR GET ALONG WITH OTHER PEOPLE: NOT DIFFICULT AT ALL
4. TROUBLE RELAXING: 0
7. FEELING AFRAID AS IF SOMETHING AWFUL MIGHT HAPPEN: 0
GAD7 TOTAL SCORE: 0
6. BECOMING EASILY ANNOYED OR IRRITABLE: 0
3. WORRYING TOO MUCH ABOUT DIFFERENT THINGS: 0
5. BEING SO RESTLESS THAT IT IS HARD TO SIT STILL: 0

## 2023-07-24 ASSESSMENT — PATIENT HEALTH QUESTIONNAIRE - PHQ9
2. FEELING DOWN, DEPRESSED OR HOPELESS: 0
1. LITTLE INTEREST OR PLEASURE IN DOING THINGS: 0
SUM OF ALL RESPONSES TO PHQ9 QUESTIONS 1 & 2: 0
SUM OF ALL RESPONSES TO PHQ QUESTIONS 1-9: 0

## 2023-07-24 NOTE — PROGRESS NOTES
1. \"Have you been to the ER, urgent care clinic since your last visit? Hospitalized since your last visit? \" No    2. \"Have you seen or consulted any other health care providers outside of the [address] [place] [address] since your last visit? \" No     3. For patients aged 43-73: Has the patient had a colonoscopy / FIT/ Cologuard? NA - based on age      If the patient is female:    4. For patients aged 43-66: Has the patient had a mammogram within the past 2 years? NA - based on age or sex      11. For patients aged 21-65: Has the patient had a pap smear?  NA - based on age or sex

## 2023-07-24 NOTE — PROGRESS NOTES
SUBJECTIVE:   Ms. Ritu Weiss is a 69 y.o. female who is here for the following complaint, and also for follow up of routine medical issues. She had been seeing Dr. Harpreet Pierce, and Dr. Lindsey Salazar before that. Chief Complaint   Patient presents with    Follow-up     6 month fu       \"I want to understand why I have these tremors off and on. \" They come and go. Sounds like an intention tremor. DM: Glucose has been running 90s and low 100s. She had colonoscopy in February with Dr. Chelsea Hazel. She is following with an ophthalmologist. Cataracts are stable. She is seeing a new nephrologist (previously Dr. Estefani Spears) for kidney dysfunction. She is now doing peritoneal dialysis. Stress: Health, son is alcoholic. She had lost weight with help of nutritionist, but this is creeping up again:   Wt Readings from Last 3 Encounters:   07/24/23 183 lb 6.4 oz (83.2 kg)   01/23/23 177 lb 6.4 oz (80.5 kg)   10/17/22 181 lb (82.1 kg)       Her numbness L foot has improved. Still noted at night sometimes. LBP is improved. She has been through treatment with Dr. Umu Melvin, now seeing someone else in his office, for Hep C, obtained during a prior blood transfusion. \"He said after the last visit I could return just as needed. \"    At this time, she is otherwise doing well and has brought no other complaints to my attention today. For a list of the medical issues addressed today, see the assessment and plan below.     PMH:   Past Medical History:   Diagnosis Date    Arthritis     LOWER SPINE    Bell's palsy     23yrs old     Cancer ([address])     kidney (right)    Chronic kidney disease 1996    right kidney removed - CANCER    Diabetes ([address])     She is controlling with weight loss    Diabetes mellitus ([address]) 10/08/2014    NIDDM    Hepatitis C 2011    hx hep C from blood transfusion per pt; Treated by Dr. Umu Melvin    Hyperlipidemia     Hypertension     Ill-defined condition 1990s    HEPATITIS C - treated with

## 2023-07-25 LAB
25(OH)D3 SERPL-MCNC: 35 NG/ML (ref 30–100)
CHOLEST SERPL-MCNC: 173 MG/DL
EST. AVERAGE GLUCOSE BLD GHB EST-MCNC: 126 MG/DL
HBA1C MFR BLD: 6 % (ref 4–5.6)
HDLC SERPL-MCNC: 52 MG/DL
HDLC SERPL: 3.3 (ref 0–5)
LDLC SERPL CALC-MCNC: 93.4 MG/DL (ref 0–100)
T4 FREE SERPL-MCNC: 1.3 NG/DL (ref 0.8–1.5)
TRIGL SERPL-MCNC: 138 MG/DL
TSH SERPL DL<=0.05 MIU/L-ACNC: 1.22 UIU/ML (ref 0.36–3.74)
VIT B12 SERPL-MCNC: 1053 PG/ML (ref 193–986)
VLDLC SERPL CALC-MCNC: 27.6 MG/DL

## 2023-07-26 ENCOUNTER — TELEPHONE (OUTPATIENT)
Age: 78
End: 2023-07-26

## 2023-07-26 NOTE — TELEPHONE ENCOUNTER
Pt states saw results on mychart    States seem out of range for some    Asks for call back to discuss results please

## 2023-08-01 NOTE — TELEPHONE ENCOUNTER
I gave the patient her lab results, per , \"the diabetes is improved. \"Patient verbalized understanding of information discussed w/ no further questions at this time.

## 2023-09-25 ENCOUNTER — HOSPITAL ENCOUNTER (OUTPATIENT)
Facility: HOSPITAL | Age: 78
Setting detail: RECURRING SERIES
Discharge: HOME | End: 2023-09-28
Payer: MEDICARE

## 2023-09-25 PROCEDURE — 97110 THERAPEUTIC EXERCISES: CPT

## 2023-09-25 PROCEDURE — 97161 PT EVAL LOW COMPLEX 20 MIN: CPT

## 2023-09-25 NOTE — THERAPY EVALUATION
accomplished in 4-6 weeks:    1. Patient will report pain level at worst as less than or equal to 1/10 so they can perform ADLs without pain. 2. Patient will have B LE strength > or = 4/5 to assist with sit to stand transfers. 3. Patient will be able to ambulate > or = 3000 feet with use of standard cane for community ambulation. Frequency / Duration: Patient to be seen 2 times per week for 4 weeks    Patient/ Caregiver education and instruction: Diagnosis, prognosis, exercises [x]  Plan of care has been reviewed with PTA      Certification Period: 9/25/23 12/23/25      Jared Hector, PT       9/25/2023       12:07 PM        ===================================================================  I certify that the above Therapy Services are being furnished while the patient is under my care. I agree with the treatment plan and certify that this therapy is necessary. Physician's Signature:_________________________   DATE:_________   TIME:________                           Corona Devlin MD    ** Signature, Date and Time must be completed for valid certification **  Please sign and fax to [phone].   Thank you

## 2023-09-27 ENCOUNTER — APPOINTMENT (OUTPATIENT)
Facility: HOSPITAL | Age: 78
End: 2023-09-27
Payer: MEDICARE

## 2023-09-29 ENCOUNTER — HOSPITAL ENCOUNTER (OUTPATIENT)
Facility: HOSPITAL | Age: 78
Setting detail: RECURRING SERIES
End: 2023-09-29
Payer: MEDICARE

## 2023-09-29 PROCEDURE — 97112 NEUROMUSCULAR REEDUCATION: CPT

## 2023-09-29 PROCEDURE — 97110 THERAPEUTIC EXERCISES: CPT

## 2023-09-29 NOTE — PROGRESS NOTES
goals. (see flow sheet as applicable)     Details if applicable:    Tandem stance  NBOS on foam         Details if applicable:     30     Total Total         [x]  Patient Education billed concurrently with other procedures   [x] Review HEP    [] Progressed/Changed HEP, detail:    [] Other detail:         Other Objective/Functional Measures  NT    Pain Level at end of session (0-10 scale): 0      Assessment   Patient tolerated progressions well. Min/mod sway noted throughout balance activities, no LOB. Pt with noted muscle fatigue following sit to stands. Patient will continue to benefit from skilled PT / OT services to modify and progress therapeutic interventions, analyze and address functional mobility deficits, analyze and address ROM deficits, analyze and address strength deficits, analyze and cue for proper movement patterns, analyze and modify for postural abnormalities, and analyze and address imbalance/dizziness to address functional deficits and attain remaining goals. Progress toward goals / Updated goals:  []  See Progress Note/Recertification    Short Term Goals: To be accomplished in 2 weeks:               1. Patient will be I in HEP to promote self management of symptoms. 2. Patient will report pain level at worst as less than or equal to 3/10 so they can perform ADLs without pain. Long Term Goals: To be accomplished in 4-6 weeks:               1.  Patient will report pain level at worst as less than or equal to 1/10 so they can perform ADLs without pain. 2. Patient will have B LE strength > or = 4/5 to assist with sit to stand transfers. 3. Patient will be able to ambulate > or = 3000 feet with use of standard cane for community ambulation.              PLAN  Yes  Continue plan of care  Re-Cert Due: 48/61/96  []  Upgrade activities as tolerated  []  Discharge due to :  []  Other:      Tammi Virk, SARAH       9/29/2023       10:50 AM

## 2023-10-02 ENCOUNTER — HOSPITAL ENCOUNTER (OUTPATIENT)
Facility: HOSPITAL | Age: 78
Setting detail: RECURRING SERIES
Discharge: HOME | End: 2023-10-05
Payer: MEDICARE

## 2023-10-02 PROCEDURE — 97112 NEUROMUSCULAR REEDUCATION: CPT

## 2023-10-02 PROCEDURE — 97110 THERAPEUTIC EXERCISES: CPT

## 2023-10-02 NOTE — PROGRESS NOTES
PHYSICAL THERAPY - MEDICARE DAILY TREATMENT NOTE (updated 3/23)      Date: 10/2/2023          Patient Name:  Ruben Jones :  1945   Medical   Diagnosis:  Other general symptoms and signs [R68.89] Treatment Diagnosis:  R26.89   Abnormalities of gait and mobility    Referral Source:  Bonilla Duncan MD Insurance:   Payor: Savannah Trevino / Plan: Kanwal Moreno / Product Type: *No Product type* /                     Patient  verified yes     Visit #   Current  / Total 3    Time   In / Out 10:30 11:05   Total Treatment Time 35 min   Total Timed Codes 35 min   1:1 Treatment Time 30 min      [facility] Totals Reminder:  bill using total billable   min of TIMED therapeutic procedures and modalities. 8-22 min = 1 unit; 23-37 min = 2 units; 38-52 min = 3 units; 53-67 min = 4 units; 68-82 min = 5 units            SUBJECTIVE    Pain Level (0-10 scale): 0/10    Any medication changes, allergies to medications, adverse drug reactions, diagnosis change, or new procedure performed?: [x] No    [] Yes (see summary sheet for update)  Medications: Verified on Patient Summary List    Subjective functional status/changes:     Patient reports that she tried to use her cane this weekend and felt more off balance with it. OBJECTIVE    Observation: Enters clinic carrying quad cane     Therapeutic Procedures: Tx Min Billable or 1:1 Min (if diff from Tx Min) Procedure, Rationale, Specifics   25 [phone] [id number] Therapeutic Exercise (timed):  increase ROM, strength, coordination, balance, and proprioception to improve patient's ability to progress to PLOF and address remaining functional goals.  (see flow sheet as applicable)     Details if applicable:     10 10 50865 Neuromuscular Re-Education (timed):  improve balance, coordination, kinesthetic sense, posture, core stability and proprioception to improve patient's ability to develop conscious control of individual muscles and awareness of position of extremities in

## 2023-10-05 ENCOUNTER — APPOINTMENT (OUTPATIENT)
Facility: HOSPITAL | Age: 78
End: 2023-10-05
Payer: MEDICARE

## 2023-10-06 ENCOUNTER — HOSPITAL ENCOUNTER (OUTPATIENT)
Facility: HOSPITAL | Age: 78
Setting detail: RECURRING SERIES
Discharge: HOME | End: 2023-10-09
Payer: MEDICARE

## 2023-10-06 PROCEDURE — 97112 NEUROMUSCULAR REEDUCATION: CPT

## 2023-10-06 PROCEDURE — 97116 GAIT TRAINING THERAPY: CPT

## 2023-10-06 NOTE — PROGRESS NOTES
PHYSICAL THERAPY - MEDICARE DAILY TREATMENT NOTE (updated 3/23)      Date: 10/6/2023          Patient Name:  Saud Jimenez :  1945   Medical   Diagnosis:  Other general symptoms and signs [R68.89] Treatment Diagnosis:  R26.89   Abnormalities of gait and mobility    Referral Source:  Flores Peralta MD Insurance:   Payor: Jaquan Codycristal / Plan: [employer] [facility] / Product Type: *No Product type* /                     Patient  verified yes     Visit #   Current  / Total 4    Time   In / Out 8:25 am 9:05 am   Total Treatment Time 40 min   Total Timed Codes 40 min   1:1 Treatment Time 30 min      [facility] Totals Reminder:  bill using total billable   min of TIMED therapeutic procedures and modalities. 8-22 min = 1 unit; 23-37 min = 2 units; 38-52 min = 3 units; 53-67 min = 4 units; 68-82 min = 5 units            SUBJECTIVE    Pain Level (0-10 scale): 0/10    Any medication changes, allergies to medications, adverse drug reactions, diagnosis change, or new procedure performed?: [x] No    [] Yes (see summary sheet for update)  Medications: Verified on Patient Summary List    Subjective functional status/changes:     Patient reports the quad cane wasn't working for her last time. Would like her SPC adjusted. Exercises going well at home. OBJECTIVE    Observation: Enters clinic carrying single point cane     Therapeutic Procedures: Tx Min Billable or 1:1 Min (if diff from Tx Min) Procedure, Rationale, Specifics   20 10 [id number] Therapeutic Exercise (timed):  increase ROM, strength, coordination, balance, and proprioception to improve patient's ability to progress to PLOF and address remaining functional goals.  (see flow sheet as applicable)     Details if applicable:     10 10 42451 Neuromuscular Re-Education (timed):  improve balance, coordination, kinesthetic sense, posture, core stability and proprioception to improve patient's ability to develop conscious control of individual muscles and

## 2023-10-09 ENCOUNTER — HOSPITAL ENCOUNTER (OUTPATIENT)
Facility: HOSPITAL | Age: 78
Setting detail: RECURRING SERIES
Discharge: HOME | End: 2023-10-12
Payer: MEDICARE

## 2023-10-09 ENCOUNTER — APPOINTMENT (OUTPATIENT)
Facility: HOSPITAL | Age: 78
End: 2023-10-09
Payer: MEDICARE

## 2023-10-09 PROCEDURE — 97112 NEUROMUSCULAR REEDUCATION: CPT

## 2023-10-09 PROCEDURE — 97110 THERAPEUTIC EXERCISES: CPT

## 2023-10-09 PROCEDURE — 97116 GAIT TRAINING THERAPY: CPT

## 2023-10-09 NOTE — PROGRESS NOTES
PHYSICAL THERAPY - MEDICARE DAILY TREATMENT NOTE (updated 3/23)      Date: 10/9/2023          Patient Name:  Dory Morales :  1945   Medical   Diagnosis:  Other general symptoms and signs [R68.89] Treatment Diagnosis:  R26.89   Abnormalities of gait and mobility    Referral Source:  Domingo Mccormick MD Insurance:   Payor: Karen Sanchez / Plan: [employer] / Product Type: *No Product type* /                     Patient  verified yes     Visit #   Current  / Total 5    Time   In / Out 10:30 am 11:25 am   Total Treatment Time 55 min   Total Timed Codes 55 min   1:1 Treatment Time 47 min      [facility] Totals Reminder:  bill using total billable   min of TIMED therapeutic procedures and modalities. 8-22 min = 1 unit; 23-37 min = 2 units; 38-52 min = 3 units; 53-67 min = 4 units; 68-82 min = 5 units            SUBJECTIVE    Pain Level (0-10 scale): 0/10    Any medication changes, allergies to medications, adverse drug reactions, diagnosis change, or new procedure performed?: [x] No    [] Yes (see summary sheet for update)  Medications: Verified on Patient Summary List    Subjective functional status/changes:     Pt. Denies fall/LOB since last visit while at home. Pt reported difficulty stepping up on curve. Pt stated that she forgot [address] this visit but will bring it in next visit for review of gait pattern. OBJECTIVE    Observation: Enters clinic carrying single point cane     Therapeutic Procedures: Tx Min Billable or 1:1 Min (if diff from Tx Min) Procedure, Rationale, Specifics   27 [phone] [id number] Therapeutic Exercise (timed):  increase ROM, strength, coordination, balance, and proprioception to improve patient's ability to progress to PLOF and address remaining functional goals.  (see flow sheet as applicable)     Details if applicable:      89782 Neuromuscular Re-Education (timed):  improve balance, coordination, kinesthetic sense, posture, core stability and proprioception to

## 2023-10-11 ENCOUNTER — HOSPITAL ENCOUNTER (OUTPATIENT)
Facility: HOSPITAL | Age: 78
Setting detail: RECURRING SERIES
Discharge: HOME | End: 2023-10-14
Payer: MEDICARE

## 2023-10-11 PROCEDURE — 97112 NEUROMUSCULAR REEDUCATION: CPT

## 2023-10-11 PROCEDURE — 97110 THERAPEUTIC EXERCISES: CPT

## 2023-10-11 NOTE — PROGRESS NOTES
accomplished in 4-6 weeks:               1.  Patient will report pain level at worst as less than or equal to 1/10 so they can perform ADLs without pain. 2. Patient will have B LE strength > or = 4/5 to assist with sit to stand transfers. 3. Patient will be able to ambulate > or = 3000 feet with use of standard cane for community ambulation.              PLAN  Yes  Continue plan of care  Re-Cert Due: 38/12/85  []  Upgrade activities as tolerated  []  Discharge due to :  []  Other:      Tristian Markham, [address] [place] [address]       10/11/2023       11:09 AM

## 2023-10-16 ENCOUNTER — HOSPITAL ENCOUNTER (OUTPATIENT)
Facility: HOSPITAL | Age: 78
Setting detail: RECURRING SERIES
Discharge: HOME | End: 2023-10-19
Payer: MEDICARE

## 2023-10-16 PROCEDURE — 97112 NEUROMUSCULAR REEDUCATION: CPT

## 2023-10-16 PROCEDURE — 97110 THERAPEUTIC EXERCISES: CPT

## 2023-10-16 NOTE — PROGRESS NOTES
PHYSICAL THERAPY - MEDICARE DAILY TREATMENT NOTE (updated 3/23)      Date: 10/16/2023          Patient Name:  Romi Rogers :  1945   Medical   Diagnosis:  Other general symptoms and signs [R68.89] Treatment Diagnosis:  R26.89   Abnormalities of gait and mobility    Referral Source:  Alia Vasquez MD Insurance:   Payor: [employer] / Plan: [employer] / Product Type: *No Product type* /                     Patient  verified yes     Visit #   Current  / Total 7    Time   In / Out 10:30 am 11:15 am   Total Treatment Time 45 min   Total Timed Codes 45 min   1:1 Treatment Time 45 min      [facility] Totals Reminder:  bill using total billable   min of TIMED therapeutic procedures and modalities. 8-22 min = 1 unit; 23-37 min = 2 units; 38-52 min = 3 units; 53-67 min = 4 units; 68-82 min = 5 units            SUBJECTIVE    Pain Level (0-10 scale): 1/10    Any medication changes, allergies to medications, adverse drug reactions, diagnosis change, or new procedure performed?: [x] No    [] Yes (see summary sheet for update)  Medications: Verified on Patient Summary List    Subjective functional status/changes:     Pt stated that she is doing pretty good and denies LOB and falls since last visit, is having P! In R knee 1/10. OBJECTIVE    Observation: Enters clinic carrying single point cane     Therapeutic Procedures: Tx Min Billable or 1:1 Min (if diff from Tx Min) Procedure, Rationale, Specifics   20 20 79795 Therapeutic Exercise (timed):  increase ROM, strength, coordination, balance, and proprioception to improve patient's ability to progress to PLOF and address remaining functional goals.  (see flow sheet as applicable)     Details if applicable:     46 82 08358 Neuromuscular Re-Education (timed):  improve balance, coordination, kinesthetic sense, posture, core stability and proprioception to improve patient's ability to develop conscious control of individual muscles and awareness

## 2023-10-19 ENCOUNTER — HOSPITAL ENCOUNTER (OUTPATIENT)
Facility: HOSPITAL | Age: 78
Setting detail: RECURRING SERIES
Discharge: HOME | End: 2023-10-22
Payer: MEDICARE

## 2023-10-19 PROCEDURE — 97110 THERAPEUTIC EXERCISES: CPT

## 2023-10-19 PROCEDURE — 97112 NEUROMUSCULAR REEDUCATION: CPT

## 2023-10-19 NOTE — PROGRESS NOTES
analyze and address functional mobility deficits, analyze and address ROM deficits, analyze and address strength deficits, analyze and cue for proper movement patterns, analyze and modify for postural abnormalities, and analyze and address imbalance/dizziness to address functional deficits and attain remaining goals. Progress toward goals / Updated goals:  []  See Progress Note/Recertification    Short Term Goals: To be accomplished in 2 weeks:               1. Patient will be I in HEP to promote self management of symptoms. PROGRESSING              2. Patient will report pain level at worst as less than or equal to 3/10 so they can perform ADLs without pain. PROGRESSING  Long Term Goals: To be accomplished in 4-6 weeks:               1.  Patient will report pain level at worst as less than or equal to 1/10 so they can perform ADLs without pain. 2. Patient will have B LE strength > or = 4/5 to assist with sit to stand transfers. 3. Patient will be able to ambulate > or = 3000 feet with use of standard cane for community ambulation.              PLAN  Yes  Continue plan of care 2x week x 4 weeks from 48/01[birth year]  Re-Cert Due: 19/58/48  []  Upgrade activities as tolerated  []  Discharge due to :  []  Other:      Afua Sample, PT       10/19/2023       10:04 AM

## 2023-10-24 ENCOUNTER — HOSPITAL ENCOUNTER (OUTPATIENT)
Facility: HOSPITAL | Age: 78
Setting detail: RECURRING SERIES
Discharge: HOME | End: 2023-10-27
Payer: MEDICARE

## 2023-10-24 PROCEDURE — 97110 THERAPEUTIC EXERCISES: CPT

## 2023-10-24 PROCEDURE — 97112 NEUROMUSCULAR REEDUCATION: CPT

## 2023-10-24 NOTE — PROGRESS NOTES
PHYSICAL THERAPY - MEDICARE DAILY TREATMENT  NOTE (updated 3/23)      Date: 10/24/2023          Patient Name:  Yvette Coleman :  1945   Medical   Diagnosis:  Other general symptoms and signs [R68.89] Treatment Diagnosis:  R26.89   Abnormalities of gait and mobility    Referral Source:  Darren De Leon MD Insurance:   Payor: Radha CarrascoKrillion / Plan: Cameron Reddish / Product Type: *No Product type* /                     Patient  verified yes     Visit #   Current  / Total 9    Time   In / Out 9:30 am 10:15 am   Total Treatment Time 45 min   Total Timed Codes 45 min   1:1 Treatment Time 40 min      [facility] Totals Reminder:  bill using total billable   min of TIMED therapeutic procedures and modalities. 8-22 min = 1 unit; 23-37 min = 2 units; 38-52 min = 3 units; 53-67 min = 4 units; 68-82 min = 5 units            SUBJECTIVE    Pain Level (0-10 scale): 0    Any medication changes, allergies to medications, adverse drug reactions, diagnosis change, or new procedure performed?: [x] No    [] Yes (see summary sheet for update)  Medications: Verified on Patient Summary List    Subjective functional status/changes:     Pt states that she has had no LOB or falls since last treatment. Therapeutic Procedures: Tx Min Billable or 1:1 Min (if diff from Tx Min) Procedure, Rationale, Specifics   20 20 [id number] Therapeutic Exercise (timed):  increase ROM, strength, coordination, balance, and proprioception to improve patient's ability to progress to PLOF and address remaining functional goals. (see flow sheet as applicable)     Details if applicable:  Add.  Ball squ, HS curl with SWB, LAQ   25 83 [id number] Neuromuscular Re-Education (timed):  improve balance, coordination, kinesthetic sense, posture, core stability and proprioception to improve patient's ability to develop conscious control of individual muscles and awareness of position of extremities in order to progress to PLOF and address remaining

## 2023-10-26 ENCOUNTER — HOSPITAL ENCOUNTER (OUTPATIENT)
Facility: HOSPITAL | Age: 78
Setting detail: RECURRING SERIES
Discharge: HOME | End: 2023-10-29
Payer: MEDICARE

## 2023-10-26 PROCEDURE — 97112 NEUROMUSCULAR REEDUCATION: CPT

## 2023-10-26 PROCEDURE — 97110 THERAPEUTIC EXERCISES: CPT

## 2023-10-26 NOTE — PROGRESS NOTES
PHYSICAL THERAPY - MEDICARE DAILY TREATMENT  NOTE (updated 3/23)      Date: 10/26/2023          Patient Name:  Digna Payne :  1945   Medical   Diagnosis:  Other general symptoms and signs [R68.89] Treatment Diagnosis:  R26.89   Abnormalities of gait and mobility    Referral Source:  Jason Carey MD Insurance:   Payor: [id number] / Plan: [employer] / Product Type: *No Product type* /                     Patient  verified yes     Visit #   Current  / Total 10    Time   In / Out 10:30 am 11:20 am   Total Treatment Time 50 min   Total Timed Codes 35 min   1:1 Treatment Time 35 min      [facility] Totals Reminder:  bill using total billable   min of TIMED therapeutic procedures and modalities. 8-22 min = 1 unit; 23-37 min = 2 units; 38-52 min = 3 units; 53-67 min = 4 units; 68-82 min = 5 units            SUBJECTIVE    Pain Level (0-10 scale): 0    Any medication changes, allergies to medications, adverse drug reactions, diagnosis change, or new procedure performed?: [x] No    [] Yes (see summary sheet for update)  Medications: Verified on Patient Summary List    Subjective functional status/changes:     Pt states that she has had no LOB or falls since last treatment. Stated that she went to [facility] to lay wreathes at her parents grave and felt her balance was improving and used her cane to transverse the grass and uneven surfaces. Therapeutic Procedures: Tx Min Billable or 1:1 Min (if diff from Tx Min) Procedure, Rationale, Specifics   25 10 24724 Therapeutic Exercise (timed):  increase ROM, strength, coordination, balance, and proprioception to improve patient's ability to progress to PLOF and address remaining functional goals.  (see flow sheet as applicable)     Details if applicable:      23667 Neuromuscular Re-Education (timed):  improve balance, coordination, kinesthetic sense, posture, core stability and proprioception to improve patient's ability to develop

## 2023-10-30 ENCOUNTER — HOSPITAL ENCOUNTER (OUTPATIENT)
Facility: HOSPITAL | Age: 78
Setting detail: RECURRING SERIES
Discharge: HOME | End: 2023-11-02
Payer: MEDICARE

## 2023-10-30 PROCEDURE — 97112 NEUROMUSCULAR REEDUCATION: CPT

## 2023-10-30 PROCEDURE — 97110 THERAPEUTIC EXERCISES: CPT

## 2023-10-30 NOTE — PROGRESS NOTES
PHYSICAL THERAPY - MEDICARE DAILY TREATMENT  NOTE (updated 3/23)      Date: 10/30/2023          Patient Name:  Rigoberto De Leon :  1945   Medical   Diagnosis:  Other general symptoms and signs [R68.89] Treatment Diagnosis:  R26.89   Abnormalities of gait and mobility    Referral Source:  Florin Robles MD Insurance:   Payor: Gloria Hamilton / Plan: [employer] / Product Type: *No Product type* /                     Patient  verified yes     Visit #   Current  / Total 11    Time   In / Out 9:30 am 10:20   Total Treatment Time 50 min   Total Timed Codes 35 min   1:1 Treatment Time 35 min      [facility] Totals Reminder:  bill using total billable   min of TIMED therapeutic procedures and modalities. 8-22 min = 1 unit; 23-37 min = 2 units; 38-52 min = 3 units; 53-67 min = 4 units; 68-82 min = 5 units            SUBJECTIVE    Pain Level (0-10 scale): 0    Any medication changes, allergies to medications, adverse drug reactions, diagnosis change, or new procedure performed?: [x] No    [] Yes (see summary sheet for update)  Medications: Verified on Patient Summary List    Subjective functional status/changes:     Pt states that she has had no LOB or falls since last treatment. Pt stated that she feels like her balance has gotten much better. Therapeutic Procedures: Tx Min Billable or 1:1 Min (if diff from Tx Min) Procedure, Rationale, Specifics   25 96 80297 Therapeutic Exercise (timed):  increase ROM, strength, coordination, balance, and proprioception to improve patient's ability to progress to PLOF and address remaining functional goals.  (see flow sheet as applicable)     Details if applicable:      82 18515 Neuromuscular Re-Education (timed):  improve balance, coordination, kinesthetic sense, posture, core stability and proprioception to improve patient's ability to develop conscious control of individual muscles and awareness of position of extremities in order to progress to PLOF

## 2023-11-01 ENCOUNTER — HOSPITAL ENCOUNTER (OUTPATIENT)
Facility: HOSPITAL | Age: 78
Setting detail: RECURRING SERIES
Discharge: HOME | End: 2023-11-04
Payer: MEDICARE

## 2023-11-01 PROCEDURE — 97112 NEUROMUSCULAR REEDUCATION: CPT

## 2023-11-01 PROCEDURE — 97110 THERAPEUTIC EXERCISES: CPT

## 2023-11-01 NOTE — PROGRESS NOTES
goals. (see flow sheet as applicable)     Details if applicable:     50 40    Total Total         [x]  Patient Education billed concurrently with other procedures   [x] Review HEP    [] Progressed/Changed HEP, detail:    [x] Other detail:__     Other Objective/Functional Measures  NT    Pain Level at end of session (0-10 scale): 0/10      Assessment   Pt completed therapeutic exs and neuro re-ed with min-mod challenge. Balance in tandem is improving with TC at upper back and chest for stabilizing. Patient will continue to benefit from skilled PT / OT services to modify and progress therapeutic interventions, analyze and address functional mobility deficits, analyze and address ROM deficits, analyze and address strength deficits, analyze and cue for proper movement patterns, analyze and modify for postural abnormalities, and analyze and address imbalance/dizziness to address functional deficits and attain remaining goals. Progress toward goals / Updated goals:  []  See Progress Note/Recertification            PLAN  Yes  Continue plan of care 2x week x 4 weeks from 86/60/31  Re-Cert Due: 30/98/88  []  Upgrade activities as tolerated  []  Discharge due to :  [x]  Other: incorporate new neuro re-ed exs with dynamic balance.        Miguelito Sow, SARAH       11/1/2023       2:42 PM

## 2023-11-06 ENCOUNTER — HOSPITAL ENCOUNTER (OUTPATIENT)
Facility: HOSPITAL | Age: 78
Setting detail: RECURRING SERIES
Discharge: HOME | End: 2023-11-09
Payer: MEDICARE

## 2023-11-06 PROCEDURE — 97112 NEUROMUSCULAR REEDUCATION: CPT

## 2023-11-06 PROCEDURE — 97110 THERAPEUTIC EXERCISES: CPT

## 2023-11-06 NOTE — PROGRESS NOTES
functional goals. (see flow sheet as applicable)     Details if applicable:  added dynamic balance(walking marches, tandem walk, retro walk, 4 square stepping on mat pad)   45 38    Total Total         [x]  Patient Education billed concurrently with other procedures   [x] Review HEP    [] Progressed/Changed HEP, detail:    [x] Other detail:__     Other Objective/Functional Measures  NT    Pain Level at end of session (0-10 scale): 0/10      Assessment   Patient challenged with addition of dynamic balance exercises. Noted decreased R foot clearance at times while perform step over drills and during 4 square stepping on the mat. Patient will continue to benefit from skilled PT / OT services to modify and progress therapeutic interventions, analyze and address functional mobility deficits, analyze and address ROM deficits, analyze and address strength deficits, analyze and cue for proper movement patterns, analyze and modify for postural abnormalities, and analyze and address imbalance/dizziness to address functional deficits and attain remaining goals. Progress toward goals / Updated goals:  []  See Progress Note/Recertification            PLAN  Yes  Continue plan of care 2x week x 4 weeks from 20/67/97  Re-Cert Due: 00/87/21  []  Upgrade activities as tolerated  []  Discharge due to :  [x]  Other: incorporate new neuro re-ed exs with dynamic balance.        Jossue Syed, PTA       11/6/2023       11:40 AM

## 2023-11-08 ENCOUNTER — HOSPITAL ENCOUNTER (OUTPATIENT)
Facility: HOSPITAL | Age: 78
Setting detail: RECURRING SERIES
Discharge: HOME | End: 2023-11-11
Payer: MEDICARE

## 2023-11-08 PROCEDURE — 97110 THERAPEUTIC EXERCISES: CPT

## 2023-11-08 PROCEDURE — 97112 NEUROMUSCULAR REEDUCATION: CPT

## 2023-11-08 NOTE — PROGRESS NOTES
PHYSICAL THERAPY - MEDICARE DAILY TREATMENT  NOTE (updated 3/23)      Date: 2023          Patient Name:  Ritu Weiss :  1945   Medical   Diagnosis:  Other general symptoms and signs [R68.89] Treatment Diagnosis:  R26.89   Abnormalities of gait and mobility    Referral Source:  Ailyn Elliott MD Insurance:   Payor: Bethany Tay / Plan: [employer] / Product Type: *No Product type* /                     Patient  verified yes     Visit #   Current  / Total 14    Time   In / Out 10:30 AM 11:15 AM   Total Treatment Time 45 min   Total Timed Codes 45 min   1:1 Treatment Time 35 min      [facility] Totals Reminder:  bill using total billable   min of TIMED therapeutic procedures and modalities. 8-22 min = 1 unit; 23-37 min = 2 units; 38-52 min = 3 units; 53-67 min = 4 units; 68-82 min = 5 units            SUBJECTIVE    Pain Level (0-10 scale): 0    Any medication changes, allergies to medications, adverse drug reactions, diagnosis change, or new procedure performed?: [x] No    [] Yes (see summary sheet for update)  Medications: Verified on Patient Summary List    Subjective functional status/changes:     Pt stated she is doing well and feeling like her balance is getting better. Therapeutic Procedures: Tx Min Billable or 1:1 Min (if diff from Tx Min) Procedure, Rationale, Specifics   20 10 [id number] Therapeutic Exercise (timed):  increase ROM, strength, coordination, balance, and proprioception to improve patient's ability to progress to PLOF and address remaining functional goals. (see flow sheet as applicable)     Details if applicable:     25 94 96293 Neuromuscular Re-Education (timed):  improve balance, coordination, kinesthetic sense, posture, core stability and proprioception to improve patient's ability to develop conscious control of individual muscles and awareness of position of extremities in order to progress to PLOF and address remaining functional goals.  (see flow

## 2023-11-13 ENCOUNTER — HOSPITAL ENCOUNTER (OUTPATIENT)
Facility: HOSPITAL | Age: 78
Setting detail: RECURRING SERIES
Discharge: HOME | End: 2023-11-16
Payer: MEDICARE

## 2023-11-13 PROCEDURE — 97112 NEUROMUSCULAR REEDUCATION: CPT

## 2023-11-13 PROCEDURE — 97110 THERAPEUTIC EXERCISES: CPT

## 2023-11-13 NOTE — PROGRESS NOTES
PHYSICAL THERAPY - MEDICARE DAILY TREATMENT  NOTE (updated 3/23)      Date: 2023          Patient Name:  Romi Rogers :  1945   Medical   Diagnosis:  Other general symptoms and signs [R68.89] Treatment Diagnosis:  R26.89   Abnormalities of gait and mobility    Referral Source:  Alia Vasquez MD Insurance:   Payor: [employer] / Plan: [employer] / Product Type: *No Product type* /                     Patient  verified yes     Visit #   Current  / Total 15    Time   In / Out 10:00 AM 10:45 AM   Total Treatment Time 45 min   Total Timed Codes 45 min   1:1 Treatment Time 35 min      [facility] Totals Reminder:  bill using total billable   min of TIMED therapeutic procedures and modalities. 8-22 min = 1 unit; 23-37 min = 2 units; 38-52 min = 3 units; 53-67 min = 4 units; 68-82 min = 5 units            SUBJECTIVE    Pain Level (0-10 scale): 0    Any medication changes, allergies to medications, adverse drug reactions, diagnosis change, or new procedure performed?: [x] No    [] Yes (see summary sheet for update)  Medications: Verified on Patient Summary List    Subjective functional status/changes:     Pt stated that she is doing well and even had her grand children this weekend without issues. Therapeutic Procedures: Tx Min Billable or 1:1 Min (if diff from Tx Min) Procedure, Rationale, Specifics   20 10 69777 Therapeutic Exercise (timed):  increase ROM, strength, coordination, balance, and proprioception to improve patient's ability to progress to PLOF and address remaining functional goals.  (see flow sheet as applicable)     Details if applicable:     25 83 89730 Neuromuscular Re-Education (timed):  improve balance, coordination, kinesthetic sense, posture, core stability and proprioception to improve patient's ability to develop conscious control of individual muscles and awareness of position of extremities in order to progress to PLOF and address remaining functional

## 2023-11-22 ENCOUNTER — HOSPITAL ENCOUNTER (OUTPATIENT)
Facility: HOSPITAL | Age: 78
Setting detail: RECURRING SERIES
Discharge: HOME | End: 2023-11-25
Payer: MEDICARE

## 2023-11-22 PROCEDURE — 97110 THERAPEUTIC EXERCISES: CPT

## 2023-11-22 PROCEDURE — 97112 NEUROMUSCULAR REEDUCATION: CPT

## 2023-11-22 NOTE — PROGRESS NOTES
PHYSICAL THERAPY - MEDICARE DAILY TREATMENT  NOTE (updated 3/23)      Date: 2023          Patient Name:  Katelyn Hudson :  1945   Medical   Diagnosis:  Other general symptoms and signs [R68.89] Treatment Diagnosis:  R26.89   Abnormalities of gait and mobility    Referral Source:  Erasto Shah MD Insurance:   Payor: Lana Oh / Plan: [employer] / Product Type: *No Product type* /                     Patient  verified yes     Visit #   Current  / Total 16    Time   In / Out 10:30 AM 11:15 AM   Total Treatment Time 45 min   Total Timed Codes 45 min   1:1 Treatment Time 35 min      [facility] Totals Reminder:  bill using total billable   min of TIMED therapeutic procedures and modalities. 8-22 min = 1 unit; 23-37 min = 2 units; 38-52 min = 3 units; 53-67 min = 4 units; 68-82 min = 5 units            SUBJECTIVE    Pain Level (0-10 scale): 0    Any medication changes, allergies to medications, adverse drug reactions, diagnosis change, or new procedure performed?: [x] No    [] Yes (see summary sheet for update)  Medications: Verified on Patient Summary List    Subjective functional status/changes:     Pt stated that she feels like she is doing better and a little stronger in her legs. She still has hesitation with curbs but feels more confident than she used to. She would like to d/c to [facility].        OBJECTIVE    Observation: stands with increased lumbar flexion, increased knee flexion B                 Sit to stand tranfers: No UE use              5x sit to stand: 15 sec      Balance: NBOS EO: 30 sec, NBOS EC: 18 sec min/mod sway, semi tandem stance: 30 sec Bilateral, SLS: L 6 sec with LE's touching, R 6 sec with LE's touching L SLS without LE touching <1 sec, R 1 sec without LE touching      Gait: R trendelenburg, increased lumbar flexion         ROM:      Hip PROM: B WFL        Strength:      R Hip flexion 4/5  R Knee extension 5/5  R Knee flexion 4+/5  R Ankle DF 5/5  R hip

## 2023-12-02 ENCOUNTER — HOSPITAL ENCOUNTER (INPATIENT)
Facility: HOSPITAL | Age: 78
LOS: 4 days | Discharge: HOME | DRG: 871 | End: 2023-12-06
Attending: EMERGENCY MEDICINE | Admitting: FAMILY MEDICINE
Payer: MEDICARE

## 2023-12-02 ENCOUNTER — APPOINTMENT (OUTPATIENT)
Facility: HOSPITAL | Age: 78
DRG: 871 | End: 2023-12-02
Payer: MEDICARE

## 2023-12-02 DIAGNOSIS — A41.9 SEPTICEMIA (HCC): Primary | ICD-10-CM

## 2023-12-02 LAB
ALBUMIN SERPL-MCNC: 2.7 G/DL (ref 3.5–5)
ALBUMIN/GLOB SERPL: 0.6 (ref 1.1–2.2)
ALP SERPL-CCNC: 70 U/L (ref 45–117)
ALT SERPL-CCNC: 11 U/L (ref 12–78)
ANION GAP SERPL CALC-SCNC: 7 MMOL/L (ref 5–15)
APPEARANCE UR: CLEAR
AST SERPL-CCNC: 9 U/L (ref 15–37)
BACTERIA URNS QL MICRO: NEGATIVE /HPF
BASOPHILS # BLD: 0 K/UL (ref 0–0.1)
BASOPHILS NFR BLD: 0 % (ref 0–1)
BILIRUB SERPL-MCNC: 0.7 MG/DL (ref 0.2–1)
BILIRUB UR QL: NEGATIVE
BUN SERPL-MCNC: 28 MG/DL (ref 6–20)
BUN/CREAT SERPL: 3 (ref 12–20)
CALCIUM SERPL-MCNC: 9 MG/DL (ref 8.5–10.1)
CHLORIDE SERPL-SCNC: 102 MMOL/L (ref 97–108)
CO2 SERPL-SCNC: 30 MMOL/L (ref 21–32)
COLOR UR: ABNORMAL
COMMENT:: NORMAL
CREAT SERPL-MCNC: 8.5 MG/DL (ref 0.55–1.02)
DIFFERENTIAL METHOD BLD: ABNORMAL
EOSINOPHIL # BLD: 0 K/UL (ref 0–0.4)
EOSINOPHIL NFR BLD: 0 % (ref 0–7)
EPITH CASTS URNS QL MICRO: ABNORMAL /LPF
ERYTHROCYTE [DISTWIDTH] IN BLOOD BY AUTOMATED COUNT: 14.5 % (ref 11.5–14.5)
FLUAV RNA SPEC QL NAA+PROBE: NOT DETECTED
FLUBV RNA SPEC QL NAA+PROBE: NOT DETECTED
GLOBULIN SER CALC-MCNC: 4.2 G/DL (ref 2–4)
GLUCOSE SERPL-MCNC: 145 MG/DL (ref 65–100)
GLUCOSE UR STRIP.AUTO-MCNC: NEGATIVE MG/DL
HCT VFR BLD AUTO: 31.6 % (ref 35–47)
HGB BLD-MCNC: 10.2 G/DL (ref 11.5–16)
HGB UR QL STRIP: NEGATIVE
HYALINE CASTS URNS QL MICRO: ABNORMAL /LPF (ref 0–5)
IMM GRANULOCYTES # BLD AUTO: 0.1 K/UL (ref 0–0.04)
IMM GRANULOCYTES NFR BLD AUTO: 0 % (ref 0–0.5)
KETONES UR QL STRIP.AUTO: NEGATIVE MG/DL
LACTATE SERPL-SCNC: 2.5 MMOL/L (ref 0.4–2)
LACTATE SERPL-SCNC: 2.6 MMOL/L (ref 0.4–2)
LEUKOCYTE ESTERASE UR QL STRIP.AUTO: NEGATIVE
LYMPHOCYTES # BLD: 1.2 K/UL (ref 0.8–3.5)
LYMPHOCYTES NFR BLD: 7 % (ref 12–49)
MCH RBC QN AUTO: 28.7 PG (ref 26–34)
MCHC RBC AUTO-ENTMCNC: 32.3 G/DL (ref 30–36.5)
MCV RBC AUTO: 88.8 FL (ref 80–99)
MONOCYTES # BLD: 0.5 K/UL (ref 0–1)
MONOCYTES NFR BLD: 3 % (ref 5–13)
NEUTS SEG # BLD: 14.4 K/UL (ref 1.8–8)
NEUTS SEG NFR BLD: 90 % (ref 32–75)
NITRITE UR QL STRIP.AUTO: NEGATIVE
NRBC # BLD: 0 K/UL (ref 0–0.01)
NRBC BLD-RTO: 0 PER 100 WBC
PH UR STRIP: 8.5 (ref 5–8)
PLATELET # BLD AUTO: 175 K/UL (ref 150–400)
PMV BLD AUTO: 11 FL (ref 8.9–12.9)
POTASSIUM SERPL-SCNC: 3.1 MMOL/L (ref 3.5–5.1)
PROT SERPL-MCNC: 6.9 G/DL (ref 6.4–8.2)
PROT UR STRIP-MCNC: 100 MG/DL
RBC # BLD AUTO: 3.56 M/UL (ref 3.8–5.2)
RBC #/AREA URNS HPF: ABNORMAL /HPF (ref 0–5)
SARS-COV-2 RNA RESP QL NAA+PROBE: NOT DETECTED
SODIUM SERPL-SCNC: 139 MMOL/L (ref 136–145)
SP GR UR REFRACTOMETRY: 1.01 (ref 1–1.03)
SPECIMEN HOLD: NORMAL
SPECIMEN HOLD: NORMAL
UROBILINOGEN UR QL STRIP.AUTO: 0.2 EU/DL (ref 0.2–1)
WBC # BLD AUTO: 16.1 K/UL (ref 3.6–11)
WBC URNS QL MICRO: ABNORMAL /HPF (ref 0–4)

## 2023-12-02 PROCEDURE — 99285 EMERGENCY DEPT VISIT HI MDM: CPT

## 2023-12-02 PROCEDURE — 93005 ELECTROCARDIOGRAM TRACING: CPT | Performed by: EMERGENCY MEDICINE

## 2023-12-02 PROCEDURE — 87040 BLOOD CULTURE FOR BACTERIA: CPT

## 2023-12-02 PROCEDURE — 36415 COLL VENOUS BLD VENIPUNCTURE: CPT

## 2023-12-02 PROCEDURE — 96365 THER/PROPH/DIAG IV INF INIT: CPT

## 2023-12-02 PROCEDURE — 6370000000 HC RX 637 (ALT 250 FOR IP): Performed by: EMERGENCY MEDICINE

## 2023-12-02 PROCEDURE — 85025 COMPLETE CBC W/AUTO DIFF WBC: CPT

## 2023-12-02 PROCEDURE — 83605 ASSAY OF LACTIC ACID: CPT

## 2023-12-02 PROCEDURE — 6360000002 HC RX W HCPCS: Performed by: EMERGENCY MEDICINE

## 2023-12-02 PROCEDURE — 96367 TX/PROPH/DG ADDL SEQ IV INF: CPT

## 2023-12-02 PROCEDURE — 80053 COMPREHEN METABOLIC PANEL: CPT

## 2023-12-02 PROCEDURE — 71045 X-RAY EXAM CHEST 1 VIEW: CPT

## 2023-12-02 PROCEDURE — 2580000003 HC RX 258: Performed by: EMERGENCY MEDICINE

## 2023-12-02 PROCEDURE — 83036 HEMOGLOBIN GLYCOSYLATED A1C: CPT

## 2023-12-02 PROCEDURE — 1100000000 HC RM PRIVATE

## 2023-12-02 PROCEDURE — 81001 URINALYSIS AUTO W/SCOPE: CPT

## 2023-12-02 PROCEDURE — 87636 SARSCOV2 & INF A&B AMP PRB: CPT

## 2023-12-02 RX ORDER — POLYETHYLENE GLYCOL 3350 17 G/17G
17 POWDER, FOR SOLUTION ORAL DAILY PRN
Status: DISCONTINUED | OUTPATIENT
Start: 2023-12-02 | End: 2023-12-06 | Stop reason: HOSPADM

## 2023-12-02 RX ORDER — SODIUM CHLORIDE 0.9 % (FLUSH) 0.9 %
5-40 SYRINGE (ML) INJECTION PRN
Status: DISCONTINUED | OUTPATIENT
Start: 2023-12-02 | End: 2023-12-06 | Stop reason: HOSPADM

## 2023-12-02 RX ORDER — ACETAMINOPHEN 325 MG/1
650 TABLET ORAL EVERY 6 HOURS PRN
Status: DISCONTINUED | OUTPATIENT
Start: 2023-12-02 | End: 2023-12-06 | Stop reason: HOSPADM

## 2023-12-02 RX ORDER — HEPARIN SODIUM 5000 [USP'U]/ML
5000 INJECTION, SOLUTION INTRAVENOUS; SUBCUTANEOUS EVERY 8 HOURS SCHEDULED
Status: DISCONTINUED | OUTPATIENT
Start: 2023-12-02 | End: 2023-12-06 | Stop reason: HOSPADM

## 2023-12-02 RX ORDER — SODIUM CHLORIDE 0.9 % (FLUSH) 0.9 %
5-40 SYRINGE (ML) INJECTION EVERY 12 HOURS SCHEDULED
Status: DISCONTINUED | OUTPATIENT
Start: 2023-12-02 | End: 2023-12-06 | Stop reason: HOSPADM

## 2023-12-02 RX ORDER — ONDANSETRON 4 MG/1
4 TABLET, ORALLY DISINTEGRATING ORAL EVERY 8 HOURS PRN
Status: DISCONTINUED | OUTPATIENT
Start: 2023-12-02 | End: 2023-12-06 | Stop reason: HOSPADM

## 2023-12-02 RX ORDER — ACETAMINOPHEN 500 MG
1000 TABLET ORAL
Status: COMPLETED | OUTPATIENT
Start: 2023-12-02 | End: 2023-12-02

## 2023-12-02 RX ORDER — ONDANSETRON 2 MG/ML
4 INJECTION INTRAMUSCULAR; INTRAVENOUS EVERY 6 HOURS PRN
Status: DISCONTINUED | OUTPATIENT
Start: 2023-12-02 | End: 2023-12-06 | Stop reason: HOSPADM

## 2023-12-02 RX ORDER — ACETAMINOPHEN 650 MG/1
650 SUPPOSITORY RECTAL EVERY 6 HOURS PRN
Status: DISCONTINUED | OUTPATIENT
Start: 2023-12-02 | End: 2023-12-06 | Stop reason: HOSPADM

## 2023-12-02 RX ORDER — SODIUM CHLORIDE 9 MG/ML
INJECTION, SOLUTION INTRAVENOUS PRN
Status: DISCONTINUED | OUTPATIENT
Start: 2023-12-02 | End: 2023-12-06 | Stop reason: HOSPADM

## 2023-12-02 RX ORDER — 0.9 % SODIUM CHLORIDE 0.9 %
1000 INTRAVENOUS SOLUTION INTRAVENOUS ONCE
Status: COMPLETED | OUTPATIENT
Start: 2023-12-02 | End: 2023-12-02

## 2023-12-02 RX ADMIN — SODIUM CHLORIDE 1000 ML: 9 INJECTION, SOLUTION INTRAVENOUS at 19:34

## 2023-12-02 RX ADMIN — ACETAMINOPHEN 1000 MG: 500 TABLET ORAL at 19:34

## 2023-12-02 RX ADMIN — PIPERACILLIN AND TAZOBACTAM 4500 MG: 4; .5 INJECTION, POWDER, LYOPHILIZED, FOR SOLUTION INTRAVENOUS at 20:28

## 2023-12-02 RX ADMIN — VANCOMYCIN HYDROCHLORIDE 2000 MG: 10 INJECTION, POWDER, LYOPHILIZED, FOR SOLUTION INTRAVENOUS at 21:15

## 2023-12-02 ASSESSMENT — PAIN - FUNCTIONAL ASSESSMENT: PAIN_FUNCTIONAL_ASSESSMENT: 0-10

## 2023-12-02 ASSESSMENT — PAIN SCALES - GENERAL: PAINLEVEL_OUTOF10: 0

## 2023-12-02 NOTE — ED TRIAGE NOTES
Pt arrives via EMS from home for generalized weakness, fevers. Denies any other complaints, denies any recent infections.

## 2023-12-03 ENCOUNTER — APPOINTMENT (OUTPATIENT)
Facility: HOSPITAL | Age: 78
DRG: 871 | End: 2023-12-03
Payer: MEDICARE

## 2023-12-03 LAB
ALBUMIN SERPL-MCNC: 2.3 G/DL (ref 3.5–5)
ANION GAP SERPL CALC-SCNC: 12 MMOL/L (ref 5–15)
APPEARANCE FLD: ABNORMAL
BASOPHILS # BLD: 0.1 K/UL (ref 0–0.1)
BASOPHILS NFR BLD: 0 % (ref 0–1)
BUN SERPL-MCNC: 29 MG/DL (ref 6–20)
BUN/CREAT SERPL: 4 (ref 12–20)
CALCIUM SERPL-MCNC: 8 MG/DL (ref 8.5–10.1)
CHLORIDE SERPL-SCNC: 107 MMOL/L (ref 97–108)
CO2 SERPL-SCNC: 24 MMOL/L (ref 21–32)
COLOR FLD: ABNORMAL
CREAT SERPL-MCNC: 7.8 MG/DL (ref 0.55–1.02)
DIFFERENTIAL METHOD BLD: ABNORMAL
EKG ATRIAL RATE: 100 BPM
EKG DIAGNOSIS: NORMAL
EKG P AXIS: 30 DEGREES
EKG P-R INTERVAL: 132 MS
EKG Q-T INTERVAL: 326 MS
EKG QRS DURATION: 70 MS
EKG QTC CALCULATION (BAZETT): 420 MS
EKG R AXIS: 8 DEGREES
EKG T AXIS: 81 DEGREES
EKG VENTRICULAR RATE: 100 BPM
EOSINOPHIL # BLD: 0 K/UL (ref 0–0.4)
EOSINOPHIL NFR BLD: 0 % (ref 0–7)
ERYTHROCYTE [DISTWIDTH] IN BLOOD BY AUTOMATED COUNT: 14.7 % (ref 11.5–14.5)
EST. AVERAGE GLUCOSE BLD GHB EST-MCNC: 120 MG/DL
GLUCOSE BLD STRIP.AUTO-MCNC: 100 MG/DL (ref 65–117)
GLUCOSE BLD STRIP.AUTO-MCNC: 103 MG/DL (ref 65–117)
GLUCOSE BLD STRIP.AUTO-MCNC: 185 MG/DL (ref 65–117)
GLUCOSE SERPL-MCNC: 107 MG/DL (ref 65–100)
HBA1C MFR BLD: 5.8 % (ref 4–5.6)
HBV SURFACE AB SER QL: REACTIVE
HBV SURFACE AB SER-ACNC: 58.34 MIU/ML
HBV SURFACE AG SER QL: <0.1 INDEX
HBV SURFACE AG SER QL: NEGATIVE
HCT VFR BLD AUTO: 31.9 % (ref 35–47)
HGB BLD-MCNC: 10 G/DL (ref 11.5–16)
IMM GRANULOCYTES # BLD AUTO: 0.1 K/UL (ref 0–0.04)
IMM GRANULOCYTES NFR BLD AUTO: 0 % (ref 0–0.5)
LACTATE SERPL-SCNC: 2.5 MMOL/L (ref 0.4–2)
LACTATE SERPL-SCNC: 2.6 MMOL/L (ref 0.4–2)
LYMPHOCYTES # BLD: 1.9 K/UL (ref 0.8–3.5)
LYMPHOCYTES NFR BLD: 10 % (ref 12–49)
MCH RBC QN AUTO: 28.5 PG (ref 26–34)
MCHC RBC AUTO-ENTMCNC: 31.3 G/DL (ref 30–36.5)
MCV RBC AUTO: 90.9 FL (ref 80–99)
MONOCYTES # BLD: 1 K/UL (ref 0–1)
MONOCYTES NFR BLD: 5 % (ref 5–13)
MONOS+MACROS NFR FLD: 2 %
NEUTROPHILS NFR FLD: 98 %
NEUTS SEG # BLD: 16.3 K/UL (ref 1.8–8)
NEUTS SEG NFR BLD: 85 % (ref 32–75)
NRBC # BLD: 0 K/UL (ref 0–0.01)
NRBC BLD-RTO: 0 PER 100 WBC
NUC CELL # FLD: ABNORMAL /CU MM
PHOSPHATE SERPL-MCNC: 4.1 MG/DL (ref 2.6–4.7)
PLATELET # BLD AUTO: 127 K/UL (ref 150–400)
PMV BLD AUTO: 12.3 FL (ref 8.9–12.9)
POTASSIUM SERPL-SCNC: 3.2 MMOL/L (ref 3.5–5.1)
RBC # BLD AUTO: 3.51 M/UL (ref 3.8–5.2)
RBC # FLD: >100 /CU MM
SERVICE CMNT-IMP: ABNORMAL
SERVICE CMNT-IMP: NORMAL
SERVICE CMNT-IMP: NORMAL
SODIUM SERPL-SCNC: 143 MMOL/L (ref 136–145)
SPECIMEN SOURCE FLD: ABNORMAL
WBC # BLD AUTO: 19.3 K/UL (ref 3.6–11)

## 2023-12-03 PROCEDURE — 6360000002 HC RX W HCPCS: Performed by: FAMILY MEDICINE

## 2023-12-03 PROCEDURE — 85025 COMPLETE CBC W/AUTO DIFF WBC: CPT

## 2023-12-03 PROCEDURE — 87186 SC STD MICRODIL/AGAR DIL: CPT

## 2023-12-03 PROCEDURE — 87070 CULTURE OTHR SPECIMN AEROBIC: CPT

## 2023-12-03 PROCEDURE — 6360000002 HC RX W HCPCS: Performed by: INTERNAL MEDICINE

## 2023-12-03 PROCEDURE — 74176 CT ABD & PELVIS W/O CONTRAST: CPT

## 2023-12-03 PROCEDURE — 6370000000 HC RX 637 (ALT 250 FOR IP): Performed by: FAMILY MEDICINE

## 2023-12-03 PROCEDURE — 83605 ASSAY OF LACTIC ACID: CPT

## 2023-12-03 PROCEDURE — 90945 DIALYSIS ONE EVALUATION: CPT

## 2023-12-03 PROCEDURE — A4722 DIALYS SOL FLD VOL > 1999CC: HCPCS | Performed by: INTERNAL MEDICINE

## 2023-12-03 PROCEDURE — 86706 HEP B SURFACE ANTIBODY: CPT

## 2023-12-03 PROCEDURE — 36415 COLL VENOUS BLD VENIPUNCTURE: CPT

## 2023-12-03 PROCEDURE — 2580000003 HC RX 258: Performed by: FAMILY MEDICINE

## 2023-12-03 PROCEDURE — 6370000000 HC RX 637 (ALT 250 FOR IP)

## 2023-12-03 PROCEDURE — 87324 CLOSTRIDIUM AG IA: CPT

## 2023-12-03 PROCEDURE — 80069 RENAL FUNCTION PANEL: CPT

## 2023-12-03 PROCEDURE — 82962 GLUCOSE BLOOD TEST: CPT

## 2023-12-03 PROCEDURE — 6370000000 HC RX 637 (ALT 250 FOR IP): Performed by: INTERNAL MEDICINE

## 2023-12-03 PROCEDURE — 2060000000 HC ICU INTERMEDIATE R&B

## 2023-12-03 PROCEDURE — 87449 NOS EACH ORGANISM AG IA: CPT

## 2023-12-03 PROCEDURE — 89050 BODY FLUID CELL COUNT: CPT

## 2023-12-03 PROCEDURE — 2580000003 HC RX 258: Performed by: INTERNAL MEDICINE

## 2023-12-03 PROCEDURE — 87102 FUNGUS ISOLATION CULTURE: CPT

## 2023-12-03 PROCEDURE — 87205 SMEAR GRAM STAIN: CPT

## 2023-12-03 PROCEDURE — 87077 CULTURE AEROBIC IDENTIFY: CPT

## 2023-12-03 PROCEDURE — 87340 HEPATITIS B SURFACE AG IA: CPT

## 2023-12-03 RX ORDER — SODIUM CHLORIDE, SODIUM LACTATE, CALCIUM CHLORIDE, MAGNESIUM CHLORIDE AND DEXTROSE 2.5; 538; 448; 18.3; 5.08 G/100ML; MG/100ML; MG/100ML; MG/100ML; MG/100ML
6000 INJECTION, SOLUTION INTRAPERITONEAL EVERY 24 HOURS
Status: DISCONTINUED | OUTPATIENT
Start: 2023-12-03 | End: 2023-12-06 | Stop reason: HOSPADM

## 2023-12-03 RX ORDER — LEVOTHYROXINE SODIUM 0.05 MG/1
50 TABLET ORAL
Status: DISCONTINUED | OUTPATIENT
Start: 2023-12-03 | End: 2023-12-06 | Stop reason: HOSPADM

## 2023-12-03 RX ORDER — SODIUM CHLORIDE, SODIUM LACTATE, CALCIUM CHLORIDE, MAGNESIUM CHLORIDE AND DEXTROSE 2.5; 538; 448; 18.3; 5.08 G/100ML; MG/100ML; MG/100ML; MG/100ML; MG/100ML
6000 INJECTION, SOLUTION INTRAPERITONEAL EVERY 24 HOURS
Status: DISCONTINUED | OUTPATIENT
Start: 2023-12-03 | End: 2023-12-04 | Stop reason: DRUGHIGH

## 2023-12-03 RX ORDER — ACETAMINOPHEN 325 MG/1
650 TABLET ORAL EVERY 6 HOURS PRN
Status: DISCONTINUED | OUTPATIENT
Start: 2023-12-03 | End: 2023-12-03 | Stop reason: SDUPTHER

## 2023-12-03 RX ORDER — SODIUM CHLORIDE, SODIUM LACTATE, CALCIUM CHLORIDE, MAGNESIUM CHLORIDE AND DEXTROSE 2.5; 538; 448; 18.3; 5.08 G/100ML; MG/100ML; MG/100ML; MG/100ML; MG/100ML
2000 INJECTION, SOLUTION INTRAPERITONEAL ONCE
Status: COMPLETED | OUTPATIENT
Start: 2023-12-03 | End: 2023-12-03

## 2023-12-03 RX ORDER — SODIUM CHLORIDE, SODIUM LACTATE, CALCIUM CHLORIDE, MAGNESIUM CHLORIDE AND DEXTROSE 1.5; 538; 448; 18.3; 5.08 G/100ML; MG/100ML; MG/100ML; MG/100ML; MG/100ML
3000 INJECTION, SOLUTION INTRAPERITONEAL CONTINUOUS
Status: DISCONTINUED | OUTPATIENT
Start: 2023-12-03 | End: 2023-12-03

## 2023-12-03 RX ORDER — INSULIN LISPRO 100 [IU]/ML
0-4 INJECTION, SOLUTION INTRAVENOUS; SUBCUTANEOUS
Status: DISCONTINUED | OUTPATIENT
Start: 2023-12-03 | End: 2023-12-06 | Stop reason: HOSPADM

## 2023-12-03 RX ORDER — ATORVASTATIN CALCIUM 20 MG/1
20 TABLET, FILM COATED ORAL DAILY
Status: DISCONTINUED | OUTPATIENT
Start: 2023-12-03 | End: 2023-12-06 | Stop reason: HOSPADM

## 2023-12-03 RX ORDER — METOPROLOL SUCCINATE 50 MG/1
50 TABLET, EXTENDED RELEASE ORAL DAILY
Status: DISCONTINUED | OUTPATIENT
Start: 2023-12-03 | End: 2023-12-06 | Stop reason: HOSPADM

## 2023-12-03 RX ORDER — GENTAMICIN SULFATE 1 MG/G
CREAM TOPICAL DAILY
Status: DISCONTINUED | OUTPATIENT
Start: 2023-12-03 | End: 2023-12-04

## 2023-12-03 RX ORDER — INSULIN LISPRO 100 [IU]/ML
0-4 INJECTION, SOLUTION INTRAVENOUS; SUBCUTANEOUS NIGHTLY
Status: DISCONTINUED | OUTPATIENT
Start: 2023-12-03 | End: 2023-12-06 | Stop reason: HOSPADM

## 2023-12-03 RX ORDER — SODIUM CHLORIDE, SODIUM LACTATE, CALCIUM CHLORIDE, MAGNESIUM CHLORIDE AND DEXTROSE 2.5; 538; 448; 18.3; 5.08 G/100ML; MG/100ML; MG/100ML; MG/100ML; MG/100ML
3000 INJECTION, SOLUTION INTRAPERITONEAL CONTINUOUS
Status: DISCONTINUED | OUTPATIENT
Start: 2023-12-03 | End: 2023-12-03

## 2023-12-03 RX ORDER — LACTOBACILLUS RHAMNOSUS GG 10B CELL
1 CAPSULE ORAL
Status: DISCONTINUED | OUTPATIENT
Start: 2023-12-03 | End: 2023-12-06 | Stop reason: HOSPADM

## 2023-12-03 RX ORDER — DEXTROSE MONOHYDRATE 100 MG/ML
INJECTION, SOLUTION INTRAVENOUS CONTINUOUS PRN
Status: DISCONTINUED | OUTPATIENT
Start: 2023-12-03 | End: 2023-12-06 | Stop reason: HOSPADM

## 2023-12-03 RX ADMIN — ATORVASTATIN CALCIUM 20 MG: 20 TABLET, FILM COATED ORAL at 11:07

## 2023-12-03 RX ADMIN — SODIUM CHLORIDE, PRESERVATIVE FREE 10 ML: 5 INJECTION INTRAVENOUS at 21:50

## 2023-12-03 RX ADMIN — HEPARIN SODIUM 5000 UNITS: 5000 INJECTION INTRAVENOUS; SUBCUTANEOUS at 14:41

## 2023-12-03 RX ADMIN — SODIUM CHLORIDE, PRESERVATIVE FREE 10 ML: 5 INJECTION INTRAVENOUS at 11:08

## 2023-12-03 RX ADMIN — Medication 1 CAPSULE: at 21:50

## 2023-12-03 RX ADMIN — LEVOTHYROXINE SODIUM 50 MCG: 50 TABLET ORAL at 05:23

## 2023-12-03 RX ADMIN — HEPARIN SODIUM: 1000 INJECTION INTRAVENOUS; SUBCUTANEOUS at 18:04

## 2023-12-03 RX ADMIN — HEPARIN SODIUM 5000 UNITS: 5000 INJECTION INTRAVENOUS; SUBCUTANEOUS at 05:21

## 2023-12-03 RX ADMIN — SODIUM CHLORIDE, SODIUM LACTATE, CALCIUM CHLORIDE, MAGNESIUM CHLORIDE AND DEXTROSE 6000 ML: 2.5; 538; 448; 18.3; 5.08 INJECTION, SOLUTION INTRAPERITONEAL at 23:56

## 2023-12-03 RX ADMIN — HEPARIN SODIUM 5000 UNITS: 5000 INJECTION INTRAVENOUS; SUBCUTANEOUS at 21:50

## 2023-12-03 RX ADMIN — GENTAMICIN SULFATE: 1 CREAM TOPICAL at 15:05

## 2023-12-03 RX ADMIN — ACETAMINOPHEN 650 MG: 325 TABLET ORAL at 05:23

## 2023-12-03 RX ADMIN — METOPROLOL SUCCINATE 50 MG: 50 TABLET, EXTENDED RELEASE ORAL at 11:07

## 2023-12-03 RX ADMIN — SODIUM CHLORIDE, SODIUM LACTATE, CALCIUM CHLORIDE, MAGNESIUM CHLORIDE AND DEXTROSE 2000 ML: 2.5; 538; 448; 18.3; 5.08 INJECTION, SOLUTION INTRAPERITONEAL at 15:00

## 2023-12-03 ASSESSMENT — PAIN SCALES - GENERAL
PAINLEVEL_OUTOF10: 0
PAINLEVEL_OUTOF10: 0

## 2023-12-03 NOTE — PLAN OF CARE
Patient has a sprained ankle and she was given ibuprofen 800mg in the er but this is not helping. She said she hit it again and it is very painful. Wants to know if you would prescribe something stronger. Problem: Discharge Planning  Goal: Discharge to home or other facility with appropriate resources  Outcome: Progressing  Flowsheets (Taken 12/3/2023 0158)  Discharge to home or other facility with appropriate resources:   Identify barriers to discharge with patient and caregiver   Identify discharge learning needs (meds, wound care, etc)

## 2023-12-03 NOTE — H&P
Allergen Reactions    Codeine Hives      Family History   Problem Relation Age of Onset    Kidney Disease Mother     No Known Problems Son     Hypertension Son     Hypertension Daughter     Diabetes Daughter     Diabetes Sister     Kidney Disease Father         dialysis    Heart Disease Father     No Known Problems Brother     Diabetes Mother     Anesth Problems Neg Hx       Social History:  reports that she quit smoking about 31 years ago. Her smoking use included cigarettes. She smoked an average of .25 packs per day. She has never used smokeless tobacco. She reports that she does not drink alcohol and does not use drugs. Social Determinants of Health     Tobacco Use: Medium Risk (7/24/2023)    Patient History     Smoking Tobacco Use: Former     Smokeless Tobacco Use: Never     Passive Exposure: Not on file   Alcohol Use: Not on file   Financial Resource Strain: Low Risk  (7/24/2023)    Overall Financial Resource Strain (CARDIA)     Difficulty of Paying Living Expenses: Not hard at all   Food Insecurity: Not on file (7/24/2023)   Transportation Needs: Unknown (7/24/2023)    PRAPARE - Transportation     Lack of Transportation (Medical): Not on file     Lack of Transportation (Non-Medical): No   Physical Activity: Not on file   Stress: Not on file   Social Connections: Not on file   Intimate Partner Violence: Not on file   Depression: Not at risk (7/24/2023)    PHQ-2     PHQ-2 Score: 0   Housing Stability: Unknown (7/24/2023)    Housing Stability Vital Sign     Unable to Pay for Housing in the Last Year: Not on file     Number of Places Lived in the Last Year: Not on file     Unstable Housing in the Last Year: No   Interpersonal Safety: Not on file   Utilities: Not on file        Medications were reconciled to the best of my ability given all available resources at the time of admission. Route is PO if not otherwise noted.      Family and social history were personally reviewed, all pertinent and relevant details

## 2023-12-03 NOTE — ED NOTES
TRANSFER - OUT REPORT:    Verbal report given on [address]  being transferred to  for routine progression of patient care       Report consisted of patient's Situation, Background, Assessment and   Recommendations(SBAR). Information from the following report(s) Nurse Handoff Report, ED Encounter Summary, ED SBAR, Intake/Output, MAR, and Recent Results was reviewed with the receiving nurse. [place] Fall Assessment:    Presents to emergency department  because of falls (Syncope, seizure, or loss of consciousness): No  Age > 70: Yes  Altered Mental Status, Intoxication with alcohol or substance confusion (Disorientation, impaired judgment, poor safety awaremess, or inability to follow instructions): No  Impaired Mobility: Ambulates or transfers with assistive devices or assistance; Unable to ambulate or transer.: Yes  Nursing Judgement: Yes          Lines:   Peripheral IV 12/02/23 Left Antecubital (Active)   Site Assessment Clean, dry & intact 12/02/23 1928   Line Status Blood return noted;Capped;Flushed;Normal saline locked;Specimen collected 12/02/23 1928   Alcohol Cap Used Yes 12/02/23 1928   Dressing Status Clean, dry & intact 12/02/23 1928   Dressing Type Transparent 12/02/23 1928       Peripheral IV 12/02/23 Right Antecubital (Active)   Site Assessment Clean, dry & intact 12/02/23 1928   Line Status Capped;Flushed;Normal saline locked;Specimen collected 12/02/23 1928   Alcohol Cap Used Yes 12/02/23 1928   Dressing Status Clean, dry & intact 12/02/23 1928   Dressing Type Transparent 12/02/23 1928        Opportunity for questions and clarification was provided.       Patient transported with:  Monitor and Registered Nurse          Alberto Wheeler RN  12/03/23 0025

## 2023-12-04 PROBLEM — Z99.2 END-STAGE RENAL DISEASE ON PERITONEAL DIALYSIS (HCC): Status: ACTIVE | Noted: 2023-12-04

## 2023-12-04 PROBLEM — N18.6 END-STAGE RENAL DISEASE ON PERITONEAL DIALYSIS (HCC): Status: ACTIVE | Noted: 2023-12-04

## 2023-12-04 PROBLEM — R19.7 DIARRHEA: Status: ACTIVE | Noted: 2023-12-04

## 2023-12-04 PROBLEM — A41.9 SEPTICEMIA (HCC): Status: ACTIVE | Noted: 2023-12-04

## 2023-12-04 PROBLEM — T85.71XA DIALYSIS-ASSOCIATED PERITONITIS (HCC): Status: ACTIVE | Noted: 2023-12-04

## 2023-12-04 PROBLEM — R50.9 FEVER: Status: ACTIVE | Noted: 2023-12-04

## 2023-12-04 LAB
ALBUMIN SERPL-MCNC: 2.3 G/DL (ref 3.5–5)
ANION GAP SERPL CALC-SCNC: 9 MMOL/L (ref 5–15)
BACTERIA SPEC CULT: NORMAL
BACTERIA SPEC CULT: NORMAL
BASOPHILS # BLD: 0 K/UL (ref 0–0.1)
BASOPHILS NFR BLD: 0 % (ref 0–1)
BUN SERPL-MCNC: 30 MG/DL (ref 6–20)
BUN/CREAT SERPL: 4 (ref 12–20)
C DIFF GDH STL QL: NEGATIVE
C DIFF TOX A+B STL QL IA: NEGATIVE
C DIFF TOXIN INTERPRETATION: NORMAL
CALCIUM SERPL-MCNC: 8.9 MG/DL (ref 8.5–10.1)
CHLORIDE SERPL-SCNC: 102 MMOL/L (ref 97–108)
CO2 SERPL-SCNC: 26 MMOL/L (ref 21–32)
CREAT SERPL-MCNC: 7.55 MG/DL (ref 0.55–1.02)
DIFFERENTIAL METHOD BLD: ABNORMAL
EOSINOPHIL # BLD: 0 K/UL (ref 0–0.4)
EOSINOPHIL NFR BLD: 0 % (ref 0–7)
ERYTHROCYTE [DISTWIDTH] IN BLOOD BY AUTOMATED COUNT: 14.6 % (ref 11.5–14.5)
GLUCOSE BLD STRIP.AUTO-MCNC: 121 MG/DL (ref 65–117)
GLUCOSE BLD STRIP.AUTO-MCNC: 121 MG/DL (ref 65–117)
GLUCOSE BLD STRIP.AUTO-MCNC: 221 MG/DL (ref 65–117)
GLUCOSE BLD STRIP.AUTO-MCNC: 249 MG/DL (ref 65–117)
GLUCOSE SERPL-MCNC: 225 MG/DL (ref 65–100)
HCT VFR BLD AUTO: 28.9 % (ref 35–47)
HGB BLD-MCNC: 9.2 G/DL (ref 11.5–16)
IMM GRANULOCYTES # BLD AUTO: 0.1 K/UL (ref 0–0.04)
IMM GRANULOCYTES NFR BLD AUTO: 0 % (ref 0–0.5)
LYMPHOCYTES # BLD: 1.9 K/UL (ref 0.8–3.5)
LYMPHOCYTES NFR BLD: 14 % (ref 12–49)
MCH RBC QN AUTO: 28.2 PG (ref 26–34)
MCHC RBC AUTO-ENTMCNC: 31.8 G/DL (ref 30–36.5)
MCV RBC AUTO: 88.7 FL (ref 80–99)
MONOCYTES # BLD: 0.8 K/UL (ref 0–1)
MONOCYTES NFR BLD: 6 % (ref 5–13)
NEUTS SEG # BLD: 10.9 K/UL (ref 1.8–8)
NEUTS SEG NFR BLD: 80 % (ref 32–75)
NRBC # BLD: 0 K/UL (ref 0–0.01)
NRBC BLD-RTO: 0 PER 100 WBC
PHOSPHATE SERPL-MCNC: 4 MG/DL (ref 2.6–4.7)
PLATELET # BLD AUTO: 158 K/UL (ref 150–400)
PMV BLD AUTO: 11.6 FL (ref 8.9–12.9)
POTASSIUM SERPL-SCNC: 2.6 MMOL/L (ref 3.5–5.1)
RBC # BLD AUTO: 3.26 M/UL (ref 3.8–5.2)
SERVICE CMNT-IMP: ABNORMAL
SERVICE CMNT-IMP: NORMAL
SODIUM SERPL-SCNC: 137 MMOL/L (ref 136–145)
WBC # BLD AUTO: 13.8 K/UL (ref 3.6–11)

## 2023-12-04 PROCEDURE — 6370000000 HC RX 637 (ALT 250 FOR IP)

## 2023-12-04 PROCEDURE — 2060000000 HC ICU INTERMEDIATE R&B

## 2023-12-04 PROCEDURE — 87506 IADNA-DNA/RNA PROBE TQ 6-11: CPT

## 2023-12-04 PROCEDURE — 6370000000 HC RX 637 (ALT 250 FOR IP): Performed by: FAMILY MEDICINE

## 2023-12-04 PROCEDURE — 6360000002 HC RX W HCPCS: Performed by: FAMILY MEDICINE

## 2023-12-04 PROCEDURE — 80069 RENAL FUNCTION PANEL: CPT

## 2023-12-04 PROCEDURE — 87209 SMEAR COMPLEX STAIN: CPT

## 2023-12-04 PROCEDURE — 5A1D90Z PERFORMANCE OF URINARY FILTRATION, CONTINUOUS, GREATER THAN 18 HOURS PER DAY: ICD-10-PCS | Performed by: INTERNAL MEDICINE

## 2023-12-04 PROCEDURE — 82962 GLUCOSE BLOOD TEST: CPT

## 2023-12-04 PROCEDURE — 87177 OVA AND PARASITES SMEARS: CPT

## 2023-12-04 PROCEDURE — 36415 COLL VENOUS BLD VENIPUNCTURE: CPT

## 2023-12-04 PROCEDURE — 85025 COMPLETE CBC W/AUTO DIFF WBC: CPT

## 2023-12-04 PROCEDURE — 6370000000 HC RX 637 (ALT 250 FOR IP): Performed by: INTERNAL MEDICINE

## 2023-12-04 PROCEDURE — 2580000003 HC RX 258: Performed by: INTERNAL MEDICINE

## 2023-12-04 PROCEDURE — 99223 1ST HOSP IP/OBS HIGH 75: CPT | Performed by: INTERNAL MEDICINE

## 2023-12-04 PROCEDURE — 2580000003 HC RX 258: Performed by: FAMILY MEDICINE

## 2023-12-04 RX ORDER — SODIUM CHLORIDE, SODIUM LACTATE, CALCIUM CHLORIDE, MAGNESIUM CHLORIDE AND DEXTROSE 1.5; 538; 448; 18.3; 5.08 G/100ML; MG/100ML; MG/100ML; MG/100ML; MG/100ML
6000 INJECTION, SOLUTION INTRAPERITONEAL DAILY
Status: DISCONTINUED | OUTPATIENT
Start: 2023-12-04 | End: 2023-12-06 | Stop reason: HOSPADM

## 2023-12-04 RX ORDER — GENTAMICIN SULFATE 1 MG/G
CREAM TOPICAL DAILY PRN
Status: DISCONTINUED | OUTPATIENT
Start: 2023-12-04 | End: 2023-12-06 | Stop reason: HOSPADM

## 2023-12-04 RX ADMIN — INSULIN LISPRO 1 UNITS: 100 INJECTION, SOLUTION INTRAVENOUS; SUBCUTANEOUS at 07:07

## 2023-12-04 RX ADMIN — SODIUM CHLORIDE, PRESERVATIVE FREE 10 ML: 5 INJECTION INTRAVENOUS at 09:47

## 2023-12-04 RX ADMIN — LEVOTHYROXINE SODIUM 50 MCG: 50 TABLET ORAL at 07:06

## 2023-12-04 RX ADMIN — HEPARIN SODIUM 5000 UNITS: 5000 INJECTION INTRAVENOUS; SUBCUTANEOUS at 15:43

## 2023-12-04 RX ADMIN — HEPARIN SODIUM 5000 UNITS: 5000 INJECTION INTRAVENOUS; SUBCUTANEOUS at 21:36

## 2023-12-04 RX ADMIN — POTASSIUM BICARBONATE 40 MEQ: 782 TABLET, EFFERVESCENT ORAL at 15:44

## 2023-12-04 RX ADMIN — SODIUM CHLORIDE, PRESERVATIVE FREE 10 ML: 5 INJECTION INTRAVENOUS at 21:36

## 2023-12-04 RX ADMIN — GENTAMICIN SULFATE: 1 CREAM TOPICAL at 17:37

## 2023-12-04 RX ADMIN — ATORVASTATIN CALCIUM 20 MG: 20 TABLET, FILM COATED ORAL at 09:47

## 2023-12-04 RX ADMIN — METOPROLOL SUCCINATE 50 MG: 50 TABLET, EXTENDED RELEASE ORAL at 09:47

## 2023-12-04 RX ADMIN — CEFEPIME 1000 MG: 1 INJECTION, POWDER, FOR SOLUTION INTRAMUSCULAR; INTRAVENOUS at 02:30

## 2023-12-04 RX ADMIN — HEPARIN SODIUM 5000 UNITS: 5000 INJECTION INTRAVENOUS; SUBCUTANEOUS at 07:06

## 2023-12-04 RX ADMIN — SODIUM CHLORIDE, SODIUM LACTATE, CALCIUM CHLORIDE, MAGNESIUM CHLORIDE AND DEXTROSE 6000 ML: 2.5; 538; 448; 18.3; 5.08 INJECTION, SOLUTION INTRAPERITONEAL at 17:36

## 2023-12-04 RX ADMIN — POTASSIUM BICARBONATE 40 MEQ: 782 TABLET, EFFERVESCENT ORAL at 05:18

## 2023-12-04 RX ADMIN — Medication 1 CAPSULE: at 09:47

## 2023-12-04 NOTE — CARE COORDINATION
None   Patient expects to be discharged to: Apartment   One/Two Story Residence One story   History of falls? 1   Services At/After Discharge   Transition of Care Consult (CM Consult) N/A   Services At/After Discharge None   [employer] Information Provided? No   Mode of Transport at Discharge Self   Confirm Follow Up Transport Self     Mrs. Flory Kramer was seen in her room. Her address, phone number, and insurance were verified. She lives by herself in a third story apartment with elevator access. Her pharmacy is the [facility] [address]. She lives in the [facility]. There were no home health needs noted at this time. Her family will provide transportation for her when she is medically stable for discharge. Will continue to follow for discharge planning.   Graciela Perez LCSW

## 2023-12-04 NOTE — PLAN OF CARE
2000 Received patient from [place], [place].    0730 Bedside shift change report given to Eduard Boone RN (oncoming nurse) by Yandel Joe RN. Report included the following information SBAR, Kardex, MAR, Recent Results, and Cardiac Rhythm NSR.      Problem: Discharge Planning  Goal: Discharge to home or other facility with appropriate resources  Outcome: Progressing     Problem: Safety - Adult  Goal: Free from fall injury  Outcome: Progressing     Problem: Chronic Conditions and Co-morbidities  Goal: Patient's chronic conditions and co-morbidity symptoms are monitored and maintained or improved  Outcome: Progressing

## 2023-12-05 LAB
ALBUMIN SERPL-MCNC: 2 G/DL (ref 3.5–5)
ANION GAP SERPL CALC-SCNC: 8 MMOL/L (ref 5–15)
APPEARANCE FLD: ABNORMAL
BACTERIA SPEC CULT: ABNORMAL
BASOPHILS # BLD: 0.1 K/UL (ref 0–0.1)
BASOPHILS NFR BLD: 0 % (ref 0–1)
BUN SERPL-MCNC: 24 MG/DL (ref 6–20)
BUN/CREAT SERPL: 4 (ref 12–20)
C COLI+JEJUNI TUF STL QL NAA+PROBE: NEGATIVE
CALCIUM SERPL-MCNC: 8.2 MG/DL (ref 8.5–10.1)
CHLORIDE SERPL-SCNC: 102 MMOL/L (ref 97–108)
CO2 SERPL-SCNC: 28 MMOL/L (ref 21–32)
COLOR FLD: COLORLESS
CREAT SERPL-MCNC: 6.56 MG/DL (ref 0.55–1.02)
CRP SERPL-MCNC: 17.5 MG/DL (ref 0–0.6)
DIFFERENTIAL METHOD BLD: ABNORMAL
EC STX1+STX2 GENES STL QL NAA+PROBE: NEGATIVE
EOSINOPHIL # BLD: 0.2 K/UL (ref 0–0.4)
EOSINOPHIL NFR BLD: 1 % (ref 0–7)
ERYTHROCYTE [DISTWIDTH] IN BLOOD BY AUTOMATED COUNT: 14.3 % (ref 11.5–14.5)
ETEC ELTA+ESTB GENES STL QL NAA+PROBE: NEGATIVE
GLUCOSE BLD STRIP.AUTO-MCNC: 111 MG/DL (ref 65–117)
GLUCOSE BLD STRIP.AUTO-MCNC: 144 MG/DL (ref 65–117)
GLUCOSE BLD STRIP.AUTO-MCNC: 144 MG/DL (ref 65–117)
GLUCOSE BLD STRIP.AUTO-MCNC: 186 MG/DL (ref 65–117)
GLUCOSE SERPL-MCNC: 152 MG/DL (ref 65–100)
GRAM STN SPEC: ABNORMAL
HCT VFR BLD AUTO: 27.5 % (ref 35–47)
HGB BLD-MCNC: 9 G/DL (ref 11.5–16)
IMM GRANULOCYTES # BLD AUTO: 0 K/UL (ref 0–0.04)
IMM GRANULOCYTES NFR BLD AUTO: 0 % (ref 0–0.5)
LYMPHOCYTES # BLD: 2.4 K/UL (ref 0.8–3.5)
LYMPHOCYTES NFR BLD: 19 % (ref 12–49)
MCH RBC QN AUTO: 28.3 PG (ref 26–34)
MCHC RBC AUTO-ENTMCNC: 32.7 G/DL (ref 30–36.5)
MCV RBC AUTO: 86.5 FL (ref 80–99)
MONOCYTES # BLD: 0.8 K/UL (ref 0–1)
MONOCYTES NFR BLD: 7 % (ref 5–13)
NEUTS SEG # BLD: 9 K/UL (ref 1.8–8)
NEUTS SEG NFR BLD: 73 % (ref 32–75)
NRBC # BLD: 0 K/UL (ref 0–0.01)
NRBC BLD-RTO: 0 PER 100 WBC
NUC CELL # FLD: 313 /CU MM
P SHIGELLOIDES DNA STL QL NAA+PROBE: NEGATIVE
PHOSPHATE SERPL-MCNC: 2.8 MG/DL (ref 2.6–4.7)
PLATELET # BLD AUTO: 176 K/UL (ref 150–400)
PMV BLD AUTO: 12.5 FL (ref 8.9–12.9)
POTASSIUM SERPL-SCNC: 2.9 MMOL/L (ref 3.5–5.1)
RBC # BLD AUTO: 3.18 M/UL (ref 3.8–5.2)
RBC # FLD: 56 /CU MM
SALMONELLA SP SPAO STL QL NAA+PROBE: NEGATIVE
SERVICE CMNT-IMP: ABNORMAL
SERVICE CMNT-IMP: NORMAL
SHIGELLA SP+EIEC IPAH STL QL NAA+PROBE: NEGATIVE
SODIUM SERPL-SCNC: 138 MMOL/L (ref 136–145)
SPECIMEN SOURCE FLD: ABNORMAL
V CHOL+PARA+VUL DNA STL QL NAA+NON-PROBE: NEGATIVE
VANCOMYCIN SERPL-MCNC: 30.9 UG/ML
WBC # BLD AUTO: 12.5 K/UL (ref 3.6–11)
Y ENTEROCOL DNA STL QL NAA+NON-PROBE: NEGATIVE

## 2023-12-05 PROCEDURE — 80202 ASSAY OF VANCOMYCIN: CPT

## 2023-12-05 PROCEDURE — 2580000003 HC RX 258: Performed by: INTERNAL MEDICINE

## 2023-12-05 PROCEDURE — 6370000000 HC RX 637 (ALT 250 FOR IP): Performed by: INTERNAL MEDICINE

## 2023-12-05 PROCEDURE — 6360000002 HC RX W HCPCS: Performed by: INTERNAL MEDICINE

## 2023-12-05 PROCEDURE — 2060000000 HC ICU INTERMEDIATE R&B

## 2023-12-05 PROCEDURE — 90945 DIALYSIS ONE EVALUATION: CPT

## 2023-12-05 PROCEDURE — 6370000000 HC RX 637 (ALT 250 FOR IP)

## 2023-12-05 PROCEDURE — 80069 RENAL FUNCTION PANEL: CPT

## 2023-12-05 PROCEDURE — A4722 DIALYS SOL FLD VOL > 1999CC: HCPCS | Performed by: INTERNAL MEDICINE

## 2023-12-05 PROCEDURE — 36415 COLL VENOUS BLD VENIPUNCTURE: CPT

## 2023-12-05 PROCEDURE — 86140 C-REACTIVE PROTEIN: CPT

## 2023-12-05 PROCEDURE — 2580000003 HC RX 258: Performed by: FAMILY MEDICINE

## 2023-12-05 PROCEDURE — 6360000002 HC RX W HCPCS: Performed by: FAMILY MEDICINE

## 2023-12-05 PROCEDURE — 85025 COMPLETE CBC W/AUTO DIFF WBC: CPT

## 2023-12-05 PROCEDURE — 6370000000 HC RX 637 (ALT 250 FOR IP): Performed by: FAMILY MEDICINE

## 2023-12-05 PROCEDURE — 6370000000 HC RX 637 (ALT 250 FOR IP): Performed by: HOSPITALIST

## 2023-12-05 PROCEDURE — 89050 BODY FLUID CELL COUNT: CPT

## 2023-12-05 PROCEDURE — 82962 GLUCOSE BLOOD TEST: CPT

## 2023-12-05 RX ORDER — LOPERAMIDE HYDROCHLORIDE 2 MG/1
2 CAPSULE ORAL
Status: DISCONTINUED | OUTPATIENT
Start: 2023-12-05 | End: 2023-12-06 | Stop reason: HOSPADM

## 2023-12-05 RX ADMIN — SODIUM CHLORIDE, PRESERVATIVE FREE 10 ML: 5 INJECTION INTRAVENOUS at 20:57

## 2023-12-05 RX ADMIN — POTASSIUM BICARBONATE 40 MEQ: 782 TABLET, EFFERVESCENT ORAL at 16:01

## 2023-12-05 RX ADMIN — ATORVASTATIN CALCIUM 20 MG: 20 TABLET, FILM COATED ORAL at 09:22

## 2023-12-05 RX ADMIN — Medication 1 CAPSULE: at 07:25

## 2023-12-05 RX ADMIN — HEPARIN SODIUM 5000 UNITS: 5000 INJECTION INTRAVENOUS; SUBCUTANEOUS at 06:43

## 2023-12-05 RX ADMIN — HEPARIN SODIUM: 1000 INJECTION INTRAVENOUS; SUBCUTANEOUS at 07:29

## 2023-12-05 RX ADMIN — LOPERAMIDE HYDROCHLORIDE 2 MG: 2 CAPSULE ORAL at 20:56

## 2023-12-05 RX ADMIN — GENTAMICIN SULFATE: 1 CREAM TOPICAL at 15:07

## 2023-12-05 RX ADMIN — POTASSIUM BICARBONATE 40 MEQ: 782 TABLET, EFFERVESCENT ORAL at 20:56

## 2023-12-05 RX ADMIN — HEPARIN SODIUM 5000 UNITS: 5000 INJECTION INTRAVENOUS; SUBCUTANEOUS at 20:56

## 2023-12-05 RX ADMIN — SODIUM CHLORIDE, PRESERVATIVE FREE 10 ML: 5 INJECTION INTRAVENOUS at 09:13

## 2023-12-05 RX ADMIN — METOPROLOL SUCCINATE 50 MG: 50 TABLET, EXTENDED RELEASE ORAL at 09:22

## 2023-12-05 RX ADMIN — HEPARIN SODIUM 5000 UNITS: 5000 INJECTION INTRAVENOUS; SUBCUTANEOUS at 16:01

## 2023-12-05 RX ADMIN — LOPERAMIDE HYDROCHLORIDE 2 MG: 2 CAPSULE ORAL at 16:12

## 2023-12-05 RX ADMIN — LEVOTHYROXINE SODIUM 50 MCG: 50 TABLET ORAL at 06:43

## 2023-12-05 RX ADMIN — SODIUM CHLORIDE, SODIUM LACTATE, CALCIUM CHLORIDE, MAGNESIUM CHLORIDE AND DEXTROSE 6000 ML: 2.5; 538; 448; 18.3; 5.08 INJECTION, SOLUTION INTRAPERITONEAL at 15:06

## 2023-12-05 RX ADMIN — SODIUM CHLORIDE, SODIUM LACTATE, CALCIUM CHLORIDE, MAGNESIUM CHLORIDE AND DEXTROSE 6000 ML: 1.5; 538; 448; 18.3; 5.08 INJECTION, SOLUTION INTRAPERITONEAL at 15:06

## 2023-12-05 ASSESSMENT — PAIN SCALES - GENERAL: PAINLEVEL_OUTOF10: 0

## 2023-12-05 NOTE — PLAN OF CARE
Problem: Safety - Adult  Goal: Free from fall injury  Outcome: Progressing  Flowsheets (Taken 12/5/2023 105)  Free From Fall Injury:   Instruct family/caregiver on patient safety   Based on caregiver fall risk screen, instruct family/caregiver to ask for assistance with transferring infant if caregiver noted to have fall risk factors

## 2023-12-06 VITALS
HEIGHT: 64 IN | SYSTOLIC BLOOD PRESSURE: 136 MMHG | TEMPERATURE: 97.9 F | WEIGHT: 166.2 LBS | RESPIRATION RATE: 18 BRPM | HEART RATE: 84 BPM | OXYGEN SATURATION: 100 % | DIASTOLIC BLOOD PRESSURE: 71 MMHG | BODY MASS INDEX: 28.38 KG/M2

## 2023-12-06 PROBLEM — R50.9 FEVER: Status: RESOLVED | Noted: 2023-12-04 | Resolved: 2023-12-06

## 2023-12-06 PROBLEM — A41.9 SEPTICEMIA (HCC): Status: RESOLVED | Noted: 2023-12-04 | Resolved: 2023-12-06

## 2023-12-06 PROBLEM — A41.9 SEPSIS (HCC): Status: RESOLVED | Noted: 2023-12-02 | Resolved: 2023-12-06

## 2023-12-06 PROBLEM — R19.7 DIARRHEA: Status: RESOLVED | Noted: 2023-12-04 | Resolved: 2023-12-06

## 2023-12-06 LAB
GLUCOSE BLD STRIP.AUTO-MCNC: 131 MG/DL (ref 65–117)
GLUCOSE BLD STRIP.AUTO-MCNC: 90 MG/DL (ref 65–117)
SERVICE CMNT-IMP: ABNORMAL
SERVICE CMNT-IMP: NORMAL

## 2023-12-06 PROCEDURE — 6370000000 HC RX 637 (ALT 250 FOR IP): Performed by: FAMILY MEDICINE

## 2023-12-06 PROCEDURE — 2580000003 HC RX 258: Performed by: FAMILY MEDICINE

## 2023-12-06 PROCEDURE — 6360000002 HC RX W HCPCS: Performed by: FAMILY MEDICINE

## 2023-12-06 PROCEDURE — 82962 GLUCOSE BLOOD TEST: CPT

## 2023-12-06 PROCEDURE — A4722 DIALYS SOL FLD VOL > 1999CC: HCPCS | Performed by: INTERNAL MEDICINE

## 2023-12-06 PROCEDURE — 6370000000 HC RX 637 (ALT 250 FOR IP): Performed by: INTERNAL MEDICINE

## 2023-12-06 PROCEDURE — 6360000002 HC RX W HCPCS: Performed by: INTERNAL MEDICINE

## 2023-12-06 RX ORDER — LACTOBACILLUS RHAMNOSUS GG 10B CELL
1 CAPSULE ORAL
Qty: 14 CAPSULE | Refills: 0 | Status: SHIPPED | OUTPATIENT
Start: 2023-12-07

## 2023-12-06 RX ORDER — LACTOBACILLUS RHAMNOSUS GG 10B CELL
1 CAPSULE ORAL
Qty: 14 CAPSULE | Refills: 0 | Status: SHIPPED | OUTPATIENT
Start: 2023-12-07 | End: 2023-12-06 | Stop reason: SDUPTHER

## 2023-12-06 RX ADMIN — LEVOTHYROXINE SODIUM 50 MCG: 50 TABLET ORAL at 06:14

## 2023-12-06 RX ADMIN — ATORVASTATIN CALCIUM 20 MG: 20 TABLET, FILM COATED ORAL at 09:05

## 2023-12-06 RX ADMIN — METOPROLOL SUCCINATE 50 MG: 50 TABLET, EXTENDED RELEASE ORAL at 09:05

## 2023-12-06 RX ADMIN — HEPARIN SODIUM 5000 UNITS: 5000 INJECTION INTRAVENOUS; SUBCUTANEOUS at 06:14

## 2023-12-06 RX ADMIN — HEPARIN SODIUM: 1000 INJECTION INTRAVENOUS; SUBCUTANEOUS at 08:46

## 2023-12-06 RX ADMIN — SODIUM CHLORIDE, PRESERVATIVE FREE 10 ML: 5 INJECTION INTRAVENOUS at 08:06

## 2023-12-06 RX ADMIN — POTASSIUM BICARBONATE 40 MEQ: 782 TABLET, EFFERVESCENT ORAL at 09:05

## 2023-12-06 ASSESSMENT — PAIN SCALES - GENERAL: PAINLEVEL_OUTOF10: 0

## 2023-12-06 NOTE — DISCHARGE SUMMARY
Discharge Summary       PATIENT ID: Mathew Jackson  MRN: [id number]   YOB: 1945    DATE OF ADMISSION: 12/2/2023  6:56 PM    DATE OF DISCHARGE: 12/6/2023   PRIMARY CARE PROVIDER: Liset Boston MD     ATTENDING PHYSICIAN: Dr Bailee Chang  DISCHARGING PROVIDER: Bailee Chang MD    To contact this individual call [phone] and ask the  to page. If unavailable ask to be transferred the Adult Hospitalist Department. CONSULTATIONS: IP CONSULT TO NEPHROLOGY  IP CONSULT TO PHARMACY  IP CONSULT TO INFECTIOUS DISEASES    PROCEDURES/SURGERIES: * No surgery found *    ADMITTING DIAGNOSES & HOSPITAL COURSE:   Sepsis  Sec to E coli peritonitis  Acute on chronic diarrhea  Leukocytosis continues to improve  -follow UA/Ucx, and blood cx.    -PD fluid culture E coli  -PD fluid nucleated cell down from 44, 920 to 313  -Stool studies  -On ceftazidime IP for 2 weeks  -Appreciate ID  -Appreciate discussion with Nephrology, discharge today with antibiotics     #ESRD on PD  #hypokalemia, severe  #HTN  -Appreciate nephrology for IP PD  -daily RFP  -continue home metoprolol     #DM II with hyperglcemia  #dyslipidemia  -ISS     #hypothyroidism  -continue home levothyroxine     #hx renal cancer s/p right nephrectomy  #hx treated hepatitis C    PENDING TEST RESULTS:   At the time of discharge the following test results are still pending: none    FOLLOW UP APPOINTMENTS:    PCP  Nephrology     ADDITIONAL CARE RECOMMENDATIONS: as above    DIET: renal diet    ACTIVITY: activity as tolerated      DISCHARGE MEDICATIONS:     Medication List        START taking these medications      lactobacillus capsule  Take 1 capsule by mouth daily (with breakfast)  Start taking on: December 7, 2023     potassium bicarb-citric acid 20 MEQ Tbef effervescent tablet  Commonly known as: EFFER-K  Take 1 tablet by mouth 2 times daily            CONTINUE taking these medications      acetaminophen 500 MG tablet  Commonly known as:

## 2023-12-06 NOTE — PLAN OF CARE
Problem: Discharge Planning  Goal: Discharge to home or other facility with appropriate resources  Outcome: Completed     Problem: Safety - Adult  Goal: Free from fall injury  12/6/2023 1158 by Jacek Harding RN  Outcome: Completed  12/6/2023 1027 by Jacek Harding RN  Outcome: Progressing  Flowsheets (Taken 12/6/2023 1027)  Free From Fall Injury:   Cameron Sprawls family/caregiver on patient safety   Based on caregiver fall risk screen, instruct family/caregiver to ask for assistance with transferring infant if caregiver noted to have fall risk factors     Problem: Chronic Conditions and Co-morbidities  Goal: Patient's chronic conditions and co-morbidity symptoms are monitored and maintained or improved  Outcome: Completed

## 2023-12-06 NOTE — DISCHARGE INSTRUCTIONS
Discharge Instructions       PATIENT ID: Marlin Love  MRN: [id number]   YOB: 1945    DATE OF ADMISSION: [unfilled]    DATE OF DISCHARGE: 12/6/2023    PRIMARY CARE PROVIDER: @PCP@     ATTENDING PHYSICIAN: [unfilled]  DISCHARGING PROVIDER: Carmelina Barthel, MD    To contact this individual call [phone] and ask the  to page. If unavailable ask to be transferred the Adult Hospitalist Department. DISCHARGE DIAGNOSES Peritonitis    CONSULTATIONS: Nephrology, ID    PROCEDURES/SURGERIES: * No surgery found *    PENDING TEST RESULTS:   At the time of discharge the following test results are still pending: none    FOLLOW UP APPOINTMENTS:   PCP  Nephrology    ADDITIONAL CARE RECOMMENDATIONS: as above    DIET: renal diet    ACTIVITY: activity as tolerated    DISCHARGE MEDICATIONS:   See Medication Reconciliation Form    It is important that you take the medication exactly as they are prescribed. Keep your medication in the bottles provided by the pharmacist and keep a list of the medication names, dosages, and times to be taken in your wallet. Do not take other medications without consulting your doctor. NOTIFY YOUR PHYSICIAN FOR ANY OF THE FOLLOWING:   Fever over 101 degrees for 24 hours. Chest pain, shortness of breath, fever, chills, nausea, vomiting, diarrhea, change in mentation, falling, weakness, bleeding. Severe pain or pain not relieved by medications. Or, any other signs or symptoms that you may have questions about.       DISPOSITION:   x Home With:   OT  PT  HH  RN       SNF/Inpatient Rehab/LTAC    Independent/assisted living    Hospice    Other:     CDMP Checked:   Yes x     PROBLEM LIST Updated:  Yes x       Signed:   Carmelina Barthel, MD  12/6/2023  11:25 AM

## 2023-12-06 NOTE — PLAN OF CARE
Problem: Safety - Adult  Goal: Free from fall injury  Outcome: Progressing  Flowsheets (Taken 12/6/2023 1027)  Free From Fall Injury:   Instruct family/caregiver on patient safety   Based on caregiver fall risk screen, instruct family/caregiver to ask for assistance with transferring infant if caregiver noted to have fall risk factors

## 2023-12-07 ENCOUNTER — TELEPHONE (OUTPATIENT)
Age: 78
End: 2023-12-07

## 2023-12-07 LAB
BACTERIA SPEC CULT: NORMAL
BACTERIA SPEC CULT: NORMAL
SERVICE CMNT-IMP: NORMAL
SERVICE CMNT-IMP: NORMAL

## 2023-12-07 NOTE — TELEPHONE ENCOUNTER
Scheduled appointment with PCP within 7-14 days    Follow Up  Future Appointments   Date Time Provider [address]   12/13/2023 11:30 AM Latesha Harrison MD [facility] BS AMB   1/25/2024 11:00 AM Latesha Harrison MD [facility] BS GABRIELA Hector

## 2023-12-07 NOTE — TELEPHONE ENCOUNTER
Care Transitions Initial Follow Up Call    Outreach made within 2 business days of discharge: Yes    Patient: Geronimo Medina Patient : 1945   MRN: [id number]  Reason for Admission: There are no discharge diagnoses documented for the most recent discharge.   Discharge Date: 23       Spoke with: Mailbox full unable to leave a message to schedule [facility]     Discharge department/facility: [facility]      Scheduled appointment with PCP within 7-14 days    Follow Up  Future Appointments   Date Time Provider [address] [place] [address]   2024 11:00 AM Gila Nunez MD [facility] BS GABRIELA Dhaliwal

## 2023-12-08 LAB
O+P SPEC MICRO: NORMAL
O+P STL CONC: NORMAL
SPECIMEN SOURCE: NORMAL

## 2024-01-15 RX ORDER — BLOOD SUGAR DIAGNOSTIC
STRIP MISCELLANEOUS
Qty: 100 EACH | Refills: 0 | Status: SHIPPED | OUTPATIENT
Start: 2024-01-15

## 2024-01-26 RX ORDER — ATORVASTATIN CALCIUM 20 MG/1
TABLET, FILM COATED ORAL
Qty: 90 TABLET | Refills: 3 | Status: SHIPPED | OUTPATIENT
Start: 2024-01-26

## 2024-04-16 SDOH — HEALTH STABILITY: PHYSICAL HEALTH: ON AVERAGE, HOW MANY DAYS PER WEEK DO YOU ENGAGE IN MODERATE TO STRENUOUS EXERCISE (LIKE A BRISK WALK)?: 0 DAYS

## 2024-04-16 SDOH — HEALTH STABILITY: PHYSICAL HEALTH: ON AVERAGE, HOW MANY MINUTES DO YOU ENGAGE IN EXERCISE AT THIS LEVEL?: 0 MIN

## 2024-04-16 ASSESSMENT — PATIENT HEALTH QUESTIONNAIRE - PHQ9
SUM OF ALL RESPONSES TO PHQ9 QUESTIONS 1 & 2: 0
SUM OF ALL RESPONSES TO PHQ QUESTIONS 1-9: 0
2. FEELING DOWN, DEPRESSED OR HOPELESS: NOT AT ALL

## 2024-04-16 ASSESSMENT — LIFESTYLE VARIABLES
HOW MANY STANDARD DRINKS CONTAINING ALCOHOL DO YOU HAVE ON A TYPICAL DAY: 0
HOW OFTEN DO YOU HAVE SIX OR MORE DRINKS ON ONE OCCASION: 1
HOW MANY STANDARD DRINKS CONTAINING ALCOHOL DO YOU HAVE ON A TYPICAL DAY: PATIENT DOES NOT DRINK
HOW OFTEN DO YOU HAVE A DRINK CONTAINING ALCOHOL: NEVER
HOW OFTEN DO YOU HAVE A DRINK CONTAINING ALCOHOL: 1

## 2024-04-19 ENCOUNTER — OFFICE VISIT (OUTPATIENT)
Age: 79
End: 2024-04-19
Payer: MEDICARE

## 2024-04-19 VITALS
HEIGHT: 64 IN | HEART RATE: 67 BPM | DIASTOLIC BLOOD PRESSURE: 77 MMHG | BODY MASS INDEX: 28.51 KG/M2 | OXYGEN SATURATION: 100 % | RESPIRATION RATE: 18 BRPM | WEIGHT: 167 LBS | TEMPERATURE: 97.9 F | SYSTOLIC BLOOD PRESSURE: 144 MMHG

## 2024-04-19 DIAGNOSIS — N18.6 ESRD ON PERITONEAL DIALYSIS (HCC): ICD-10-CM

## 2024-04-19 DIAGNOSIS — E11.21 TYPE 2 DIABETES MELLITUS WITH DIABETIC NEPHROPATHY, UNSPECIFIED WHETHER LONG TERM INSULIN USE (HCC): ICD-10-CM

## 2024-04-19 DIAGNOSIS — C64.9 MALIGNANT NEOPLASM OF KIDNEY EXCLUDING RENAL PELVIS, UNSPECIFIED LATERALITY (HCC): ICD-10-CM

## 2024-04-19 DIAGNOSIS — M25.552 LEFT HIP PAIN: ICD-10-CM

## 2024-04-19 DIAGNOSIS — E55.9 VITAMIN D DEFICIENCY: ICD-10-CM

## 2024-04-19 DIAGNOSIS — E03.9 HYPOTHYROIDISM, UNSPECIFIED TYPE: ICD-10-CM

## 2024-04-19 DIAGNOSIS — D51.1 VITAMIN B12 DEFICIENCY ANEMIA DUE TO SELECTIVE VITAMIN B12 MALABSORPTION WITH PROTEINURIA: ICD-10-CM

## 2024-04-19 DIAGNOSIS — Z00.00 MEDICARE ANNUAL WELLNESS VISIT, SUBSEQUENT: Primary | ICD-10-CM

## 2024-04-19 DIAGNOSIS — Z99.2 ESRD ON PERITONEAL DIALYSIS (HCC): ICD-10-CM

## 2024-04-19 DIAGNOSIS — I50.22 CHRONIC SYSTOLIC (CONGESTIVE) HEART FAILURE (HCC): ICD-10-CM

## 2024-04-19 LAB
25(OH)D3 SERPL-MCNC: 33.5 NG/ML (ref 30–100)
CHOLEST SERPL-MCNC: 160 MG/DL
EST. AVERAGE GLUCOSE BLD GHB EST-MCNC: 117 MG/DL
HBA1C MFR BLD: 5.7 % (ref 4–5.6)
HDLC SERPL-MCNC: 59 MG/DL
HDLC SERPL: 2.7 (ref 0–5)
LDLC SERPL CALC-MCNC: 78 MG/DL (ref 0–100)
T4 FREE SERPL-MCNC: 1.1 NG/DL (ref 0.8–1.5)
TRIGL SERPL-MCNC: 115 MG/DL
TSH SERPL DL<=0.05 MIU/L-ACNC: 1.45 UIU/ML (ref 0.36–3.74)
VLDLC SERPL CALC-MCNC: 23 MG/DL

## 2024-04-19 PROCEDURE — 99214 OFFICE O/P EST MOD 30 MIN: CPT | Performed by: INTERNAL MEDICINE

## 2024-04-19 NOTE — PROGRESS NOTES
Chief Complaint   Patient presents with    Medicare AW        BP (!) 144/77 (Site: Left Upper Arm, Position: Sitting, Cuff Size: Medium Adult)   Pulse 67   Temp 97.9 °F (36.6 °C) (Temporal)   Resp 18   Ht 1.626 m (5' 4\")   Wt 75.8 kg (167 lb)   SpO2 100%   BMI 28.67 kg/m²     Pain Scale: 4/10  Pain Location: Knee (L knee)     Current Outpatient Medications on File Prior to Visit   Medication Sig Dispense Refill    atorvastatin (LIPITOR) 20 MG tablet Take 1 tablet by mouth once daily 90 tablet 3    ONETOUCH ULTRA strip USE 1 STRIP TO CHECK GLUCOSE 2 TO 3 TIMES A WEEK 100 each 0    VITAMIN D PO Take by mouth Pt states every other day      lactobacillus (CULTURELLE) capsule Take 1 capsule by mouth daily (with breakfast) 14 capsule 0    potassium bicarb-citric acid (EFFER-K) 20 MEQ TBEF effervescent tablet Take 1 tablet by mouth 2 times daily 120 tablet 1    acetaminophen (TYLENOL) 500 MG tablet Take by mouth every 6 hours as needed      furosemide (LASIX) 20 MG tablet Take by mouth 2 times daily      levothyroxine (SYNTHROID) 50 MCG tablet Take by mouth every morning (before breakfast)      metoprolol succinate (TOPROL XL) 50 MG extended release tablet Take by mouth daily      potassium chloride 20 MEQ/15ML (10%) oral solution Take 30 mLs by mouth daily (Patient not taking: Reported on 4/19/2024) 900 mL 5    allopurinol (ZYLOPRIM) 300 MG tablet Take by mouth daily (Patient not taking: Reported on 12/13/2023)       No current facility-administered medications on file prior to visit.       Health Maintenance Due   Topic    Hepatitis B vaccine (1 of 3 - Risk Dialysis 4-dose series)    Respiratory Syncytial Virus (RSV) Pregnant or age 60 yrs+ (1 - 1-dose 60+ series)    Diabetic retinal exam     Annual Wellness Visit (Medicare Advantage)     Diabetic foot exam        1. \"Have you been to the ER, urgent care clinic since your last visit?  Hospitalized since your last visit?\" no    2. \"Have you seen or consulted any

## 2024-04-19 NOTE — PATIENT INSTRUCTIONS
stay active?  Talk with your doctor about any physical challenges you're facing. Make a plan with your doctor if you have a health problem or aren't sure how to get started with activity.  If you're already active, ask your doctor if there is anything you should change to stay safe as your body and health change.  If you tend to feel dizzy after you take medicine, avoid activity at that time. Try being active before you take your medicine. This will reduce your risk of falls.  If you plan to be active at home, make sure to clear your space before you get started. Remove things like TV cords, coffee tables, and throw rugs. It's safest to have plenty of space to move freely.  The key to getting more active is to take it slow and steady. Try to improve only a little bit at a time. Pick just one area to improve on at first. And if an activity hurts, stop and talk to your doctor.  Where can you learn more?  Go to https://www.[employer].net/patientEd and enter P600 to learn more about \"Learning About Being Active as an Older Adult.\"  Current as of: June 5, 2023               Content Version: 14.0  © 2297-9198 [email or web address].   Care instructions adapted under license by [employer]. If you have questions about a medical condition or this instruction, always ask your healthcare professional. [email or web address] disclaims any warranty or liability for your use of this information.           Learning About Vision Tests  What are vision tests?     The four most common vision tests are visual acuity tests, refraction, visual field tests, and color vision tests.  Visual acuity (sharpness) tests  These tests are used:  To see if you need glasses or contact lenses.  To monitor an eye problem.  To check an eye injury.  Visual acuity tests are done as part of routine exams. You may also have this test when you get your 's license or apply for some types of jobs.  Visual field tests  These tests are used:  To

## 2024-04-19 NOTE — PROGRESS NOTES
SUBJECTIVE:   Ms. Vale Ndiaye is a 79 y.o. female who is here for the following complaint, and also for follow up of routine medical issues.  She had been seeing Dr. Santos, and Dr. Nicholas before that.     Chief Complaint   Patient presents with    Medicare AWV       She has had two falls since last visit. She slipped on wet grass after a dialysis visit, and on Easter Sunday, she slipped on some plastic in a closet and fell.     Since the second fall, she has had pain in left \"side\" around hip, and difficulty elevating leg.     She gets tremors \"every once in a while\" but not as bad. She was put on medication \"starts with WATSON\" (Ropinarole). This caused hallucinations and Dr. Winters stopped it.     It is recalled that she was hospitalized in Dec 2023 for sepsis due to E. Coli pertionitis, treated with ceftazidime and Vancomycin.     DM: Glucose has been running 90s and low 100s. 156 this morning.     She had colonoscopy in February 2023 with Dr. Phelps.     She is following with an ophthalmologist. Cataracts are stable.     She is seeing a new nephrologist Dr. Winters (previously Dr. Vidal) for kidney dysfunction. She is now doing peritoneal dialysis.     Stress: Health, son is alcoholic.      She had lost weight with help of nutritionist, but this is creeping up again:   Wt Readings from Last 3 Encounters:   04/19/24 75.8 kg (167 lb)   12/13/23 75.3 kg (166 lb)   12/06/23 75.4 kg (166 lb 3.2 oz)       Her numbness L foot has improved. Still noted at night sometimes.     She has been through treatment with Dr. Morris, now seeing someone else in his office, for Hep C, obtained during a prior blood transfusion.  \"He said after the last visit I could return just as needed.\"    At this time, she is otherwise doing well and has brought no other complaints to my attention today.  For a list of the medical issues addressed today, see the assessment and plan below.    PMH:   Past Medical History:   Diagnosis

## 2024-04-19 NOTE — PROGRESS NOTES
Medicare Annual Wellness Visit    Vale Ndiaye is here for Medicare AWV    Assessment & Plan   Medicare annual wellness visit, subsequent  Recommendations for Preventive Services Due: see orders and patient instructions/AVS.  Recommended screening schedule for the next 5-10 years is provided to the patient in written form: see Patient Instructions/AVS.     No follow-ups on file.     Subjective       Patient's complete Health Risk Assessment and screening values have been reviewed and are found in Flowsheets. The following problems were reviewed today and where indicated follow up appointments were made and/or referrals ordered.    Positive Risk Factor Screenings with Interventions:    Fall Risk:  Do you feel unsteady or are you worried about falling? : (!) yes  2 or more falls in past year?: (!) yes  Fall with injury in past year?: no     Interventions:    Reviewed medications, home hazards, visual acuity, and co-morbidities that can increase risk for falls            General HRA Questions:  Select all that apply: (!) New or Increased Pain, New or Increased Fatigue    Pain Interventions:  See AVS for additional education material    Fatigue Interventions:  See AVS for additional education material      Activity, Diet, and Weight:  On average, how many days per week do you engage in moderate to strenuous exercise (like a brisk walk)?: 0 days  On average, how many minutes do you engage in exercise at this level?: 0 min    Do you eat balanced/healthy meals regularly?: Yes    Body mass index is 28.67 kg/m².      Inactivity Interventions:  See AVS for additional education material          Vision Screen:  Do you have difficulty driving, watching TV, or doing any of your daily activities because of your eyesight?: No  Have you had an eye exam within the past year?: (!) No  No results found.    Interventions:    She follows with ophthalmologist                    Objective   Vitals:    04/19/24 1043 04/19/24 1047

## 2024-04-25 ENCOUNTER — HOSPITAL ENCOUNTER (OUTPATIENT)
Facility: HOSPITAL | Age: 79
Discharge: HOME | End: 2024-04-25
Payer: MEDICARE

## 2024-04-25 PROCEDURE — 73502 X-RAY EXAM HIP UNI 2-3 VIEWS: CPT

## 2024-04-26 ENCOUNTER — TRANSCRIBE ORDERS (OUTPATIENT)
Facility: HOSPITAL | Age: 79
End: 2024-04-26

## 2024-04-26 DIAGNOSIS — Z12.31 SCREENING MAMMOGRAM FOR HIGH-RISK PATIENT: Primary | ICD-10-CM

## 2024-05-16 RX ORDER — BLOOD SUGAR DIAGNOSTIC
STRIP MISCELLANEOUS
Qty: 100 EACH | Refills: 0 | Status: SHIPPED | OUTPATIENT
Start: 2024-05-16

## 2024-05-20 RX ORDER — LEVOTHYROXINE SODIUM 0.05 MG/1
50 TABLET ORAL
Qty: 90 TABLET | Refills: 3 | Status: SHIPPED | OUTPATIENT
Start: 2024-05-20

## 2024-05-21 ENCOUNTER — HOSPITAL ENCOUNTER (OUTPATIENT)
Facility: HOSPITAL | Age: 79
Discharge: HOME | End: 2024-05-24
Attending: INTERNAL MEDICINE
Payer: MEDICARE

## 2024-05-21 VITALS — HEIGHT: 64 IN | WEIGHT: 165 LBS | BODY MASS INDEX: 28.17 KG/M2

## 2024-05-21 DIAGNOSIS — Z12.31 SCREENING MAMMOGRAM FOR HIGH-RISK PATIENT: ICD-10-CM

## 2024-05-21 PROCEDURE — 77063 BREAST TOMOSYNTHESIS BI: CPT

## 2024-10-22 ENCOUNTER — OFFICE VISIT (OUTPATIENT)
Age: 79
End: 2024-10-22
Payer: MEDICARE

## 2024-10-22 VITALS
HEIGHT: 64 IN | HEART RATE: 70 BPM | OXYGEN SATURATION: 99 % | SYSTOLIC BLOOD PRESSURE: 154 MMHG | WEIGHT: 166 LBS | RESPIRATION RATE: 16 BRPM | BODY MASS INDEX: 28.34 KG/M2 | TEMPERATURE: 97.2 F | DIASTOLIC BLOOD PRESSURE: 78 MMHG

## 2024-10-22 DIAGNOSIS — E03.9 HYPOTHYROIDISM, UNSPECIFIED TYPE: ICD-10-CM

## 2024-10-22 DIAGNOSIS — E11.21 TYPE 2 DIABETES MELLITUS WITH DIABETIC NEPHROPATHY, UNSPECIFIED WHETHER LONG TERM INSULIN USE (HCC): Primary | ICD-10-CM

## 2024-10-22 DIAGNOSIS — D51.1 VITAMIN B12 DEFICIENCY ANEMIA DUE TO SELECTIVE VITAMIN B12 MALABSORPTION WITH PROTEINURIA: ICD-10-CM

## 2024-10-22 DIAGNOSIS — E55.9 VITAMIN D DEFICIENCY: ICD-10-CM

## 2024-10-22 DIAGNOSIS — E11.21 TYPE 2 DIABETES MELLITUS WITH DIABETIC NEPHROPATHY, UNSPECIFIED WHETHER LONG TERM INSULIN USE (HCC): ICD-10-CM

## 2024-10-22 PROCEDURE — 3044F HG A1C LEVEL LT 7.0%: CPT | Performed by: INTERNAL MEDICINE

## 2024-10-22 PROCEDURE — 3077F SYST BP >= 140 MM HG: CPT | Performed by: INTERNAL MEDICINE

## 2024-10-22 PROCEDURE — 3078F DIAST BP <80 MM HG: CPT | Performed by: INTERNAL MEDICINE

## 2024-10-22 PROCEDURE — 99214 OFFICE O/P EST MOD 30 MIN: CPT | Performed by: INTERNAL MEDICINE

## 2024-10-22 PROCEDURE — 1123F ACP DISCUSS/DSCN MKR DOCD: CPT | Performed by: INTERNAL MEDICINE

## 2024-10-22 SDOH — ECONOMIC STABILITY: FOOD INSECURITY: WITHIN THE PAST 12 MONTHS, THE FOOD YOU BOUGHT JUST DIDN'T LAST AND YOU DIDN'T HAVE MONEY TO GET MORE.: NEVER TRUE

## 2024-10-22 SDOH — ECONOMIC STABILITY: INCOME INSECURITY: HOW HARD IS IT FOR YOU TO PAY FOR THE VERY BASICS LIKE FOOD, HOUSING, MEDICAL CARE, AND HEATING?: NOT HARD AT ALL

## 2024-10-22 SDOH — ECONOMIC STABILITY: FOOD INSECURITY: WITHIN THE PAST 12 MONTHS, YOU WORRIED THAT YOUR FOOD WOULD RUN OUT BEFORE YOU GOT MONEY TO BUY MORE.: NEVER TRUE

## 2024-10-22 NOTE — PROGRESS NOTES
\"Have you been to the ER, urgent care clinic since your last visit?  Hospitalized since your last visit?\"    NO    “Have you seen or consulted any other health care providers outside our system since your last visit?”    NO      “Have you had a diabetic eye exam?”        Date of last diabetic eye exam: 6/25/2019

## 2024-10-22 NOTE — PROGRESS NOTES
SUBJECTIVE:   Ms. Vale Ndiaye is a 79 y.o. female who is here for the following complaint, and also for follow up of routine medical issues.  She had been seeing Dr. Santos, and Dr. Nicholas before that.     Chief Complaint   Patient presents with    Follow-up       She has had no falls since last visit.     She still gets tremors \"every once in a while\" but not as bad. She was put on medication \"starts with WATSON\" (Ropinarole). This caused hallucinations and Dr. Winters stopped it.     It is recalled that she was hospitalized in Dec 2023 for sepsis due to E. Coli pertionitis, treated with ceftazidime and Vancomycin.     DM: Glucose has been running 90s and low 100s. 156 this morning.     She had colonoscopy in February 2023 with Dr. Phelps.     She is following with an ophthalmologist. Cataracts are stable.     She is seeing a nephrologist Dr. Winters (previously Dr. Vidal) for kidney dysfunction. She is now doing peritoneal dialysis.     Stress: Health, son is alcoholic.      She had lost weight with help of nutritionist, but this is creeping up again:   Wt Readings from Last 3 Encounters:   10/22/24 75.3 kg (166 lb)   05/21/24 74.8 kg (165 lb)   04/19/24 75.8 kg (167 lb)       Her numbness L foot has improved. Still noted at night sometimes.     She has been through treatment with Dr. Morris, now seeing someone else in his office, for Hep C, obtained during a prior blood transfusion.  \"He said after the last visit I could return just as needed.\"    At this time, she is otherwise doing well and has brought no other complaints to my attention today.  For a list of the medical issues addressed today, see the assessment and plan below.    PMH:   Past Medical History:   Diagnosis Date    Arthritis     LOWER SPINE    Bell's palsy     23yrs old     Cancer (HCC)     kidney (right)    Chronic kidney disease 1996    right kidney removed - CANCER    Diabetes (HCC)     She is controlling with weight loss    Diabetes

## 2024-10-23 LAB
25(OH)D3 SERPL-MCNC: 37.9 NG/ML (ref 30–100)
ALBUMIN SERPL-MCNC: 3.1 G/DL (ref 3.5–5)
ALBUMIN/GLOB SERPL: 1 (ref 1.1–2.2)
ALP SERPL-CCNC: 149 U/L (ref 45–117)
ALT SERPL-CCNC: 19 U/L (ref 12–78)
ANION GAP SERPL CALC-SCNC: 6 MMOL/L (ref 2–12)
AST SERPL-CCNC: 10 U/L (ref 15–37)
BASOPHILS # BLD: 0.1 K/UL (ref 0–0.1)
BASOPHILS NFR BLD: 0 % (ref 0–1)
BILIRUB SERPL-MCNC: 0.2 MG/DL (ref 0.2–1)
BUN SERPL-MCNC: 32 MG/DL (ref 6–20)
BUN/CREAT SERPL: 4 (ref 12–20)
CALCIUM SERPL-MCNC: 8.6 MG/DL (ref 8.5–10.1)
CHLORIDE SERPL-SCNC: 101 MMOL/L (ref 97–108)
CHOLEST SERPL-MCNC: 157 MG/DL
CO2 SERPL-SCNC: 31 MMOL/L (ref 21–32)
CREAT SERPL-MCNC: 8.97 MG/DL (ref 0.55–1.02)
DIFFERENTIAL METHOD BLD: ABNORMAL
EOSINOPHIL # BLD: 0.1 K/UL (ref 0–0.4)
EOSINOPHIL NFR BLD: 0 % (ref 0–7)
ERYTHROCYTE [DISTWIDTH] IN BLOOD BY AUTOMATED COUNT: 15.1 % (ref 11.5–14.5)
EST. AVERAGE GLUCOSE BLD GHB EST-MCNC: 120 MG/DL
GLOBULIN SER CALC-MCNC: 3.2 G/DL (ref 2–4)
GLUCOSE SERPL-MCNC: 104 MG/DL (ref 65–100)
HBA1C MFR BLD: 5.8 % (ref 4–5.6)
HCT VFR BLD AUTO: 30.3 % (ref 35–47)
HDLC SERPL-MCNC: 51 MG/DL
HDLC SERPL: 3.1 (ref 0–5)
HGB BLD-MCNC: 9.6 G/DL (ref 11.5–16)
IMM GRANULOCYTES # BLD AUTO: 0 K/UL (ref 0–0.04)
IMM GRANULOCYTES NFR BLD AUTO: 0 % (ref 0–0.5)
LDLC SERPL CALC-MCNC: 83.6 MG/DL (ref 0–100)
LYMPHOCYTES # BLD: 2.7 K/UL (ref 0.8–3.5)
LYMPHOCYTES NFR BLD: 23 % (ref 12–49)
MCH RBC QN AUTO: 27.8 PG (ref 26–34)
MCHC RBC AUTO-ENTMCNC: 31.7 G/DL (ref 30–36.5)
MCV RBC AUTO: 87.8 FL (ref 80–99)
MONOCYTES # BLD: 0.6 K/UL (ref 0–1)
MONOCYTES NFR BLD: 5 % (ref 5–13)
NEUTS SEG # BLD: 8 K/UL (ref 1.8–8)
NEUTS SEG NFR BLD: 72 % (ref 32–75)
NRBC # BLD: 0 K/UL (ref 0–0.01)
NRBC BLD-RTO: 0 PER 100 WBC
PLATELET # BLD AUTO: 215 K/UL (ref 150–400)
PMV BLD AUTO: 11.7 FL (ref 8.9–12.9)
POTASSIUM SERPL-SCNC: 3.4 MMOL/L (ref 3.5–5.1)
PROT SERPL-MCNC: 6.3 G/DL (ref 6.4–8.2)
RBC # BLD AUTO: 3.45 M/UL (ref 3.8–5.2)
SODIUM SERPL-SCNC: 138 MMOL/L (ref 136–145)
T4 FREE SERPL-MCNC: 1.2 NG/DL (ref 0.8–1.5)
TRIGL SERPL-MCNC: 112 MG/DL
TSH SERPL DL<=0.05 MIU/L-ACNC: 1.4 UIU/ML (ref 0.36–3.74)
VIT B12 SERPL-MCNC: 839 PG/ML (ref 193–986)
VLDLC SERPL CALC-MCNC: 22.4 MG/DL
WBC # BLD AUTO: 11.4 K/UL (ref 3.6–11)

## 2025-02-14 RX ORDER — ATORVASTATIN CALCIUM 20 MG/1
TABLET, FILM COATED ORAL
Qty: 90 TABLET | Refills: 3 | Status: SHIPPED | OUTPATIENT
Start: 2025-02-14

## 2025-04-02 NOTE — PROGRESS NOTES
Written and verbal discharge instructions reviewed with patient. Patient confirmed understanding with adequate teach back. No IV in place at this time. Belongings with patient at time of discharge. General

## 2025-04-22 ENCOUNTER — OFFICE VISIT (OUTPATIENT)
Age: 80
End: 2025-04-22
Payer: MEDICARE

## 2025-04-22 VITALS
DIASTOLIC BLOOD PRESSURE: 63 MMHG | BODY MASS INDEX: 27.21 KG/M2 | SYSTOLIC BLOOD PRESSURE: 103 MMHG | OXYGEN SATURATION: 98 % | HEIGHT: 64 IN | WEIGHT: 159.4 LBS | RESPIRATION RATE: 16 BRPM | TEMPERATURE: 98.1 F | HEART RATE: 69 BPM

## 2025-04-22 DIAGNOSIS — D51.1 VITAMIN B12 DEFICIENCY ANEMIA DUE TO SELECTIVE VITAMIN B12 MALABSORPTION WITH PROTEINURIA: ICD-10-CM

## 2025-04-22 DIAGNOSIS — I10 PRIMARY HYPERTENSION: ICD-10-CM

## 2025-04-22 DIAGNOSIS — E03.9 HYPOTHYROIDISM, UNSPECIFIED TYPE: ICD-10-CM

## 2025-04-22 DIAGNOSIS — C64.9 MALIGNANT NEOPLASM OF KIDNEY EXCLUDING RENAL PELVIS, UNSPECIFIED LATERALITY (HCC): ICD-10-CM

## 2025-04-22 DIAGNOSIS — Z99.2 ESRD ON PERITONEAL DIALYSIS (HCC): ICD-10-CM

## 2025-04-22 DIAGNOSIS — E78.2 MIXED HYPERLIPIDEMIA: ICD-10-CM

## 2025-04-22 DIAGNOSIS — E11.21 TYPE 2 DIABETES MELLITUS WITH DIABETIC NEPHROPATHY, UNSPECIFIED WHETHER LONG TERM INSULIN USE (HCC): Primary | ICD-10-CM

## 2025-04-22 DIAGNOSIS — E55.9 VITAMIN D DEFICIENCY: ICD-10-CM

## 2025-04-22 DIAGNOSIS — E11.21 TYPE 2 DIABETES MELLITUS WITH DIABETIC NEPHROPATHY, UNSPECIFIED WHETHER LONG TERM INSULIN USE (HCC): ICD-10-CM

## 2025-04-22 DIAGNOSIS — I50.22 CHRONIC SYSTOLIC (CONGESTIVE) HEART FAILURE (HCC): ICD-10-CM

## 2025-04-22 DIAGNOSIS — N18.6 ESRD ON PERITONEAL DIALYSIS (HCC): ICD-10-CM

## 2025-04-22 PROCEDURE — 3074F SYST BP LT 130 MM HG: CPT | Performed by: INTERNAL MEDICINE

## 2025-04-22 PROCEDURE — 99214 OFFICE O/P EST MOD 30 MIN: CPT | Performed by: INTERNAL MEDICINE

## 2025-04-22 PROCEDURE — 3078F DIAST BP <80 MM HG: CPT | Performed by: INTERNAL MEDICINE

## 2025-04-22 PROCEDURE — 1159F MED LIST DOCD IN RCRD: CPT | Performed by: INTERNAL MEDICINE

## 2025-04-22 PROCEDURE — 1123F ACP DISCUSS/DSCN MKR DOCD: CPT | Performed by: INTERNAL MEDICINE

## 2025-04-22 SDOH — ECONOMIC STABILITY: FOOD INSECURITY: WITHIN THE PAST 12 MONTHS, THE FOOD YOU BOUGHT JUST DIDN'T LAST AND YOU DIDN'T HAVE MONEY TO GET MORE.: NEVER TRUE

## 2025-04-22 SDOH — ECONOMIC STABILITY: FOOD INSECURITY: WITHIN THE PAST 12 MONTHS, YOU WORRIED THAT YOUR FOOD WOULD RUN OUT BEFORE YOU GOT MONEY TO BUY MORE.: NEVER TRUE

## 2025-04-22 ASSESSMENT — PATIENT HEALTH QUESTIONNAIRE - PHQ9
SUM OF ALL RESPONSES TO PHQ QUESTIONS 1-9: 1
SUM OF ALL RESPONSES TO PHQ QUESTIONS 1-9: 1
2. FEELING DOWN, DEPRESSED OR HOPELESS: NOT AT ALL
1. LITTLE INTEREST OR PLEASURE IN DOING THINGS: SEVERAL DAYS
SUM OF ALL RESPONSES TO PHQ QUESTIONS 1-9: 1
SUM OF ALL RESPONSES TO PHQ QUESTIONS 1-9: 1

## 2025-04-22 NOTE — PROGRESS NOTES
SUBJECTIVE:   Ms. Vale Ndiaye is a 80 y.o. female who is here for the following complaint, and also for follow up of routine medical issues.  She had been seeing Dr. Santos, and Dr. Nicholas before that.     Chief Complaint   Patient presents with    Hypertension    Diabetes       She had resection of a basal cell CA from her L foot, and skin graft. She goes to \"[facility]\".     She still gets tremors \"every once in a while\" but not as bad. She was put on medication \"starts with WATSON\" (Ropinarole). This caused hallucinations and Dr. Winters stopped it.     It is recalled that she was hospitalized in Dec 2023 for sepsis due to E. Coli pertionitis, treated with ceftazidime and Vancomycin.     DM: Glucose has been running 90s and low 100s.      She had colonoscopy in February 2023 with Dr. Phelps.     She is following with an ophthalmologist. Cataracts are stable.     She is seeing a nephrologist Dr. Winters (previously Dr. Vidal) for kidney dysfunction. She is now doing peritoneal dialysis.     Stress: Health, son is alcoholic.      She had lost weight with help of nutritionist, but this is creeping up again:   Wt Readings from Last 3 Encounters:   04/22/25 72.3 kg (159 lb 6.4 oz)   10/22/24 75.3 kg (166 lb)   05/21/24 74.8 kg (165 lb)       Her numbness L foot has improved. Still noted at night sometimes.     She has been through treatment with Dr. Morris, now seeing someone else in his office, for Hep C, obtained during a prior blood transfusion.  \"He said after the last visit I could return just as needed.\"    At this time, she is otherwise doing well and has brought no other complaints to my attention today.  For a list of the medical issues addressed today, see the assessment and plan below.    PMH:   Past Medical History:   Diagnosis Date    Arthritis     LOWER SPINE    Bell's palsy     23yrs old     Cancer (HCC)     kidney (right)    Chronic kidney disease 1996    right kidney removed - 
\"Have you been to the ER, urgent care clinic since your last visit?  Hospitalized since your last visit?\"    [facility], flu     “Have you seen or consulted any other health care providers outside our system since your last visit?”    no      “Have you had a diabetic eye exam?”    05/2024  Date of last diabetic eye exam: 6/25/2019          
19-Jun-2024 09:58

## 2025-04-23 ENCOUNTER — RESULTS FOLLOW-UP (OUTPATIENT)
Age: 80
End: 2025-04-23

## 2025-04-23 LAB
25(OH)D3 SERPL-MCNC: 48.2 NG/ML (ref 30–100)
ALBUMIN SERPL-MCNC: 3.3 G/DL (ref 3.5–5)
ALBUMIN/GLOB SERPL: 1 (ref 1.1–2.2)
ALP SERPL-CCNC: 130 U/L (ref 45–117)
ALT SERPL-CCNC: 26 U/L (ref 12–78)
ANION GAP SERPL CALC-SCNC: 10 MMOL/L (ref 2–12)
AST SERPL-CCNC: 20 U/L (ref 15–37)
BASOPHILS # BLD: 0.06 K/UL (ref 0–0.1)
BASOPHILS NFR BLD: 0.5 % (ref 0–1)
BILIRUB SERPL-MCNC: 0.3 MG/DL (ref 0.2–1)
BUN SERPL-MCNC: 40 MG/DL (ref 6–20)
BUN/CREAT SERPL: 4 (ref 12–20)
CALCIUM SERPL-MCNC: 9 MG/DL (ref 8.5–10.1)
CHLORIDE SERPL-SCNC: 99 MMOL/L (ref 97–108)
CHOLEST SERPL-MCNC: 197 MG/DL
CO2 SERPL-SCNC: 29 MMOL/L (ref 21–32)
CREAT SERPL-MCNC: 10.1 MG/DL (ref 0.55–1.02)
DIFFERENTIAL METHOD BLD: ABNORMAL
EOSINOPHIL # BLD: 0.08 K/UL (ref 0–0.4)
EOSINOPHIL NFR BLD: 0.7 % (ref 0–7)
ERYTHROCYTE [DISTWIDTH] IN BLOOD BY AUTOMATED COUNT: 16.6 % (ref 11.5–14.5)
EST. AVERAGE GLUCOSE BLD GHB EST-MCNC: 120 MG/DL
FOLATE SERPL-MCNC: 65.9 NG/ML (ref 5–21)
GLOBULIN SER CALC-MCNC: 3.3 G/DL (ref 2–4)
GLUCOSE SERPL-MCNC: 107 MG/DL (ref 65–100)
HBA1C MFR BLD: 5.8 % (ref 4–5.6)
HCT VFR BLD AUTO: 30.2 % (ref 35–47)
HDLC SERPL-MCNC: 62 MG/DL
HDLC SERPL: 3.2 (ref 0–5)
HGB BLD-MCNC: 9.4 G/DL (ref 11.5–16)
IMM GRANULOCYTES # BLD AUTO: 0.04 K/UL (ref 0–0.04)
IMM GRANULOCYTES NFR BLD AUTO: 0.4 % (ref 0–0.5)
LDLC SERPL CALC-MCNC: 112 MG/DL (ref 0–100)
LYMPHOCYTES # BLD: 2.12 K/UL (ref 0.8–3.5)
LYMPHOCYTES NFR BLD: 19.3 % (ref 12–49)
MCH RBC QN AUTO: 27.7 PG (ref 26–34)
MCHC RBC AUTO-ENTMCNC: 31.1 G/DL (ref 30–36.5)
MCV RBC AUTO: 89.1 FL (ref 80–99)
MONOCYTES # BLD: 0.7 K/UL (ref 0–1)
MONOCYTES NFR BLD: 6.4 % (ref 5–13)
NEUTS SEG # BLD: 7.98 K/UL (ref 1.8–8)
NEUTS SEG NFR BLD: 72.7 % (ref 32–75)
NRBC # BLD: 0 K/UL (ref 0–0.01)
NRBC BLD-RTO: 0 PER 100 WBC
PLATELET # BLD AUTO: 220 K/UL (ref 150–400)
PMV BLD AUTO: 11.7 FL (ref 8.9–12.9)
POTASSIUM SERPL-SCNC: 3.6 MMOL/L (ref 3.5–5.1)
PROT SERPL-MCNC: 6.6 G/DL (ref 6.4–8.2)
RBC # BLD AUTO: 3.39 M/UL (ref 3.8–5.2)
SODIUM SERPL-SCNC: 138 MMOL/L (ref 136–145)
T4 FREE SERPL-MCNC: 1.2 NG/DL (ref 0.8–1.5)
TRIGL SERPL-MCNC: 115 MG/DL
TSH SERPL DL<=0.05 MIU/L-ACNC: 1.41 UIU/ML (ref 0.36–3.74)
VIT B12 SERPL-MCNC: 883 PG/ML (ref 193–986)
VLDLC SERPL CALC-MCNC: 23 MG/DL
WBC # BLD AUTO: 11 K/UL (ref 3.6–11)

## 2025-04-28 ENCOUNTER — TRANSCRIBE ORDERS (OUTPATIENT)
Facility: HOSPITAL | Age: 80
End: 2025-04-28

## 2025-04-28 DIAGNOSIS — Z12.31 OTHER SCREENING MAMMOGRAM: Primary | ICD-10-CM

## 2025-05-22 ENCOUNTER — HOSPITAL ENCOUNTER (OUTPATIENT)
Facility: HOSPITAL | Age: 80
Discharge: HOME | End: 2025-05-25
Attending: INTERNAL MEDICINE
Payer: MEDICARE

## 2025-05-22 VITALS — HEIGHT: 64 IN | BODY MASS INDEX: 25.61 KG/M2 | WEIGHT: 150 LBS

## 2025-05-22 DIAGNOSIS — Z12.31 OTHER SCREENING MAMMOGRAM: ICD-10-CM

## 2025-05-22 PROCEDURE — 77063 BREAST TOMOSYNTHESIS BI: CPT

## 2025-06-30 RX ORDER — LEVOTHYROXINE SODIUM 50 UG/1
50 TABLET ORAL
Qty: 90 TABLET | Refills: 3 | Status: SHIPPED | OUTPATIENT
Start: 2025-06-30

## 2025-08-14 ENCOUNTER — HOSPITAL ENCOUNTER (INPATIENT)
Facility: HOSPITAL | Age: 80
LOS: 4 days | Discharge: HOME | DRG: 640 | End: 2025-08-18
Attending: STUDENT IN AN ORGANIZED HEALTH CARE EDUCATION/TRAINING PROGRAM | Admitting: INTERNAL MEDICINE
Payer: MEDICARE

## 2025-08-14 ENCOUNTER — APPOINTMENT (OUTPATIENT)
Facility: HOSPITAL | Age: 80
DRG: 640 | End: 2025-08-14
Payer: MEDICARE

## 2025-08-14 DIAGNOSIS — I50.31 ACUTE HEART FAILURE WITH PRESERVED EJECTION FRACTION (HCC): ICD-10-CM

## 2025-08-14 DIAGNOSIS — R79.89 ELEVATED TROPONIN: Primary | ICD-10-CM

## 2025-08-14 DIAGNOSIS — R79.89 ELEVATED BRAIN NATRIURETIC PEPTIDE (BNP) LEVEL: ICD-10-CM

## 2025-08-14 DIAGNOSIS — Z99.2 PERITONEAL DIALYSIS CATHETER IN PLACE: ICD-10-CM

## 2025-08-14 PROBLEM — N18.6 ESRD NEEDING DIALYSIS (HCC): Status: ACTIVE | Noted: 2025-08-14

## 2025-08-14 LAB
ALBUMIN SERPL-MCNC: 3.4 G/DL (ref 3.5–5.2)
ALBUMIN/GLOB SERPL: 0.9 (ref 1.1–2.2)
ALP SERPL-CCNC: 121 U/L (ref 35–104)
ALT SERPL-CCNC: 21 U/L (ref 10–35)
ANION GAP SERPL CALC-SCNC: 21 MMOL/L (ref 2–14)
APTT PPP: <20 SEC (ref 22.1–31)
AST SERPL-CCNC: 19 U/L (ref 10–35)
BASOPHILS # BLD: 0.05 K/UL (ref 0–0.1)
BASOPHILS NFR BLD: 0.5 % (ref 0–1)
BILIRUB SERPL-MCNC: 0.3 MG/DL (ref 0–1.2)
BUN SERPL-MCNC: 38 MG/DL (ref 8–23)
BUN/CREAT SERPL: 3 (ref 12–20)
CALCIUM SERPL-MCNC: 9.7 MG/DL (ref 8.8–10.2)
CHLORIDE SERPL-SCNC: 91 MMOL/L (ref 98–107)
CO2 SERPL-SCNC: 26 MMOL/L (ref 20–29)
COMMENT:: NORMAL
COMMENT:: NORMAL
CREAT SERPL-MCNC: 11.1 MG/DL (ref 0.6–1)
DIFFERENTIAL METHOD BLD: ABNORMAL
EKG ATRIAL RATE: 98 BPM
EKG DIAGNOSIS: NORMAL
EKG P AXIS: 46 DEGREES
EKG P-R INTERVAL: 118 MS
EKG Q-T INTERVAL: 340 MS
EKG QRS DURATION: 80 MS
EKG QTC CALCULATION (BAZETT): 434 MS
EKG R AXIS: 36 DEGREES
EKG T AXIS: 77 DEGREES
EKG VENTRICULAR RATE: 98 BPM
EOSINOPHIL # BLD: 0.03 K/UL (ref 0–0.4)
EOSINOPHIL NFR BLD: 0.3 % (ref 0–7)
ERYTHROCYTE [DISTWIDTH] IN BLOOD BY AUTOMATED COUNT: 15 % (ref 11.5–14.5)
ERYTHROCYTE [DISTWIDTH] IN BLOOD BY AUTOMATED COUNT: 15.3 % (ref 11.5–14.5)
GLOBULIN SER CALC-MCNC: 3.9 G/DL (ref 2–4)
GLUCOSE BLD STRIP.AUTO-MCNC: 135 MG/DL (ref 65–117)
GLUCOSE SERPL-MCNC: 159 MG/DL (ref 65–100)
HCT VFR BLD AUTO: 31.3 % (ref 35–47)
HCT VFR BLD AUTO: 34.7 % (ref 35–47)
HGB BLD-MCNC: 10.4 G/DL (ref 11.5–16)
HGB BLD-MCNC: 11.6 G/DL (ref 11.5–16)
IMM GRANULOCYTES # BLD AUTO: 0.03 K/UL (ref 0–0.04)
IMM GRANULOCYTES NFR BLD AUTO: 0.3 % (ref 0–0.5)
INR PPP: 1 (ref 0.9–1.1)
LACTATE SERPL-SCNC: 1.8 MMOL/L (ref 0.5–2)
LACTATE SERPL-SCNC: 2.6 MMOL/L (ref 0.5–2)
LYMPHOCYTES # BLD: 2.5 K/UL (ref 0.8–3.5)
LYMPHOCYTES NFR BLD: 24.9 % (ref 12–49)
MAGNESIUM SERPL-MCNC: 3.5 MG/DL (ref 1.6–2.4)
MCH RBC QN AUTO: 28.8 PG (ref 26–34)
MCH RBC QN AUTO: 29 PG (ref 26–34)
MCHC RBC AUTO-ENTMCNC: 33.2 G/DL (ref 30–36.5)
MCHC RBC AUTO-ENTMCNC: 33.4 G/DL (ref 30–36.5)
MCV RBC AUTO: 86.1 FL (ref 80–99)
MCV RBC AUTO: 87.2 FL (ref 80–99)
MONOCYTES # BLD: 0.64 K/UL (ref 0–1)
MONOCYTES NFR BLD: 6.4 % (ref 5–13)
NEUTS SEG # BLD: 6.81 K/UL (ref 1.8–8)
NEUTS SEG NFR BLD: 67.6 % (ref 32–75)
NRBC # BLD: 0 K/UL (ref 0–0.01)
NRBC # BLD: 0 K/UL (ref 0–0.01)
NRBC BLD-RTO: 0 PER 100 WBC
NRBC BLD-RTO: 0 PER 100 WBC
NT PRO BNP: 3748 PG/ML (ref 0–450)
PLATELET # BLD AUTO: 206 K/UL (ref 150–400)
PLATELET # BLD AUTO: 297 K/UL (ref 150–400)
PMV BLD AUTO: 10.4 FL (ref 8.9–12.9)
PMV BLD AUTO: 10.9 FL (ref 8.9–12.9)
POTASSIUM SERPL-SCNC: 3.5 MMOL/L (ref 3.5–5.1)
PROCALCITONIN SERPL-MCNC: 0.37 NG/ML
PROT SERPL-MCNC: 7.4 G/DL (ref 6.4–8.3)
PROTHROMBIN TIME: 10.3 SEC (ref 9.2–11.2)
RBC # BLD AUTO: 3.59 M/UL (ref 3.8–5.2)
RBC # BLD AUTO: 4.03 M/UL (ref 3.8–5.2)
SERVICE CMNT-IMP: ABNORMAL
SODIUM SERPL-SCNC: 138 MMOL/L (ref 136–145)
SPECIMEN HOLD: NORMAL
SPECIMEN HOLD: NORMAL
THERAPEUTIC RANGE: ABNORMAL SECS (ref 58–77)
TROPONIN T SERPL HS-MCNC: 121 NG/L (ref 0–14)
TROPONIN T SERPL HS-MCNC: 129 NG/L (ref 0–14)
TSH, 3RD GENERATION: 0.95 UIU/ML (ref 0.27–4.2)
UFH PPP CHRO-ACNC: <0.1 IU/ML
WBC # BLD AUTO: 10.1 K/UL (ref 3.6–11)
WBC # BLD AUTO: 12.1 K/UL (ref 3.6–11)

## 2025-08-14 PROCEDURE — 93005 ELECTROCARDIOGRAM TRACING: CPT | Performed by: INTERNAL MEDICINE

## 2025-08-14 PROCEDURE — P9047 ALBUMIN (HUMAN), 25%, 50ML: HCPCS | Performed by: INTERNAL MEDICINE

## 2025-08-14 PROCEDURE — 83605 ASSAY OF LACTIC ACID: CPT

## 2025-08-14 PROCEDURE — 84443 ASSAY THYROID STIM HORMONE: CPT

## 2025-08-14 PROCEDURE — 2060000000 HC ICU INTERMEDIATE R&B

## 2025-08-14 PROCEDURE — 6370000000 HC RX 637 (ALT 250 FOR IP): Performed by: INTERNAL MEDICINE

## 2025-08-14 PROCEDURE — 99285 EMERGENCY DEPT VISIT HI MDM: CPT

## 2025-08-14 PROCEDURE — 93005 ELECTROCARDIOGRAM TRACING: CPT | Performed by: STUDENT IN AN ORGANIZED HEALTH CARE EDUCATION/TRAINING PROGRAM

## 2025-08-14 PROCEDURE — 71046 X-RAY EXAM CHEST 2 VIEWS: CPT

## 2025-08-14 PROCEDURE — 83735 ASSAY OF MAGNESIUM: CPT

## 2025-08-14 PROCEDURE — 83880 ASSAY OF NATRIURETIC PEPTIDE: CPT

## 2025-08-14 PROCEDURE — 6360000002 HC RX W HCPCS: Performed by: INTERNAL MEDICINE

## 2025-08-14 PROCEDURE — 85025 COMPLETE CBC W/AUTO DIFF WBC: CPT

## 2025-08-14 PROCEDURE — 85027 COMPLETE CBC AUTOMATED: CPT

## 2025-08-14 PROCEDURE — 87040 BLOOD CULTURE FOR BACTERIA: CPT

## 2025-08-14 PROCEDURE — 82962 GLUCOSE BLOOD TEST: CPT

## 2025-08-14 PROCEDURE — 84484 ASSAY OF TROPONIN QUANT: CPT

## 2025-08-14 PROCEDURE — 6370000000 HC RX 637 (ALT 250 FOR IP): Performed by: STUDENT IN AN ORGANIZED HEALTH CARE EDUCATION/TRAINING PROGRAM

## 2025-08-14 PROCEDURE — 96360 HYDRATION IV INFUSION INIT: CPT

## 2025-08-14 PROCEDURE — 6360000002 HC RX W HCPCS: Performed by: STUDENT IN AN ORGANIZED HEALTH CARE EDUCATION/TRAINING PROGRAM

## 2025-08-14 PROCEDURE — 84145 PROCALCITONIN (PCT): CPT

## 2025-08-14 PROCEDURE — 80053 COMPREHEN METABOLIC PANEL: CPT

## 2025-08-14 PROCEDURE — 85730 THROMBOPLASTIN TIME PARTIAL: CPT

## 2025-08-14 PROCEDURE — 2580000003 HC RX 258: Performed by: STUDENT IN AN ORGANIZED HEALTH CARE EDUCATION/TRAINING PROGRAM

## 2025-08-14 PROCEDURE — 85520 HEPARIN ASSAY: CPT

## 2025-08-14 PROCEDURE — 85610 PROTHROMBIN TIME: CPT

## 2025-08-14 RX ORDER — HEPARIN SODIUM 5000 [USP'U]/ML
5000 INJECTION, SOLUTION INTRAVENOUS; SUBCUTANEOUS EVERY 8 HOURS SCHEDULED
Status: DISCONTINUED | OUTPATIENT
Start: 2025-08-14 | End: 2025-08-14 | Stop reason: SDUPTHER

## 2025-08-14 RX ORDER — ACETAMINOPHEN 325 MG/1
650 TABLET ORAL EVERY 6 HOURS PRN
Status: DISCONTINUED | OUTPATIENT
Start: 2025-08-14 | End: 2025-08-18 | Stop reason: HOSPADM

## 2025-08-14 RX ORDER — POLYETHYLENE GLYCOL 3350 17 G/17G
17 POWDER, FOR SOLUTION ORAL DAILY PRN
Status: DISCONTINUED | OUTPATIENT
Start: 2025-08-14 | End: 2025-08-18 | Stop reason: HOSPADM

## 2025-08-14 RX ORDER — HYDROMORPHONE HYDROCHLORIDE 1 MG/ML
1 INJECTION, SOLUTION INTRAMUSCULAR; INTRAVENOUS; SUBCUTANEOUS EVERY 4 HOURS PRN
Status: DISCONTINUED | OUTPATIENT
Start: 2025-08-14 | End: 2025-08-18 | Stop reason: HOSPADM

## 2025-08-14 RX ORDER — HEPARIN SODIUM 1000 [USP'U]/ML
30 INJECTION, SOLUTION INTRAVENOUS; SUBCUTANEOUS PRN
Status: DISCONTINUED | OUTPATIENT
Start: 2025-08-14 | End: 2025-08-15

## 2025-08-14 RX ORDER — MIDODRINE HYDROCHLORIDE 5 MG/1
10 TABLET ORAL
Status: DISCONTINUED | OUTPATIENT
Start: 2025-08-14 | End: 2025-08-18 | Stop reason: HOSPADM

## 2025-08-14 RX ORDER — IPRATROPIUM BROMIDE AND ALBUTEROL SULFATE 2.5; .5 MG/3ML; MG/3ML
1 SOLUTION RESPIRATORY (INHALATION) EVERY 4 HOURS PRN
Status: DISCONTINUED | OUTPATIENT
Start: 2025-08-14 | End: 2025-08-18 | Stop reason: HOSPADM

## 2025-08-14 RX ORDER — INSULIN LISPRO 100 [IU]/ML
0-8 INJECTION, SOLUTION INTRAVENOUS; SUBCUTANEOUS
Status: DISCONTINUED | OUTPATIENT
Start: 2025-08-14 | End: 2025-08-18 | Stop reason: HOSPADM

## 2025-08-14 RX ORDER — ASPIRIN 81 MG/1
81 TABLET ORAL DAILY
Status: DISCONTINUED | OUTPATIENT
Start: 2025-08-15 | End: 2025-08-18 | Stop reason: HOSPADM

## 2025-08-14 RX ORDER — ASPIRIN 81 MG/1
324 TABLET, CHEWABLE ORAL
Status: COMPLETED | OUTPATIENT
Start: 2025-08-14 | End: 2025-08-14

## 2025-08-14 RX ORDER — ATORVASTATIN CALCIUM 20 MG/1
20 TABLET, FILM COATED ORAL DAILY
Status: DISCONTINUED | OUTPATIENT
Start: 2025-08-15 | End: 2025-08-18 | Stop reason: HOSPADM

## 2025-08-14 RX ORDER — HEPARIN SODIUM 1000 [USP'U]/ML
60 INJECTION, SOLUTION INTRAVENOUS; SUBCUTANEOUS ONCE
Status: COMPLETED | OUTPATIENT
Start: 2025-08-14 | End: 2025-08-14

## 2025-08-14 RX ORDER — LEVOTHYROXINE SODIUM 50 UG/1
50 TABLET ORAL
Status: DISCONTINUED | OUTPATIENT
Start: 2025-08-15 | End: 2025-08-18 | Stop reason: HOSPADM

## 2025-08-14 RX ORDER — ACETAMINOPHEN 650 MG/1
650 SUPPOSITORY RECTAL EVERY 6 HOURS PRN
Status: DISCONTINUED | OUTPATIENT
Start: 2025-08-14 | End: 2025-08-18 | Stop reason: HOSPADM

## 2025-08-14 RX ORDER — 0.9 % SODIUM CHLORIDE 0.9 %
500 INTRAVENOUS SOLUTION INTRAVENOUS ONCE
Status: COMPLETED | OUTPATIENT
Start: 2025-08-14 | End: 2025-08-14

## 2025-08-14 RX ORDER — SODIUM CHLORIDE 0.9 % (FLUSH) 0.9 %
5-40 SYRINGE (ML) INJECTION PRN
Status: DISCONTINUED | OUTPATIENT
Start: 2025-08-14 | End: 2025-08-18 | Stop reason: HOSPADM

## 2025-08-14 RX ORDER — HEPARIN SODIUM 10000 [USP'U]/100ML
5-30 INJECTION, SOLUTION INTRAVENOUS CONTINUOUS
Status: DISCONTINUED | OUTPATIENT
Start: 2025-08-14 | End: 2025-08-15

## 2025-08-14 RX ORDER — OXYCODONE HYDROCHLORIDE 5 MG/1
5 TABLET ORAL EVERY 4 HOURS PRN
Status: DISCONTINUED | OUTPATIENT
Start: 2025-08-14 | End: 2025-08-18 | Stop reason: HOSPADM

## 2025-08-14 RX ORDER — ONDANSETRON 4 MG/1
4 TABLET, ORALLY DISINTEGRATING ORAL EVERY 8 HOURS PRN
Status: DISCONTINUED | OUTPATIENT
Start: 2025-08-14 | End: 2025-08-18 | Stop reason: HOSPADM

## 2025-08-14 RX ORDER — HEPARIN SODIUM 1000 [USP'U]/ML
60 INJECTION, SOLUTION INTRAVENOUS; SUBCUTANEOUS PRN
Status: DISCONTINUED | OUTPATIENT
Start: 2025-08-14 | End: 2025-08-15

## 2025-08-14 RX ORDER — SODIUM CHLORIDE 9 MG/ML
INJECTION, SOLUTION INTRAVENOUS PRN
Status: DISCONTINUED | OUTPATIENT
Start: 2025-08-14 | End: 2025-08-18 | Stop reason: HOSPADM

## 2025-08-14 RX ORDER — ALBUMIN (HUMAN) 12.5 G/50ML
25 SOLUTION INTRAVENOUS ONCE
Status: COMPLETED | OUTPATIENT
Start: 2025-08-14 | End: 2025-08-14

## 2025-08-14 RX ORDER — SODIUM CHLORIDE 0.9 % (FLUSH) 0.9 %
5-40 SYRINGE (ML) INJECTION EVERY 12 HOURS SCHEDULED
Status: DISCONTINUED | OUTPATIENT
Start: 2025-08-14 | End: 2025-08-18 | Stop reason: HOSPADM

## 2025-08-14 RX ORDER — ONDANSETRON 2 MG/ML
4 INJECTION INTRAMUSCULAR; INTRAVENOUS EVERY 6 HOURS PRN
Status: DISCONTINUED | OUTPATIENT
Start: 2025-08-14 | End: 2025-08-18 | Stop reason: HOSPADM

## 2025-08-14 RX ADMIN — SODIUM CHLORIDE 500 ML: 0.9 INJECTION, SOLUTION INTRAVENOUS at 15:55

## 2025-08-14 RX ADMIN — HEPARIN SODIUM AND DEXTROSE 12 UNITS/KG/HR: 10000; 5 INJECTION INTRAVENOUS at 21:05

## 2025-08-14 RX ADMIN — MIDODRINE HYDROCHLORIDE 10 MG: 5 TABLET ORAL at 22:22

## 2025-08-14 RX ADMIN — ASPIRIN 324 MG: 81 TABLET, CHEWABLE ORAL at 18:08

## 2025-08-14 RX ADMIN — HEPARIN SODIUM 4000 UNITS: 1000 INJECTION, SOLUTION INTRAVENOUS; SUBCUTANEOUS at 20:58

## 2025-08-14 RX ADMIN — ALBUMIN (HUMAN) 25 G: 0.25 INJECTION, SOLUTION INTRAVENOUS at 22:24

## 2025-08-14 ASSESSMENT — PAIN - FUNCTIONAL ASSESSMENT: PAIN_FUNCTIONAL_ASSESSMENT: 0-10

## 2025-08-14 ASSESSMENT — PAIN SCALES - GENERAL
PAINLEVEL_OUTOF10: 0

## 2025-08-15 ENCOUNTER — APPOINTMENT (OUTPATIENT)
Facility: HOSPITAL | Age: 80
DRG: 640 | End: 2025-08-15
Attending: INTERNAL MEDICINE
Payer: MEDICARE

## 2025-08-15 PROBLEM — Z99.2 PERITONEAL DIALYSIS CATHETER IN PLACE: Status: ACTIVE | Noted: 2025-08-15

## 2025-08-15 PROBLEM — I95.9 ARTERIAL HYPOTENSION: Status: ACTIVE | Noted: 2025-08-15

## 2025-08-15 PROBLEM — R79.89 ELEVATED TROPONIN: Status: ACTIVE | Noted: 2025-08-15

## 2025-08-15 LAB
ALBUMIN SERPL-MCNC: 3.5 G/DL (ref 3.5–5.2)
ALBUMIN/GLOB SERPL: 1.4 (ref 1.1–2.2)
ALP SERPL-CCNC: 118 U/L (ref 35–104)
ALT SERPL-CCNC: 20 U/L (ref 10–35)
ANION GAP SERPL CALC-SCNC: 20 MMOL/L (ref 2–14)
APPEARANCE FLD: CLEAR
AST SERPL-CCNC: 29 U/L (ref 10–35)
BASOPHILS # BLD: 0.05 K/UL (ref 0–0.1)
BASOPHILS NFR BLD: 0.4 % (ref 0–1)
BILIRUB SERPL-MCNC: 0.3 MG/DL (ref 0–1.2)
BUN SERPL-MCNC: 45 MG/DL (ref 8–23)
BUN/CREAT SERPL: 4 (ref 12–20)
CALCIUM SERPL-MCNC: 8.8 MG/DL (ref 8.8–10.2)
CHLORIDE SERPL-SCNC: 94 MMOL/L (ref 98–107)
CO2 SERPL-SCNC: 21 MMOL/L (ref 20–29)
COLOR FLD: COLORLESS
CREAT SERPL-MCNC: 11.9 MG/DL (ref 0.6–1)
D DIMER PPP FEU-MCNC: 0.93 MG/L FEU (ref 0–0.65)
DIFFERENTIAL METHOD BLD: ABNORMAL
ECHO AO ROOT DIAM: 3.5 CM
ECHO AO ROOT INDEX: 2.05 CM/M2
ECHO AV AREA PEAK VELOCITY: 3 CM2
ECHO AV AREA VTI: 3.3 CM2
ECHO AV AREA/BSA PEAK VELOCITY: 1.8 CM2/M2
ECHO AV AREA/BSA VTI: 1.9 CM2/M2
ECHO AV MEAN GRADIENT: 4 MMHG
ECHO AV MEAN VELOCITY: 0.9 M/S
ECHO AV PEAK GRADIENT: 9 MMHG
ECHO AV PEAK VELOCITY: 1.5 M/S
ECHO AV VELOCITY RATIO: 1
ECHO AV VTI: 29.8 CM
ECHO BSA: 1.73 M2
ECHO EST RA PRESSURE: 3 MMHG
ECHO LA DIAMETER INDEX: 2.16 CM/M2
ECHO LA DIAMETER: 3.7 CM
ECHO LA TO AORTIC ROOT RATIO: 1.06
ECHO LA VOL A-L A2C: 52 ML (ref 22–52)
ECHO LA VOL A-L A4C: 51 ML (ref 22–52)
ECHO LA VOL MOD A2C: 50 ML (ref 22–52)
ECHO LA VOL MOD A4C: 47 ML (ref 22–52)
ECHO LA VOLUME AREA LENGTH: 54 ML
ECHO LA VOLUME INDEX A-L A2C: 30 ML/M2 (ref 16–34)
ECHO LA VOLUME INDEX A-L A4C: 30 ML/M2 (ref 16–34)
ECHO LA VOLUME INDEX AREA LENGTH: 32 ML/M2 (ref 16–34)
ECHO LA VOLUME INDEX MOD A2C: 29 ML/M2 (ref 16–34)
ECHO LA VOLUME INDEX MOD A4C: 27 ML/M2 (ref 16–34)
ECHO LV E' LATERAL VELOCITY: 8.21 CM/S
ECHO LV E' SEPTAL VELOCITY: 5.26 CM/S
ECHO LV EF PHYSICIAN: 65 %
ECHO LV FRACTIONAL SHORTENING: 59 % (ref 28–44)
ECHO LV INTERNAL DIMENSION DIASTOLE INDEX: 2.28 CM/M2
ECHO LV INTERNAL DIMENSION DIASTOLIC: 3.9 CM (ref 3.9–5.3)
ECHO LV INTERNAL DIMENSION SYSTOLIC INDEX: 0.94 CM/M2
ECHO LV INTERNAL DIMENSION SYSTOLIC: 1.6 CM
ECHO LV IVSD: 1.1 CM (ref 0.6–0.9)
ECHO LV MASS 2D: 113.6 G (ref 67–162)
ECHO LV MASS INDEX 2D: 66.4 G/M2 (ref 43–95)
ECHO LV POSTERIOR WALL DIASTOLIC: 0.8 CM (ref 0.6–0.9)
ECHO LV RELATIVE WALL THICKNESS RATIO: 0.41
ECHO LVOT AREA: 2.8 CM2
ECHO LVOT AV VTI INDEX: 1.13
ECHO LVOT DIAM: 1.9 CM
ECHO LVOT MEAN GRADIENT: 4 MMHG
ECHO LVOT PEAK GRADIENT: 9 MMHG
ECHO LVOT PEAK VELOCITY: 1.5 M/S
ECHO LVOT STROKE VOLUME INDEX: 55.8 ML/M2
ECHO LVOT SV: 95.5 ML
ECHO LVOT VTI: 33.7 CM
ECHO MV A VELOCITY: 0.65 M/S
ECHO MV AREA PHT: 3.1 CM2
ECHO MV AREA VTI: 3.4 CM2
ECHO MV E DECELERATION TIME (DT): 245 MS
ECHO MV E VELOCITY: 0.36 M/S
ECHO MV E/A RATIO: 0.55
ECHO MV E/E' LATERAL: 4.38
ECHO MV E/E' RATIO (AVERAGED): 5.61
ECHO MV E/E' SEPTAL: 6.84
ECHO MV LVOT VTI INDEX: 0.85
ECHO MV MAX VELOCITY: 1 M/S
ECHO MV MEAN GRADIENT: 2 MMHG
ECHO MV MEAN VELOCITY: 0.6 M/S
ECHO MV PEAK GRADIENT: 4 MMHG
ECHO MV PRESSURE HALF TIME (PHT): 71 MS
ECHO MV REGURGITANT PEAK GRADIENT: 81 MMHG
ECHO MV REGURGITANT PEAK VELOCITY: 4.5 M/S
ECHO MV VTI: 28.5 CM
ECHO RIGHT VENTRICULAR SYSTOLIC PRESSURE (RVSP): 24 MMHG
ECHO RV FREE WALL PEAK S': 11.4 CM/S
ECHO RV TAPSE: 1.8 CM (ref 1.7–?)
ECHO TV REGURGITANT MAX VELOCITY: 2.27 M/S
ECHO TV REGURGITANT PEAK GRADIENT: 21 MMHG
EKG ATRIAL RATE: 80 BPM
EKG DIAGNOSIS: NORMAL
EKG P AXIS: 60 DEGREES
EKG P-R INTERVAL: 146 MS
EKG Q-T INTERVAL: 392 MS
EKG QRS DURATION: 78 MS
EKG QTC CALCULATION (BAZETT): 452 MS
EKG R AXIS: 12 DEGREES
EKG T AXIS: 129 DEGREES
EKG VENTRICULAR RATE: 80 BPM
EOSINOPHIL # BLD: 0.05 K/UL (ref 0–0.4)
EOSINOPHIL NFR BLD: 0.4 % (ref 0–7)
ERYTHROCYTE [DISTWIDTH] IN BLOOD BY AUTOMATED COUNT: 15.3 % (ref 11.5–14.5)
EST. AVERAGE GLUCOSE BLD GHB EST-MCNC: 121 MG/DL
FOLATE SERPL-MCNC: >40 NG/ML (ref 4.8–24.2)
GLOBULIN SER CALC-MCNC: 2.5 G/DL (ref 2–4)
GLUCOSE BLD STRIP.AUTO-MCNC: 86 MG/DL (ref 65–117)
GLUCOSE BLD STRIP.AUTO-MCNC: 90 MG/DL (ref 65–117)
GLUCOSE BLD STRIP.AUTO-MCNC: 99 MG/DL (ref 65–117)
GLUCOSE BLD STRIP.AUTO-MCNC: 99 MG/DL (ref 65–117)
GLUCOSE SERPL-MCNC: 95 MG/DL (ref 65–100)
HBA1C MFR BLD: 5.8 % (ref 4–5.6)
HCT VFR BLD AUTO: 28.5 % (ref 35–47)
HGB BLD-MCNC: 9.4 G/DL (ref 11.5–16)
IMM GRANULOCYTES # BLD AUTO: 0.04 K/UL (ref 0–0.04)
IMM GRANULOCYTES NFR BLD AUTO: 0.4 % (ref 0–0.5)
LIPASE SERPL-CCNC: 496 U/L (ref 13–60)
LYMPHOCYTES # BLD: 3.85 K/UL (ref 0.8–3.5)
LYMPHOCYTES NFR BLD: 34.5 % (ref 12–49)
LYMPHOCYTES NFR FLD: 2 %
MAGNESIUM SERPL-MCNC: 3.8 MG/DL (ref 1.6–2.4)
MCH RBC QN AUTO: 28.7 PG (ref 26–34)
MCHC RBC AUTO-ENTMCNC: 33 G/DL (ref 30–36.5)
MCV RBC AUTO: 87.2 FL (ref 80–99)
MONOCYTES # BLD: 1.09 K/UL (ref 0–1)
MONOCYTES NFR BLD: 9.8 % (ref 5–13)
MONOS+MACROS NFR FLD: 96 %
NEUTROPHILS NFR FLD: 2 %
NEUTS SEG # BLD: 6.08 K/UL (ref 1.8–8)
NEUTS SEG NFR BLD: 54.5 % (ref 32–75)
NRBC # BLD: 0 K/UL (ref 0–0.01)
NRBC BLD-RTO: 0 PER 100 WBC
NUC CELL # FLD: 25 /CU MM
PHOSPHATE SERPL-MCNC: 6 MG/DL (ref 2.4–4.5)
PLATELET # BLD AUTO: 255 K/UL (ref 150–400)
PMV BLD AUTO: 10.9 FL (ref 8.9–12.9)
POTASSIUM SERPL-SCNC: 3.9 MMOL/L (ref 3.5–5.1)
PROT SERPL-MCNC: 6 G/DL (ref 6.4–8.3)
RBC # BLD AUTO: 3.27 M/UL (ref 3.8–5.2)
RBC # FLD: 0 /CU MM
SERVICE CMNT-IMP: NORMAL
SODIUM SERPL-SCNC: 135 MMOL/L (ref 136–145)
SPECIMEN SOURCE FLD: ABNORMAL
UFH PPP CHRO-ACNC: 0.99 IU/ML
VIT B12 SERPL-MCNC: 2568 PG/ML (ref 232–1245)
WBC # BLD AUTO: 11.2 K/UL (ref 3.6–11)

## 2025-08-15 PROCEDURE — 97161 PT EVAL LOW COMPLEX 20 MIN: CPT

## 2025-08-15 PROCEDURE — 93306 TTE W/DOPPLER COMPLETE: CPT

## 2025-08-15 PROCEDURE — 6360000002 HC RX W HCPCS: Performed by: NURSE PRACTITIONER

## 2025-08-15 PROCEDURE — 97165 OT EVAL LOW COMPLEX 30 MIN: CPT

## 2025-08-15 PROCEDURE — 6370000000 HC RX 637 (ALT 250 FOR IP): Performed by: NURSE PRACTITIONER

## 2025-08-15 PROCEDURE — 85025 COMPLETE CBC W/AUTO DIFF WBC: CPT

## 2025-08-15 PROCEDURE — 83735 ASSAY OF MAGNESIUM: CPT

## 2025-08-15 PROCEDURE — 85379 FIBRIN DEGRADATION QUANT: CPT

## 2025-08-15 PROCEDURE — 2580000003 HC RX 258: Performed by: INTERNAL MEDICINE

## 2025-08-15 PROCEDURE — 2500000003 HC RX 250 WO HCPCS: Performed by: INTERNAL MEDICINE

## 2025-08-15 PROCEDURE — P9047 ALBUMIN (HUMAN), 25%, 50ML: HCPCS | Performed by: NURSE PRACTITIONER

## 2025-08-15 PROCEDURE — 97530 THERAPEUTIC ACTIVITIES: CPT

## 2025-08-15 PROCEDURE — 90945 DIALYSIS ONE EVALUATION: CPT

## 2025-08-15 PROCEDURE — 83036 HEMOGLOBIN GLYCOSYLATED A1C: CPT

## 2025-08-15 PROCEDURE — 82962 GLUCOSE BLOOD TEST: CPT

## 2025-08-15 PROCEDURE — 89050 BODY FLUID CELL COUNT: CPT

## 2025-08-15 PROCEDURE — 83690 ASSAY OF LIPASE: CPT

## 2025-08-15 PROCEDURE — 80053 COMPREHEN METABOLIC PANEL: CPT

## 2025-08-15 PROCEDURE — 94760 N-INVAS EAR/PLS OXIMETRY 1: CPT

## 2025-08-15 PROCEDURE — A4722 DIALYS SOL FLD VOL > 1999CC: HCPCS | Performed by: INTERNAL MEDICINE

## 2025-08-15 PROCEDURE — 84100 ASSAY OF PHOSPHORUS: CPT

## 2025-08-15 PROCEDURE — 6370000000 HC RX 637 (ALT 250 FOR IP): Performed by: INTERNAL MEDICINE

## 2025-08-15 PROCEDURE — 99222 1ST HOSP IP/OBS MODERATE 55: CPT | Performed by: INTERNAL MEDICINE

## 2025-08-15 PROCEDURE — 93306 TTE W/DOPPLER COMPLETE: CPT | Performed by: INTERNAL MEDICINE

## 2025-08-15 PROCEDURE — 3E1M39Z IRRIGATION OF PERITONEAL CAVITY USING DIALYSATE, PERCUTANEOUS APPROACH: ICD-10-PCS | Performed by: INTERNAL MEDICINE

## 2025-08-15 PROCEDURE — 82607 VITAMIN B-12: CPT

## 2025-08-15 PROCEDURE — 85520 HEPARIN ASSAY: CPT

## 2025-08-15 PROCEDURE — 6360000002 HC RX W HCPCS: Performed by: INTERNAL MEDICINE

## 2025-08-15 PROCEDURE — 2060000000 HC ICU INTERMEDIATE R&B

## 2025-08-15 PROCEDURE — 82746 ASSAY OF FOLIC ACID SERUM: CPT

## 2025-08-15 RX ORDER — SODIUM CHLORIDE, SODIUM LACTATE, CALCIUM CHLORIDE, MAGNESIUM CHLORIDE AND DEXTROSE 1.5; 538; 448; 18.3; 5.08 G/100ML; MG/100ML; MG/100ML; MG/100ML; MG/100ML
6000 INJECTION, SOLUTION INTRAPERITONEAL CONTINUOUS
Status: DISCONTINUED | OUTPATIENT
Start: 2025-08-15 | End: 2025-08-18 | Stop reason: HOSPADM

## 2025-08-15 RX ORDER — LOPERAMIDE HYDROCHLORIDE 2 MG/1
2 CAPSULE ORAL 4 TIMES DAILY PRN
Status: DISCONTINUED | OUTPATIENT
Start: 2025-08-15 | End: 2025-08-18 | Stop reason: HOSPADM

## 2025-08-15 RX ORDER — SODIUM CHLORIDE, SODIUM LACTATE, CALCIUM CHLORIDE, MAGNESIUM CHLORIDE AND DEXTROSE 2.5; 538; 448; 18.3; 5.08 G/100ML; MG/100ML; MG/100ML; MG/100ML; MG/100ML
6000 INJECTION, SOLUTION INTRAPERITONEAL CONTINUOUS
Status: DISCONTINUED | OUTPATIENT
Start: 2025-08-15 | End: 2025-08-17

## 2025-08-15 RX ORDER — GENTAMICIN SULFATE 1 MG/G
CREAM TOPICAL DAILY PRN
Status: DISCONTINUED | OUTPATIENT
Start: 2025-08-15 | End: 2025-08-18 | Stop reason: HOSPADM

## 2025-08-15 RX ORDER — ALBUMIN (HUMAN) 12.5 G/50ML
25 SOLUTION INTRAVENOUS ONCE
Status: COMPLETED | OUTPATIENT
Start: 2025-08-15 | End: 2025-08-15

## 2025-08-15 RX ADMIN — MIDODRINE HYDROCHLORIDE 10 MG: 5 TABLET ORAL at 16:31

## 2025-08-15 RX ADMIN — ALBUMIN (HUMAN) 25 G: 0.25 INJECTION, SOLUTION INTRAVENOUS at 02:48

## 2025-08-15 RX ADMIN — SODIUM CHLORIDE, SODIUM LACTATE, CALCIUM CHLORIDE, MAGNESIUM CHLORIDE AND DEXTROSE 6000 ML: 1.5; 538; 448; 18.3; 5.08 INJECTION, SOLUTION INTRAPERITONEAL at 20:27

## 2025-08-15 RX ADMIN — LEVOTHYROXINE SODIUM 50 MCG: 0.05 TABLET ORAL at 07:26

## 2025-08-15 RX ADMIN — GENTAMICIN SULFATE: 1 CREAM TOPICAL at 20:27

## 2025-08-15 RX ADMIN — MIDODRINE HYDROCHLORIDE 10 MG: 5 TABLET ORAL at 09:24

## 2025-08-15 RX ADMIN — SODIUM CHLORIDE, PRESERVATIVE FREE 10 ML: 5 INJECTION INTRAVENOUS at 09:25

## 2025-08-15 RX ADMIN — LOPERAMIDE HYDROCHLORIDE 2 MG: 2 CAPSULE ORAL at 19:43

## 2025-08-15 RX ADMIN — ATORVASTATIN CALCIUM 20 MG: 20 TABLET, FILM COATED ORAL at 09:24

## 2025-08-15 RX ADMIN — ASPIRIN 81 MG: 81 TABLET, COATED ORAL at 09:24

## 2025-08-15 RX ADMIN — SODIUM CHLORIDE, SODIUM LACTATE, CALCIUM CHLORIDE, MAGNESIUM CHLORIDE AND DEXTROSE 6000 ML: 2.5; 538; 448; 18.3; 5.08 INJECTION, SOLUTION INTRAPERITONEAL at 20:28

## 2025-08-15 RX ADMIN — MIDODRINE HYDROCHLORIDE 10 MG: 5 TABLET ORAL at 12:06

## 2025-08-15 ASSESSMENT — PAIN SCALES - GENERAL
PAINLEVEL_OUTOF10: 0
PAINLEVEL_OUTOF10: 0

## 2025-08-16 LAB
ALBUMIN SERPL-MCNC: 3.5 G/DL (ref 3.5–5.2)
ANION GAP SERPL CALC-SCNC: 17 MMOL/L (ref 2–14)
BASOPHILS # BLD: 0.05 K/UL (ref 0–0.1)
BASOPHILS NFR BLD: 0.6 % (ref 0–1)
BUN SERPL-MCNC: 46 MG/DL (ref 8–23)
BUN/CREAT SERPL: 4 (ref 12–20)
CALCIUM SERPL-MCNC: 8.6 MG/DL (ref 8.8–10.2)
CHLORIDE SERPL-SCNC: 95 MMOL/L (ref 98–107)
CO2 SERPL-SCNC: 25 MMOL/L (ref 20–29)
CREAT SERPL-MCNC: 12 MG/DL (ref 0.6–1)
DIFFERENTIAL METHOD BLD: ABNORMAL
EOSINOPHIL # BLD: 0.1 K/UL (ref 0–0.4)
EOSINOPHIL NFR BLD: 1.1 % (ref 0–7)
ERYTHROCYTE [DISTWIDTH] IN BLOOD BY AUTOMATED COUNT: 15.5 % (ref 11.5–14.5)
GLUCOSE BLD STRIP.AUTO-MCNC: 101 MG/DL (ref 65–117)
GLUCOSE BLD STRIP.AUTO-MCNC: 105 MG/DL (ref 65–117)
GLUCOSE BLD STRIP.AUTO-MCNC: 120 MG/DL (ref 65–117)
GLUCOSE BLD STRIP.AUTO-MCNC: 167 MG/DL (ref 65–117)
GLUCOSE SERPL-MCNC: 105 MG/DL (ref 65–100)
GRAM STN SPEC: NORMAL
HCT VFR BLD AUTO: 29.2 % (ref 35–47)
HGB BLD-MCNC: 9.5 G/DL (ref 11.5–16)
IMM GRANULOCYTES # BLD AUTO: 0.03 K/UL (ref 0–0.04)
IMM GRANULOCYTES NFR BLD AUTO: 0.3 % (ref 0–0.5)
LYMPHOCYTES # BLD: 2.82 K/UL (ref 0.8–3.5)
LYMPHOCYTES NFR BLD: 31.7 % (ref 12–49)
MAGNESIUM SERPL-MCNC: 3.6 MG/DL (ref 1.6–2.4)
MCH RBC QN AUTO: 28.9 PG (ref 26–34)
MCHC RBC AUTO-ENTMCNC: 32.5 G/DL (ref 30–36.5)
MCV RBC AUTO: 88.8 FL (ref 80–99)
MONOCYTES # BLD: 0.85 K/UL (ref 0–1)
MONOCYTES NFR BLD: 9.6 % (ref 5–13)
NEUTS SEG # BLD: 5.04 K/UL (ref 1.8–8)
NEUTS SEG NFR BLD: 56.7 % (ref 32–75)
NRBC # BLD: 0 K/UL (ref 0–0.01)
NRBC BLD-RTO: 0 PER 100 WBC
PHOSPHATE SERPL-MCNC: 6.1 MG/DL (ref 2.4–4.5)
PLATELET # BLD AUTO: 233 K/UL (ref 150–400)
PMV BLD AUTO: 10.5 FL (ref 8.9–12.9)
POTASSIUM SERPL-SCNC: 3.6 MMOL/L (ref 3.5–5.1)
RBC # BLD AUTO: 3.29 M/UL (ref 3.8–5.2)
SERVICE CMNT-IMP: ABNORMAL
SERVICE CMNT-IMP: ABNORMAL
SERVICE CMNT-IMP: NORMAL
SODIUM SERPL-SCNC: 137 MMOL/L (ref 136–145)
WBC # BLD AUTO: 8.9 K/UL (ref 3.6–11)

## 2025-08-16 PROCEDURE — 80069 RENAL FUNCTION PANEL: CPT

## 2025-08-16 PROCEDURE — A4722 DIALYS SOL FLD VOL > 1999CC: HCPCS | Performed by: INTERNAL MEDICINE

## 2025-08-16 PROCEDURE — 6360000002 HC RX W HCPCS: Performed by: INTERNAL MEDICINE

## 2025-08-16 PROCEDURE — 6370000000 HC RX 637 (ALT 250 FOR IP): Performed by: NURSE PRACTITIONER

## 2025-08-16 PROCEDURE — 90945 DIALYSIS ONE EVALUATION: CPT

## 2025-08-16 PROCEDURE — 2580000003 HC RX 258: Performed by: INTERNAL MEDICINE

## 2025-08-16 PROCEDURE — 85025 COMPLETE CBC W/AUTO DIFF WBC: CPT

## 2025-08-16 PROCEDURE — 2060000000 HC ICU INTERMEDIATE R&B

## 2025-08-16 PROCEDURE — 82962 GLUCOSE BLOOD TEST: CPT

## 2025-08-16 PROCEDURE — 2580000003 HC RX 258: Performed by: HOSPITALIST

## 2025-08-16 PROCEDURE — 2500000003 HC RX 250 WO HCPCS: Performed by: INTERNAL MEDICINE

## 2025-08-16 PROCEDURE — 83735 ASSAY OF MAGNESIUM: CPT

## 2025-08-16 PROCEDURE — 6370000000 HC RX 637 (ALT 250 FOR IP): Performed by: INTERNAL MEDICINE

## 2025-08-16 RX ORDER — SODIUM CHLORIDE 9 MG/ML
INJECTION, SOLUTION INTRAVENOUS CONTINUOUS
Status: DISPENSED | OUTPATIENT
Start: 2025-08-16 | End: 2025-08-17

## 2025-08-16 RX ORDER — 0.9 % SODIUM CHLORIDE 0.9 %
500 INTRAVENOUS SOLUTION INTRAVENOUS ONCE
Status: COMPLETED | OUTPATIENT
Start: 2025-08-16 | End: 2025-08-16

## 2025-08-16 RX ADMIN — SODIUM CHLORIDE, SODIUM LACTATE, CALCIUM CHLORIDE, MAGNESIUM CHLORIDE AND DEXTROSE 6000 ML: 2.5; 538; 448; 18.3; 5.08 INJECTION, SOLUTION INTRAPERITONEAL at 18:39

## 2025-08-16 RX ADMIN — SODIUM CHLORIDE: 9 INJECTION, SOLUTION INTRAVENOUS at 18:46

## 2025-08-16 RX ADMIN — MIDODRINE HYDROCHLORIDE 10 MG: 5 TABLET ORAL at 08:37

## 2025-08-16 RX ADMIN — SODIUM CHLORIDE, PRESERVATIVE FREE 10 ML: 5 INJECTION INTRAVENOUS at 08:37

## 2025-08-16 RX ADMIN — GENTAMICIN SULFATE: 1 CREAM TOPICAL at 18:39

## 2025-08-16 RX ADMIN — SODIUM CHLORIDE, SODIUM LACTATE, CALCIUM CHLORIDE, MAGNESIUM CHLORIDE AND DEXTROSE 6000 ML: 1.5; 538; 448; 18.3; 5.08 INJECTION, SOLUTION INTRAPERITONEAL at 18:39

## 2025-08-16 RX ADMIN — MIDODRINE HYDROCHLORIDE 10 MG: 5 TABLET ORAL at 12:48

## 2025-08-16 RX ADMIN — ASPIRIN 81 MG: 81 TABLET, COATED ORAL at 08:36

## 2025-08-16 RX ADMIN — LOPERAMIDE HYDROCHLORIDE 2 MG: 2 CAPSULE ORAL at 08:37

## 2025-08-16 RX ADMIN — LOPERAMIDE HYDROCHLORIDE 2 MG: 2 CAPSULE ORAL at 20:30

## 2025-08-16 RX ADMIN — SODIUM CHLORIDE 500 ML: 0.9 INJECTION, SOLUTION INTRAVENOUS at 14:38

## 2025-08-16 RX ADMIN — ATORVASTATIN CALCIUM 20 MG: 20 TABLET, FILM COATED ORAL at 08:37

## 2025-08-16 RX ADMIN — LEVOTHYROXINE SODIUM 50 MCG: 0.05 TABLET ORAL at 07:47

## 2025-08-16 RX ADMIN — MIDODRINE HYDROCHLORIDE 10 MG: 5 TABLET ORAL at 18:46

## 2025-08-16 ASSESSMENT — PAIN SCALES - GENERAL: PAINLEVEL_OUTOF10: 0

## 2025-08-17 LAB
ALBUMIN SERPL-MCNC: 2.9 G/DL (ref 3.5–5.2)
ANION GAP SERPL CALC-SCNC: 17 MMOL/L (ref 2–14)
BASOPHILS # BLD: 0.05 K/UL (ref 0–0.1)
BASOPHILS NFR BLD: 0.6 % (ref 0–1)
BUN SERPL-MCNC: 45 MG/DL (ref 8–23)
CALCIUM SERPL-MCNC: 8.1 MG/DL (ref 8.8–10.2)
CHLORIDE SERPL-SCNC: 99 MMOL/L (ref 98–107)
CO2 SERPL-SCNC: 21 MMOL/L (ref 20–29)
CORTIS SERPL-MCNC: 17.6 UG/DL
CREAT SERPL-MCNC: 11.5 MG/DL (ref 0.6–1)
DIFFERENTIAL METHOD BLD: ABNORMAL
EOSINOPHIL # BLD: 0.04 K/UL (ref 0–0.4)
EOSINOPHIL NFR BLD: 0.5 % (ref 0–7)
ERYTHROCYTE [DISTWIDTH] IN BLOOD BY AUTOMATED COUNT: 15.5 % (ref 11.5–14.5)
GLUCOSE BLD STRIP.AUTO-MCNC: 118 MG/DL (ref 65–117)
GLUCOSE BLD STRIP.AUTO-MCNC: 160 MG/DL (ref 65–117)
GLUCOSE BLD STRIP.AUTO-MCNC: 85 MG/DL (ref 65–117)
GLUCOSE BLD STRIP.AUTO-MCNC: 92 MG/DL (ref 65–117)
GLUCOSE SERPL-MCNC: 91 MG/DL (ref 65–100)
HCT VFR BLD AUTO: 27.9 % (ref 35–47)
HGB BLD-MCNC: 9 G/DL (ref 11.5–16)
IMM GRANULOCYTES # BLD AUTO: 0.03 K/UL (ref 0–0.04)
IMM GRANULOCYTES NFR BLD AUTO: 0.3 % (ref 0–0.5)
LYMPHOCYTES # BLD: 2.46 K/UL (ref 0.8–3.5)
LYMPHOCYTES NFR BLD: 28.5 % (ref 12–49)
MAGNESIUM SERPL-MCNC: 3.1 MG/DL (ref 1.6–2.4)
MCH RBC QN AUTO: 28.7 PG (ref 26–34)
MCHC RBC AUTO-ENTMCNC: 32.3 G/DL (ref 30–36.5)
MCV RBC AUTO: 88.9 FL (ref 80–99)
MONOCYTES # BLD: 1.12 K/UL (ref 0–1)
MONOCYTES NFR BLD: 13 % (ref 5–13)
NEUTS SEG # BLD: 4.94 K/UL (ref 1.8–8)
NEUTS SEG NFR BLD: 57.1 % (ref 32–75)
NRBC # BLD: 0 K/UL (ref 0–0.01)
NRBC BLD-RTO: 0 PER 100 WBC
PHOSPHATE SERPL-MCNC: 5.7 MG/DL (ref 2.4–4.5)
PLATELET # BLD AUTO: 211 K/UL (ref 150–400)
PMV BLD AUTO: 10.5 FL (ref 8.9–12.9)
POTASSIUM SERPL-SCNC: 3.4 MMOL/L (ref 3.5–5.1)
RBC # BLD AUTO: 3.14 M/UL (ref 3.8–5.2)
SERVICE CMNT-IMP: ABNORMAL
SERVICE CMNT-IMP: ABNORMAL
SERVICE CMNT-IMP: NORMAL
SERVICE CMNT-IMP: NORMAL
SODIUM SERPL-SCNC: 138 MMOL/L (ref 136–145)
WBC # BLD AUTO: 8.6 K/UL (ref 3.6–11)

## 2025-08-17 PROCEDURE — 82533 TOTAL CORTISOL: CPT

## 2025-08-17 PROCEDURE — 83735 ASSAY OF MAGNESIUM: CPT

## 2025-08-17 PROCEDURE — 6370000000 HC RX 637 (ALT 250 FOR IP): Performed by: NURSE PRACTITIONER

## 2025-08-17 PROCEDURE — 85025 COMPLETE CBC W/AUTO DIFF WBC: CPT

## 2025-08-17 PROCEDURE — A4722 DIALYS SOL FLD VOL > 1999CC: HCPCS | Performed by: INTERNAL MEDICINE

## 2025-08-17 PROCEDURE — 2060000000 HC ICU INTERMEDIATE R&B

## 2025-08-17 PROCEDURE — 2580000003 HC RX 258: Performed by: HOSPITALIST

## 2025-08-17 PROCEDURE — 6370000000 HC RX 637 (ALT 250 FOR IP): Performed by: HOSPITALIST

## 2025-08-17 PROCEDURE — 90945 DIALYSIS ONE EVALUATION: CPT

## 2025-08-17 PROCEDURE — 2580000003 HC RX 258: Performed by: INTERNAL MEDICINE

## 2025-08-17 PROCEDURE — 82962 GLUCOSE BLOOD TEST: CPT

## 2025-08-17 PROCEDURE — 6360000002 HC RX W HCPCS: Performed by: INTERNAL MEDICINE

## 2025-08-17 PROCEDURE — 2500000003 HC RX 250 WO HCPCS: Performed by: INTERNAL MEDICINE

## 2025-08-17 PROCEDURE — 6370000000 HC RX 637 (ALT 250 FOR IP): Performed by: INTERNAL MEDICINE

## 2025-08-17 PROCEDURE — 80069 RENAL FUNCTION PANEL: CPT

## 2025-08-17 RX ORDER — 0.9 % SODIUM CHLORIDE 0.9 %
500 INTRAVENOUS SOLUTION INTRAVENOUS ONCE
Status: COMPLETED | OUTPATIENT
Start: 2025-08-17 | End: 2025-08-17

## 2025-08-17 RX ORDER — LACTOBACILLUS RHAMNOSUS GG 10B CELL
1 CAPSULE ORAL 2 TIMES DAILY WITH MEALS
Status: DISCONTINUED | OUTPATIENT
Start: 2025-08-17 | End: 2025-08-18 | Stop reason: HOSPADM

## 2025-08-17 RX ADMIN — ATORVASTATIN CALCIUM 20 MG: 20 TABLET, FILM COATED ORAL at 08:31

## 2025-08-17 RX ADMIN — SODIUM CHLORIDE, PRESERVATIVE FREE 10 ML: 5 INJECTION INTRAVENOUS at 08:31

## 2025-08-17 RX ADMIN — ASPIRIN 81 MG: 81 TABLET, COATED ORAL at 08:31

## 2025-08-17 RX ADMIN — MIDODRINE HYDROCHLORIDE 10 MG: 5 TABLET ORAL at 08:31

## 2025-08-17 RX ADMIN — Medication 1 CAPSULE: at 12:07

## 2025-08-17 RX ADMIN — Medication 1 CAPSULE: at 16:34

## 2025-08-17 RX ADMIN — LEVOTHYROXINE SODIUM 50 MCG: 0.05 TABLET ORAL at 08:31

## 2025-08-17 RX ADMIN — SODIUM CHLORIDE, PRESERVATIVE FREE 10 ML: 5 INJECTION INTRAVENOUS at 20:47

## 2025-08-17 RX ADMIN — SODIUM CHLORIDE, SODIUM LACTATE, CALCIUM CHLORIDE, MAGNESIUM CHLORIDE AND DEXTROSE 6000 ML: 1.5; 538; 448; 18.3; 5.08 INJECTION, SOLUTION INTRAPERITONEAL at 17:09

## 2025-08-17 RX ADMIN — GENTAMICIN SULFATE: 1 CREAM TOPICAL at 17:09

## 2025-08-17 RX ADMIN — LOPERAMIDE HYDROCHLORIDE 2 MG: 2 CAPSULE ORAL at 13:49

## 2025-08-17 RX ADMIN — MIDODRINE HYDROCHLORIDE 10 MG: 5 TABLET ORAL at 12:07

## 2025-08-17 RX ADMIN — SODIUM CHLORIDE 500 ML: 0.9 INJECTION, SOLUTION INTRAVENOUS at 12:08

## 2025-08-17 RX ADMIN — MIDODRINE HYDROCHLORIDE 10 MG: 5 TABLET ORAL at 16:34

## 2025-08-17 ASSESSMENT — PAIN SCALES - GENERAL: PAINLEVEL_OUTOF10: 0

## 2025-08-18 VITALS
TEMPERATURE: 98.6 F | RESPIRATION RATE: 12 BRPM | DIASTOLIC BLOOD PRESSURE: 30 MMHG | SYSTOLIC BLOOD PRESSURE: 107 MMHG | HEART RATE: 72 BPM | WEIGHT: 147.71 LBS | OXYGEN SATURATION: 100 % | HEIGHT: 64 IN | BODY MASS INDEX: 25.22 KG/M2

## 2025-08-18 LAB
GLUCOSE BLD STRIP.AUTO-MCNC: 107 MG/DL (ref 65–117)
GLUCOSE BLD STRIP.AUTO-MCNC: 84 MG/DL (ref 65–117)
SERVICE CMNT-IMP: NORMAL
SERVICE CMNT-IMP: NORMAL

## 2025-08-18 PROCEDURE — 97530 THERAPEUTIC ACTIVITIES: CPT

## 2025-08-18 PROCEDURE — 82962 GLUCOSE BLOOD TEST: CPT

## 2025-08-18 PROCEDURE — 6370000000 HC RX 637 (ALT 250 FOR IP): Performed by: INTERNAL MEDICINE

## 2025-08-18 PROCEDURE — 2500000003 HC RX 250 WO HCPCS: Performed by: INTERNAL MEDICINE

## 2025-08-18 PROCEDURE — 6370000000 HC RX 637 (ALT 250 FOR IP): Performed by: HOSPITALIST

## 2025-08-18 RX ORDER — LOPERAMIDE HYDROCHLORIDE 2 MG/1
2 CAPSULE ORAL 3 TIMES DAILY PRN
Qty: 30 CAPSULE | Refills: 0 | Status: SHIPPED | OUTPATIENT
Start: 2025-08-18 | End: 2025-08-25

## 2025-08-18 RX ORDER — MIDODRINE HYDROCHLORIDE 10 MG/1
10 TABLET ORAL
Qty: 90 TABLET | Refills: 3 | Status: SHIPPED | OUTPATIENT
Start: 2025-08-18

## 2025-08-18 RX ADMIN — ACETAMINOPHEN 650 MG: 325 TABLET ORAL at 08:42

## 2025-08-18 RX ADMIN — ATORVASTATIN CALCIUM 20 MG: 20 TABLET, FILM COATED ORAL at 08:46

## 2025-08-18 RX ADMIN — MIDODRINE HYDROCHLORIDE 10 MG: 5 TABLET ORAL at 12:38

## 2025-08-18 RX ADMIN — SODIUM CHLORIDE, PRESERVATIVE FREE 10 ML: 5 INJECTION INTRAVENOUS at 08:49

## 2025-08-18 RX ADMIN — MIDODRINE HYDROCHLORIDE 10 MG: 5 TABLET ORAL at 17:15

## 2025-08-18 RX ADMIN — ASPIRIN 81 MG: 81 TABLET, COATED ORAL at 08:46

## 2025-08-18 RX ADMIN — MIDODRINE HYDROCHLORIDE 10 MG: 5 TABLET ORAL at 08:46

## 2025-08-18 RX ADMIN — Medication 1 CAPSULE: at 08:46

## 2025-08-18 RX ADMIN — LEVOTHYROXINE SODIUM 50 MCG: 0.05 TABLET ORAL at 07:13

## 2025-08-18 ASSESSMENT — PAIN SCALES - GENERAL
PAINLEVEL_OUTOF10: 0

## 2025-08-25 ENCOUNTER — OFFICE VISIT (OUTPATIENT)
Age: 80
End: 2025-08-25
Payer: MEDICARE

## 2025-08-25 VITALS
HEART RATE: 80 BPM | SYSTOLIC BLOOD PRESSURE: 90 MMHG | DIASTOLIC BLOOD PRESSURE: 52 MMHG | WEIGHT: 160.6 LBS | HEIGHT: 64 IN | BODY MASS INDEX: 27.42 KG/M2

## 2025-08-25 DIAGNOSIS — R07.9 CHEST PAIN, UNSPECIFIED TYPE: Primary | ICD-10-CM

## 2025-08-25 DIAGNOSIS — R06.02 SHORTNESS OF BREATH: ICD-10-CM

## 2025-08-25 PROCEDURE — 3078F DIAST BP <80 MM HG: CPT | Performed by: NURSE PRACTITIONER

## 2025-08-25 PROCEDURE — 1123F ACP DISCUSS/DSCN MKR DOCD: CPT | Performed by: NURSE PRACTITIONER

## 2025-08-25 PROCEDURE — 1160F RVW MEDS BY RX/DR IN RCRD: CPT | Performed by: NURSE PRACTITIONER

## 2025-08-25 PROCEDURE — 3074F SYST BP LT 130 MM HG: CPT | Performed by: NURSE PRACTITIONER

## 2025-08-25 PROCEDURE — 1159F MED LIST DOCD IN RCRD: CPT | Performed by: NURSE PRACTITIONER

## 2025-08-25 PROCEDURE — 99214 OFFICE O/P EST MOD 30 MIN: CPT | Performed by: NURSE PRACTITIONER

## 2025-08-25 PROCEDURE — 1126F AMNT PAIN NOTED NONE PRSNT: CPT | Performed by: NURSE PRACTITIONER

## 2025-08-25 ASSESSMENT — PATIENT HEALTH QUESTIONNAIRE - PHQ9
SUM OF ALL RESPONSES TO PHQ QUESTIONS 1-9: 1
1. LITTLE INTEREST OR PLEASURE IN DOING THINGS: NOT AT ALL
2. FEELING DOWN, DEPRESSED OR HOPELESS: SEVERAL DAYS
SUM OF ALL RESPONSES TO PHQ QUESTIONS 1-9: 1

## (undated) DEVICE — DERMABOND SKIN ADH 0.7ML -- DERMABOND ADVANCED 12/BX

## (undated) DEVICE — AEGIS 1" DISK 4MM HOLE, PEEL OPEN: Brand: MEDLINE

## (undated) DEVICE — CANN NASAL O2 CAPNOGRAPHY AD -- FILTERLINE

## (undated) DEVICE — REM POLYHESIVE ADULT PATIENT RETURN ELECTRODE: Brand: VALLEYLAB

## (undated) DEVICE — PAD,SANITARY,11 IN,MAXI,N-STRL,IND WRAP: Brand: MEDLINE

## (undated) DEVICE — SOLUTION INJ 1000ML ST H2O FLX CONT

## (undated) DEVICE — SET ADMIN 16ML TBNG L100IN 2 Y INJ SITE IV PIGGY BK DISP

## (undated) DEVICE — SET EXTN TBNG L BOR 4 W STPCOCK ST 32IN PRIMING VOL 6ML

## (undated) DEVICE — GLOVE SURG SZ 8 L12IN FNGR THK94MIL STD WHT LTX FREE

## (undated) DEVICE — KIT IV STRT W CHLORAPREP PD 1ML

## (undated) DEVICE — TROCAR: Brand: KII FIOS FIRST ENTRY

## (undated) DEVICE — ENDO CARRY-ON PROCEDURE KIT INCLUDES ENZYMATIC SPONGE, GAUZE, BIOHAZARD LABEL, TRAY, LUBRICANT, DIRTY SCOPE LABEL, WATER LABEL, TRAY, DRAWSTRING PAD, AND DEFENDO 4-PIECE KIT.: Brand: ENDO CARRY-ON PROCEDURE KIT

## (undated) DEVICE — BW-412T DISP COMBO CLEANING BRUSH: Brand: SINGLE USE COMBINATION CLEANING BRUSH

## (undated) DEVICE — SURGICAL PROCEDURE PACK BASIN MAJ SET CUST NO CAUT

## (undated) DEVICE — STRAP RESTRAIN W3.5XL19IN TECLIN STRRP POS LEG DURING LITH

## (undated) DEVICE — FORCEPS BX L240CM JAW DIA2.8MM L CAP W/ NDL MIC MESH TOOTH

## (undated) DEVICE — 3M™ TEGADERM™ TRANSPARENT FILM DRESSING FRAME STYLE, 1626W, 4 IN X 4-3/4 IN (10 CM X 12 CM), 50/CT 4CT/CASE: Brand: 3M™ TEGADERM™

## (undated) DEVICE — PACK,BASIC,SIRUS,V: Brand: MEDLINE

## (undated) DEVICE — SOL INJ SOD CL 0.9% 500ML BG --

## (undated) DEVICE — QUILTED PREMIUM COMFORT UNDERPAD,EXTRA HEAVY: Brand: WINGS

## (undated) DEVICE — NEEDLE HYPO 18GA L1.5IN PNK S STL HUB POLYPR SHLD REG BVL

## (undated) DEVICE — SUTURE PROL SZ 0 L30IN NONABSORBABLE BLU L26MM CT-2 1/2 CIR 8412H

## (undated) DEVICE — DEVON™ KNEE AND BODY STRAP 60" X 3" (1.5 M X 7.6 CM): Brand: DEVON

## (undated) DEVICE — SUTURE PROL SZ 2-0 L36IN NONABSORBABLE BLU RB-1 L17MM 1/2 8559H

## (undated) DEVICE — CATH FOL TY IC BAG 16FR 2000ML -- CONVERT TO ITEM 363158

## (undated) DEVICE — 1200 GUARD II KIT W/5MM TUBE W/O VAC TUBE: Brand: GUARDIAN

## (undated) DEVICE — SPONGE GZ W4XL4IN COT 12 PLY TYP VII WVN C FLD DSGN

## (undated) DEVICE — CATH IV AUTOGRD BC BLU 22GA 25 -- INSYTE

## (undated) DEVICE — SYR 10ML CTRL LR LCK NSAF LF --

## (undated) DEVICE — BAG BELONG PT PERS CLEAR HANDL

## (undated) DEVICE — Device

## (undated) DEVICE — SNARE ENDOSCP L240CM LOOP W27MM SHTH DIA2.4MM WRK CHN 2.8MM

## (undated) DEVICE — BLADE ASSEMB CLP HAIR FINE --

## (undated) DEVICE — HYPODERMIC SAFETY NEEDLE: Brand: MONOJECT

## (undated) DEVICE — TRAP SURG QUAD PARABOLA SLOT DSGN SFTY SCRN TRAPEASE

## (undated) DEVICE — SUTURE VCRL SZ 3-0 L27IN ABSRB UD L26MM SH 1/2 CIR J416H

## (undated) DEVICE — Z CONVERTED USE 2274299 CUFF BLD PRESSURE LNG MED AD 25-35 CM ARM FLEXIPORT DISP

## (undated) DEVICE — Z DISCONTINUED USE 2751540 TUBING IRRIG L10IN DISP PMP ENDOGATOR

## (undated) DEVICE — THIS ADAPTER IS A DOUBLE SEALING FEMALE LUER LOCK ADAPTER WITH A 2-PIECE, COMBINATION COMPRESSION FIT/BARBED CATHETER CONNECTOR. THE ADAPTER IS USED TO CONNECT THE PD CATHETER TO A SOLUTION TRANSFER SET WITH LOCKING CONNECTOR.: Brand: LOCKING TITANIUM ADAPTER FOR PERITONEAL DIALYSIS CATHETER

## (undated) DEVICE — SUTURE PDS II SZ 2-0 L27IN ABSRB VLT SH L26MM 1/2 CIR Z317H

## (undated) DEVICE — DECANTER BAG 9": Brand: MEDLINE INDUSTRIES, INC.

## (undated) DEVICE — SYRINGE MED 20ML STD CLR PLAS LUERLOCK TIP N CTRL DISP

## (undated) DEVICE — AIRLIFE™ U/CONNECT-IT OXYGEN TUBING 7 FEET (2.1 M) CRUSH-RESISTANT OXYGEN TUBING, VINYL TIPPED: Brand: AIRLIFE™

## (undated) DEVICE — SUTURE SZ 0 27IN 5/8 CIR UR-6  TAPER PT VIOLET ABSRB VICRYL J603H

## (undated) DEVICE — TRAY PREP DRY W/ PREM GLV 2 APPL 6 SPNG 2 UNDPD 1 OVERWRAP

## (undated) DEVICE — DRAPE SURG CYSTO N REINF ST W O FLD PCH STD

## (undated) DEVICE — BAG SPEC BIOHZD LF 2MIL 6X10IN -- CONVERT TO ITEM 357326

## (undated) DEVICE — INFECTION CONTROL KIT SYS

## (undated) DEVICE — CONNECTOR TBNG AUX H2O JET DISP FOR OLY 160/180 SER

## (undated) DEVICE — CONTAINER SPEC 20 ML LID NEUT BUFF FORMALIN 10 % POLYPR STS

## (undated) DEVICE — SYR 50ML SLIP TIP NSAF LF STRL --

## (undated) DEVICE — TOWEL SURG W17XL27IN STD BLU COT NONFENESTRATED PREWASHED

## (undated) DEVICE — KENDALL RADIOLUCENT FOAM MONITORING ELECTRODE -RECTANGULAR SHAPE: Brand: KENDALL

## (undated) DEVICE — GOWN,SIRUS,NONRNF,SETINSLV,2XL,18/CS: Brand: MEDLINE

## (undated) DEVICE — SOLIDIFIER FLUID 3000 CC ABSORB

## (undated) DEVICE — Z INACTIVE USE 2527070 DRAPE SURG W40XL44IN UNDERBUTTOCK SMS POLYPR W/ PCH BK DISP

## (undated) DEVICE — LAPAROSCOPIC TROCAR SLEEVE/SINGLE USE: Brand: KII® OPTICAL ACCESS SYSTEM

## (undated) DEVICE — GENERAL LAPAROSCOPY-MRMC: Brand: MEDLINE INDUSTRIES, INC.

## (undated) DEVICE — ASTOUND STANDARD SURGICAL GOWN, XXL: Brand: CONVERTORS

## (undated) DEVICE — NEONATAL-ADULT SPO2 SENSOR: Brand: NELLCOR

## (undated) DEVICE — SUTURE VCRL SZ 0 L18IN ABSRB VLT L26MM CT-2 1/2 CIR J727D

## (undated) DEVICE — NEEDLE HYPO 22GA L1.5IN BLK S STL HUB POLYPR SHLD REG BVL

## (undated) DEVICE — ROCKER SWITCH PENCIL BLADE ELECTRODE, HOLSTER: Brand: EDGE

## (undated) DEVICE — CYSTO/BLADDER IRRIGATION SET, REGULATING CLAMP

## (undated) DEVICE — X-RAY SPONGES,16 PLY: Brand: DERMACEA

## (undated) DEVICE — SUTURE MCRYL SZ 4-0 L27IN ABSRB UD L19MM PS-2 1/2 CIR PRIM Y426H

## (undated) DEVICE — TROCAR: Brand: KII® SLEEVE

## (undated) DEVICE — WRISTBAND ID AD W1XL11.5IN RED POLY ALRG PREPRINTED PERM